# Patient Record
Sex: FEMALE | Race: OTHER | HISPANIC OR LATINO | Employment: OTHER | URBAN - METROPOLITAN AREA
[De-identification: names, ages, dates, MRNs, and addresses within clinical notes are randomized per-mention and may not be internally consistent; named-entity substitution may affect disease eponyms.]

---

## 2022-05-06 ENCOUNTER — HOSPITAL ENCOUNTER (INPATIENT)
Facility: HOSPITAL | Age: 57
LOS: 6 days | Discharge: HOME | DRG: 394 | End: 2022-05-13
Attending: EMERGENCY MEDICINE | Admitting: SURGERY
Payer: MEDICARE

## 2022-05-06 ENCOUNTER — APPOINTMENT (EMERGENCY)
Dept: RADIOLOGY | Facility: HOSPITAL | Age: 57
DRG: 394 | End: 2022-05-06
Payer: MEDICARE

## 2022-05-06 DIAGNOSIS — D73.3 SPLENIC ABSCESS: Primary | ICD-10-CM

## 2022-05-06 DIAGNOSIS — R10.12 LEFT UPPER QUADRANT ABDOMINAL PAIN: ICD-10-CM

## 2022-05-06 DIAGNOSIS — L03.311 CELLULITIS OF ABDOMINAL WALL: ICD-10-CM

## 2022-05-06 LAB
ALBUMIN SERPL BCP-MCNC: 2.8 G/DL (ref 3.5–5)
ALP SERPL-CCNC: 85 U/L (ref 46–116)
ALT SERPL W P-5'-P-CCNC: 19 U/L (ref 12–78)
ANION GAP SERPL CALCULATED.3IONS-SCNC: 7 MMOL/L (ref 4–13)
AST SERPL W P-5'-P-CCNC: 20 U/L (ref 5–45)
BASOPHILS # BLD AUTO: 0.09 THOUSANDS/ΜL (ref 0–0.1)
BASOPHILS NFR BLD AUTO: 1 % (ref 0–1)
BILIRUB SERPL-MCNC: 0.18 MG/DL (ref 0.2–1)
BUN SERPL-MCNC: 16 MG/DL (ref 5–25)
CALCIUM ALBUM COR SERPL-MCNC: 9.8 MG/DL (ref 8.3–10.1)
CALCIUM SERPL-MCNC: 8.8 MG/DL (ref 8.3–10.1)
CHLORIDE SERPL-SCNC: 107 MMOL/L (ref 100–108)
CO2 SERPL-SCNC: 23 MMOL/L (ref 21–32)
CREAT SERPL-MCNC: 0.7 MG/DL (ref 0.6–1.3)
EOSINOPHIL # BLD AUTO: 0.23 THOUSAND/ΜL (ref 0–0.61)
EOSINOPHIL NFR BLD AUTO: 2 % (ref 0–6)
ERYTHROCYTE [DISTWIDTH] IN BLOOD BY AUTOMATED COUNT: 17.4 % (ref 11.6–15.1)
GFR SERPL CREATININE-BSD FRML MDRD: 95 ML/MIN/1.73SQ M
GLUCOSE SERPL-MCNC: 90 MG/DL (ref 65–140)
HCT VFR BLD AUTO: 34.1 % (ref 34.8–46.1)
HGB BLD-MCNC: 10.9 G/DL (ref 11.5–15.4)
IMM GRANULOCYTES # BLD AUTO: 0.03 THOUSAND/UL (ref 0–0.2)
IMM GRANULOCYTES NFR BLD AUTO: 0 % (ref 0–2)
LYMPHOCYTES # BLD AUTO: 2.86 THOUSANDS/ΜL (ref 0.6–4.47)
LYMPHOCYTES NFR BLD AUTO: 22 % (ref 14–44)
MCH RBC QN AUTO: 25 PG (ref 26.8–34.3)
MCHC RBC AUTO-ENTMCNC: 32 G/DL (ref 31.4–37.4)
MCV RBC AUTO: 78 FL (ref 82–98)
MONOCYTES # BLD AUTO: 1.11 THOUSAND/ΜL (ref 0.17–1.22)
MONOCYTES NFR BLD AUTO: 9 % (ref 4–12)
NEUTROPHILS # BLD AUTO: 8.58 THOUSANDS/ΜL (ref 1.85–7.62)
NEUTS SEG NFR BLD AUTO: 66 % (ref 43–75)
NRBC BLD AUTO-RTO: 0 /100 WBCS
PLATELET # BLD AUTO: 528 THOUSANDS/UL (ref 149–390)
PMV BLD AUTO: 8.9 FL (ref 8.9–12.7)
POTASSIUM SERPL-SCNC: 4.4 MMOL/L (ref 3.5–5.3)
PROT SERPL-MCNC: 7.2 G/DL (ref 6.4–8.2)
RBC # BLD AUTO: 4.36 MILLION/UL (ref 3.81–5.12)
SODIUM SERPL-SCNC: 137 MMOL/L (ref 136–145)
WBC # BLD AUTO: 12.9 THOUSAND/UL (ref 4.31–10.16)

## 2022-05-06 PROCEDURE — 96375 TX/PRO/DX INJ NEW DRUG ADDON: CPT

## 2022-05-06 PROCEDURE — 99285 EMERGENCY DEPT VISIT HI MDM: CPT | Performed by: EMERGENCY MEDICINE

## 2022-05-06 PROCEDURE — G1004 CDSM NDSC: HCPCS

## 2022-05-06 PROCEDURE — 80053 COMPREHEN METABOLIC PANEL: CPT | Performed by: EMERGENCY MEDICINE

## 2022-05-06 PROCEDURE — 36415 COLL VENOUS BLD VENIPUNCTURE: CPT | Performed by: EMERGENCY MEDICINE

## 2022-05-06 PROCEDURE — 99285 EMERGENCY DEPT VISIT HI MDM: CPT

## 2022-05-06 PROCEDURE — 96376 TX/PRO/DX INJ SAME DRUG ADON: CPT

## 2022-05-06 PROCEDURE — 85025 COMPLETE CBC W/AUTO DIFF WBC: CPT | Performed by: EMERGENCY MEDICINE

## 2022-05-06 PROCEDURE — 74176 CT ABD & PELVIS W/O CONTRAST: CPT

## 2022-05-06 PROCEDURE — 93005 ELECTROCARDIOGRAM TRACING: CPT

## 2022-05-06 PROCEDURE — 96365 THER/PROPH/DIAG IV INF INIT: CPT

## 2022-05-06 RX ORDER — HYDROMORPHONE HCL/PF 1 MG/ML
0.5 SYRINGE (ML) INJECTION ONCE
Status: COMPLETED | OUTPATIENT
Start: 2022-05-06 | End: 2022-05-06

## 2022-05-06 RX ORDER — ONDANSETRON 2 MG/ML
4 INJECTION INTRAMUSCULAR; INTRAVENOUS ONCE
Status: COMPLETED | OUTPATIENT
Start: 2022-05-06 | End: 2022-05-06

## 2022-05-06 RX ADMIN — ONDANSETRON 4 MG: 2 INJECTION INTRAMUSCULAR; INTRAVENOUS at 20:17

## 2022-05-06 RX ADMIN — PIPERACILLIN AND TAZOBACTAM 4.5 G: 36; 4.5 INJECTION, POWDER, FOR SOLUTION INTRAVENOUS at 23:07

## 2022-05-06 RX ADMIN — HYDROMORPHONE HYDROCHLORIDE 0.5 MG: 1 INJECTION, SOLUTION INTRAMUSCULAR; INTRAVENOUS; SUBCUTANEOUS at 20:17

## 2022-05-06 RX ADMIN — HYDROMORPHONE HYDROCHLORIDE 0.5 MG: 1 INJECTION, SOLUTION INTRAMUSCULAR; INTRAVENOUS; SUBCUTANEOUS at 22:22

## 2022-05-06 NOTE — ED ATTENDING ATTESTATION
5/6/2022  I, Ed Ambrose MD, saw and evaluated the patient  I have discussed the patient with the resident/non-physician practitioner and agree with the resident's/non-physician practitioner's findings, Plan of Care, and MDM as documented in the resident's/non-physician practitioner's note, except where noted  All available labs and Radiology studies were reviewed  I was present for key portions of any procedure(s) performed by the resident/non-physician practitioner and I was immediately available to provide assistance  At this point I agree with the current assessment done in the Emergency Department  I have conducted an independent evaluation of this patient a history and physical is as follows:  Pt had gastric sleeve surgery in august with splenic injury and abscess formation   Pt had drain pulled in february and apparently had improving collection Pt now has pain for 1 week radiating into l shoulder No fevers + chills PE: alert heart reg lungs clear abd soft tender luq there is area of redness at site of drain in past MDM: will do ct labs   ED Course         Critical Care Time  Procedures

## 2022-05-07 ENCOUNTER — APPOINTMENT (INPATIENT)
Dept: RADIOLOGY | Facility: HOSPITAL | Age: 57
DRG: 394 | End: 2022-05-07
Attending: RADIOLOGY
Payer: MEDICARE

## 2022-05-07 PROBLEM — D73.3 SPLENIC ABSCESS: Status: ACTIVE | Noted: 2022-05-07

## 2022-05-07 LAB
ABO GROUP BLD: NORMAL
ABO GROUP BLD: NORMAL
ANION GAP SERPL CALCULATED.3IONS-SCNC: 3 MMOL/L (ref 4–13)
ATRIAL RATE: 72 BPM
BASOPHILS # BLD AUTO: 0.09 THOUSANDS/ΜL (ref 0–0.1)
BASOPHILS NFR BLD AUTO: 1 % (ref 0–1)
BLD GP AB SCN SERPL QL: POSITIVE
BLOOD GROUP ANTIBODIES SERPL: NORMAL
BUN SERPL-MCNC: 11 MG/DL (ref 5–25)
CALCIUM SERPL-MCNC: 9.2 MG/DL (ref 8.3–10.1)
CHLORIDE SERPL-SCNC: 108 MMOL/L (ref 100–108)
CO2 SERPL-SCNC: 29 MMOL/L (ref 21–32)
CREAT SERPL-MCNC: 0.74 MG/DL (ref 0.6–1.3)
DAT POLY-SP REAG RBC QL: NEGATIVE
E AG RBC QL: NEGATIVE
EOSINOPHIL # BLD AUTO: 0.3 THOUSAND/ΜL (ref 0–0.61)
EOSINOPHIL NFR BLD AUTO: 3 % (ref 0–6)
ERYTHROCYTE [DISTWIDTH] IN BLOOD BY AUTOMATED COUNT: 17.3 % (ref 11.6–15.1)
GFR SERPL CREATININE-BSD FRML MDRD: 88 ML/MIN/1.73SQ M
GLUCOSE SERPL-MCNC: 80 MG/DL (ref 65–140)
HCT VFR BLD AUTO: 32.1 % (ref 34.8–46.1)
HGB BLD-MCNC: 10.4 G/DL (ref 11.5–15.4)
IMM GRANULOCYTES # BLD AUTO: 0.03 THOUSAND/UL (ref 0–0.2)
IMM GRANULOCYTES NFR BLD AUTO: 0 % (ref 0–2)
INR PPP: 1.06 (ref 0.84–1.19)
LYMPHOCYTES # BLD AUTO: 2.45 THOUSANDS/ΜL (ref 0.6–4.47)
LYMPHOCYTES NFR BLD AUTO: 22 % (ref 14–44)
MCH RBC QN AUTO: 25.6 PG (ref 26.8–34.3)
MCHC RBC AUTO-ENTMCNC: 32.4 G/DL (ref 31.4–37.4)
MCV RBC AUTO: 79 FL (ref 82–98)
MONOCYTES # BLD AUTO: 1.02 THOUSAND/ΜL (ref 0.17–1.22)
MONOCYTES NFR BLD AUTO: 9 % (ref 4–12)
NEUTROPHILS # BLD AUTO: 7.13 THOUSANDS/ΜL (ref 1.85–7.62)
NEUTS SEG NFR BLD AUTO: 65 % (ref 43–75)
NRBC BLD AUTO-RTO: 0 /100 WBCS
P AXIS: 63 DEGREES
PLATELET # BLD AUTO: 502 THOUSANDS/UL (ref 149–390)
PMV BLD AUTO: 9 FL (ref 8.9–12.7)
POTASSIUM SERPL-SCNC: 4.4 MMOL/L (ref 3.5–5.3)
PR INTERVAL: 160 MS
PROTHROMBIN TIME: 13.4 SECONDS (ref 11.6–14.5)
QRS AXIS: 24 DEGREES
QRSD INTERVAL: 86 MS
QT INTERVAL: 394 MS
QTC INTERVAL: 431 MS
RBC # BLD AUTO: 4.07 MILLION/UL (ref 3.81–5.12)
RH BLD: POSITIVE
RH BLD: POSITIVE
SODIUM SERPL-SCNC: 140 MMOL/L (ref 136–145)
SPECIMEN EXPIRATION DATE: NORMAL
T WAVE AXIS: 32 DEGREES
VENTRICULAR RATE: 72 BPM
WBC # BLD AUTO: 11.02 THOUSAND/UL (ref 4.31–10.16)

## 2022-05-07 PROCEDURE — 87075 CULTR BACTERIA EXCEPT BLOOD: CPT | Performed by: SURGERY

## 2022-05-07 PROCEDURE — 99152 MOD SED SAME PHYS/QHP 5/>YRS: CPT | Performed by: RADIOLOGY

## 2022-05-07 PROCEDURE — 86870 RBC ANTIBODY IDENTIFICATION: CPT | Performed by: SURGERY

## 2022-05-07 PROCEDURE — 99255 IP/OBS CONSLTJ NEW/EST HI 80: CPT | Performed by: INTERNAL MEDICINE

## 2022-05-07 PROCEDURE — C1769 GUIDE WIRE: HCPCS

## 2022-05-07 PROCEDURE — 10030 IMG GID FLU COLL DRG SFT TIS: CPT

## 2022-05-07 PROCEDURE — 10030 IMG GID FLU COLL DRG SFT TIS: CPT | Performed by: RADIOLOGY

## 2022-05-07 PROCEDURE — NC001 PR NO CHARGE: Performed by: RADIOLOGY

## 2022-05-07 PROCEDURE — 87077 CULTURE AEROBIC IDENTIFY: CPT | Performed by: SURGERY

## 2022-05-07 PROCEDURE — 87185 SC STD ENZYME DETCJ PER NZM: CPT | Performed by: SURGERY

## 2022-05-07 PROCEDURE — 99153 MOD SED SAME PHYS/QHP EA: CPT

## 2022-05-07 PROCEDURE — 86905 BLOOD TYPING RBC ANTIGENS: CPT

## 2022-05-07 PROCEDURE — 99152 MOD SED SAME PHYS/QHP 5/>YRS: CPT

## 2022-05-07 PROCEDURE — 87070 CULTURE OTHR SPECIMN AEROBIC: CPT | Performed by: SURGERY

## 2022-05-07 PROCEDURE — C1729 CATH, DRAINAGE: HCPCS

## 2022-05-07 PROCEDURE — 86850 RBC ANTIBODY SCREEN: CPT | Performed by: SURGERY

## 2022-05-07 PROCEDURE — 99222 1ST HOSP IP/OBS MODERATE 55: CPT | Performed by: SURGERY

## 2022-05-07 PROCEDURE — 99223 1ST HOSP IP/OBS HIGH 75: CPT | Performed by: INTERNAL MEDICINE

## 2022-05-07 PROCEDURE — 079P30Z DRAINAGE OF SPLEEN WITH DRAINAGE DEVICE, PERCUTANEOUS APPROACH: ICD-10-PCS | Performed by: SURGERY

## 2022-05-07 PROCEDURE — 87205 SMEAR GRAM STAIN: CPT | Performed by: SURGERY

## 2022-05-07 PROCEDURE — 87040 BLOOD CULTURE FOR BACTERIA: CPT | Performed by: STUDENT IN AN ORGANIZED HEALTH CARE EDUCATION/TRAINING PROGRAM

## 2022-05-07 PROCEDURE — 86901 BLOOD TYPING SEROLOGIC RH(D): CPT | Performed by: SURGERY

## 2022-05-07 PROCEDURE — 85610 PROTHROMBIN TIME: CPT | Performed by: SURGERY

## 2022-05-07 PROCEDURE — 87076 CULTURE ANAEROBE IDENT EACH: CPT | Performed by: SURGERY

## 2022-05-07 PROCEDURE — 86880 COOMBS TEST DIRECT: CPT | Performed by: SURGERY

## 2022-05-07 PROCEDURE — 93010 ELECTROCARDIOGRAM REPORT: CPT | Performed by: INTERNAL MEDICINE

## 2022-05-07 PROCEDURE — 36415 COLL VENOUS BLD VENIPUNCTURE: CPT | Performed by: SURGERY

## 2022-05-07 PROCEDURE — 85025 COMPLETE CBC W/AUTO DIFF WBC: CPT | Performed by: SURGERY

## 2022-05-07 PROCEDURE — 86900 BLOOD TYPING SEROLOGIC ABO: CPT | Performed by: SURGERY

## 2022-05-07 PROCEDURE — 80048 BASIC METABOLIC PNL TOTAL CA: CPT | Performed by: SURGERY

## 2022-05-07 RX ORDER — SENNOSIDES 8.6 MG
1 TABLET ORAL DAILY
Status: DISCONTINUED | OUTPATIENT
Start: 2022-05-07 | End: 2022-05-13 | Stop reason: HOSPADM

## 2022-05-07 RX ORDER — OXYCODONE HYDROCHLORIDE 5 MG/1
5 TABLET ORAL EVERY 4 HOURS PRN
Status: DISCONTINUED | OUTPATIENT
Start: 2022-05-07 | End: 2022-05-13 | Stop reason: HOSPADM

## 2022-05-07 RX ORDER — HYDROMORPHONE HCL/PF 1 MG/ML
0.5 SYRINGE (ML) INJECTION EVERY 4 HOURS PRN
Status: DISCONTINUED | OUTPATIENT
Start: 2022-05-07 | End: 2022-05-12

## 2022-05-07 RX ORDER — AMLODIPINE BESYLATE 10 MG/1
10 TABLET ORAL DAILY
Status: DISCONTINUED | OUTPATIENT
Start: 2022-05-07 | End: 2022-05-13 | Stop reason: HOSPADM

## 2022-05-07 RX ORDER — POLYETHYLENE GLYCOL 3350 17 G/17G
17 POWDER, FOR SOLUTION ORAL DAILY
Status: DISCONTINUED | OUTPATIENT
Start: 2022-05-07 | End: 2022-05-13 | Stop reason: HOSPADM

## 2022-05-07 RX ORDER — ACETAMINOPHEN 325 MG/1
975 TABLET ORAL EVERY 8 HOURS SCHEDULED
Status: DISCONTINUED | OUTPATIENT
Start: 2022-05-07 | End: 2022-05-13 | Stop reason: HOSPADM

## 2022-05-07 RX ORDER — CARVEDILOL 12.5 MG/1
12.5 TABLET ORAL 2 TIMES DAILY WITH MEALS
Status: CANCELLED | OUTPATIENT
Start: 2022-05-07

## 2022-05-07 RX ORDER — SODIUM CHLORIDE 9 MG/ML
10 INJECTION INTRAVENOUS DAILY
Qty: 300 ML | Refills: 3 | Status: SHIPPED | OUTPATIENT
Start: 2022-05-07 | End: 2022-05-13

## 2022-05-07 RX ORDER — CALCIUM CARBONATE 200(500)MG
1000 TABLET,CHEWABLE ORAL 3 TIMES DAILY PRN
Status: DISCONTINUED | OUTPATIENT
Start: 2022-05-07 | End: 2022-05-13 | Stop reason: HOSPADM

## 2022-05-07 RX ORDER — TRAZODONE HYDROCHLORIDE 50 MG/1
75 TABLET ORAL
Status: DISCONTINUED | OUTPATIENT
Start: 2022-05-07 | End: 2022-05-13 | Stop reason: HOSPADM

## 2022-05-07 RX ORDER — HEPARIN SODIUM 5000 [USP'U]/ML
5000 INJECTION, SOLUTION INTRAVENOUS; SUBCUTANEOUS EVERY 8 HOURS SCHEDULED
Status: DISCONTINUED | OUTPATIENT
Start: 2022-05-07 | End: 2022-05-13 | Stop reason: HOSPADM

## 2022-05-07 RX ORDER — MIDAZOLAM HYDROCHLORIDE 2 MG/2ML
INJECTION, SOLUTION INTRAMUSCULAR; INTRAVENOUS CODE/TRAUMA/SEDATION MEDICATION
Status: COMPLETED | OUTPATIENT
Start: 2022-05-07 | End: 2022-05-07

## 2022-05-07 RX ORDER — SODIUM CHLORIDE, SODIUM LACTATE, POTASSIUM CHLORIDE, CALCIUM CHLORIDE 600; 310; 30; 20 MG/100ML; MG/100ML; MG/100ML; MG/100ML
125 INJECTION, SOLUTION INTRAVENOUS CONTINUOUS
Status: DISCONTINUED | OUTPATIENT
Start: 2022-05-07 | End: 2022-05-08

## 2022-05-07 RX ORDER — LEVOTHYROXINE SODIUM 0.07 MG/1
150 TABLET ORAL
Status: DISCONTINUED | OUTPATIENT
Start: 2022-05-07 | End: 2022-05-13 | Stop reason: HOSPADM

## 2022-05-07 RX ORDER — ALBUTEROL SULFATE 90 UG/1
2 AEROSOL, METERED RESPIRATORY (INHALATION) EVERY 4 HOURS PRN
Status: CANCELLED | OUTPATIENT
Start: 2022-05-07

## 2022-05-07 RX ORDER — FENTANYL CITRATE 50 UG/ML
INJECTION, SOLUTION INTRAMUSCULAR; INTRAVENOUS CODE/TRAUMA/SEDATION MEDICATION
Status: COMPLETED | OUTPATIENT
Start: 2022-05-07 | End: 2022-05-07

## 2022-05-07 RX ORDER — CITALOPRAM 20 MG/1
40 TABLET ORAL DAILY
Status: DISCONTINUED | OUTPATIENT
Start: 2022-05-07 | End: 2022-05-13 | Stop reason: HOSPADM

## 2022-05-07 RX ORDER — AMLODIPINE BESYLATE 5 MG/1
10 TABLET ORAL DAILY
Status: CANCELLED | OUTPATIENT
Start: 2022-05-07

## 2022-05-07 RX ORDER — ONDANSETRON 2 MG/ML
4 INJECTION INTRAMUSCULAR; INTRAVENOUS EVERY 4 HOURS PRN
Status: DISCONTINUED | OUTPATIENT
Start: 2022-05-07 | End: 2022-05-13 | Stop reason: HOSPADM

## 2022-05-07 RX ORDER — TRAZODONE HYDROCHLORIDE 50 MG/1
50 TABLET ORAL
Status: CANCELLED | OUTPATIENT
Start: 2022-05-07

## 2022-05-07 RX ORDER — LEVOTHYROXINE SODIUM 0.07 MG/1
150 TABLET ORAL
Status: CANCELLED | OUTPATIENT
Start: 2022-05-07

## 2022-05-07 RX ORDER — OXYCODONE HYDROCHLORIDE 10 MG/1
10 TABLET ORAL EVERY 4 HOURS PRN
Status: DISCONTINUED | OUTPATIENT
Start: 2022-05-07 | End: 2022-05-13 | Stop reason: HOSPADM

## 2022-05-07 RX ADMIN — OXYCODONE HYDROCHLORIDE 10 MG: 10 TABLET ORAL at 21:42

## 2022-05-07 RX ADMIN — FENTANYL CITRATE 25 MCG: 50 INJECTION INTRAMUSCULAR; INTRAVENOUS at 14:20

## 2022-05-07 RX ADMIN — SODIUM CHLORIDE, SODIUM LACTATE, POTASSIUM CHLORIDE, AND CALCIUM CHLORIDE 125 ML/HR: .6; .31; .03; .02 INJECTION, SOLUTION INTRAVENOUS at 07:47

## 2022-05-07 RX ADMIN — ACETAMINOPHEN 975 MG: 325 TABLET, FILM COATED ORAL at 21:39

## 2022-05-07 RX ADMIN — FENTANYL CITRATE 50 MCG: 50 INJECTION INTRAMUSCULAR; INTRAVENOUS at 14:13

## 2022-05-07 RX ADMIN — FENTANYL CITRATE 25 MCG: 50 INJECTION INTRAMUSCULAR; INTRAVENOUS at 14:30

## 2022-05-07 RX ADMIN — MIDAZOLAM 1 MG: 1 INJECTION INTRAMUSCULAR; INTRAVENOUS at 14:14

## 2022-05-07 RX ADMIN — PIPERACILLIN AND TAZOBACTAM 4.5 G: 36; 4.5 INJECTION, POWDER, FOR SOLUTION INTRAVENOUS at 12:37

## 2022-05-07 RX ADMIN — SODIUM CHLORIDE, SODIUM LACTATE, POTASSIUM CHLORIDE, AND CALCIUM CHLORIDE 125 ML/HR: .6; .31; .03; .02 INJECTION, SOLUTION INTRAVENOUS at 17:13

## 2022-05-07 RX ADMIN — PIPERACILLIN AND TAZOBACTAM 4.5 G: 36; 4.5 INJECTION, POWDER, FOR SOLUTION INTRAVENOUS at 23:40

## 2022-05-07 RX ADMIN — PIPERACILLIN AND TAZOBACTAM 4.5 G: 36; 4.5 INJECTION, POWDER, FOR SOLUTION INTRAVENOUS at 05:48

## 2022-05-07 RX ADMIN — LEVOTHYROXINE SODIUM 150 MCG: 75 TABLET ORAL at 05:48

## 2022-05-07 RX ADMIN — OXYCODONE HYDROCHLORIDE 10 MG: 10 TABLET ORAL at 08:02

## 2022-05-07 RX ADMIN — HEPARIN SODIUM 5000 UNITS: 5000 INJECTION INTRAVENOUS; SUBCUTANEOUS at 02:57

## 2022-05-07 RX ADMIN — TRAZODONE HYDROCHLORIDE 75 MG: 50 TABLET ORAL at 23:40

## 2022-05-07 RX ADMIN — HYDROMORPHONE HYDROCHLORIDE 0.5 MG: 1 INJECTION, SOLUTION INTRAMUSCULAR; INTRAVENOUS; SUBCUTANEOUS at 12:35

## 2022-05-07 RX ADMIN — TRAZODONE HYDROCHLORIDE 75 MG: 50 TABLET ORAL at 02:58

## 2022-05-07 RX ADMIN — ACETAMINOPHEN 975 MG: 325 TABLET, FILM COATED ORAL at 02:57

## 2022-05-07 RX ADMIN — CALCIUM CARBONATE (ANTACID) CHEW TAB 500 MG 1000 MG: 500 CHEW TAB at 21:49

## 2022-05-07 RX ADMIN — OXYCODONE HYDROCHLORIDE 10 MG: 10 TABLET ORAL at 16:26

## 2022-05-07 RX ADMIN — ACETAMINOPHEN 975 MG: 325 TABLET, FILM COATED ORAL at 12:35

## 2022-05-07 RX ADMIN — HYDROMORPHONE HYDROCHLORIDE 0.5 MG: 1 INJECTION, SOLUTION INTRAMUSCULAR; INTRAVENOUS; SUBCUTANEOUS at 19:34

## 2022-05-07 RX ADMIN — MIDAZOLAM 0.5 MG: 1 INJECTION INTRAMUSCULAR; INTRAVENOUS at 14:30

## 2022-05-07 RX ADMIN — MIDAZOLAM 0.5 MG: 1 INJECTION INTRAMUSCULAR; INTRAVENOUS at 14:20

## 2022-05-07 RX ADMIN — PIPERACILLIN AND TAZOBACTAM 4.5 G: 36; 4.5 INJECTION, POWDER, FOR SOLUTION INTRAVENOUS at 17:14

## 2022-05-07 RX ADMIN — SODIUM CHLORIDE, SODIUM LACTATE, POTASSIUM CHLORIDE, AND CALCIUM CHLORIDE 125 ML/HR: .6; .31; .03; .02 INJECTION, SOLUTION INTRAVENOUS at 02:56

## 2022-05-07 RX ADMIN — CITALOPRAM HYDROBROMIDE 40 MG: 20 TABLET, FILM COATED ORAL at 08:02

## 2022-05-07 RX ADMIN — HEPARIN SODIUM 5000 UNITS: 5000 INJECTION INTRAVENOUS; SUBCUTANEOUS at 21:39

## 2022-05-07 NOTE — PROGRESS NOTES
Progress Note - General Surgery  : [facility] Resident on [facility] [address] 59 y o  female MRN: [id number]  Unit/Bed#: [facility] 605-01 Encounter: [id number]      Assessment:  59 y o  female with splenic abscess likely causing subcutaneous abscess, s/p IR drain placement       Plan:  Zosyn  F/u blood/drain cultures  Monitor LUIS ENRIQUE drain output/character  Regular diet  PRN analgesia  Appreciate GI consultation   Hopefully EGD Monday to address axios stent blockage  Hold Eliquis  DVT ppx        Subjective: Feels well      Objective:     Physical Exam:  GEN: NAD   Ab: Soft, NT/ND, drain SS in tubing with some tan output in the tubing  Lung: Normal effort on RA  CV: RRR   Extrem: No CCE   Neuro: A+Ox3       I/O     None          Lab, Imaging and other studies: I have personally reviewed pertinent reports    , CBC with diff:   Lab Results   Component Value Date    WBC 11 02 (H) 05/07/2022    HGB 10 4 (L) 05/07/2022    HCT 32 1 (L) 05/07/2022    MCV 79 (L) 05/07/2022     (H) 05/07/2022    MCH 25 6 (L) 05/07/2022    MCHC 32 4 05/07/2022    RDW 17 3 (H) 05/07/2022    MPV 9 0 05/07/2022    NRBC 0 05/07/2022   , BMP/CMP:   Lab Results   Component Value Date    SODIUM 140 05/07/2022    K 4 4 05/07/2022     05/07/2022    CO2 29 05/07/2022    BUN 11 05/07/2022    CREATININE 0 74 05/07/2022    CALCIUM 9 2 05/07/2022    AST 20 05/06/2022    ALT 19 05/06/2022    ALKPHOS 85 05/06/2022    EGFR 88 05/07/2022         VTE Pharmacologic Prophylaxis: Heparin        Ksenia Felix MD  5/7/2022 10:36 AM

## 2022-05-07 NOTE — DISCHARGE INSTRUCTIONS
Acute Care Surgery Discharge Instructions    Please follow-up as instructed  Activity:  - You may resume activity as tolerated  Diet:    - You may resume your normal diet  Medications:  - Please complete your entire course of antibiotics, Augmentin, through 6/3/2022 at least   - You should continue your current medication regimenafter discharge unless otherwise instructed  Please refer to your discharge medication list for further details  - Please take the pain medications as directed  - You are encouraged to use non-narcotic pain medications first and whenever possible  Reserve the use of narcotic pain medication for moderate to severe pain not controlled by non-narcotic medications   - No driving while taking narcotic pain medications  - You may become constipated, especially if taking pain medications  You may take any over the counter stool softeners or laxatives as needed  Examples: Milk of Magnesia, Colace, Senna  Additional Instructions:  - May shower daily and/or bathe normally  - If you have any questions or concerns after discharge please call the office   - Call office or return to ER if fever greater than 101, chills, persistent nausea/vomiting, and/or worsening/uncontrollable pain

## 2022-05-07 NOTE — H&P (VIEW-ONLY)
[facility] - Inpatient Consultation  Gely Shook 59 y o  female MRN: [id number]  Unit/Bed#: [facility] 605-01 Encounter: [id number]    Reason for Consult / Principal Problem:     Perisplenic abscess    ASSESSMENT & PLAN:      64-year-old female with history of hypertension, hypothyroidism, coronary artery disease status post stent placement x4, prior DVT on Eliquis, history of laparoscopic sleeve gastrectomy complicated by splenic injury and subsequent perisplenic abscess at outside hospital, who presented with worsening left upper quadrant abdominal pain and was found to have abscess and possible cellulitis  GI consultation requested for perisplenic abscess and concern for possible occlusion of Axios stent  1  Status post LAMS (Axios) placement, perisplenic abscess, cellulitis - unclear if stent is occluded although this may be the case  Inflammation in the abdominal wall may be secondary to body attempt to formed fistula  Presented with leukocytosis but otherwise hemodynamically stable and afebrile  Certainly reasonable to evaluate the stent endoscopically and perform any interventions if needed depending on findings  · Will tentatively plan for endoscopy for evaluation of Axios stent early next week depending on GI lab and advanced endoscopist availability  · Depending on endoscopic findings, may consider removal versus exchange of the stent if needed; however, will discuss further with advanced endoscopist on Monday  · NPO after midnight on Sunday for possible procedure on Monday  · Discussed with patient and she is agreeable with plan as above  · Hold anticoagulation; okay for DVT prophylaxis  · Appreciate IR consultation with plan for abdominal wall aspiration versus drainage and potential intraperitoneal access if collection is seen  · Check blood cultures  · Agree with IV antibiotics and appreciate ID recommendations  · Continue supportive care per primary team    2   History of DVT on anticoagulation - on Eliquis as outpatient  Last dose in AM of 5/6  · Hold anticoagulation  · Okay to continue DVT prophylaxis  · GI recommendations otherwise as noted above  ______________________________________________________________________    HISTORY OF PRESENT ILLNESS:  61-year-old female with history of hypertension, hypothyroidism, CAD status post stent placement x4, prior DVT on Eliquis, history of laparoscopic sleeve gastrectomy in 46/6268 complicated by splenic injury requiring IR embolization, further complicated by perisplenic abscess status post IR drainage and placement of IR drain as well as endoscopic lumen-apposing stent placement in 12/2021 at [facility] in [place]  Following her surgery, patient had prolonged hospitalization requiring long-term antibiotics as well as the interventions by IR in GI as noted above  Most recently, patient was discharged to rehab at the end of December on IV Unasyn for 6 weeks and then change to oral Augmentin  Patient had repeat hospitalization in 02/2022 following IR drainage removal when she had worsening left upper quadrant pain as well as left shoulder pain  Patient underwent imaging with ultrasound and CT scan which demonstrated residual left upper quadrant abscess but no further intervention was performed at that time  She has since been at rehab but was visiting the [facility] to see her daughter for Mother's Day  Due to worsening left upper quadrant abdominal pain with radiation to the left flank as well as left shoulder pain over the last 2 months, patient presented to [facility] for further evaluation  Patient was admitted under the surgical service  Non-contrast CT on 05/06 on admission revealed probable collection in the left upper quadrant although not well-differentiated from the spleen in the absence of contrast enhancement with adjacent postsurgical change in the gastric fundus    There was presumed collection measuring approximately 4 5 cm containing few foci of gas  There was soft tissue inflammation in the left lateral chest wall overlying the diaphragm and spleen which could reflect cellulitis  Inflammation extending to the chest wall and intercostal space not well-differentiated from the superior aspect of the spleen and adjacent fluid collection  Possible developing fistula not excluded  GI consultation requested due to concern for possible occlusion of the existing Axios stent  This was placed in 12/2021 at [facility] in [place]  The stent appears to be in appropriate position although may be occluded  Patient reports that she has not had any repeat endoscopy since [facility] placement in December        REVIEW OF SYSTEMS:    CONSTITUTIONAL: Denies any fever, rigors, and weight loss  HEENT: No earache or tinnitus, denies hearing loss or visual disturbances  CARDIOVASCULAR: No chest pain or palpitations   RESPIRATORY: Denies any cough, hemoptysis, shortness of breath or dyspnea on exertion  GASTROINTESTINAL: As noted in the History of Present Illness   GENITOURINARY: No problems with urination, denies any hematuria or dysuria  NEUROLOGIC: No dizziness or vertigo, denies headaches   MUSCULOSKELETAL: Denies any muscle or joint pain   SKIN: Denies skin rashes or itching   ENDOCRINE: Denies excessive thirst, denies intolerance to heat or cold  PSYCHOSOCIAL: Denies depression or anxiety, denies any recent memory loss     Historical Information   Past Medical History:   Diagnosis Date    Disease of thyroid gland     Hernia     Hypertension      Past Surgical History:   Procedure Laterality Date    CHOLECYSTECTOMY      CORONARY ANGIOPLASTY WITH STENT PLACEMENT       Social History   Social History     Substance and Sexual Activity   Alcohol Use No     Social History     Substance and Sexual Activity   Drug Use No     Social History     Tobacco Use   Smoking Status Former Smoker   Smokeless Tobacco Never Used     History reviewed  No pertinent family history  Meds/Allergies   Medications Prior to Admission   Medication    naproxen (NAPROSYN) 500 mg tablet     Current Facility-Administered Medications   Medication Dose Route Frequency    acetaminophen (TYLENOL) tablet 975 mg  975 mg Oral Q8H [address]    amLODIPine (NORVASC) tablet 10 mg  10 mg Oral Daily    citalopram (CeleXA) tablet 40 mg  40 mg Oral Daily    heparin (porcine) subcutaneous injection 5,000 Units  5,000 Units Subcutaneous Q8H [address]    HYDROmorphone (DILAUDID) injection 0 5 mg  0 5 mg Intravenous Q4H PRN    lactated ringers infusion  125 mL/hr Intravenous Continuous    levothyroxine tablet 150 mcg  150 mcg Oral Early Morning    ondansetron (ZOFRAN) injection 4 mg  4 mg Intravenous Q4H PRN    oxyCODONE (ROXICODONE) immediate release tablet 10 mg  10 mg Oral Q4H PRN    oxyCODONE (ROXICODONE) IR tablet 5 mg  5 mg Oral Q4H PRN    piperacillin-tazobactam (ZOSYN) 4 5 g in sodium chloride 0 9 % 100 mL IVPB  4 5 g Intravenous Q6H    polyethylene glycol (MIRALAX) packet 17 g  17 g Oral Daily    senna (SENOKOT) tablet 8 6 mg  1 tablet Oral Daily    traZODone (DESYREL) tablet 75 mg  75 mg Oral HS     No Known Allergies    PHYSICAL EXAM:      Objective   Blood pressure 97/60, pulse 68, temperature 97 7 °F (36 5 °C), resp  rate 18, height 5' 3" (1 6 m), weight 88 5 kg (195 lb), last menstrual period 08/03/2016, SpO2 94 %, not currently breastfeeding  Body mass index is 34 54 kg/m²    No intake or output data in the 24 hours ending 05/07/22 1121    General Appearance:   Alert, cooperative, no distress   HEENT:   Normocephalic, atraumatic, anicteric     Neck:   Supple, symmetrical, trachea midline   Lungs:   Equal chest rise, respirations unlabored    Heart:   Regular rate and rhythm   Abdomen:   Soft, TTP in LUQ/left lateral flank in area of previous drain site, non-distended; normal bowel sounds; no masses, no organomegaly    Rectal:   Deferred Extremities:   No cyanosis, clubbing or edema    Neuro:    Moves all 4 extremities    Skin:   No jaundice, rashes, or lesions; healed scar in area of previous drain in LUQ/left flank, mild erythema and warmth to touch      LAB RESULTS:     Admission on 05/06/2022   Component Date Value    WBC 05/06/2022 12 90*    RBC 05/06/2022 4 36     Hemoglobin 05/06/2022 10 9*    Hematocrit 05/06/2022 34 1*    MCV 05/06/2022 78*    MCH 05/06/2022 25 0*    MCHC 05/06/2022 32 0     RDW 05/06/2022 17 4*    MPV 05/06/2022 8 9     Platelets 36/34/1809 528*    nRBC 05/06/2022 0     Neutrophils Relative 05/06/2022 66     Immat GRANS % 05/06/2022 0     Lymphocytes Relative 05/06/2022 22     Monocytes Relative 05/06/2022 9     Eosinophils Relative 05/06/2022 2     Basophils Relative 05/06/2022 1     Neutrophils Absolute 05/06/2022 8 58*    Immature Grans Absolute 05/06/2022 0 03     Lymphocytes Absolute 05/06/2022 2 86     Monocytes Absolute 05/06/2022 1 11     Eosinophils Absolute 05/06/2022 0 23     Basophils Absolute 05/06/2022 0 09     Sodium 05/06/2022 137     Potassium 05/06/2022 4 4     Chloride 05/06/2022 107     CO2 05/06/2022 23     ANION GAP 05/06/2022 7     BUN 05/06/2022 16     Creatinine 05/06/2022 0 70     Glucose 05/06/2022 90     Calcium 05/06/2022 8 8     Corrected Calcium 05/06/2022 9 8     AST 05/06/2022 20     ALT 05/06/2022 19     Alkaline Phosphatase 05/06/2022 85     Total Protein 05/06/2022 7 2     Albumin 05/06/2022 2 8*    Total Bilirubin 05/06/2022 0 18*    eGFR 05/06/2022 95     WBC 05/07/2022 11 02*    RBC 05/07/2022 4 07     Hemoglobin 05/07/2022 10 4*    Hematocrit 05/07/2022 32 1*    MCV 05/07/2022 79*    MCH 05/07/2022 25 6*    MCHC 05/07/2022 32 4     RDW 05/07/2022 17 3*    MPV 05/07/2022 9 0     Platelets 54/84/4442 502*    nRBC 05/07/2022 0     Neutrophils Relative 05/07/2022 65     Immat GRANS % 05/07/2022 0     Lymphocytes Relative 05/07/2022 22     Monocytes Relative 05/07/2022 9     Eosinophils Relative 05/07/2022 3     Basophils Relative 05/07/2022 1     Neutrophils Absolute 05/07/2022 7 13     Immature Grans Absolute 05/07/2022 0 03     Lymphocytes Absolute 05/07/2022 2 45     Monocytes Absolute 05/07/2022 1 02     Eosinophils Absolute 05/07/2022 0 30     Basophils Absolute 05/07/2022 0 09     Sodium 05/07/2022 140     Potassium 05/07/2022 4 4     Chloride 05/07/2022 108     CO2 05/07/2022 29     ANION GAP 05/07/2022 3*    BUN 05/07/2022 11     Creatinine 05/07/2022 0 74     Glucose 05/07/2022 80     Calcium 05/07/2022 9 2     eGFR 05/07/2022 88     ABO Grouping 05/07/2022 A     Rh Factor 05/07/2022 Positive     Antibody Screen 05/07/2022 Positive     Specimen Expiration Date 05/07/2022 [id number]     ABO Grouping 05/07/2022 A     Rh Factor 05/07/2022 Positive     Protime 05/07/2022 13 4     INR 05/07/2022 1 06        RADIOLOGY RESULTS: I have personally reviewed pertinent imaging studies  VINICIO Aviles  Chief Gastroenterology Fellow  [facility]  Available on [email or web address]

## 2022-05-07 NOTE — ED PROVIDER NOTES
History  Chief Complaint   Patient presents with    Abdominal Pain     Pt had a gastric sleeve done in August, during the surgery, they cut into her spleen leading to complications  pt started with LUQ abdominal pain, right at the site where she had a drain placed after the surgery  Pt denies fevers but c/o chills  18-year-old female with history of gastric sleeve surgery complicated by splenic injury with subsequent development of a splenic abscess who presents for evaluation of left upper quadrant abdominal pain  Patient underwent gastric sleeve surgery in August of 2021  Her spleen was inadvertently injured during the procedure and she subsequently underwent coil embolization with development of a perisplenic abscess  She had drains placed that were removed in February of 2022  One week ago, she developed increasing left upper quadrant abdominal pain that radiates to the left shoulder  She has had some intermittent nausea but no vomiting  She has had subjective chills but no fevers  She had 2 episodes of loose stools yesterday but her bowel movements have otherwise been normal for her  Her pain is pleuritic in nature but does not radiate elsewhere  She has been using Tylenol with codeine at home for her pain with little improvement  She also notes that there is an area of erythema over the left upper quadrant and an area of swelling that developed approximately 2 days ago  Prior to Admission Medications   Prescriptions Last Dose Informant Patient Reported? Taking?   naproxen (NAPROSYN) 500 mg tablet   No No   Sig: Take 1 tablet by mouth 2 (two) times a day with meals      Facility-Administered Medications: None       Past Medical History:   Diagnosis Date    Disease of thyroid gland     Hernia     Hypertension        Past Surgical History:   Procedure Laterality Date    CHOLECYSTECTOMY      CORONARY ANGIOPLASTY WITH STENT PLACEMENT         History reviewed   No pertinent family history  I have reviewed and agree with the history as documented  E-Cigarette/Vaping     E-Cigarette/Vaping Substances     Social History     Tobacco Use    Smoking status: Former Smoker    Smokeless tobacco: Never Used   Substance Use Topics    Alcohol use: No    Drug use: No        Review of Systems   Constitutional: Positive for chills  Negative for fever  Eyes: Negative for visual disturbance  Respiratory: Negative for chest tightness and shortness of breath  Cardiovascular: Negative for chest pain and leg swelling  Gastrointestinal: Positive for abdominal pain, diarrhea and nausea  Negative for abdominal distention, constipation and vomiting  Genitourinary: Negative for dysuria, flank pain, frequency and urgency  Musculoskeletal: Negative for back pain and gait problem  Skin: Positive for color change  Negative for pallor and rash  Neurological: Negative for syncope, weakness, light-headedness and headaches  All other systems reviewed and are negative  Physical Exam  ED Triage Vitals   Temperature Pulse Respirations Blood Pressure SpO2   05/06/22 1958 05/06/22 1844 05/06/22 1844 05/06/22 1844 05/06/22 1844   97 5 °F (36 4 °C) 88 16 138/75 97 %      Temp Source Heart Rate Source Patient Position - Orthostatic VS BP Location FiO2 (%)   05/06/22 1958 05/06/22 1844 05/06/22 1844 05/06/22 1844 --   Oral Monitor Sitting Right arm       Pain Score       05/06/22 1844       8             Orthostatic Vital Signs  Vitals:    05/06/22 1844 05/06/22 2107 05/06/22 2226   BP: 138/75 132/79 112/64   Pulse: 88 74 68   Patient Position - Orthostatic VS: Sitting Sitting Sitting       Physical Exam  Vitals and nursing note reviewed  Constitutional:       General: She is not in acute distress  Appearance: She is well-developed  HENT:      Head: Normocephalic and atraumatic        Nose: Nose normal       Mouth/Throat:      Mouth: Mucous membranes are moist    Eyes:      Extraocular Movements: Extraocular movements intact  Cardiovascular:      Rate and Rhythm: Normal rate and regular rhythm  Heart sounds: No murmur heard  No friction rub  No gallop  Pulmonary:      Effort: Pulmonary effort is normal       Breath sounds: Normal breath sounds  No wheezing, rhonchi or rales  Abdominal:      General: There is no distension  Palpations: Abdomen is soft  Tenderness: There is abdominal tenderness in the left upper quadrant  There is guarding  There is no rebound  Comments: Area of erythema surrounding an area of palpable fluctuance in the left upper quadrant of the abdomen  See media for images  Musculoskeletal:         General: No swelling or tenderness  Normal range of motion  Cervical back: Normal range of motion and neck supple  Skin:     General: Skin is warm and dry  Coloration: Skin is not pale  Findings: No rash  Neurological:      General: No focal deficit present  Mental Status: She is alert and oriented to person, place, and time     Psychiatric:         Behavior: Behavior normal          ED Medications  Medications   lactated ringers infusion (has no administration in time range)   ondansetron (ZOFRAN) injection 4 mg (has no administration in time range)   polyethylene glycol (MIRALAX) packet 17 g (has no administration in time range)   senna (SENOKOT) tablet 8 6 mg (has no administration in time range)   heparin (porcine) subcutaneous injection 5,000 Units (has no administration in time range)   acetaminophen (TYLENOL) tablet 975 mg (has no administration in time range)   oxyCODONE (ROXICODONE) IR tablet 5 mg (has no administration in time range)   oxyCODONE (ROXICODONE) immediate release tablet 10 mg (has no administration in time range)   HYDROmorphone (DILAUDID) injection 0 5 mg (has no administration in time range)   piperacillin-tazobactam (ZOSYN) 4 5 g in sodium chloride 0 9 % 100 mL IVPB (has no administration in time range)   amLODIPine (NORVASC) tablet 10 mg (has no administration in time range)   traZODone (DESYREL) tablet 75 mg (has no administration in time range)   levothyroxine tablet 150 mcg (has no administration in time range)   citalopram (CeleXA) tablet 40 mg (has no administration in time range)   ondansetron (ZOFRAN) injection 4 mg (4 mg Intravenous Given 5/6/22 2017)   HYDROmorphone (DILAUDID) injection 0 5 mg (0 5 mg Intravenous Given 5/6/22 2017)   barium (READI-CAT 2) suspension 500 mL (500 mL Oral Given 5/6/22 2156)   HYDROmorphone (DILAUDID) injection 0 5 mg (0 5 mg Intravenous Given 5/6/22 2222)   piperacillin-tazobactam (ZOSYN) 4 5 g in sodium chloride 0 9 % 100 mL IVPB (4 5 g Intravenous New Bag 5/6/22 2307)       Diagnostic Studies  Results Reviewed     Procedure Component Value Units Date/Time    Platelet count [[id number]]     Lab Status: No result Specimen: Blood     Comprehensive metabolic panel [[id number]]  (Abnormal) Collected: 05/06/22 2007    Lab Status: Final result Specimen: Blood from Arm, Right Updated: 05/06/22 2055     Sodium 137 mmol/L      Potassium 4 4 mmol/L      Chloride 107 mmol/L      CO2 23 mmol/L      ANION GAP 7 mmol/L      BUN 16 mg/dL      Creatinine 0 70 mg/dL      Glucose 90 mg/dL      Calcium 8 8 mg/dL      Corrected Calcium 9 8 mg/dL      AST 20 U/L      ALT 19 U/L      Alkaline Phosphatase 85 U/L      Total Protein 7 2 g/dL      Albumin 2 8 g/dL      Total Bilirubin 0 18 mg/dL      eGFR 95 ml/min/1 73sq m     Narrative:      [place] guidelines for Chronic Kidney Disease (CKD):     Stage 1 with normal or high GFR (GFR > 90 mL/min/1 73 square meters)    Stage 2 Mild CKD (GFR = 60-89 mL/min/1 73 square meters)    Stage 3A Moderate CKD (GFR = 45-59 mL/min/1 73 square meters)    Stage 3B Moderate CKD (GFR = 30-44 mL/min/1 73 square meters)    Stage 4 Severe CKD (GFR = 15-29 mL/min/1 73 square meters)    Stage 5 End Stage CKD (GFR <15 mL/min/1 73 square meters)  Note: GFR calculation is accurate only with a steady state creatinine    CBC and differential [[id number]]  (Abnormal) Collected: 05/06/22 2007    Lab Status: Final result Specimen: Blood from Arm, Right Updated: 05/06/22 2025     WBC 12 90 Thousand/uL      RBC 4 36 Million/uL      Hemoglobin 10 9 g/dL      Hematocrit 34 1 %      MCV 78 fL      MCH 25 0 pg      MCHC 32 0 g/dL      RDW 17 4 %      MPV 8 9 fL      Platelets 955 Thousands/uL      nRBC 0 /100 WBCs      Neutrophils Relative 66 %      Immat GRANS % 0 %      Lymphocytes Relative 22 %      Monocytes Relative 9 %      Eosinophils Relative 2 %      Basophils Relative 1 %      Neutrophils Absolute 8 58 Thousands/µL      Immature Grans Absolute 0 03 Thousand/uL      Lymphocytes Absolute 2 86 Thousands/µL      Monocytes Absolute 1 11 Thousand/µL      Eosinophils Absolute 0 23 Thousand/µL      Basophils Absolute 0 09 Thousands/µL                  CT abdomen pelvis wo contrast   Final Result by Derick Moy MD (05/06 2250)   Addendum 1 of 1 by Derick Moy MD (05/06 2250)   ADDENDUM:      Soft tissue inflammation in the left lateral chest wall overlying the    diaphragm and spleen could reflect cellulitis  Additionally inflammation    extends to the chest wall and intercostal space and is not well    differentiated from the superior aspect of the    spleen and adjacent fluid collection  Developing cutaneous fistula    cannot be excluded; however evaluation is limited in the absence of    contrast       Final         1  Probable collection in the left upper quadrant, not well differentiated from the spleen and the absence of intravenous contrast, and adjacent to postsurgical change in the gastric fundus  Presumed collection measures 4 5 cm containing few foci of    gas  Comparison with prior outside imaging, if available would be helpful  2   Postsurgical change from gastric sleeve    No evidence of bowel obstruction, colitis, diverticulitis or appendicitis  Workstation performed: [id number]               Procedures  Procedures      ED Course  ED Course as of 05/07/22 0115   Fri May 06, 2022   2025 Procedure Note: EKG  Date/Time: 05/06/22 8:23 PM   Interpreted by: Tiffany Mendez   Indications / Diagnosis: Upper abdominal pain  ECG reviewed by me, the ED Provider: yes   The EKG demonstrates:  Rhythm: rate 72, normal sinus  Intervals: normal intervals  Axis: normal axis  QRS/Blocks: normal QRS  ST Changes: No acute ST Changes, no STD/BURT       2055 Comprehensive metabolic panel(!)                             SBIRT 22yo+      Most Recent Value   SBIRT (23 yo +)    In order to provide better care to our patients, we are screening all of our patients for alcohol and drug use  Would it be okay to ask you these screening questions? No Filed at: 05/06/2022 2015                [facility]  Number of Diagnoses or Management Options  Cellulitis of abdominal wall: new and requires workup  Left upper quadrant abdominal pain: new and requires workup  Splenic abscess: new and requires workup  Diagnosis management comments: 38-year-old female who presents for evaluation of left upper quadrant abdominal pain with known splenic abscess  Concern for fistulization of abscess based on exam  Will obtain labs and CTAP to evaluate  Labs with mild leukocytosis  CT concerning for fistula formation with extension of abscess into the soft tissue  Patient initiated on zosyn  Discussed with red surgery and admitted to their service          Amount and/or Complexity of Data Reviewed  Clinical lab tests: reviewed and ordered  Tests in the radiology section of CPT®: ordered and reviewed  Review and summarize past medical records: yes  Discuss the patient with other providers: yes    Risk of Complications, Morbidity, and/or Mortality  Presenting problems: high  Diagnostic procedures: low  Management options: moderate    Patient Progress  Patient progress: stable      Disposition  Final diagnoses:   Splenic abscess   Left upper quadrant abdominal pain   Cellulitis of abdominal wall     Time reflects when diagnosis was documented in both MDM as applicable and the Disposition within this note     Time User Action Codes Description Comment    5/7/2022 12:31 AM Wales Heman Add [D73 3] Splenic abscess     5/7/2022 12:31 AM Levander Sleek Modify [D73 3] Splenic abscess     5/7/2022 12:32 AM Levander Sleek Add [R10 12] Left upper quadrant abdominal pain     5/7/2022 12:32 AM Levander Sleek Add [X53 356] Cellulitis of abdominal wall       ED Disposition     ED Disposition Condition Date/Time Comment    Admit Stable Sat May 7, 2022 12:31 AM Case was discussed with general surgery and the patient's admission status was agreed to be Admission Status: inpatient status to the service of Dr  To          Follow-up Information    None         Patient's Medications   Discharge Prescriptions    No medications on file     No discharge procedures on file  PDMP Review     None           ED Provider  Attending physically available and evaluated Samuel Joy I managed the patient along with the ED Attending      Electronically Signed by         Bolivar Elias MD  05/07/22 6563

## 2022-05-07 NOTE — H&P
H&P Exam - General Surgery   Joanne Mcintosh 59 y o  female MRN: [id number]  Unit/Bed#: ED 15 Encounter: [id number]    Assessment/Plan     Assessment:  58 yo female s/p lap sleeve gastrectomy complicated by splenic injury requiring splenic embolization  Further complicated by multiple splenic abscesses requiring IR drainage and cystgastrostomy, now presents with residual LUQ fluid collection and concern for developing cutaneous fistula    AVSS  WBC-12  Cr-0 70    Plan:  -patient will require IR drainage of left upper quadrant collection  -will start IV Zosyn, consult infectious disease secondary to prolonged antibiotics have previous institution  -[email or web address]  -will hold Eliquis, last dose yesterday morning  -admission to general surgery service, Med surg  -Pain/nasea control prn    History of Present Illness     HPI:  Joanne Mcintosh is a 59 y o  female p medical history of of hypertension, hypothyroidism, coronary artery disease status post stent placement x4, prior DVT on Eliquis who presents with left upper quadrant, left flank, and left shoulder pain  Patient has a previous surgical history of a laparoscopic sleeve gastrectomy, this was complicated by splenic injury requiring IR embolization at [facility] in August of 2021  Patient re-presented to the hospital in November with perisplenic abscesses, this required IR drainage and ultimately cyst gastrostomy of in December of 2021  Patient had a prolonged hospitalization requiring long-term antibiotics  She was most recently discharged to rehab at the end of December on IV Unasyn for 6 weeks  She was ultimately switched to Augmentin p o  For 10 days  The patient's original cultures is in November of 2021  End of February patient had her IR drainage removed  He has approximately 1 month later the patient began having worsening left upper quadrant pain onset and left shoulder pain    At the time an ultrasound and CT scan of the abdomen demonstrated a residual left upper quadrant of abscess  However no further treatment was recommended at that time  There was no further drainage or antibiotics  Over the past 2 months the patient has been complaining of worsening left upper quadrant pain, left shoulder pain, fatigue, and chills  Patient is visiting the [facility] was developed to see her daughter for mother's Day  Patient wishes to have her care at [facility]  Review of Systems   Constitutional: Positive for activity change, appetite change and chills  Negative for fever  HENT: Negative  Eyes: Negative  Respiratory: Negative for shortness of breath  Cardiovascular: Negative for chest pain  Gastrointestinal: Positive for abdominal pain  Negative for nausea and vomiting  Endocrine: Negative  Genitourinary: Negative  Musculoskeletal: Negative  Skin: Positive for wound  Allergic/Immunologic: Positive for immunocompromised state  Neurological: Negative  Hematological: Negative  Psychiatric/Behavioral: Negative  Historical Information   Past Medical History:   Diagnosis Date    Disease of thyroid gland     Hernia     Hypertension      Past Surgical History:   Procedure Laterality Date    CHOLECYSTECTOMY      CORONARY ANGIOPLASTY WITH STENT PLACEMENT       Social History   Social History     Substance and Sexual Activity   Alcohol Use No     Social History     Substance and Sexual Activity   Drug Use No     Social History     Tobacco Use   Smoking Status Former Smoker   Smokeless Tobacco Never Used     E-Cigarette/Vaping     E-Cigarette/Vaping Substances     Family History: History reviewed  No pertinent family history      Meds/Allergies   all medications and allergies reviewed  No Known Allergies    Objective   First Vitals:   Blood Pressure: 138/75 (05/06/22 1844)  Pulse: 88 (05/06/22 1844)  Temperature: 97 5 °F (36 4 °C) (05/06/22 1958)  Temp Source: Oral (05/06/22 1958)  Respirations: 16 (05/06/22 1844)  Height: 5' 3" (160 cm) (05/06/22 1844)  Weight - Scale: 88 5 kg (195 lb) (05/06/22 1844)  SpO2: 97 % (05/06/22 1844)    Current Vitals:   Blood Pressure: 112/64 (05/06/22 2226)  Pulse: 68 (05/06/22 2226)  Temperature: 97 5 °F (36 4 °C) (05/06/22 1958)  Temp Source: Oral (05/06/22 1958)  Respirations: 18 (05/06/22 2226)  Height: 5' 3" (160 cm) (05/06/22 1844)  Weight - Scale: 88 5 kg (195 lb) (05/06/22 1844)  SpO2: 97 % (05/06/22 2226)    No intake or output data in the 24 hours ending 05/07/22 0104    Invasive Devices  Report    Peripheral Intravenous Line            Peripheral IV 05/06/22 Right;Dorsal (posterior) Forearm <1 day                Physical Exam  General: NAD  HENT: NCAT MMM  Neck: supple, no JVD  CV: nl rate  Lungs: nl wob  No resp distress  ABD: Soft, left flank tenderness  Previous IR drainage area with erythema and slight induration  No palpable fluctuance noted, nondistended  Extrem: No CCE  Neuro: AAOx3    Lab Results:   I have personally reviewed pertinent lab results    , CBC:   Lab Results   Component Value Date    WBC 12 90 (H) 05/06/2022    HGB 10 9 (L) 05/06/2022    HCT 34 1 (L) 05/06/2022    MCV 78 (L) 05/06/2022     (H) 05/06/2022    MCH 25 0 (L) 05/06/2022    MCHC 32 0 05/06/2022    RDW 17 4 (H) 05/06/2022    MPV 8 9 05/06/2022    NRBC 0 05/06/2022   , CMP:   Lab Results   Component Value Date    SODIUM 137 05/06/2022    K 4 4 05/06/2022     05/06/2022    CO2 23 05/06/2022    BUN 16 05/06/2022    CREATININE 0 70 05/06/2022    CALCIUM 8 8 05/06/2022    AST 20 05/06/2022    ALT 19 05/06/2022    ALKPHOS 85 05/06/2022    EGFR 95 05/06/2022   , Coagulation: No results found for: PT, INR, APTT  Imaging: I have personally reviewed pertinent films in PACS  EKG, Pathology, and Other Studies: I have personally reviewed pertinent films in PACS    Code Status: Level 1 - Full Code  Advance Directive and Living Will:      Power of :    POLST:

## 2022-05-07 NOTE — INTERVAL H&P NOTE
Update: (This section must be completed if the H&P was completed greater than 24 hrs to procedure or admission)    H&P reviewed  After examining the patient, I find no changed to the H&P since it had been written  Patient arrives for aspiration pursestring placement of abdominal wall collection  Suspect connection a deep structures    On further discussion patient reveals that she had a four-month hospital stay with extended period of time in the intensive care unit related to her prior bariatric surgery and its complications, namely a left upper quadrant collection and bleeding as well as respiratory failure    On Eliquis currently suggest old    Risks benefits alternatives discussed Imaging reviewed with patient, we will sedate she has had difficult experiences with image guided procedures in the past    Mp2 ASA3      Patient re-evaluated   Accept as history and physical     Oscar Huggins MD/May 7, 2022/2:11 PM

## 2022-05-07 NOTE — PROGRESS NOTES
Progress Note - ***Surgery   Richard Sosa 59 y o  female MRN: [id number]  Unit/Bed#: ED 15 Encounter: [id number]    Assessment:  Patient is a 59 y o  female who presented with residual LUQ fluid collection and concern for developing cutaneous fistula  AVSS    Plan:  ***  - Plan for IR drainage of LUQ collection  - IV Zosyn, appreciate ID recommendations  - Hold Eliquis, last dose 5/6 am  - Pain/Nausea control prn    Subjective/Objective     Subjective:   No acute events overnight  Patient complains of LUQ pain, worse on palpation  No rebound  No other complaints  Denies nausea, vomiting, diarrhea, dysuria, chest pain, shortness of breath  Last BM yesterday, passing gas  Objective:    Blood pressure 124/61, pulse 77, temperature 97 5 °F (36 4 °C), temperature source Oral, resp  rate 18, height 5' 3" (1 6 m), weight 88 5 kg (195 lb), last menstrual period 08/03/2016, SpO2 98 %, not currently breastfeeding  ,Body mass index is 34 54 kg/m²  No intake or output data in the 24 hours ending 05/07/22 0616    Invasive Devices  Report    Peripheral Intravenous Line            Peripheral IV 05/06/22 Right;Dorsal (posterior) Forearm <1 day                Physical Exam  Constitutional:       General: She is not in acute distress  Appearance: She is not ill-appearing  HENT:      Head: Normocephalic and atraumatic  Eyes:      Extraocular Movements: Extraocular movements intact  Cardiovascular:      Rate and Rhythm: Normal rate and regular rhythm  Pulses: Normal pulses  Pulmonary:      Effort: Pulmonary effort is normal       Breath sounds: Normal breath sounds  Abdominal:      General: Abdomen is flat  There is no distension  Palpations: Abdomen is soft  Tenderness: There is abdominal tenderness (LUQ on palpation, no rebound)  Musculoskeletal:      Right lower leg: No edema  Left lower leg: No edema  Skin:     General: Skin is warm and dry        Capillary Refill: Capillary refill takes less than 2 seconds  Neurological:      Mental Status: She is alert and oriented to person, place, and time           Results from last 7 days   Lab Units 05/07/22  0553 05/06/22 2007   WBC Thousand/uL 11 02* 12 90*   HEMOGLOBIN g/dL 10 4* 10 9*   HEMATOCRIT % 32 1* 34 1*   PLATELETS Thousands/uL 502* 528*     Results from last 7 days   Lab Units 05/06/22 2007   POTASSIUM mmol/L 4 4   CHLORIDE mmol/L 107   CO2 mmol/L 23   BUN mg/dL 16   CREATININE mg/dL 0 70   CALCIUM mg/dL 8 8

## 2022-05-07 NOTE — TELEMEDICINE
INTERPROFESSIONAL (ELECTRONIC OR PHONE) CONSULTATION - Interventional Radiology  Loraine Flaming 59 y o  female MRN: [id number]  Unit/Bed#: [facility] 605-01 Encounter: [id number]    IR has been consulted regarding the care of Loraine Flaming  We were consulted by general surgery concerning this patient with leukocytosis and questionable collection    IP Consult to IR  Consult performed by: Dede Knox MD  Consult ordered by: Makeda Kidd DO        05/07/22      Assessment/Recommendation:     57F w/ bariatric surgery, reported splenic complications  Had a drain for period of time  Here with abd pain, WBC 12    noncon CT with questionable collection  In the absence of contrast cannot differential spleen from collection although I do not see a fluid density collection  I see an [place] stent in place from stomach remnant  Per report the drain was in for a period of time and then removed    Given inflammation in the abdominal wall I believe this represents a drain tract and likely the bodies attempt to form a fistula  We can access the subcutaneous tissue with ultrasound an aspirate or place a drain    I suspect the axios stent may be clogged  This can be evaluated endoscopically as it was placed endoscopically    Would suggest obtaining records, possibly imaging and reports from prior admission at outside institution    I discussed the above with the surgical service    - add on today for ABDOMINAL WALL aspiration vs drainage  - will not attempt intraperitoneal access unless I see a collection  - recommend advanced GI consult to evaluate axios stent endoscopically    Axios stent from stomach to questionable prior splenic collection:            Subcutaneous collection:        Total time spent in review of data, discussion with requesting provider and rendering advice was 25 minutes     Thank you for allowing Interventional Radiology to participate in the care of Loraine Flaming   Please don't hesitate to call or TigerText us with any questions  Agnieszka Almeida MD      Past Medical History:  Past Medical History:   Diagnosis Date    Disease of thyroid gland     Hernia     Hypertension        Past Surgical History:  Past Surgical History:   Procedure Laterality Date    CHOLECYSTECTOMY      CORONARY ANGIOPLASTY WITH STENT PLACEMENT         Social History:  Social History     Substance and Sexual Activity   Alcohol Use No     Social History     Substance and Sexual Activity   Drug Use No     Social History     Tobacco Use   Smoking Status Former Smoker   Smokeless Tobacco Never Used       Family History:  History reviewed  No pertinent family history      Allergies:  No Known Allergies    Medications:  Medications Prior to Admission   Medication    naproxen (NAPROSYN) 500 mg tablet     Current Facility-Administered Medications   Medication Dose Route Frequency    acetaminophen (TYLENOL) tablet 975 mg  975 mg Oral Q8H [address]    amLODIPine (NORVASC) tablet 10 mg  10 mg Oral Daily    citalopram (CeleXA) tablet 40 mg  40 mg Oral Daily    heparin (porcine) subcutaneous injection 5,000 Units  5,000 Units Subcutaneous Q8H [address]    HYDROmorphone (DILAUDID) injection 0 5 mg  0 5 mg Intravenous Q4H PRN    lactated ringers infusion  125 mL/hr Intravenous Continuous    levothyroxine tablet 150 mcg  150 mcg Oral Early Morning    ondansetron (ZOFRAN) injection 4 mg  4 mg Intravenous Q4H PRN    oxyCODONE (ROXICODONE) immediate release tablet 10 mg  10 mg Oral Q4H PRN    oxyCODONE (ROXICODONE) IR tablet 5 mg  5 mg Oral Q4H PRN    piperacillin-tazobactam (ZOSYN) 4 5 g in sodium chloride 0 9 % 100 mL IVPB  4 5 g Intravenous Q6H    polyethylene glycol (MIRALAX) packet 17 g  17 g Oral Daily    senna (SENOKOT) tablet 8 6 mg  1 tablet Oral Daily    traZODone (DESYREL) tablet 75 mg  75 mg Oral HS       Vitals:  BP 97/60 (BP Location: Right leg)   Pulse 68   Temp 97 7 °F (36 5 °C)   Resp 18   Ht 5' 3" (1 6 m)   Wt 88 5 kg (195 lb) LMP 08/03/2016   SpO2 94%   BMI 34 54 kg/m²   Body mass index is 34 54 kg/m²    Weight (last 2 days)     Date/Time Weight    05/06/22 1844 88 5 (195)          I/Os:  No intake or output data in the 24 hours ending 05/07/22 0912    Lab Results and Cultures:   CBC with diff:   Lab Results   Component Value Date    WBC 11 02 (H) 05/07/2022    HGB 10 4 (L) 05/07/2022    HCT 32 1 (L) 05/07/2022    MCV 79 (L) 05/07/2022     (H) 05/07/2022    MCH 25 6 (L) 05/07/2022    MCHC 32 4 05/07/2022    RDW 17 3 (H) 05/07/2022    MPV 9 0 05/07/2022    NRBC 0 05/07/2022      BMP/CMP:  Lab Results   Component Value Date    K 4 4 05/07/2022     05/07/2022    CO2 29 05/07/2022    BUN 11 05/07/2022    CREATININE 0 74 05/07/2022    CALCIUM 9 2 05/07/2022    AST 20 05/06/2022    ALT 19 05/06/2022    ALKPHOS 85 05/06/2022    EGFR 88 05/07/2022   ,     Coags: No results found for: PT, PTT, INR,          HgbA1c: No results found for: HGBA1C    Blood Culture: No results found for: BLOODCX,   Urinalysis:   Lab Results   Component Value Date    COLORU Yellow 10/03/2016    CLARITYU Clear 10/03/2016    SPECGRAV 1 020 10/03/2016    PHUR 6 5 10/03/2016    LEUKOCYTESUR Negative 10/03/2016    NITRITE Negative 10/03/2016    GLUCOSEU Negative 10/03/2016    KETONESU Negative 10/03/2016    BILIRUBINUR Negative 10/03/2016    BLOODU Moderate (A) 10/03/2016   ,   Urine Culture:   Lab Results   Component Value Date    URINECX >100,000 cfu/ml Escherichia coli 10/03/2016   ,   Wound Culure:  No results found for: WOUNDCULT    Imaging Studies: I have personally reviewed pertinent films in PACS

## 2022-05-07 NOTE — SEDATION DOCUMENTATION
6F left abdominal abscess drainage tube placement completed by Dr Esthela Cerna  Patient tolerated well, denies pain and discomfort post procedure  Specimens to lab  Pt transported back to room and bedside report given to [facility], [address] [facility]

## 2022-05-07 NOTE — BRIEF OP NOTE (RAD/CATH)
INTERVENTIONAL RADIOLOGY PROCEDURE NOTE    Date: 5/7/2022    Procedure:   IR DRAINAGE    Preoperative diagnosis:   1  Splenic abscess    2  Left upper quadrant abdominal pain    3  Cellulitis of abdominal wall         Postoperative diagnosis: Same  Surgeon: Carlos Miller MD     Assistant: None  No qualified resident was available  Blood loss: 0    Specimens:  Pus for culture, aerobic, anaerobic     Findings:     Small linear collection presumably along the prior drain tract in the left abdominal wall  This was seen to extend down to the surface of the abdomen    [address] drain placed    7 cc pus obtained    Complications: None immediate      Anesthesia: conscious sedation     Plan:  Await endoscopic evaluation  Suggest drain check while patient is inpatient under fluoroscopy to evaluate the deeper tract

## 2022-05-07 NOTE — CONSULTS
[facility] - Inpatient Consultation  Joaquin Leblanc 59 y o  female MRN: [id number]  Unit/Bed#: [facility] 605-01 Encounter: [id number]    Reason for Consult / Principal Problem:     Perisplenic abscess    ASSESSMENT & PLAN:      57-year-old female with history of hypertension, hypothyroidism, coronary artery disease status post stent placement x4, prior DVT on Eliquis, history of laparoscopic sleeve gastrectomy complicated by splenic injury and subsequent perisplenic abscess at outside hospital, who presented with worsening left upper quadrant abdominal pain and was found to have abscess and possible cellulitis  GI consultation requested for perisplenic abscess and concern for possible occlusion of Axios stent  1  Status post LAMS (Axios) placement, perisplenic abscess, cellulitis - unclear if stent is occluded although this may be the case  Inflammation in the abdominal wall may be secondary to body attempt to formed fistula  Presented with leukocytosis but otherwise hemodynamically stable and afebrile  Certainly reasonable to evaluate the stent endoscopically and perform any interventions if needed depending on findings  · Will tentatively plan for endoscopy for evaluation of Axios stent early next week depending on GI lab and advanced endoscopist availability  · Depending on endoscopic findings, may consider removal versus exchange of the stent if needed; however, will discuss further with advanced endoscopist on Monday  · NPO after midnight on Sunday for possible procedure on Monday  · Discussed with patient and she is agreeable with plan as above  · Hold anticoagulation; okay for DVT prophylaxis  · Appreciate IR consultation with plan for abdominal wall aspiration versus drainage and potential intraperitoneal access if collection is seen  · Check blood cultures  · Agree with IV antibiotics and appreciate ID recommendations  · Continue supportive care per primary team    2   History of DVT on anticoagulation - on Eliquis as outpatient  Last dose in AM of 5/6  · Hold anticoagulation  · Okay to continue DVT prophylaxis  · GI recommendations otherwise as noted above  ______________________________________________________________________    HISTORY OF PRESENT ILLNESS:  49-year-old female with history of hypertension, hypothyroidism, CAD status post stent placement x4, prior DVT on Eliquis, history of laparoscopic sleeve gastrectomy in 98/5318 complicated by splenic injury requiring IR embolization, further complicated by perisplenic abscess status post IR drainage and placement of IR drain as well as endoscopic lumen-apposing stent placement in 12/2021 at [facility] in [place]  Following her surgery, patient had prolonged hospitalization requiring long-term antibiotics as well as the interventions by IR in GI as noted above  Most recently, patient was discharged to rehab at the end of December on IV Unasyn for 6 weeks and then change to oral Augmentin  Patient had repeat hospitalization in 02/2022 following IR drainage removal when she had worsening left upper quadrant pain as well as left shoulder pain  Patient underwent imaging with ultrasound and CT scan which demonstrated residual left upper quadrant abscess but no further intervention was performed at that time  She has since been at rehab but was visiting the [facility] to see her daughter for Mother's Day  Due to worsening left upper quadrant abdominal pain with radiation to the left flank as well as left shoulder pain over the last 2 months, patient presented to [facility] for further evaluation  Patient was admitted under the surgical service  Non-contrast CT on 05/06 on admission revealed probable collection in the left upper quadrant although not well-differentiated from the spleen in the absence of contrast enhancement with adjacent postsurgical change in the gastric fundus    There was presumed collection measuring approximately 4 5 cm containing few foci of gas  There was soft tissue inflammation in the left lateral chest wall overlying the diaphragm and spleen which could reflect cellulitis  Inflammation extending to the chest wall and intercostal space not well-differentiated from the superior aspect of the spleen and adjacent fluid collection  Possible developing fistula not excluded  GI consultation requested due to concern for possible occlusion of the existing Axios stent  This was placed in 12/2021 at [facility] in [place]  The stent appears to be in appropriate position although may be occluded  Patient reports that she has not had any repeat endoscopy since [facility] placement in December        REVIEW OF SYSTEMS:    CONSTITUTIONAL: Denies any fever, rigors, and weight loss  HEENT: No earache or tinnitus, denies hearing loss or visual disturbances  CARDIOVASCULAR: No chest pain or palpitations   RESPIRATORY: Denies any cough, hemoptysis, shortness of breath or dyspnea on exertion  GASTROINTESTINAL: As noted in the History of Present Illness   GENITOURINARY: No problems with urination, denies any hematuria or dysuria  NEUROLOGIC: No dizziness or vertigo, denies headaches   MUSCULOSKELETAL: Denies any muscle or joint pain   SKIN: Denies skin rashes or itching   ENDOCRINE: Denies excessive thirst, denies intolerance to heat or cold  PSYCHOSOCIAL: Denies depression or anxiety, denies any recent memory loss     Historical Information   Past Medical History:   Diagnosis Date    Disease of thyroid gland     Hernia     Hypertension      Past Surgical History:   Procedure Laterality Date    CHOLECYSTECTOMY      CORONARY ANGIOPLASTY WITH STENT PLACEMENT       Social History   Social History     Substance and Sexual Activity   Alcohol Use No     Social History     Substance and Sexual Activity   Drug Use No     Social History     Tobacco Use   Smoking Status Former Smoker   Smokeless Tobacco Never Used     History reviewed  No pertinent family history  Meds/Allergies   Medications Prior to Admission   Medication    naproxen (NAPROSYN) 500 mg tablet     Current Facility-Administered Medications   Medication Dose Route Frequency    acetaminophen (TYLENOL) tablet 975 mg  975 mg Oral Q8H [facility]    amLODIPine (NORVASC) tablet 10 mg  10 mg Oral Daily    citalopram (CeleXA) tablet 40 mg  40 mg Oral Daily    heparin (porcine) subcutaneous injection 5,000 Units  5,000 Units Subcutaneous Q8H [facility]    HYDROmorphone (DILAUDID) injection 0 5 mg  0 5 mg Intravenous Q4H PRN    lactated ringers infusion  125 mL/hr Intravenous Continuous    levothyroxine tablet 150 mcg  150 mcg Oral Early Morning    ondansetron (ZOFRAN) injection 4 mg  4 mg Intravenous Q4H PRN    oxyCODONE (ROXICODONE) immediate release tablet 10 mg  10 mg Oral Q4H PRN    oxyCODONE (ROXICODONE) IR tablet 5 mg  5 mg Oral Q4H PRN    piperacillin-tazobactam (ZOSYN) 4 5 g in sodium chloride 0 9 % 100 mL IVPB  4 5 g Intravenous Q6H    polyethylene glycol (MIRALAX) packet 17 g  17 g Oral Daily    senna (SENOKOT) tablet 8 6 mg  1 tablet Oral Daily    traZODone (DESYREL) tablet 75 mg  75 mg Oral HS     No Known Allergies    PHYSICAL EXAM:      Objective   Blood pressure 97/60, pulse 68, temperature 97 7 °F (36 5 °C), resp  rate 18, height 5' 3" (1 6 m), weight 88 5 kg (195 lb), last menstrual period 08/03/2016, SpO2 94 %, not currently breastfeeding  Body mass index is 34 54 kg/m²    No intake or output data in the 24 hours ending 05/07/22 1121    General Appearance:   Alert, cooperative, no distress   HEENT:   Normocephalic, atraumatic, anicteric     Neck:   Supple, symmetrical, trachea midline   Lungs:   Equal chest rise, respirations unlabored    Heart:   Regular rate and rhythm   Abdomen:   Soft, TTP in LUQ/left lateral flank in area of previous drain site, non-distended; normal bowel sounds; no masses, no organomegaly    Rectal:   Deferred Extremities:   No cyanosis, clubbing or edema    Neuro:    Moves all 4 extremities    Skin:   No jaundice, rashes, or lesions; healed scar in area of previous drain in LUQ/left flank, mild erythema and warmth to touch      LAB RESULTS:     Admission on 05/06/2022   Component Date Value    WBC 05/06/2022 12 90*    RBC 05/06/2022 4 36     Hemoglobin 05/06/2022 10 9*    Hematocrit 05/06/2022 34 1*    MCV 05/06/2022 78*    MCH 05/06/2022 25 0*    MCHC 05/06/2022 32 0     RDW 05/06/2022 17 4*    MPV 05/06/2022 8 9     Platelets 93/26/3164 528*    nRBC 05/06/2022 0     Neutrophils Relative 05/06/2022 66     Immat GRANS % 05/06/2022 0     Lymphocytes Relative 05/06/2022 22     Monocytes Relative 05/06/2022 9     Eosinophils Relative 05/06/2022 2     Basophils Relative 05/06/2022 1     Neutrophils Absolute 05/06/2022 8 58*    Immature Grans Absolute 05/06/2022 0 03     Lymphocytes Absolute 05/06/2022 2 86     Monocytes Absolute 05/06/2022 1 11     Eosinophils Absolute 05/06/2022 0 23     Basophils Absolute 05/06/2022 0 09     Sodium 05/06/2022 137     Potassium 05/06/2022 4 4     Chloride 05/06/2022 107     CO2 05/06/2022 23     ANION GAP 05/06/2022 7     BUN 05/06/2022 16     Creatinine 05/06/2022 0 70     Glucose 05/06/2022 90     Calcium 05/06/2022 8 8     Corrected Calcium 05/06/2022 9 8     AST 05/06/2022 20     ALT 05/06/2022 19     Alkaline Phosphatase 05/06/2022 85     Total Protein 05/06/2022 7 2     Albumin 05/06/2022 2 8*    Total Bilirubin 05/06/2022 0 18*    eGFR 05/06/2022 95     WBC 05/07/2022 11 02*    RBC 05/07/2022 4 07     Hemoglobin 05/07/2022 10 4*    Hematocrit 05/07/2022 32 1*    MCV 05/07/2022 79*    MCH 05/07/2022 25 6*    MCHC 05/07/2022 32 4     RDW 05/07/2022 17 3*    MPV 05/07/2022 9 0     Platelets 84/68/1966 502*    nRBC 05/07/2022 0     Neutrophils Relative 05/07/2022 65     Immat GRANS % 05/07/2022 0     Lymphocytes Relative 05/07/2022 22     Monocytes Relative 05/07/2022 9     Eosinophils Relative 05/07/2022 3     Basophils Relative 05/07/2022 1     Neutrophils Absolute 05/07/2022 7 13     Immature Grans Absolute 05/07/2022 0 03     Lymphocytes Absolute 05/07/2022 2 45     Monocytes Absolute 05/07/2022 1 02     Eosinophils Absolute 05/07/2022 0 30     Basophils Absolute 05/07/2022 0 09     Sodium 05/07/2022 140     Potassium 05/07/2022 4 4     Chloride 05/07/2022 108     CO2 05/07/2022 29     ANION GAP 05/07/2022 3*    BUN 05/07/2022 11     Creatinine 05/07/2022 0 74     Glucose 05/07/2022 80     Calcium 05/07/2022 9 2     eGFR 05/07/2022 88     ABO Grouping 05/07/2022 A     Rh Factor 05/07/2022 Positive     Antibody Screen 05/07/2022 Positive     Specimen Expiration Date 05/07/2022 [id number]     ABO Grouping 05/07/2022 A     Rh Factor 05/07/2022 Positive     Protime 05/07/2022 13 4     INR 05/07/2022 1 06        RADIOLOGY RESULTS: I have personally reviewed pertinent imaging studies  VINICIO Ireland  Chief Gastroenterology Fellow  [facility]  Available on [email or web address]

## 2022-05-08 LAB
ALBUMIN SERPL BCP-MCNC: 2.3 G/DL (ref 3.5–5)
ALP SERPL-CCNC: 72 U/L (ref 46–116)
ALT SERPL W P-5'-P-CCNC: 15 U/L (ref 12–78)
ANION GAP SERPL CALCULATED.3IONS-SCNC: 2 MMOL/L (ref 4–13)
AST SERPL W P-5'-P-CCNC: 10 U/L (ref 5–45)
BASOPHILS # BLD AUTO: 0.08 THOUSANDS/ΜL (ref 0–0.1)
BASOPHILS NFR BLD AUTO: 1 % (ref 0–1)
BILIRUB SERPL-MCNC: 0.17 MG/DL (ref 0.2–1)
BUN SERPL-MCNC: 9 MG/DL (ref 5–25)
CALCIUM ALBUM COR SERPL-MCNC: 9.8 MG/DL (ref 8.3–10.1)
CALCIUM SERPL-MCNC: 8.4 MG/DL (ref 8.3–10.1)
CHLORIDE SERPL-SCNC: 107 MMOL/L (ref 100–108)
CO2 SERPL-SCNC: 30 MMOL/L (ref 21–32)
CREAT SERPL-MCNC: 0.81 MG/DL (ref 0.6–1.3)
EOSINOPHIL # BLD AUTO: 0.23 THOUSAND/ΜL (ref 0–0.61)
EOSINOPHIL NFR BLD AUTO: 3 % (ref 0–6)
ERYTHROCYTE [DISTWIDTH] IN BLOOD BY AUTOMATED COUNT: 17.4 % (ref 11.6–15.1)
GFR SERPL CREATININE-BSD FRML MDRD: 79 ML/MIN/1.73SQ M
GLUCOSE SERPL-MCNC: 81 MG/DL (ref 65–140)
HCT VFR BLD AUTO: 31.3 % (ref 34.8–46.1)
HGB BLD-MCNC: 9.9 G/DL (ref 11.5–15.4)
IMM GRANULOCYTES # BLD AUTO: 0.04 THOUSAND/UL (ref 0–0.2)
IMM GRANULOCYTES NFR BLD AUTO: 1 % (ref 0–2)
LYMPHOCYTES # BLD AUTO: 2.22 THOUSANDS/ΜL (ref 0.6–4.47)
LYMPHOCYTES NFR BLD AUTO: 26 % (ref 14–44)
MCH RBC QN AUTO: 25.3 PG (ref 26.8–34.3)
MCHC RBC AUTO-ENTMCNC: 31.6 G/DL (ref 31.4–37.4)
MCV RBC AUTO: 80 FL (ref 82–98)
MONOCYTES # BLD AUTO: 0.86 THOUSAND/ΜL (ref 0.17–1.22)
MONOCYTES NFR BLD AUTO: 10 % (ref 4–12)
NEUTROPHILS # BLD AUTO: 5.03 THOUSANDS/ΜL (ref 1.85–7.62)
NEUTS SEG NFR BLD AUTO: 59 % (ref 43–75)
NRBC BLD AUTO-RTO: 0 /100 WBCS
PLATELET # BLD AUTO: 463 THOUSANDS/UL (ref 149–390)
PMV BLD AUTO: 8.9 FL (ref 8.9–12.7)
POTASSIUM SERPL-SCNC: 4.1 MMOL/L (ref 3.5–5.3)
PROT SERPL-MCNC: 6.3 G/DL (ref 6.4–8.2)
RBC # BLD AUTO: 3.91 MILLION/UL (ref 3.81–5.12)
SODIUM SERPL-SCNC: 139 MMOL/L (ref 136–145)
WBC # BLD AUTO: 8.46 THOUSAND/UL (ref 4.31–10.16)

## 2022-05-08 PROCEDURE — 80053 COMPREHEN METABOLIC PANEL: CPT | Performed by: SURGERY

## 2022-05-08 PROCEDURE — 85025 COMPLETE CBC W/AUTO DIFF WBC: CPT | Performed by: SURGERY

## 2022-05-08 PROCEDURE — 99232 SBSQ HOSP IP/OBS MODERATE 35: CPT | Performed by: INTERNAL MEDICINE

## 2022-05-08 PROCEDURE — 99232 SBSQ HOSP IP/OBS MODERATE 35: CPT | Performed by: SURGERY

## 2022-05-08 RX ORDER — DIPHENHYDRAMINE HCL 25 MG
25 TABLET ORAL EVERY 6 HOURS PRN
Status: DISCONTINUED | OUTPATIENT
Start: 2022-05-08 | End: 2022-05-13 | Stop reason: HOSPADM

## 2022-05-08 RX ADMIN — HYDROMORPHONE HYDROCHLORIDE 0.5 MG: 1 INJECTION, SOLUTION INTRAMUSCULAR; INTRAVENOUS; SUBCUTANEOUS at 05:31

## 2022-05-08 RX ADMIN — OXYCODONE HYDROCHLORIDE 10 MG: 10 TABLET ORAL at 08:03

## 2022-05-08 RX ADMIN — PIPERACILLIN AND TAZOBACTAM 4.5 G: 36; 4.5 INJECTION, POWDER, FOR SOLUTION INTRAVENOUS at 11:22

## 2022-05-08 RX ADMIN — PIPERACILLIN AND TAZOBACTAM 4.5 G: 36; 4.5 INJECTION, POWDER, FOR SOLUTION INTRAVENOUS at 05:23

## 2022-05-08 RX ADMIN — OXYCODONE HYDROCHLORIDE 10 MG: 10 TABLET ORAL at 02:50

## 2022-05-08 RX ADMIN — DIPHENHYDRAMINE HCL 25 MG: 25 TABLET ORAL at 19:59

## 2022-05-08 RX ADMIN — TRAZODONE HYDROCHLORIDE 75 MG: 50 TABLET ORAL at 21:29

## 2022-05-08 RX ADMIN — HEPARIN SODIUM 5000 UNITS: 5000 INJECTION INTRAVENOUS; SUBCUTANEOUS at 11:19

## 2022-05-08 RX ADMIN — HEPARIN SODIUM 5000 UNITS: 5000 INJECTION INTRAVENOUS; SUBCUTANEOUS at 21:29

## 2022-05-08 RX ADMIN — HYDROMORPHONE HYDROCHLORIDE 0.5 MG: 1 INJECTION, SOLUTION INTRAMUSCULAR; INTRAVENOUS; SUBCUTANEOUS at 09:33

## 2022-05-08 RX ADMIN — PIPERACILLIN AND TAZOBACTAM 4.5 G: 36; 4.5 INJECTION, POWDER, FOR SOLUTION INTRAVENOUS at 17:21

## 2022-05-08 RX ADMIN — LEVOTHYROXINE SODIUM 150 MCG: 75 TABLET ORAL at 05:23

## 2022-05-08 RX ADMIN — OXYCODONE HYDROCHLORIDE 10 MG: 10 TABLET ORAL at 17:28

## 2022-05-08 RX ADMIN — CALCIUM CARBONATE (ANTACID) CHEW TAB 500 MG 1000 MG: 500 CHEW TAB at 12:30

## 2022-05-08 RX ADMIN — OXYCODONE HYDROCHLORIDE 10 MG: 10 TABLET ORAL at 21:32

## 2022-05-08 RX ADMIN — POLYETHYLENE GLYCOL 3350 17 G: 17 POWDER, FOR SOLUTION ORAL at 08:02

## 2022-05-08 RX ADMIN — CITALOPRAM HYDROBROMIDE 40 MG: 20 TABLET, FILM COATED ORAL at 08:02

## 2022-05-08 RX ADMIN — ACETAMINOPHEN 975 MG: 325 TABLET, FILM COATED ORAL at 21:29

## 2022-05-08 RX ADMIN — ACETAMINOPHEN 975 MG: 325 TABLET, FILM COATED ORAL at 11:19

## 2022-05-08 RX ADMIN — SODIUM CHLORIDE, SODIUM LACTATE, POTASSIUM CHLORIDE, AND CALCIUM CHLORIDE 125 ML/HR: .6; .31; .03; .02 INJECTION, SOLUTION INTRAVENOUS at 00:32

## 2022-05-08 RX ADMIN — DIPHENHYDRAMINE HCL 25 MG: 25 TABLET ORAL at 12:28

## 2022-05-08 NOTE — PROGRESS NOTES
Progress Note - Infectious Disease   Deena Palmer 59 y o  female MRN: [id number]  Unit/Bed#: [facility] 605-01 Encounter: [id number]      Impression:  1  Recurrent/persistent splenic abscesses  2  S/P Laparoscopic sleeve gastrectomy complicated by splenic injury requiring IR embolization with subsequent splenic abscesses, IR drainage, Axios stent placement and prolonged IV antibiotic course all at [facility]  3  History of DVT on Eliquis  4  CAD S/P stent placement   5  History of hypothyroidism     Recommendations:  Patient is afebrile with normal WBC count  Patient now having pruritus but no overt rash  1  Check final results of abscess fluid and blood cultures which are negative so far  2  Pending above continue piperacillin/tazobactam 4 5 g q 6 hours IV  If rash develops will need to discontinue and switch Rx  3  GI planning endoscopy  to evaluate Axios stent   4  Eventually we will need to decide whether it is more appropriate to give prolonged antibiotic course and/or splenectomy  5  Discussed at length with the patient and her relatives were present at bedside    Antibiotics:  1  Piperacillin/tazobactam 4 5 g q 6 hours IV, day 3 Rx     Subjective:  Has pruritus without rash  No pain  Denies fevers, chills, or sweats  Denies nausea, vomiting, or diarrhea  Objective:  Vitals:  Temp:  [97 5 °F (36 4 °C)-98 5 °F (36 9 °C)] 97 7 °F (36 5 °C)  HR:  [57-68] 68  Resp:  [16-18] 16  BP: ()/(58-75) 127/75  SpO2:  [91 %-94 %] 94 %  Temp (24hrs), Av 9 °F (36 6 °C), Min:97 5 °F (36 4 °C), Max:98 5 °F (36 9 °C)  Current: Temperature: 97 7 °F (36 5 °C)    Physical Exam:     General Appearance:  Eyes:   Alert, nontoxic, no acute distress  Conjunctiva pale   Throat: Oropharynx moist without lesions    Lips, mucosa, and tongue normal   Neck: Supple, symmetrical, trachea midline, no adenopathy,  no tenderness/mass/nodules   Lungs:   Clear to auscultation bilaterally, no audible wheezes, rhonchi or rales; respirations unlabored   Heart:  Regular rate and rhythm, S1, S2 normal, no murmur, rub or gallop   Abdomen:   Soft, protuberant, striae LUQ LUIS ENRIQUE drain in place with modest sanguinopurulent drainage, non-tender, non-distended, positive bowel sounds  No masses, no organomegaly    No CVA tenderness   Extremities: Extremities normal, atraumatic, no clubbing, cyanosis or edema   Skin: As above, no rash         Invasive Devices  Report    Peripheral Intravenous Line            Peripheral IV 05/06/22 Right;Dorsal (posterior) Forearm 1 day          Drain            Abscess Drain Abdomen 1 day                Labs, Imaging, & Other studies:   All pertinent labs were personally reviewed  Results from last 7 days   Lab Units 05/08/22 0437 05/07/22 0553 05/06/22 2007   WBC Thousand/uL 8 46 11 02* 12 90*   HEMOGLOBIN g/dL 9 9* 10 4* 10 9*   PLATELETS Thousands/uL 463* 502* 528*     Results from last 7 days   Lab Units 05/08/22 0437 05/07/22 0553 05/07/22 0553 05/06/22 2007 05/06/22 2007   SODIUM mmol/L 139  --  140  --  137   POTASSIUM mmol/L 4 1   < > 4 4   < > 4 4   CHLORIDE mmol/L 107   < > 108   < > 107   CO2 mmol/L 30   < > 29   < > 23   BUN mg/dL 9   < > 11   < > 16   CREATININE mg/dL 0 81   < > 0 74   < > 0 70   EGFR ml/min/1 73sq m 79   < > 88   < > 95   CALCIUM mg/dL 8 4   < > 9 2   < > 8 8   AST U/L 10  --   --   --  20   ALT U/L 15  --   --    < > 19   ALK PHOS U/L 72  --   --    < > 85    < > = values in this interval not displayed  Results from last 7 days   Lab Units 05/08/22  1209 05/07/22  1224   BLOOD CULTURE   --  No Growth at 24 hrs  No Growth at 24 hrs     GRAM STAIN RESULT  1+ Polys  --    BODY FLUID CULTURE, STERILE  No growth  --

## 2022-05-08 NOTE — CONSULTS
Consultation - Infectious Disease   Richard Sosa 59 y o  female MRN: [id number]  Unit/Bed#: [facility] 605-01 Encounter: [id number]      Inpatient consult to Infectious Diseases  Consult performed by: Gibran Johnson MD  Consult ordered by: Sherryle Faster, DO          IMPRESSION & RECOMMENDATIONS:   Impression:  1  Recurrent/persistent splenic abscesses  2  S/P Laparoscopic sleeve gastrectomy complicated by splenic injury requiring IR embolization with subsequent splenic abscesses, IR drainage, Axios stent placement and prolonged IV antibiotic course all at [facility]  3  History of DVT on Eliquis  4  CAD S/P stent placement   5  History of hypothyroidism     Recommendations:    Discuss with the primary service  1  Await culture results of the splenic abscess aspirate  2  Pending above agree with piperacillin/tazobactam 4 5 g q 6 hours IV  Would anticipate that she may need another prolonged antibiotic course along with definitive drainage  3  GI planning endoscopy for evaluation of Axios stent      HISTORY OF PRESENT ILLNESS:    Reason for Consult:  Recurrent/persistent splenic abscess  HPI:  The history was taken from the patient, prior consult notes, and from some abbreviated records that were available to me from [place] arian [place] Sol is a 59y o  year old female who was admitted from the emergency room yesterday where she presented with LUQ abdominal pain at prior drain site as well as chills  Patient has a history of a laparoscopic sleeve gast[place] in [address] [place] [address] in 8/21  This was complicated by a splenic injury requiring IR embolization  In November she was readmitted with perisplenic abscesses requiring IR drainage, cyst gastrostomy in 12/21  She had a prolonged hospitalization requiring long-term antibiotics and was discharged to rehab at the end of December on IV Unasyn for 6 weeks    She also received a course of p o  Augmentin  At the end of her therapy an ultrasound CT scan of the abdomen demonstrated residual left upper quadrant abscess but no further treatment was recommended at that time  She was last seen at [facility] clinic on 03/29 and no further antibiotics were recommended  For the prior 2 months she has had LUQ pain, left shoulder pain fatigue  For the prior 2 weeks she has noticed a increase in her pain, and now began to note tender reddened area over the prior drainage site along with chills  Visiting her daughter here she was encouraged to seek further evaluation   She also has a prior history of hypertension, hypothyroidism, CAD s/p stent placement x4, prior DVT on Eliquis  GI also evaluated the patient  here with a concern for her perisplenic abscesses and possible occlusion of Axios stent  They have tentatively planned endoscopy  CT scan here of abdomen and pelvis without contrast revealed a probable collection in the LUQ containing few foci of gas as well as postsurgical changes from prior gastric sleeve  Today IR placed drainage tube in the LUQ collection and 7 cc of pus was obtained  Culture and Gram stain pending  Yesterday the patient was placed on piperacillin/tazobactam 4 5 g q 6 hours IV which is continuing  Patient has remained afebrile  Blood cultures are negative so far  WBC count is 11,020 without left shift, creatinine is WNL  Patient is currently planning on continuing her care in the [facility] area      Review of Systems positive findings include LUQ pain, tenderness, redness, chills, left shoulder discomfort  A [id number] point system-based review of systems is otherwise negative      PAST MEDICAL HISTORY:  Past Medical History:   Diagnosis Date    Disease of thyroid gland     Hernia     Hypertension      Past Surgical History:   Procedure Laterality Date    CHOLECYSTECTOMY      CORONARY ANGIOPLASTY WITH STENT PLACEMENT      IR DRAINAGE TUBE PLACEMENT  2022       FAMILY HISTORY:  Non-contributory    SOCIAL HISTORY:  Social History   , retired  at [address] [place] [address] Drive History     Substance and Sexual Activity   Alcohol Use No     Social History     Substance and Sexual Activity   Drug Use No     Social History     Tobacco Use   Smoking Status Former Smoker   Smokeless Tobacco Never Used       ALLERGIES:  No Known Allergies    MEDICATIONS:  All current active medications have been reviewed        PHYSICAL EXAM:  Temp:  [97 7 °F (36 5 °C)-98 2 °F (36 8 °C)] 98 2 °F (36 8 °C)  HR:  [54-77] 57  Resp:  [16-20] 16  BP: ()/(44-76) 99/58  SpO2:  [90 %-99 %] 91 %  Temp (24hrs), Av 9 °F (36 6 °C), Min:97 7 °F (36 5 °C), Max:98 2 °F (36 8 °C)  Current: Temperature: 98 2 °F (36 8 °C)    Intake/Output Summary (Last 24 hours) at 2022  Last data filed at 2022 1744  Gross per 24 hour   Intake 580 ml   Output 7 ml   Net 573 ml       General Appearance:  Appearing well, nontoxic, and in no distress, appears stated age   Head:  Normocephalic, without obvious abnormality, atraumatic   Eyes:  PERRL, conjunctiva pale and sclera anicteric, both eyes   Nose: Nares normal, mucosa normal, no drainage   Throat: Oropharynx moist without lesions; lips, mucosa, and tongue normal; missing some upper teeth   Neck: Supple, symmetrical, trachea midline, no adenopathy, no tenderness/mass/nodules   Back:   Symmetric, no curvature, ROM normal, no CVA tenderness   Lungs:   Clear to auscultation bilaterally, no audible wheezes, rhonchi and rales, respirations unlabored   Chest Wall:  No tenderness or deformity   Heart:  Regular rate and rhythm, S1, S2 normal, no murmur, rub or gallop   Abdomen:   Soft, protuberant, striae, LUQ LUIS ENRIQUE drain in place with sanguinopurulent drainage, non-tender, non-distended, positive bowel sounds, no masses, no organomegaly    No CVA tenderness   Extremities: Extremities normal, atraumatic, no cyanosis, clubbing or edema   Skin: Skin color, texture, turgor normal, no rashes or lesions  No draining wounds noted  Lymph nodes: Cervical, supraclavicular, and axillary nodes normal   Neurologic: Alert and oriented times 3, extremity strength 5/5 and symmetric           Invasive Devices:   Peripheral IV 05/06/22 Right;Dorsal (posterior) Forearm (Active)   Site Assessment WDL 05/07/22 1946   Dressing Type Transparent 05/07/22 1946   Line Status Flushed; Infusing 05/07/22 1946   Dressing Status Clean;Dry; Intact 05/07/22 1946   Dressing Change Due 05/10/22 05/07/22 1946   Reason Not Rotated Not due 05/07/22 1946       Abscess Drain Abdomen (Active)   Site Description Unable to view 05/07/22 1942   Dressing Status Clean;Dry; Intact 05/07/22 1942   Drainage Appearance Bloody; Serosanguineous 05/07/22 1942   Status Unclamped 05/07/22 1942   Output (mL) 7 mL 05/07/22 1438       LABS, IMAGING, & OTHER STUDIES:  Lab Results:      I have personally reviewed pertinent labs  Results from last 7 days   Lab Units 05/07/22 0553 05/06/22 2007   WBC Thousand/uL 11 02* 12 90*   HEMOGLOBIN g/dL 10 4* 10 9*   PLATELETS Thousands/uL 502* 528*     Results from last 7 days   Lab Units 05/07/22 0553 05/06/22 2007 05/06/22 2007   SODIUM mmol/L 140  --  137   POTASSIUM mmol/L 4 4   < > 4 4   CHLORIDE mmol/L 108   < > 107   CO2 mmol/L 29   < > 23   BUN mg/dL 11   < > 16   CREATININE mg/dL 0 74   < > 0 70   EGFR ml/min/1 73sq m 88   < > 95   CALCIUM mg/dL 9 2   < > 8 8   AST U/L  --   --  20   ALT U/L  --   --  19   ALK PHOS U/L  --   --  85    < > = values in this interval not displayed  Results from last 7 days   Lab Units 05/07/22  1224   BLOOD CULTURE  Received in Microbiology Lab  Culture in Progress  Received in Microbiology Lab  Culture in Progress  Imaging Studies:   I have personally reviewed pertinent imaging study reports and images in PACS          EKG, Pathology, and Other Studies:   I have personally reviewed pertinent reports

## 2022-05-08 NOTE — UTILIZATION REVIEW
Initial Clinical Review    Admission: Date/Time/Statement:   Admission Orders (From admission, onward)     Ordered        05/07/22 0029  Inpatient Admission  Once                      Orders Placed This Encounter   Procedures    Inpatient Admission     Standing Status:   Standing     Number of Occurrences:   1     Order Specific Question:   Level of Care     Answer:   Med Surg [16]     Order Specific Question:   Estimated length of stay     Answer:   More than 2 Midnights     Order Specific Question:   Certification     Answer:   I certify that inpatient services are medically necessary for this patient for a duration of greater than two midnights  See H&P and MD Progress Notes for additional information about the patient's course of treatment  ED Arrival Information     Expected Arrival Acuity    - 5/6/2022 18:25 Urgent         Means of arrival Escorted by Service Admission type    Walk-In Family Member Surgery-General Urgent         Arrival complaint    left flank pain         Chief Complaint   Patient presents with    Abdominal Pain     Pt had a gastric sleeve done in August, during the surgery, they cut into her spleen leading to complications  pt started with LUQ abdominal pain, right at the site where she had a drain placed after the surgery  Pt denies fevers but c/o chills  Initial Presentation: 59 y o  female with PMhx of HTN, hypothyroidism, CAD s/p stent x4, prior DVT on Eliquis presents to ED with c/o LUQ, L flank and L shoulder pain x2 days  Pt s/p lap sleeve gastrectomy in August 2021 c/b splenic bleed requiring IR coil embolization, cystgastrostomy, and IR drainage of splenic abscess  IR drain removed Feb 2022 and abx stopped by ID March 2022  In ED today WBC 12 90, Cr  0 7  CT A/P now with residual LUQ fluid collection and concern for developing cutaneous fistula  Admit inpatient to M/S unit under gen surgery service -- pain control  IV abx  IVFs  Hold pta Eliquis   Consult ID, IR nd GI consulted  IR tele-consult 5/7 -- Given inflammation in the abdominal wall I believe this represents a drain tract and likely the bodies attempt to form a fistula  We can access the subcutaneous tissue with ultrasound an aspirate or place a drain I suspect the axios stent may be clogged  This can be evaluated endoscopically as it was placed endoscopically     Would suggest obtaining records, possibly imaging and reports from prior admission at outside institution  Plan: add on today for ABDOMINAL WALL aspiration vs drainage  Will not attempt intraperitoneal access unless I see a collection  Recommend advanced GI consult to evaluate axios stent endoscopically  GI consult 5/7 -- IR to plan drainage today, pt states she had traumatic experience with drains in the past   Check blood cultures and agree with antibiotics for splenic abscess  Ct imaging reviewed and stent seen  Tentative NPO after midnight Sunday but timing of GI procedure would be based on GI lab and advanced endoscopist availability  Tentatively plan for endoscopy for evaluation of Axios stent early next week depending on GI lab and advanced endoscopist availability  Depending on endoscopic findings, may consider removal versus exchange of the stent if needed however, will discuss further with advanced endoscopist on Monday     IR op note 5/7: IR drainage of splenic abscess --   Findings: Small linear collection presumably along the prior drain tract in the left abdominal wall  This was seen to extend down to the surface of the abdomen     ID consult 5/7 --  A: Recurrent/persistent splenic abscesses, S/P Laparoscopic sleeve gastrectomy complicated by splenic injury requiring IR embolization with subsequent splenic abscesses, IR drainage, Axios stent placement and prolonged IV antibiotic course all at [address]  Plan to await cx results of splenic abscess aspirate   Pending above agree with piperacillin/tazobactam 4 5 g q 6 hours IV  Would anticipate that she may need another prolonged antibiotic course along with definitive drainage      Date: 5/8 Day 2:  Pt with no c/o on exam this AM -- continue IV Zosyn, ID following  F/u blood/drain cxs  Monitor BP drain output/character  Regular diet  Analgesics prn  Hopefully EGD Monday to address axios stent blockage  Continue to hold Eliquis   SCD's    ED Triage Vitals   Temperature Pulse Respirations Blood Pressure SpO2   05/06/22 1958 05/06/22 1844 05/06/22 1844 05/06/22 1844 05/06/22 1844   97 5 °F (36 4 °C) 88 16 138/75 97 %      Temp Source Heart Rate Source Patient Position - Orthostatic VS BP Location FiO2 (%)   05/06/22 1958 05/06/22 1844 05/06/22 1844 05/06/22 1844 --   Oral Monitor Sitting Right arm       Pain Score       05/06/22 1844       8          Wt Readings from Last 1 Encounters:   05/06/22 88 5 kg (195 lb)     Additional Vital Signs:   Date/Time Temp Pulse Resp BP MAP (mmHg) SpO2 Calculated FIO2 (%) - Nasal Cannula Nasal Cannula O2 Flow Rate (L/min) O2 Device   05/08/22 10:47:10 97 5 °F (36 4 °C) -- 18 102/58 73 -- -- -- --   05/07/22 15:43:20 97 9 °F (36 6 °C) 54 Abnormal  16 100/59 73 94 % -- -- --   05/07/22 14:59:26 -- 70 -- 118/68 85 95 % -- -- --   05/07/22 1445 -- 66 18 88/59 Abnormal  -- 96 % -- -- None (Room air)   05/07/22 1430 -- 63 18 99/54 -- 99 % 28 2 L/min Nasal cannula   05/07/22 1424 -- 63 16 83/45 Abnormal  -- 96 % 28 2 L/min Nasal cannula   05/07/22 07:58:08 97 7 °F (36 5 °C) 68 18 97/60 -- 94 % -- -- --   05/07/22 0615 -- 62 16 111/76 88 98 % -- -- --   05/07/22 0300 -- 77 18 124/61 -- 98 % -- -- None (Room air)   05/06/22 1958 97 5 °F (36 4 °C) -- -- -- -- -- -- -- --   05/06/22 1844 -- 88 16 138/75 -- 97 % -- -- --     Pertinent Labs/Diagnostic Test Results:   IR drainage tube placement   Final Result by Tavo Leslie MD (05/07 7296)   Impression:       Ultrasound-guided 8 Persian drain placement into a left abdominal wall superficial purulent collection      Plan:       Recommend gastroenterology consult to interrogate the transgastric stent as it may be occluded      Monitor drain output of the percutaneous drain  We can bring the patient back during this inpatient hospitalization to inject the tract and assess for communication with deep intraperitoneal structures  Workstation performed: [id number]         CT abdomen pelvis wo contrast   Final Result by Patricia Camejo MD (05/06 2250)   Addendum 1 of 1 by Patricia Camejo MD (05/06 2250)   ADDENDUM:      Soft tissue inflammation in the left lateral chest wall overlying the    diaphragm and spleen could reflect cellulitis  Additionally inflammation    extends to the chest wall and intercostal space and is not well    differentiated from the superior aspect of the    spleen and adjacent fluid collection  Developing cutaneous fistula    cannot be excluded; however evaluation is limited in the absence of    contrast       Final         1  Probable collection in the left upper quadrant, not well differentiated from the spleen and the absence of intravenous contrast, and adjacent to postsurgical change in the gastric fundus  Presumed collection measures 4 5 cm containing few foci of    gas  Comparison with prior outside imaging, if available would be helpful  2   Postsurgical change from gastric sleeve  No evidence of bowel obstruction, colitis, diverticulitis or appendicitis              Workstation performed: [id number]         IR drainage tube check/change/reposition/reinsertion/upsize    (Results Pending)   IR drainage tube check/change/reposition/reinsertion/upsize    (Results Pending)         Results from last 7 days   Lab Units 05/08/22  0437 05/07/22  0553 05/06/22 2007   WBC Thousand/uL 8 46 11 02* 12 90*   HEMOGLOBIN g/dL 9 9* 10 4* 10 9*   HEMATOCRIT % 31 3* 32 1* 34 1*   PLATELETS Thousands/uL 463* 502* 528*   NEUTROS ABS Thousands/µL 5 03 7 13 8 58*     Results from last 7 days   Lab Units 05/08/22  0437 05/07/22  0553 05/06/22 2007   SODIUM mmol/L 139 140 137   POTASSIUM mmol/L 4 1 4 4 4 4   CHLORIDE mmol/L 107 108 107   CO2 mmol/L 30 29 23   ANION GAP mmol/L 2* 3* 7   BUN mg/dL 9 11 16   CREATININE mg/dL 0 81 0 74 0 70   EGFR ml/min/1 73sq m 79 88 95   CALCIUM mg/dL 8 4 9 2 8 8     Results from last 7 days   Lab Units 05/08/22 0437 05/06/22 2007   AST U/L 10 20   ALT U/L 15 19   ALK PHOS U/L 72 85   TOTAL PROTEIN g/dL 6 3* 7 2   ALBUMIN g/dL 2 3* 2 8*   TOTAL BILIRUBIN mg/dL 0 17* 0 18*     Results from last 7 days   Lab Units 05/08/22 0437 05/07/22  0553 05/06/22 2007   GLUCOSE RANDOM mg/dL 81 80 90     Results from last 7 days   Lab Units 05/07/22  0930   PROTIME seconds 13 4   INR  1 06         Results from last 7 days   Lab Units 05/08/22  0946 05/07/22  1224   BLOOD CULTURE   --  Received in Microbiology Lab  Culture in Progress  Received in Microbiology Lab  Culture in Progress     GRAM STAIN RESULT  1+ Polys  --          ED Treatment:   Medication Administration from 05/06/2022 1824 to 05/07/2022 0736       Date/Time Order Dose Route Action     05/06/2022 2017 ondansetron (ZOFRAN) injection 4 mg 4 mg Intravenous Given     05/06/2022 2017 HYDROmorphone (DILAUDID) injection 0 5 mg 0 5 mg Intravenous Given     05/06/2022 2156 barium (READI-CAT 2) suspension 500 mL 500 mL Oral Given     05/06/2022 2222 HYDROmorphone (DILAUDID) injection 0 5 mg 0 5 mg Intravenous Given     05/06/2022 2307 piperacillin-tazobactam (ZOSYN) 4 5 g in sodium chloride 0 9 % 100 mL IVPB 4 5 g Intravenous New Bag     05/07/2022 0256 lactated ringers infusion 125 mL/hr Intravenous New Bag     05/07/2022 0257 heparin (porcine) subcutaneous injection 5,000 Units 5,000 Units Subcutaneous Given     05/07/2022 0257 acetaminophen (TYLENOL) tablet 975 mg 975 mg Oral Given     05/07/2022 0543 piperacillin-tazobactam (ZOSYN) 4 5 g in sodium chloride 0 9 % 100 mL IVPB 4 5 g Intravenous New Bag 05/07/2022 0258 traZODone (DESYREL) tablet 75 mg 75 mg Oral Given     05/07/2022 0548 levothyroxine tablet 150 mcg 150 mcg Oral Given     Past Medical History:   Diagnosis Date    Disease of thyroid gland     Hernia     Hypertension      Present on Admission:   Splenic abscess      Admitting Diagnosis: Cellulitis of abdominal wall [L03 311]  Splenic abscess [D73 3]  Flank pain [R10 9]  Left upper quadrant abdominal pain [R10 12]  Age/Sex: 59 y o  female  Admission Orders:  Scheduled Medications:  acetaminophen, 975 mg, Oral, Q8H [facility]  amLODIPine, 10 mg, Oral, Daily  citalopram, 40 mg, Oral, Daily  heparin (porcine), 5,000 Units, Subcutaneous, Q8H [facility]  levothyroxine, 150 mcg, Oral, Early Morning  piperacillin-tazobactam, 4 5 g, Intravenous, Q6H  polyethylene glycol, 17 g, Oral, Daily  senna, 1 tablet, Oral, Daily  traZODone, 75 mg, Oral, HS    Continuous IV Infusions:  lactated ringers, 125 mL/hr, Intravenous, Continuous    PRN Meds:  calcium carbonate, 1,000 mg, Oral, TID PRN 5/7 x1  HYDROmorphone, 0 5 mg, Intravenous, Q4H PRN 5/7 x2, 5/8 x2  ondansetron, 4 mg, Intravenous, Q4H PRN  oxyCODONE, 10 mg, Oral, Q4H PRN 5/7 x3, 5/8 x2  oxyCODONE, 5 mg, Oral, Q4H PRN          Network Utilization Review Department  ATTENTION: Please call with any questions or concerns to [phone] and carefully listen to the prompts so that you are directed to the right person  All voicemails are confidential   India Stone all requests for admission clinical reviews, approved or denied determinations and any other requests to dedicated fax number below belonging to the campus where the patient is receiving treatment   List of dedicated fax numbers for the Facilities:  [address] DENIALS (Administrative/Medical Necessity) [phone]   [address] (Maternity/NICU/Pediatrics) [address] [place] [address],[address] [place] [address] [phone]   [address]co [address] [facility] [address] [place] [address] [address] [address] [place] [address] Evelyn Tolent[address] [address] [place] [address] [phone]

## 2022-05-09 ENCOUNTER — ANESTHESIA EVENT (INPATIENT)
Dept: GASTROENTEROLOGY | Facility: HOSPITAL | Age: 57
DRG: 394 | End: 2022-05-09
Payer: MEDICARE

## 2022-05-09 ENCOUNTER — APPOINTMENT (INPATIENT)
Dept: GASTROENTEROLOGY | Facility: HOSPITAL | Age: 57
DRG: 394 | End: 2022-05-09
Payer: MEDICARE

## 2022-05-09 ENCOUNTER — ANESTHESIA (INPATIENT)
Dept: GASTROENTEROLOGY | Facility: HOSPITAL | Age: 57
DRG: 394 | End: 2022-05-09
Payer: MEDICARE

## 2022-05-09 LAB
ANION GAP SERPL CALCULATED.3IONS-SCNC: -2 MMOL/L (ref 4–13)
BASOPHILS # BLD AUTO: 0.06 THOUSANDS/ΜL (ref 0–0.1)
BASOPHILS NFR BLD AUTO: 1 % (ref 0–1)
BUN SERPL-MCNC: 10 MG/DL (ref 5–25)
CALCIUM SERPL-MCNC: 8.9 MG/DL (ref 8.3–10.1)
CHLORIDE SERPL-SCNC: 108 MMOL/L (ref 100–108)
CO2 SERPL-SCNC: 35 MMOL/L (ref 21–32)
CREAT SERPL-MCNC: 0.86 MG/DL (ref 0.6–1.3)
EOSINOPHIL # BLD AUTO: 0.28 THOUSAND/ΜL (ref 0–0.61)
EOSINOPHIL NFR BLD AUTO: 4 % (ref 0–6)
ERYTHROCYTE [DISTWIDTH] IN BLOOD BY AUTOMATED COUNT: 17.3 % (ref 11.6–15.1)
GFR SERPL CREATININE-BSD FRML MDRD: 74 ML/MIN/1.73SQ M
GLUCOSE SERPL-MCNC: 78 MG/DL (ref 65–140)
HCT VFR BLD AUTO: 32.6 % (ref 34.8–46.1)
HGB BLD-MCNC: 10.5 G/DL (ref 11.5–15.4)
IMM GRANULOCYTES # BLD AUTO: 0.01 THOUSAND/UL (ref 0–0.2)
IMM GRANULOCYTES NFR BLD AUTO: 0 % (ref 0–2)
LYMPHOCYTES # BLD AUTO: 2.56 THOUSANDS/ΜL (ref 0.6–4.47)
LYMPHOCYTES NFR BLD AUTO: 34 % (ref 14–44)
MCH RBC QN AUTO: 25.4 PG (ref 26.8–34.3)
MCHC RBC AUTO-ENTMCNC: 32.2 G/DL (ref 31.4–37.4)
MCV RBC AUTO: 79 FL (ref 82–98)
MONOCYTES # BLD AUTO: 0.78 THOUSAND/ΜL (ref 0.17–1.22)
MONOCYTES NFR BLD AUTO: 10 % (ref 4–12)
NEUTROPHILS # BLD AUTO: 3.87 THOUSANDS/ΜL (ref 1.85–7.62)
NEUTS SEG NFR BLD AUTO: 51 % (ref 43–75)
NRBC BLD AUTO-RTO: 0 /100 WBCS
PLATELET # BLD AUTO: 497 THOUSANDS/UL (ref 149–390)
PMV BLD AUTO: 9 FL (ref 8.9–12.7)
POTASSIUM SERPL-SCNC: 3.9 MMOL/L (ref 3.5–5.3)
RBC # BLD AUTO: 4.14 MILLION/UL (ref 3.81–5.12)
SODIUM SERPL-SCNC: 141 MMOL/L (ref 136–145)
WBC # BLD AUTO: 7.56 THOUSAND/UL (ref 4.31–10.16)

## 2022-05-09 PROCEDURE — 43247 EGD REMOVE FOREIGN BODY: CPT | Performed by: INTERNAL MEDICINE

## 2022-05-09 PROCEDURE — 80048 BASIC METABOLIC PNL TOTAL CA: CPT | Performed by: SURGERY

## 2022-05-09 PROCEDURE — 85025 COMPLETE CBC W/AUTO DIFF WBC: CPT | Performed by: SURGERY

## 2022-05-09 PROCEDURE — 99232 SBSQ HOSP IP/OBS MODERATE 35: CPT | Performed by: SURGERY

## 2022-05-09 PROCEDURE — 99231 SBSQ HOSP IP/OBS SF/LOW 25: CPT | Performed by: INTERNAL MEDICINE

## 2022-05-09 PROCEDURE — 0DP68DZ REMOVAL OF INTRALUMINAL DEVICE FROM STOMACH, VIA NATURAL OR ARTIFICIAL OPENING ENDOSCOPIC: ICD-10-PCS | Performed by: SURGERY

## 2022-05-09 RX ORDER — PROPOFOL 10 MG/ML
INJECTION, EMULSION INTRAVENOUS AS NEEDED
Status: DISCONTINUED | OUTPATIENT
Start: 2022-05-09 | End: 2022-05-09

## 2022-05-09 RX ORDER — SODIUM CHLORIDE 9 MG/ML
INJECTION, SOLUTION INTRAVENOUS CONTINUOUS PRN
Status: DISCONTINUED | OUTPATIENT
Start: 2022-05-09 | End: 2022-05-09

## 2022-05-09 RX ORDER — EPHEDRINE SULFATE 50 MG/ML
INJECTION INTRAVENOUS AS NEEDED
Status: DISCONTINUED | OUTPATIENT
Start: 2022-05-09 | End: 2022-05-09

## 2022-05-09 RX ORDER — SODIUM CHLORIDE, SODIUM LACTATE, POTASSIUM CHLORIDE, CALCIUM CHLORIDE 600; 310; 30; 20 MG/100ML; MG/100ML; MG/100ML; MG/100ML
75 INJECTION, SOLUTION INTRAVENOUS CONTINUOUS
Status: DISCONTINUED | OUTPATIENT
Start: 2022-05-10 | End: 2022-05-09

## 2022-05-09 RX ORDER — LIDOCAINE HYDROCHLORIDE 10 MG/ML
INJECTION, SOLUTION EPIDURAL; INFILTRATION; INTRACAUDAL; PERINEURAL AS NEEDED
Status: DISCONTINUED | OUTPATIENT
Start: 2022-05-09 | End: 2022-05-09

## 2022-05-09 RX ORDER — PROPOFOL 10 MG/ML
INJECTION, EMULSION INTRAVENOUS CONTINUOUS PRN
Status: DISCONTINUED | OUTPATIENT
Start: 2022-05-09 | End: 2022-05-09

## 2022-05-09 RX ORDER — SODIUM CHLORIDE 9 MG/ML
75 INJECTION, SOLUTION INTRAVENOUS CONTINUOUS
Status: DISCONTINUED | OUTPATIENT
Start: 2022-05-10 | End: 2022-05-11

## 2022-05-09 RX ORDER — FENTANYL CITRATE 50 UG/ML
INJECTION, SOLUTION INTRAMUSCULAR; INTRAVENOUS AS NEEDED
Status: DISCONTINUED | OUTPATIENT
Start: 2022-05-09 | End: 2022-05-09

## 2022-05-09 RX ADMIN — SODIUM CHLORIDE: 0.9 INJECTION, SOLUTION INTRAVENOUS at 16:03

## 2022-05-09 RX ADMIN — FENTANYL CITRATE 25 MCG: 50 INJECTION INTRAMUSCULAR; INTRAVENOUS at 16:12

## 2022-05-09 RX ADMIN — SODIUM CHLORIDE 75 ML/HR: 0.9 INJECTION, SOLUTION INTRAVENOUS at 23:21

## 2022-05-09 RX ADMIN — PIPERACILLIN AND TAZOBACTAM 4.5 G: 36; 4.5 INJECTION, POWDER, FOR SOLUTION INTRAVENOUS at 11:15

## 2022-05-09 RX ADMIN — PROPOFOL 30 MG: 10 INJECTION, EMULSION INTRAVENOUS at 16:29

## 2022-05-09 RX ADMIN — LEVOTHYROXINE SODIUM 150 MCG: 75 TABLET ORAL at 05:12

## 2022-05-09 RX ADMIN — LIDOCAINE HYDROCHLORIDE 100 MG: 10 INJECTION, SOLUTION EPIDURAL; INFILTRATION; INTRACAUDAL; PERINEURAL at 16:12

## 2022-05-09 RX ADMIN — SODIUM CHLORIDE: 0.9 INJECTION, SOLUTION INTRAVENOUS at 16:25

## 2022-05-09 RX ADMIN — OXYCODONE HYDROCHLORIDE 10 MG: 10 TABLET ORAL at 17:17

## 2022-05-09 RX ADMIN — PROPOFOL 120 MCG/KG/MIN: 10 INJECTION, EMULSION INTRAVENOUS at 16:12

## 2022-05-09 RX ADMIN — AMLODIPINE BESYLATE 10 MG: 10 TABLET ORAL at 07:40

## 2022-05-09 RX ADMIN — PROPOFOL 80 MG: 10 INJECTION, EMULSION INTRAVENOUS at 16:12

## 2022-05-09 RX ADMIN — DIPHENHYDRAMINE HCL 25 MG: 25 TABLET ORAL at 11:11

## 2022-05-09 RX ADMIN — OXYCODONE HYDROCHLORIDE 10 MG: 10 TABLET ORAL at 07:39

## 2022-05-09 RX ADMIN — TRAZODONE HYDROCHLORIDE 75 MG: 50 TABLET ORAL at 21:14

## 2022-05-09 RX ADMIN — DIPHENHYDRAMINE HCL 25 MG: 25 TABLET ORAL at 23:19

## 2022-05-09 RX ADMIN — OXYCODONE HYDROCHLORIDE 10 MG: 10 TABLET ORAL at 21:22

## 2022-05-09 RX ADMIN — EPHEDRINE SULFATE 5 MG: 50 INJECTION INTRAVENOUS at 16:25

## 2022-05-09 RX ADMIN — PIPERACILLIN AND TAZOBACTAM 4.5 G: 36; 4.5 INJECTION, POWDER, FOR SOLUTION INTRAVENOUS at 23:21

## 2022-05-09 RX ADMIN — CITALOPRAM HYDROBROMIDE 40 MG: 20 TABLET, FILM COATED ORAL at 07:39

## 2022-05-09 RX ADMIN — HEPARIN SODIUM 5000 UNITS: 5000 INJECTION INTRAVENOUS; SUBCUTANEOUS at 21:14

## 2022-05-09 RX ADMIN — HEPARIN SODIUM 5000 UNITS: 5000 INJECTION INTRAVENOUS; SUBCUTANEOUS at 11:11

## 2022-05-09 RX ADMIN — PIPERACILLIN AND TAZOBACTAM 4.5 G: 36; 4.5 INJECTION, POWDER, FOR SOLUTION INTRAVENOUS at 17:18

## 2022-05-09 RX ADMIN — PIPERACILLIN AND TAZOBACTAM 4.5 G: 36; 4.5 INJECTION, POWDER, FOR SOLUTION INTRAVENOUS at 05:12

## 2022-05-09 RX ADMIN — PIPERACILLIN AND TAZOBACTAM 4.5 G: 36; 4.5 INJECTION, POWDER, FOR SOLUTION INTRAVENOUS at 00:05

## 2022-05-09 RX ADMIN — ACETAMINOPHEN 975 MG: 325 TABLET, FILM COATED ORAL at 21:14

## 2022-05-09 RX ADMIN — ACETAMINOPHEN 975 MG: 325 TABLET, FILM COATED ORAL at 11:11

## 2022-05-09 NOTE — QUICK NOTE
Patient having axios stent evaluated today by EGD  Depending on results, IR drain may need evaluation/repositioning  Will make patient NPO after midnight for tentative drain repositioning tomorrow  Patient will need sedation for tube repositioning      [place] PA-C

## 2022-05-09 NOTE — PROGRESS NOTES
Progress Note - Infectious Disease   Gely Shook 59 y o  female MRN: [id number]  Unit/Bed#: [facility] 605-01 Encounter: [id number]      Impression:  1  Recurrent/persistent splenic abscesses  2  S/P Laparoscopic sleeve gastrectomy complicated by splenic injury requiring IR embolization with subsequent splenic abscesses, IR drainage, Axios stent placement and prolonged IV antibiotic course all at [facility]  3  History of DVT on Eliquis  4  CAD S/P stent placement   5  History of hypothyroidism     Recommendations:  Patient is afebrile with normal WBC count  1  Check final results of abscess fluid and blood cultures which are negative so far  2  Pending above continue piperacillin/tazobactam 4 5 g q 6 hours IV  If rash develops will need to discontinue and switch Rx  3  GI completed endoscopy  to evaluate Axios stent  No blockage encountered and patient was told that the stent was removed  Await description and further GI plans  4  Eventually we will need to decide whether it is more appropriate to give prolonged antibiotic course and/or splenectomy      Antibiotics:  1  Piperacillin/tazobactam 4 5 g q 6 hours IV, day 4 Rx     Subjective:  Less pruritus, no rash  No pain  Denies fevers, chills, or sweats  Denies nausea, vomiting, or diarrhea  Objective:  Vitals:  Temp:  [96 7 °F (35 9 °C)-98 1 °F (36 7 °C)] 96 7 °F (35 9 °C)  HR:  [63] 63  Resp:  [16-18] 18  BP: (124-143)/(68-76) 143/68  SpO2:  [97 %] 97 %  Temp (24hrs), Av 6 °F (36 4 °C), Min:96 7 °F (35 9 °C), Max:98 1 °F (36 7 °C)  Current: Temperature: (!) 96 7 °F (35 9 °C)    Physical Exam:     General Appearance:  Eyes:   Alert, nontoxic, no acute distress  Conjunctiva pale   Throat: Oropharynx moist without lesions    Lips, mucosa, and tongue normal   Neck: Supple, symmetrical, trachea midline, no adenopathy,  no tenderness/mass/nodules   Lungs:   Clear to auscultation bilaterally, no audible wheezes, rhonchi or rales; respirations unlabored   Heart:  Regular rate and rhythm, S1, S2 normal, no murmur, rub or gallop   Abdomen:   Soft, protuberant, striae LUQ LUIS ENRIQUE drain in place with modest sanguinopurulent drainage, non-tender, non-distended, positive bowel sounds  No masses, no organomegaly    No CVA tenderness   Extremities: Extremities normal, atraumatic, no clubbing, cyanosis or edema   Skin: As above, no rash         Invasive Devices  Report    Peripheral Intravenous Line            Peripheral IV 05/08/22 Left;Proximal;Ventral (anterior) Forearm <1 day          Drain            Abscess Drain Abdomen 2 days                Labs, Imaging, & Other studies:   All pertinent labs were personally reviewed  Results from last 7 days   Lab Units 05/09/22 0511 05/08/22 0437 05/07/22 0553   WBC Thousand/uL 7 56 8 46 11 02*   HEMOGLOBIN g/dL 10 5* 9 9* 10 4*   PLATELETS Thousands/uL 497* 463* 502*     Results from last 7 days   Lab Units 05/09/22 0511 05/08/22 0437 05/08/22 0437 05/07/22 0553 05/07/22  0553 05/06/22 2007 05/06/22 2007   SODIUM mmol/L 141  --  139  --  140   < > 137   POTASSIUM mmol/L 3 9   < > 4 1   < > 4 4   < > 4 4   CHLORIDE mmol/L 108   < > 107   < > 108   < > 107   CO2 mmol/L 35*   < > 30   < > 29   < > 23   BUN mg/dL 10   < > 9   < > 11   < > 16   CREATININE mg/dL 0 86   < > 0 81   < > 0 74   < > 0 70   EGFR ml/min/1 73sq m 74   < > 79   < > 88   < > 95   CALCIUM mg/dL 8 9   < > 8 4   < > 9 2   < > 8 8   AST U/L  --   --  10  --   --   --  20   ALT U/L  --   --  15  --   --    < > 19   ALK PHOS U/L  --   --  72  --   --    < > 85    < > = values in this interval not displayed  Results from last 7 days   Lab Units 05/07/22  1436 05/07/22  1224   BLOOD CULTURE   --  No Growth at 24 hrs  No Growth at 24 hrs  GRAM STAIN RESULT  1+ Polys  --    BODY FLUID CULTURE, STERILE  Culture results to follow    --

## 2022-05-09 NOTE — PROGRESS NOTES
Progress Note - Joanne Mcintosh 59 y o  female MRN: [id number]    Unit/Bed#: [facility] [place] 605-01 Encounter: [id number]      Assessment:  60 yo female s/p lap sleeve gastrectomy c/b splenic injury requiring IR embolization, cystgastrostomy, and IR drainage of splenic abscess  Pt now presents with residual LUQ abscess collection s/p IR drainage on 5/7    IR drain- 5 cc SS  WBC 7 56 (8 5)    Plan:  -Appreciate ID recommendations, continue zosyn  -F/u cultures  -Plan for GI EGD to evaluate axios stent today w/ GI, appreciate recs  -NPO for potential intervention  -Monitor LUIS ENRIQUE output and character  -Continue to hold eliquis  -DVT ppx  -Pain control prn    Subjective:   No acute events overnight  Reports some LUQ pain, but improving  No nausea or vomiting  Toleratin diet  Denies fevers/chills  Objective:     Vitals: Blood pressure 127/76, pulse 68, temperature 98 1 °F (36 7 °C), resp  rate 18, height 5' 3" (1 6 m), weight 88 5 kg (195 lb), last menstrual period 08/03/2016, SpO2 94 %, not currently breastfeeding  ,Body mass index is 34 54 kg/m²  Intake/Output Summary (Last 24 hours) at 5/8/2022 2243  Last data filed at 5/8/2022 1812  Gross per 24 hour   Intake 2971 25 ml   Output 720 ml   Net 2251 25 ml       Physical Exam:   General: NAD  HENT: NCAT MMM  Neck: supple, no JVD  CV: nl rate  Lungs: nl wob  No resp distress  ABD: Soft, TTP in the LUQ, nondistended   IR drain with SS output  Extrem: No CCE  Neuro: AAOx3       Invasive Devices  Report    Peripheral Intravenous Line            Peripheral IV 05/08/22 Left;Proximal;Ventral (anterior) Forearm <1 day          Drain            Abscess Drain Abdomen 1 day                Lab, Imaging and other studies: I have personally reviewed pertinent films in PACS

## 2022-05-09 NOTE — CASE MANAGEMENT
Case Management Assessment & Discharge Planning Note    Patient name Damaris Stroud  [facility] [address] [facility] [id number]  : 1965 Date 2022       Current Admission Date: 2022  Current Admission Diagnosis:Splenic abscess   Patient Active Problem List    Diagnosis Date Noted    Splenic abscess 2022      LOS (days): 2  Geometric Mean LOS (GMLOS) (days):   Days to GMLOS:     OBJECTIVE:    Risk of Unplanned Readmission Score: 7         Current admission status: Inpatient       Preferred Pharmacy:   [facility]/pharmacy #[id number], [address]  [place] [address] [address]  Phone: [phone] Fax: [phone]    Primary Care Provider: Taylor Palma MD    Primary Insurance: [facility]  Secondary Insurance:     ASSESSMENT:  [address] [place] Hilda Representative - Daughter   Primary Phone: [phone] (Home)                         Readmission Root Cause  30 Day Readmission: No    Patient Information  Admitted from[de-identified] Home  Mental Status: Alert  During Assessment patient was accompanied by: Not accompanied during assessment  Assessment information provided by[de-identified] Patient  Primary Caregiver: Self  Support Systems: Self,Parent,Family members  Home entry access options   Select all that apply : Stairs  Number of steps to enter home : One Flight  Do the steps have railings?: Yes  Type of Current Residence: Apartment  Floor Level: 2  Upon entering residence, is there a bedroom on the main floor (no further steps)?: Yes  Upon entering residence, is there a bathroom on the main floor (no further steps)?: Yes  In the last 12 months, was there a time when you were not able to pay the mortgage or rent on time?: No  In the last 12 months, how many places have you lived?: 1  In the last 12 months, was there a time when you did not have a steady place to sleep or slept in a shelter (including now)?: No  Living Arrangements: Lives w/ Parent(s)  Is patient a ?: No    Activities of Daily Living Prior to Admission  Functional Status: Independent  Completes ADLs independently?: Yes  Ambulates independently?: Yes  Does patient use assisted devices?: No  Does patient currently own DME?: Yes  What DME does the patient currently own?: Bedside Commode,Walker,Straight Cane  Does patient have a history of Outpatient Therapy (PT/OT)?: No  Does the patient have a history of Short-Term Rehab?: Yes ([facility]  Does patient have a history of HHC?: Yes (Patient does not know what agency was previously used)  Does patient currently have [facility]?: No         Patient Information Continued  Income Source: SSI/SSD  Does patient have prescription coverage?: Yes  Within the past 12 months, you worried that your food would run out before you got the money to buy more : Never true  Within the past 12 months, the food you bought just didnt last and you didnt have money to get more : Sometimes true  Food insecurity resource given?:  (Patient stated she has to reapply for food stamps and has the information for it)  Does patient receive dialysis treatments?: No  Does patient have a history of substance abuse?: No  Does patient have a history of Mental Health Diagnosis?: Yes  Is patient receiving treatment for mental health?: Yes  Has patient received inpatient treatment related to mental health in the last 2 years?: No         Means of Transportation  Means of Transport to Appts[de-identified] Friends (or public transport)  In the past 12 months, has lack of transportation kept you from medical appointments or from getting medications?: No  In the past 12 months, has lack of transportation kept you from meetings, work, or from getting things needed for daily living?: No        DISCHARGE DETAILS:    Discharge planning discussed with[de-identified] Patient  Freedom of Choice: Yes  Comments - Freedom of Choice: Patient stated she will be discharged to her daughter's home in [facility]  Patient stated she is agreeable to [facility] to come to her daughter's home if recommended  Patient stated she would be agreeable to rehab if recommended, does not know at this time if she would like to stay local or go to one near her home       Were Treatment Team discharge recommendations reviewed with patient/caregiver?: Yes  Did patient/caregiver verbalize understanding of patient care needs?: Yes  Were patient/caregiver advised of the risks associated with not following Treatment Team discharge recommendations?: Yes    Contacts  Reason/Outcome: Discharge Planning                   Patient is vaccinated for Covid-19 with Moderna vaccine, no booster

## 2022-05-09 NOTE — ANESTHESIA POSTPROCEDURE EVALUATION
Post-Op Assessment Note    CV Status:  Stable    Pain management: adequate     Mental Status:  Alert and awake   Hydration Status:  Euvolemic   PONV Controlled:  Controlled   Airway Patency:  Patent      Post Op Vitals Reviewed: Yes      Staff: CRNA         No complications documented      /53 (05/09/22 1638)    Temp 97 7 °F (36 5 °C) (05/09/22 1638)    Pulse 75 (05/09/22 1638)   Resp 18 (05/09/22 1638)    SpO2 96 % (05/09/22 1638)

## 2022-05-09 NOTE — ANESTHESIA PREPROCEDURE EVALUATION
Procedure:  EGD    Relevant Problems   Other   (+) Splenic abscess      59year old female s/p lap sleeve gastrectomy complicated by splenic injury requiring IR embolization, drainage of spenic abscess  Now presenting with residual LUQ abscess collection s/p IR drainage on 5/7/22  Plan for axios stent evaluation by EGD today  CAD, hx of angioplasty with stent placement   HTN  Hx of cholecystectomy     Body mass index is 34 54 kg/m²  Physical Exam    Airway    Mallampati score: II  TM Distance: >3 FB  Neck ROM: full     Dental       Cardiovascular      Pulmonary      Other Findings        Anesthesia Plan  ASA Score- 2     Anesthesia Type- general with ASA Monitors  Additional Monitors:   Airway Plan: ETT  Plan Factors-    Chart reviewed  Existing labs reviewed  Patient summary reviewed  Patient is not a current smoker  Obstructive sleep apnea risk education given perioperatively  Induction- intravenous and rapid sequence induction  Postoperative Plan- Plan for postoperative opioid use  Planned trial extubation    Informed Consent- Anesthetic plan and risks discussed with patient  I personally reviewed this patient with the CRNA  Discussed and agreed on the Anesthesia Plan with the CRNA  Melquiades Calles

## 2022-05-10 ENCOUNTER — APPOINTMENT (INPATIENT)
Dept: RADIOLOGY | Facility: HOSPITAL | Age: 57
DRG: 394 | End: 2022-05-10
Attending: RADIOLOGY
Payer: MEDICARE

## 2022-05-10 LAB
ANION GAP SERPL CALCULATED.3IONS-SCNC: 3 MMOL/L (ref 4–13)
BACTERIA SPEC ANAEROBE CULT: ABNORMAL
BACTERIA SPEC ANAEROBE CULT: ABNORMAL
BACTERIA SPEC BFLD CULT: ABNORMAL
BASOPHILS # BLD AUTO: 0.08 THOUSANDS/ΜL (ref 0–0.1)
BASOPHILS NFR BLD AUTO: 1 % (ref 0–1)
BUN SERPL-MCNC: 8 MG/DL (ref 5–25)
CALCIUM SERPL-MCNC: 8.6 MG/DL (ref 8.3–10.1)
CHLORIDE SERPL-SCNC: 105 MMOL/L (ref 100–108)
CO2 SERPL-SCNC: 29 MMOL/L (ref 21–32)
CREAT SERPL-MCNC: 0.81 MG/DL (ref 0.6–1.3)
EOSINOPHIL # BLD AUTO: 0.25 THOUSAND/ΜL (ref 0–0.61)
EOSINOPHIL NFR BLD AUTO: 4 % (ref 0–6)
ERYTHROCYTE [DISTWIDTH] IN BLOOD BY AUTOMATED COUNT: 17.2 % (ref 11.6–15.1)
GFR SERPL CREATININE-BSD FRML MDRD: 79 ML/MIN/1.73SQ M
GLUCOSE SERPL-MCNC: 80 MG/DL (ref 65–140)
GRAM STN SPEC: ABNORMAL
GRAM STN SPEC: ABNORMAL
HCT VFR BLD AUTO: 33.4 % (ref 34.8–46.1)
HGB BLD-MCNC: 10.6 G/DL (ref 11.5–15.4)
IMM GRANULOCYTES # BLD AUTO: 0.01 THOUSAND/UL (ref 0–0.2)
IMM GRANULOCYTES NFR BLD AUTO: 0 % (ref 0–2)
LYMPHOCYTES # BLD AUTO: 2.57 THOUSANDS/ΜL (ref 0.6–4.47)
LYMPHOCYTES NFR BLD AUTO: 40 % (ref 14–44)
MCH RBC QN AUTO: 25 PG (ref 26.8–34.3)
MCHC RBC AUTO-ENTMCNC: 31.7 G/DL (ref 31.4–37.4)
MCV RBC AUTO: 79 FL (ref 82–98)
MONOCYTES # BLD AUTO: 0.63 THOUSAND/ΜL (ref 0.17–1.22)
MONOCYTES NFR BLD AUTO: 10 % (ref 4–12)
NEUTROPHILS # BLD AUTO: 2.88 THOUSANDS/ΜL (ref 1.85–7.62)
NEUTS SEG NFR BLD AUTO: 45 % (ref 43–75)
NRBC BLD AUTO-RTO: 0 /100 WBCS
PLATELET # BLD AUTO: 513 THOUSANDS/UL (ref 149–390)
PMV BLD AUTO: 8.7 FL (ref 8.9–12.7)
POTASSIUM SERPL-SCNC: 3.6 MMOL/L (ref 3.5–5.3)
RBC # BLD AUTO: 4.24 MILLION/UL (ref 3.81–5.12)
SODIUM SERPL-SCNC: 137 MMOL/L (ref 136–145)
WBC # BLD AUTO: 6.42 THOUSAND/UL (ref 4.31–10.16)

## 2022-05-10 PROCEDURE — 99232 SBSQ HOSP IP/OBS MODERATE 35: CPT | Performed by: INTERNAL MEDICINE

## 2022-05-10 PROCEDURE — 99232 SBSQ HOSP IP/OBS MODERATE 35: CPT | Performed by: SURGERY

## 2022-05-10 PROCEDURE — 49424 ASSESS CYST CONTRAST INJECT: CPT | Performed by: STUDENT IN AN ORGANIZED HEALTH CARE EDUCATION/TRAINING PROGRAM

## 2022-05-10 PROCEDURE — 85025 COMPLETE CBC W/AUTO DIFF WBC: CPT | Performed by: SURGERY

## 2022-05-10 PROCEDURE — 76080 X-RAY EXAM OF FISTULA: CPT

## 2022-05-10 PROCEDURE — 49424 ASSESS CYST CONTRAST INJECT: CPT

## 2022-05-10 PROCEDURE — 76080 X-RAY EXAM OF FISTULA: CPT | Performed by: STUDENT IN AN ORGANIZED HEALTH CARE EDUCATION/TRAINING PROGRAM

## 2022-05-10 PROCEDURE — 80048 BASIC METABOLIC PNL TOTAL CA: CPT | Performed by: SURGERY

## 2022-05-10 RX ORDER — POTASSIUM CHLORIDE 20 MEQ/1
40 TABLET, EXTENDED RELEASE ORAL ONCE
Status: DISCONTINUED | OUTPATIENT
Start: 2022-05-10 | End: 2022-05-13 | Stop reason: HOSPADM

## 2022-05-10 RX ORDER — AMOXICILLIN AND CLAVULANATE POTASSIUM 875; 125 MG/1; MG/1
1 TABLET, FILM COATED ORAL EVERY 12 HOURS SCHEDULED
Status: DISCONTINUED | OUTPATIENT
Start: 2022-05-11 | End: 2022-05-13 | Stop reason: HOSPADM

## 2022-05-10 RX ADMIN — DIPHENHYDRAMINE HCL 25 MG: 25 TABLET ORAL at 21:00

## 2022-05-10 RX ADMIN — AMLODIPINE BESYLATE 10 MG: 10 TABLET ORAL at 08:07

## 2022-05-10 RX ADMIN — OXYCODONE HYDROCHLORIDE 10 MG: 10 TABLET ORAL at 12:27

## 2022-05-10 RX ADMIN — PIPERACILLIN AND TAZOBACTAM 4.5 G: 36; 4.5 INJECTION, POWDER, FOR SOLUTION INTRAVENOUS at 05:44

## 2022-05-10 RX ADMIN — PIPERACILLIN AND TAZOBACTAM 4.5 G: 36; 4.5 INJECTION, POWDER, FOR SOLUTION INTRAVENOUS at 17:42

## 2022-05-10 RX ADMIN — HEPARIN SODIUM 5000 UNITS: 5000 INJECTION INTRAVENOUS; SUBCUTANEOUS at 21:00

## 2022-05-10 RX ADMIN — OXYCODONE HYDROCHLORIDE 10 MG: 10 TABLET ORAL at 08:09

## 2022-05-10 RX ADMIN — ACETAMINOPHEN 975 MG: 325 TABLET, FILM COATED ORAL at 21:00

## 2022-05-10 RX ADMIN — TRAZODONE HYDROCHLORIDE 75 MG: 50 TABLET ORAL at 21:00

## 2022-05-10 RX ADMIN — OXYCODONE HYDROCHLORIDE 10 MG: 10 TABLET ORAL at 21:29

## 2022-05-10 RX ADMIN — CITALOPRAM HYDROBROMIDE 40 MG: 20 TABLET, FILM COATED ORAL at 08:07

## 2022-05-10 RX ADMIN — IOHEXOL 2 ML: 350 INJECTION, SOLUTION INTRAVENOUS at 15:13

## 2022-05-10 RX ADMIN — CALCIUM CARBONATE (ANTACID) CHEW TAB 500 MG 1000 MG: 500 CHEW TAB at 21:00

## 2022-05-10 RX ADMIN — LEVOTHYROXINE SODIUM 150 MCG: 75 TABLET ORAL at 05:44

## 2022-05-10 RX ADMIN — PIPERACILLIN AND TAZOBACTAM 4.5 G: 36; 4.5 INJECTION, POWDER, FOR SOLUTION INTRAVENOUS at 12:26

## 2022-05-10 RX ADMIN — OXYCODONE HYDROCHLORIDE 10 MG: 10 TABLET ORAL at 17:18

## 2022-05-10 NOTE — BRIEF OP NOTE (RAD/CATH)
INTERVENTIONAL RADIOLOGY PROCEDURE NOTE    Date: 5/10/2022    Procedure: tube check and removal    Preoperative diagnosis:   1  Splenic abscess    2  Left upper quadrant abdominal pain    3  Cellulitis of abdominal wall         Postoperative diagnosis: Same  Surgeon: Angélica Cobos MD     Assistant: None  No qualified resident was available  Blood loss: 0 ml    Specimens: none  Findings:   Tube check showed no fistula, minimal residual cavity  Catheter removed  Complications: None immediate      Anesthesia: none

## 2022-05-10 NOTE — PROGRESS NOTES
Progress Note - Infectious Disease   Andrea Foley 59 y o  female MRN: [id number]  Unit/Bed#: [facility] 605-01 Encounter: [id number]      Impression:  1  Recurrent/persistent splenic abscesses  2  S/P Laparoscopic sleeve gastrectomy complicated by splenic injury requiring IR embolization with subsequent splenic abscesses, IR drainage, Axios stent placement and prolonged IV antibiotic course all at [facility]  3  History of DVT on Eliquis  4  CAD S/P stent placement   5  History of hypothyroidism     Recommendations:  Patient is afebrile with normal WBC count  1  Final results of abscess fluid are showing Prevotella denticola, fusobacterium nucleatum, and non beta lactamase producing Haemophilus parainfluenza  Blood cultures are negative  2  In a m  will change piperacillin/tazobactam to Augmentin 875 mg q 12 hours p o   3  IR removed drainage tube today  4  Discussed with GI  Current plan is to give several weeks of p o  Augmentin  If this does not resolve splenic abscess it is likely she will need a splenectomy  Have asked GI to involve surgery as well as themselves an ID in laying out a plan of action for patient  In discussion with patient she intends to have her care completed here rather than [place]      Antibiotics:  1  Piperacillin/tazobactam 4 5 g q 6 hours IV, day 5 Rx     Subjective:  No rash, No pain  Denies fevers, chills, or sweats  Denies nausea, vomiting, or diarrhea  Objective:  Vitals:  Temp:  [97 3 °F (36 3 °C)-98 °F (36 7 °C)] 97 3 °F (36 3 °C)  HR:  [63] 63  Resp:  [17-18] 17  BP: ()/(61-69) 121/68  SpO2:  [92 %-96 %] 92 %  Temp (24hrs), Av 7 °F (36 5 °C), Min:97 3 °F (36 3 °C), Max:98 °F (36 7 °C)  Current: Temperature: (!) 97 3 °F (36 3 °C)    Physical Exam:     General Appearance:  Eyes:   Alert, nontoxic, no acute distress  Conjunctiva pale   Throat: Oropharynx moist without lesions    Lips, mucosa, and tongue normal   Neck: Supple, symmetrical, trachea midline, no adenopathy,  no tenderness/mass/nodules   Lungs:   Clear to auscultation bilaterally, no audible wheezes, rhonchi or rales; respirations unlabored   Heart:  Regular rate and rhythm, S1, S2 normal, no murmur, rub or gallop   Abdomen:   Soft, protuberant, striae LUQ drain out non-tender, non-distended, positive bowel sounds  No masses, no organomegaly    No CVA tenderness   Extremities: Extremities normal, atraumatic, no clubbing, cyanosis or edema   Skin: As above, no rash         Invasive Devices  Report    Peripheral Intravenous Line            Peripheral IV 05/08/22 Left;Proximal;Ventral (anterior) Forearm 1 day    Peripheral IV 05/09/22 Right Hand 1 day                Labs, Imaging, & Other studies:   All pertinent labs were personally reviewed  Results from last 7 days   Lab Units 05/10/22  0444 05/09/22  0511 05/08/22  0437   WBC Thousand/uL 6 42 7 56 8 46   HEMOGLOBIN g/dL 10 6* 10 5* 9 9*   PLATELETS Thousands/uL 513* 497* 463*     Results from last 7 days   Lab Units 05/10/22  0444 05/09/22  0511 05/09/22  0511 05/08/22  0437 05/08/22  0437 05/07/22  0553 05/06/22 2007   SODIUM mmol/L 137  --  141  --  139   < > 137   POTASSIUM mmol/L 3 6   < > 3 9   < > 4 1   < > 4 4   CHLORIDE mmol/L 105   < > 108   < > 107   < > 107   CO2 mmol/L 29   < > 35*   < > 30   < > 23   BUN mg/dL 8   < > 10   < > 9   < > 16   CREATININE mg/dL 0 81   < > 0 86   < > 0 81   < > 0 70   EGFR ml/min/1 73sq m 79   < > 74   < > 79   < > 95   CALCIUM mg/dL 8 6   < > 8 9   < > 8 4   < > 8 8   AST U/L  --   --   --   --  10  --  20   ALT U/L  --   --   --   --  15  --  19   ALK PHOS U/L  --   --   --   --  72  --  85    < > = values in this interval not displayed  Results from last 7 days   Lab Units 05/07/22  1436 05/07/22  1224   BLOOD CULTURE   --  No Growth at 72 hrs  No Growth at 72 hrs     GRAM STAIN RESULT  1+ Polys*  Rare Gram negative rods*  --    BODY FLUID CULTURE, STERILE  1+ Growth of Haemophilus parainfluenzae*  --

## 2022-05-10 NOTE — QUICK NOTE
Post Op Check  Andrea Foley 59 y o  female MRN: [id number]  Unit/Bed#: [facility] 605-01 Encounter: [id number]        Assessment/Plan     Assessment:  60 yo female s/p lap sleeve gastrectomy c/b splenic injury requiring IR embolization, cystgastrostomy, and IR drainage of splenic abscess  Pt now presents with residual LUQ abscess collection s/p IR drainage on 5/7  On EGD today, cystogastrostomy stent was removed  Afebrile, VSS  LUQ LUIS ENRIQUE: 10 mL serosanguinous     Plan:  -F/u abdominal exam  -F/u LUIS ENRIQUE output   -Follow IR plan    Subjective:  Pt is doing well  She reports her pain is well controlled  She denies nausea and vomiting  She is tolerating her liquid diet  She reports flatus but no BM  Today she has been up and ambulating  Objective     Current Vitals:   Blood Pressure: 119/50 (05/09/22 1653)  Pulse: 73 (05/09/22 1653)  Temperature: 97 7 °F (36 5 °C) (05/09/22 1638)  Temp Source: Tympanic (05/09/22 1638)  Respirations: 18 (05/09/22 1653)  Height: 5' 3" (160 cm) (05/06/22 1844)  Weight - Scale: 88 5 kg (195 lb) (05/06/22 1844)  SpO2: 97 % (05/09/22 1653)      Intake/Output Summary (Last 24 hours) at 5/9/2022 2207  Last data filed at 5/9/2022 1640  Gross per 24 hour   Intake 900 ml   Output 0 ml   Net 900 ml       Invasive Devices  Report    Peripheral Intravenous Line            Peripheral IV 05/08/22 Left;Proximal;Ventral (anterior) Forearm 1 day    Peripheral IV 05/09/22 Right Hand <1 day          Drain            Abscess Drain Abdomen 2 days                Physical Exam  Vitals and nursing note reviewed  Constitutional:       Appearance: Normal appearance  HENT:      Head: Normocephalic and atraumatic  Right Ear: External ear normal       Left Ear: External ear normal       Nose: No congestion or rhinorrhea  Eyes:      General: No scleral icterus  Extraocular Movements: Extraocular movements intact  Cardiovascular:      Rate and Rhythm: Normal rate and regular rhythm     Pulmonary: Effort: Pulmonary effort is normal    Abdominal:      General: Bowel sounds are normal  There is no distension  Palpations: Abdomen is soft  Tenderness: There is abdominal tenderness  There is no guarding or rebound  Comments: LUIS ENRIQUE Drain LUQ   Musculoskeletal:      Right lower leg: No edema  Left lower leg: No edema  Skin:     General: Skin is warm and dry  Capillary Refill: Capillary refill takes less than 2 seconds  Neurological:      General: No focal deficit present  Mental Status: She is alert and oriented to person, place, and time  Psychiatric:         Mood and Affect: Mood normal          Behavior: Behavior normal          Thought Content:  Thought content normal          Judgment: Judgment normal

## 2022-05-10 NOTE — PROGRESS NOTES
Progress Note - General Surgery   Maya Organ 59 y o  female MRN: [id number]  Unit/Bed#: [facility] 605-01 Encounter: [id number]    Assessment:  58 yo female s/p lap sleeve gastrectomy c/b splenic injury requiring IR embolization, cystgastrostomy, and IR drainage of splenic abscess  Pt now presents with residual LUQ abscess collection s/p IR drainage on 5/7  S/p axios stent removal by GI 5/9     IR drain- 0 from 5 cc SS  Cultures prelim results      Plan:  -Appreciate ID recommendations, continue zosyn  -F/u cultures  -NPO for potential intervention with IR  -Monitor LUIS ENRIQUE output and character  -Continue to hold eliquis  -DVT ppx  -Pain control prn      Subjective/Objective     Subjective:   No acute events overnight  Pain controlle  d -N  -V  +F  +BM  Objective:     Blood pressure 119/50, pulse 73, temperature 97 7 °F (36 5 °C), temperature source Tympanic, resp  rate 18, height 5' 3" (1 6 m), weight 88 5 kg (195 lb), last menstrual period 08/03/2016, SpO2 97 %, not currently breastfeeding  ,Body mass index is 34 54 kg/m²        Intake/Output Summary (Last 24 hours) at 5/9/2022 2121  Last data filed at 5/9/2022 1640  Gross per 24 hour   Intake 900 ml   Output 0 ml   Net 900 ml       Invasive Devices  Report    Peripheral Intravenous Line            Peripheral IV 05/08/22 Left;Proximal;Ventral (anterior) Forearm 1 day    Peripheral IV 05/09/22 Right Hand <1 day          Drain            Abscess Drain Abdomen 2 days                Physical Exam:   Gen: NAD, Comfortable  Neuro: A&O, No focal deficits  Head: Normal Cephalic, Atraumatic  Eye: EOMI, PERRLA, No scleral icterus  Neck: Supple, No JVD, Midline trachea  CV: RRR, Cap refill <2 sec  Pulm: Normal work of breathing, no respiratory distress  Abd: drain ss, pain by drain, otherwise soft  Ext: No edema, Non-tender  Skin: warm, dry, intact

## 2022-05-10 NOTE — BRIEF OP NOTE (RAD/CATH)
IR DRAINAGE TUBE CHECK/CHANGE/REPOSITION/REINSERTION/UPSIZE Procedure Note    PATIENT NAME: Jt Salgado  : 1965  MRN: [id number]    Pre-op Diagnosis:   1  Splenic abscess    2  Left upper quadrant abdominal pain    3  Cellulitis of abdominal wall      Post-op Diagnosis:   1  Splenic abscess    2  Left upper quadrant abdominal pain    3  Cellulitis of abdominal wall        Provider:   SHANTI Jensen  Assistants: Dr Angélica Cobos    No qualified resident was available, Resident is only observing    Estimated Blood Loss: none  Findings: Injected 2 ml of contrast, fluid collection resolved, drain removed      Specimens: none    Complications:  None immediate    Anesthesia: none    [address]     Date: 5/10/2022  Time: 3:32 PM

## 2022-05-10 NOTE — PLAN OF CARE
Problem: Potential for Falls  Goal: Patient will remain free of falls  Description: INTERVENTIONS:  - Educate patient/family on patient safety including physical limitations  - Instruct patient to call for assistance with activity   - Consult OT/PT to assist with strengthening/mobility   - Keep Call bell within reach  - Keep bed low and locked with side rails adjusted as appropriate  - Keep care items and personal belongings within reach  - Initiate and maintain comfort rounds  - Make Fall Risk Sign visible to staff  - Offer Toileting every  Hours, in advance of need  - Initiate/Maintain alarm  - Obtain necessary fall risk management equipment:   - Apply yellow socks and bracelet for high fall risk patients  - Consider moving patient to room near nurses station  Outcome: Progressing     Problem: PAIN - ADULT  Goal: Verbalizes/displays adequate comfort level or baseline comfort level  Description: Interventions:  - Encourage patient to monitor pain and request assistance  - Assess pain using appropriate pain scale  - Administer analgesics based on type and severity of pain and evaluate response  - Implement non-pharmacological measures as appropriate and evaluate response  - Consider cultural and social influences on pain and pain management  - Notify physician/advanced practitioner if interventions unsuccessful or patient reports new pain  Outcome: Progressing     Problem: INFECTION - ADULT  Goal: Absence or prevention of progression during hospitalization  Description: INTERVENTIONS:  - Assess and monitor for signs and symptoms of infection  - Monitor lab/diagnostic results  - Monitor all insertion sites, i e  indwelling lines, tubes, and drains  - Monitor endotracheal if appropriate and nasal secretions for changes in amount and color  - Tama appropriate cooling/warming therapies per order  - Administer medications as ordered  - Instruct and encourage patient and family to use good hand hygiene technique  - Identify and instruct in appropriate isolation precautions for identified infection/condition  Outcome: Progressing  Goal: Absence of fever/infection during neutropenic period  Description: INTERVENTIONS:  - Monitor WBC    Outcome: Progressing     Problem: DISCHARGE PLANNING  Goal: Discharge to home or other facility with appropriate resources  Description: INTERVENTIONS:  - Identify barriers to discharge w/patient and caregiver  - Arrange for needed discharge resources and transportation as appropriate  - Identify discharge learning needs (meds, wound care, etc )  - Arrange for interpretive services to assist at discharge as needed  - Refer to Case Management Department for coordinating discharge planning if the patient needs post-hospital services based on physician/advanced practitioner order or complex needs related to functional status, cognitive ability, or social support system  Outcome: Progressing

## 2022-05-10 NOTE — PROGRESS NOTES
Gastroenterology Progress Note      PATIENT INFORMATION      Patient: Chance Abbott 59 y o  female   MRN: [id number]  PCP: Grey Wong MD  Unit/Bed#: [facility] 605-01 Encounter: [id number]  Date Of Visit: 05/10/22  Current Length of Stay: 3 day(s)     ASSESSMENTS & PLAN    61-year-old female with history of hypertension, hypothyroidism, coronary artery disease status post stent placement x4, prior DVT on Eliquis, history of laparoscopic sleeve gastrectomy complicated by splenic injury and subsequent perisplenic abscess at outside hospital, who presented with worsening left upper quadrant abdominal pain and was found to have abscess and possible cellulitis  GI consultation requested for perisplenic abscess and concern for possible occlusion of Axios stent      Status post LAMS (Axios) placement, LUQ/subcutaneous collection, cellulitis -status post EGD with axial stent removal   There is no drainage of any pus/material from axios stent so the decision was made to remove axial stent  · Plan for IR repositioning of drain today  · Consider repeat contrast enhanced imaging later this week  · No plan for further endoscopic procedures at this time  Patient can follow-up with outpatient advanced endoscopist at [place]   · Follow-up blood cultures  · Agree with IV antibiotics and appreciate ID recommendations  · Continue supportive care per primary team     History of DVT on anticoagulation - on Eliquis as outpatient  Last dose in AM of 6  · Hold anticoagulation  · Okay to continue DVT prophylaxis  · GI recommendations otherwise as noted above      SUBJECTIVE     Minimal drain is of serosanguineous overnight  Patient reports mild left upper quadrant abdominal wall pain  Otherwise no acute complaints        OBJECTIVE     Vitals:   Temp (24hrs), Av 6 °F (36 4 °C), Min:96 7 °F (35 9 °C), Max:98 °F (36 7 °C)    Temp:  [96 7 °F (35 9 °C)-98 °F (36 7 °C)] 98 °F (36 7 °C)  HR:  [63-75] 63  Resp:  [17-18] 18  BP: ()/(50-69) 125/69  SpO2:  [92 %-97 %] 92 %  Body mass index is 34 54 kg/m²  Input and Output Summary (last 24 hours): Intake/Output Summary (Last 24 hours) at 5/10/2022 0802  Last data filed at 5/10/2022 6545  Gross per 24 hour   Intake 1893 75 ml   Output 208 ml   Net 1685 75 ml       Physical Exam:   GENERAL: NAD  HEENT:  NC/AT, no scleral icterus  CARDIAC:  RRR, +S1/S2  PULMONARY:  non-labored breathing  ABDOMEN:  Soft, NT/ND, +BS, no rebound/guarding/rigidity  Extremities:  No edema, cyanosis, or clubbing  NEUROLOGIC:  Alert  SKIN:  No rashes or erythema      ADDITIONAL DATA     Labs & Recent Cultures:     Results from last 7 days   Lab Units 05/10/22  0444   WBC Thousand/uL 6 42   HEMOGLOBIN g/dL 10 6*   HEMATOCRIT % 33 4*   PLATELETS Thousands/uL 513*   NEUTROS PCT % 45   LYMPHS PCT % 40   MONOS PCT % 10   EOS PCT % 4     Results from last 7 days   Lab Units 05/10/22  0444 05/09/22  0511 05/08/22  0437   POTASSIUM mmol/L 3 6   < > 4 1   CHLORIDE mmol/L 105   < > 107   CO2 mmol/L 29   < > 30   BUN mg/dL 8   < > 9   CREATININE mg/dL 0 81   < > 0 81   CALCIUM mg/dL 8 6   < > 8 4   ALK PHOS U/L  --   --  72   ALT U/L  --   --  15   AST U/L  --   --  10    < > = values in this interval not displayed  Results from last 7 days   Lab Units 05/07/22  0930   INR  1 06         Results from last 7 days   Lab Units 05/07/22  1436 05/07/22  1224   BLOOD CULTURE   --  No Growth at 48 hrs  No Growth at 48 hrs     GRAM STAIN RESULT  1+ Polys*  Rare Gram negative rods*  --    BODY FLUID CULTURE, STERILE  1+ Growth of Haemophilus parainfluenzae*  --          Nutrition:  Diet NPO; Sips with meds  Radiology Results:   IR drainage tube placement   Final Result by Eduardo Cranker, MD (05/07 5866)   Impression:       Ultrasound-guided 8 Filipino drain placement into a left abdominal wall superficial purulent collection      Plan:       Recommend gastroenterology consult to interrogate the transgastric stent as it may be occluded      Monitor drain output of the percutaneous drain  We can bring the patient back during this inpatient hospitalization to inject the tract and assess for communication with deep intraperitoneal structures  Workstation performed: [id number]         CT abdomen pelvis wo contrast   Final Result by Sharron Shah MD (05/06 2250)   Addendum 1 of 1 by Sharron Shah MD (05/06 2250)   ADDENDUM:      Soft tissue inflammation in the left lateral chest wall overlying the    diaphragm and spleen could reflect cellulitis  Additionally inflammation    extends to the chest wall and intercostal space and is not well    differentiated from the superior aspect of the    spleen and adjacent fluid collection  Developing cutaneous fistula    cannot be excluded; however evaluation is limited in the absence of    contrast       Final         1  Probable collection in the left upper quadrant, not well differentiated from the spleen and the absence of intravenous contrast, and adjacent to postsurgical change in the gastric fundus  Presumed collection measures 4 5 cm containing few foci of    gas  Comparison with prior outside imaging, if available would be helpful  2   Postsurgical change from gastric sleeve  No evidence of bowel obstruction, colitis, diverticulitis or appendicitis              Workstation performed: [id number]         IR drainage tube check/change/reposition/reinsertion/upsize    (Results Pending)   IR drainage tube check/change/reposition/reinsertion/upsize    (Results Pending)     Scheduled Medications:  acetaminophen, 975 mg, Q8H [facility] long-term  amLODIPine, 10 mg, Daily  citalopram, 40 mg, Daily  heparin (porcine), 5,000 Units, Q8H [facility] & long-term  levothyroxine, 150 mcg, Early Morning  piperacillin-tazobactam, 4 5 g, Q6H  polyethylene glycol, 17 g, Daily  senna, 1 tablet, Daily  traZODone, 75 mg, HS        PRN MEDS:  calcium carbonate, 1,000 mg, TID PRN  diphenhydrAMINE, 25 mg, Q6H PRN  HYDROmorphone, 0 5 mg, Q4H PRN  ondansetron, 4 mg, Q4H PRN  oxyCODONE, 10 mg, Q4H PRN  oxyCODONE, 5 mg, Q4H PRN        Last 24 Hours Medication List:   Current Facility-Administered Medications   Medication Dose Route Frequency Provider Last Rate    acetaminophen  975 mg Oral Q8H [facility] Ramon Jacobo, DO      amLODIPine  10 mg Oral Daily Subhash Sas, DO      calcium carbonate  1,000 mg Oral TID PRN Migdalia Jacob MD      citalopram  40 mg Oral Daily [facility], DO      diphenhydrAMINE  25 mg Oral Q6H PRN Subhash Sas, DO      heparin (porcine)  5,000 Units Subcutaneous Q8H [facility], DO      HYDROmorphone  0 5 mg Intravenous Q4H PRN Subhash Sas, DO      levothyroxine  150 mcg Oral Early Morning [facility], DO      ondansetron  4 mg Intravenous Q4H PRN Subhash Sas, DO      oxyCODONE  10 mg Oral Q4H PRN Subhash Sas, DO      oxyCODONE  5 mg Oral Q4H PRN [facility], DO      piperacillin-tazobactam  4 5 g Intravenous Q6H [facility], DO Stopped (05/10/22 0293)    polyethylene glycol  17 g Oral Daily Subhash Sas, DO      senna  1 tablet Oral Daily [facility], DO      sodium chloride  75 mL/hr Intravenous Continuous Shirin Rockwell MD 75 mL/hr (05/09/22 1103)    traZODone  75 mg Oral HS Subhash Sas, DO            Time Spent for Care: 30 mins spent in total   More than 50% of total time spent on counseling and coordination of care as described above  Current Length of Stay: 3 day(s)      Code Status: Level 1 - Full Code          ** Please Note: This note is constructed using a voice recognition dictation system   **

## 2022-05-11 ENCOUNTER — APPOINTMENT (INPATIENT)
Dept: RADIOLOGY | Facility: HOSPITAL | Age: 57
DRG: 394 | End: 2022-05-11
Payer: MEDICARE

## 2022-05-11 LAB
ANION GAP SERPL CALCULATED.3IONS-SCNC: 2 MMOL/L (ref 4–13)
BASOPHILS # BLD AUTO: 0.09 THOUSANDS/ΜL (ref 0–0.1)
BASOPHILS NFR BLD AUTO: 1 % (ref 0–1)
BUN SERPL-MCNC: 12 MG/DL (ref 5–25)
CALCIUM SERPL-MCNC: 9.1 MG/DL (ref 8.3–10.1)
CHLORIDE SERPL-SCNC: 107 MMOL/L (ref 100–108)
CO2 SERPL-SCNC: 30 MMOL/L (ref 21–32)
CREAT SERPL-MCNC: 0.82 MG/DL (ref 0.6–1.3)
EOSINOPHIL # BLD AUTO: 0.23 THOUSAND/ΜL (ref 0–0.61)
EOSINOPHIL NFR BLD AUTO: 4 % (ref 0–6)
ERYTHROCYTE [DISTWIDTH] IN BLOOD BY AUTOMATED COUNT: 17.4 % (ref 11.6–15.1)
GFR SERPL CREATININE-BSD FRML MDRD: 78 ML/MIN/1.73SQ M
GLUCOSE SERPL-MCNC: 81 MG/DL (ref 65–140)
HCT VFR BLD AUTO: 32.9 % (ref 34.8–46.1)
HGB BLD-MCNC: 10.3 G/DL (ref 11.5–15.4)
IMM GRANULOCYTES # BLD AUTO: 0.01 THOUSAND/UL (ref 0–0.2)
IMM GRANULOCYTES NFR BLD AUTO: 0 % (ref 0–2)
LYMPHOCYTES # BLD AUTO: 1.99 THOUSANDS/ΜL (ref 0.6–4.47)
LYMPHOCYTES NFR BLD AUTO: 30 % (ref 14–44)
MCH RBC QN AUTO: 24.6 PG (ref 26.8–34.3)
MCHC RBC AUTO-ENTMCNC: 31.3 G/DL (ref 31.4–37.4)
MCV RBC AUTO: 79 FL (ref 82–98)
MONOCYTES # BLD AUTO: 0.63 THOUSAND/ΜL (ref 0.17–1.22)
MONOCYTES NFR BLD AUTO: 10 % (ref 4–12)
NEUTROPHILS # BLD AUTO: 3.68 THOUSANDS/ΜL (ref 1.85–7.62)
NEUTS SEG NFR BLD AUTO: 55 % (ref 43–75)
NRBC BLD AUTO-RTO: 0 /100 WBCS
PLATELET # BLD AUTO: 510 THOUSANDS/UL (ref 149–390)
PMV BLD AUTO: 8.8 FL (ref 8.9–12.7)
POTASSIUM SERPL-SCNC: 3.7 MMOL/L (ref 3.5–5.3)
RBC # BLD AUTO: 4.19 MILLION/UL (ref 3.81–5.12)
SODIUM SERPL-SCNC: 139 MMOL/L (ref 136–145)
WBC # BLD AUTO: 6.63 THOUSAND/UL (ref 4.31–10.16)

## 2022-05-11 PROCEDURE — 99232 SBSQ HOSP IP/OBS MODERATE 35: CPT | Performed by: SURGERY

## 2022-05-11 PROCEDURE — G1004 CDSM NDSC: HCPCS

## 2022-05-11 PROCEDURE — 85025 COMPLETE CBC W/AUTO DIFF WBC: CPT | Performed by: SURGERY

## 2022-05-11 PROCEDURE — 99232 SBSQ HOSP IP/OBS MODERATE 35: CPT | Performed by: INTERNAL MEDICINE

## 2022-05-11 PROCEDURE — 74176 CT ABD & PELVIS W/O CONTRAST: CPT

## 2022-05-11 PROCEDURE — 80048 BASIC METABOLIC PNL TOTAL CA: CPT | Performed by: SURGERY

## 2022-05-11 RX ADMIN — OXYCODONE HYDROCHLORIDE 10 MG: 10 TABLET ORAL at 09:40

## 2022-05-11 RX ADMIN — AMOXICILLIN AND CLAVULANATE POTASSIUM 1 TABLET: 875; 125 TABLET, FILM COATED ORAL at 21:05

## 2022-05-11 RX ADMIN — TRAZODONE HYDROCHLORIDE 75 MG: 50 TABLET ORAL at 21:05

## 2022-05-11 RX ADMIN — HEPARIN SODIUM 5000 UNITS: 5000 INJECTION INTRAVENOUS; SUBCUTANEOUS at 21:05

## 2022-05-11 RX ADMIN — HEPARIN SODIUM 5000 UNITS: 5000 INJECTION INTRAVENOUS; SUBCUTANEOUS at 14:00

## 2022-05-11 RX ADMIN — ACETAMINOPHEN 975 MG: 325 TABLET, FILM COATED ORAL at 21:05

## 2022-05-11 RX ADMIN — AMLODIPINE BESYLATE 10 MG: 10 TABLET ORAL at 09:35

## 2022-05-11 RX ADMIN — CITALOPRAM HYDROBROMIDE 40 MG: 20 TABLET, FILM COATED ORAL at 09:35

## 2022-05-11 RX ADMIN — OXYCODONE HYDROCHLORIDE 10 MG: 10 TABLET ORAL at 17:45

## 2022-05-11 RX ADMIN — AMOXICILLIN AND CLAVULANATE POTASSIUM 1 TABLET: 875; 125 TABLET, FILM COATED ORAL at 09:35

## 2022-05-11 RX ADMIN — LEVOTHYROXINE SODIUM 150 MCG: 75 TABLET ORAL at 05:33

## 2022-05-11 NOTE — PROGRESS NOTES
Progress Note - Infectious Disease   Ayla Jerez 59 y o  female MRN: [id number]  Unit/Bed#: [facility] 605-01 Encounter: [id number]      Impression:  1  Recurrent/persistent splenic abscesses  2  S/P Laparoscopic sleeve gastrectomy complicated by splenic injury requiring IR embolization with subsequent splenic abscesses, IR drainage, Axios stent placement and prolonged IV antibiotic course all at [facility]  3  History of DVT on Eliquis  4  CAD S/P stent placement   5  History of hypothyroidism     Recommendations:  Patient is afebrile with normal WBC count  1  Final results of abscess fluid are showing Prevotella denticola, fusobacterium nucleatum, and non beta lactamase producing Haemophilus parainfluenza  Blood cultures are negative  2  Continue Augmentin 875 mg q 12 hours p o   3  IR removed drainage tube 5/10  4  Discussed with GI 5/10  Current plan is to give several weeks of p o  Augmentin  If this does not resolve splenic abscess it is likely she will need a splenectomy  Have asked GI to involve surgery as well as themselves an ID in laying out a plan of action for patient  In discussion with patient she intends to have her care completed here rather than [address] [place] [address]  5  Repeat CT  scan does not indicate that this spleen has a definite abscess  There is still a small fluid collection in the left upper lateral abdominal wall adjacent to the level of the spleen  6  Patient's left flank discomfort needs to be continually monitored  to see if it worsens      Antibiotics:  1  Augmentin 875 mg q 12 hours p o , day 6 total Rx of possible 28 day course     Subjective:  Now has some left flank discomfort  Denies fevers, chills, or sweats  Denies nausea, vomiting, or diarrhea        Objective:  Vitals:  Temp:  [97 7 °F (36 5 °C)-98 1 °F (36 7 °C)] 97 7 °F (36 5 °C)  HR:  [69-73] 73  Resp:  [16-18] 18  BP: (100-133)/(60-75) 132/75  SpO2:  [96 %-97 %] 97 %  Temp (24hrs), Av 8 °F (36 6 °C), Min:97 7 °F (36 5 °C), Max:98 1 °F (36 7 °C)  Current: Temperature: 97 7 °F (36 5 °C)    Physical Exam:     General Appearance:  Eyes:   Alert, nontoxic, no acute distress  Conjunctiva pale   Throat: Oropharynx moist without lesions  Lips, mucosa, and tongue normal   Neck: Supple, symmetrical, trachea midline, no adenopathy,  no tenderness/mass/nodules   Lungs:   Clear to auscultation bilaterally, no audible wheezes, rhonchi or rales; respirations unlabored   Heart:  Regular rate and rhythm, S1, S2 normal, no murmur, rub or gallop   Abdomen:   Soft, protuberant, striae, former LUQ drain site is clean and dry,  non-distended, positive bowel sounds  No masses, no organomegaly    No CVA tenderness   Extremities: Extremities normal, atraumatic, no clubbing, cyanosis or edema   Skin: As above, no rash         Invasive Devices  Report    Peripheral Intravenous Line  Duration           Peripheral IV 05/08/22 Left;Proximal;Ventral (anterior) Forearm 2 days    Peripheral IV 05/09/22 Right Hand 1 day                Labs, Imaging, & Other studies:   All pertinent labs were personally reviewed  Results from last 7 days   Lab Units 05/11/22  0443 05/10/22  0444 05/09/22  0511   WBC Thousand/uL 6 63 6 42 7 56   HEMOGLOBIN g/dL 10 3* 10 6* 10 5*   PLATELETS Thousands/uL 510* 513* 497*     Results from last 7 days   Lab Units 05/11/22  0443 05/10/22  0444 05/09/22  0511 05/08/22  0437 05/07/22  0553 05/06/22 2007   SODIUM mmol/L 139 137 141 139   < > 137   POTASSIUM mmol/L 3 7 3 6 3 9 4 1   < > 4 4   CHLORIDE mmol/L 107 105 108 107   < > 107   CO2 mmol/L 30 29 35* 30   < > 23   BUN mg/dL 12 8 10 9   < > 16   CREATININE mg/dL 0 82 0 81 0 86 0 81   < > 0 70   EGFR ml/min/1 73sq m 78 79 74 79   < > 95   CALCIUM mg/dL 9 1 8 6 8 9 8 4   < > 8 8   AST U/L  --   --   --  10  --  20   ALT U/L  --   --   --  15  --  19   ALK PHOS U/L  --   --   --  72  --  85    < > = values in this interval not displayed       Results from last 7 days   Lab Units 05/07/22  1436 05/07/22  1224   BLOOD CULTURE   --  No Growth at 72 hrs  No Growth at 72 hrs     GRAM STAIN RESULT  1+ Polys*  Rare Gram negative rods*  --    BODY FLUID CULTURE, STERILE  1+ Growth of Haemophilus parainfluenzae*  --

## 2022-05-11 NOTE — UTILIZATION REVIEW
Continued Stay Review    Date: 5/11                         Current Patient Class: Inpatient  Current Level of Care: Med Surg    HPI:57 y o  female initially admitted on 5/7    Assessment/Plan: LUQ abscess collection s/p IR drainage on 5/7  Progress notes; S/p axios stent removal by GI 5/9  IR drain removed on 5/10  Transition from Iv antibiotics to Augmentin  Plan CT today to r/o any further collections  NPO until after scan  Continue to hold Eliquis for now  Pain/nausea prn  GI cons; S/p IR drain removal, S/p Axiios stent removal  Plan for extemded course of antibiotics  If persistent collection, can consider IR versus endoscopic drainage  Can also consider splenectomy at that time    5/10  IR;  tube check and removed  Vital Signs:   05/11/22 06:19:51 98 1 °F (36 7 °C) 69 18 133/75 94 96 % --   05/10/22 22:29:04 97 7 °F (36 5 °C) -- 16 100/60 73 -- --   05/10/22 1500 -- -- -- -- -- -- None (Room air)       Pertinent Labs/Diagnostic Results:   5/11  CT abd/pelvis - Decreased size and less well-defined left upper lateral abdominal wall fluid collection when compared with the prior study    Trace partially loculated left pleural effusion posteriorly with patchy atelectasis in the lingula and left lower lobes          Results from last 7 days   Lab Units 05/11/22  0443 05/10/22  0444 05/09/22  0511 05/08/22  0437 05/07/22  0553   WBC Thousand/uL 6 63 6 42 7 56 8 46 11 02*   HEMOGLOBIN g/dL 10 3* 10 6* 10 5* 9 9* 10 4*   HEMATOCRIT % 32 9* 33 4* 32 6* 31 3* 32 1*   PLATELETS Thousands/uL 510* 513* 497* 463* 502*   NEUTROS ABS Thousands/µL 3 68 2 88 3 87 5 03 7 13         Results from last 7 days   Lab Units 05/11/22  0443 05/10/22  0444 05/09/22  0511 05/08/22  0437 05/07/22  0553   SODIUM mmol/L 139 137 141 139 140   POTASSIUM mmol/L 3 7 3 6 3 9 4 1 4 4   CHLORIDE mmol/L 107 105 108 107 108   CO2 mmol/L 30 29 35* 30 29   ANION GAP mmol/L 2* 3* -2* 2* 3*   BUN mg/dL 12 8 10 9 11   CREATININE mg/dL 0 82 0  81 0 86 0 81 0 74   EGFR ml/min/1 73sq m 78 79 74 79 88   CALCIUM mg/dL 9 1 8 6 8 9 8 4 9 2     Results from last 7 days   Lab Units 05/08/22  0437 05/06/22 2007   AST U/L 10 20   ALT U/L 15 19   ALK PHOS U/L 72 85   TOTAL PROTEIN g/dL 6 3* 7 2   ALBUMIN g/dL 2 3* 2 8*   TOTAL BILIRUBIN mg/dL 0 17* 0 18*         Results from last 7 days   Lab Units 05/11/22  0443 05/10/22  0444 05/09/22  0511 05/08/22  0437 05/07/22  0553 05/06/22 2007   GLUCOSE RANDOM mg/dL 81 80 78 81 80 90         Results from last 7 days   Lab Units 05/07/22  0930   PROTIME seconds 13 4   INR  1 06         Results from last 7 days   Lab Units 05/07/22  1436 05/07/22  1224   BLOOD CULTURE   --  No Growth at 72 hrs  No Growth at 72 hrs     GRAM STAIN RESULT  1+ Polys*  Rare Gram negative rods*  --    BODY FLUID CULTURE, STERILE  1+ Growth of Haemophilus parainfluenzae*  --      Medications:   Scheduled Medications:  acetaminophen, 975 mg, Oral, Q8H [facility]  amLODIPine, 10 mg, Oral, Daily  amoxicillin-clavulanate, 1 tablet, Oral, Q12H [facility]  citalopram, 40 mg, Oral, Daily  heparin (porcine), 5,000 Units, Subcutaneous, Q8H [facility]  levothyroxine, 150 mcg, Oral, Early Morning  polyethylene glycol, 17 g, Oral, Daily  potassium chloride, 40 mEq, Oral, Once  senna, 1 tablet, Oral, Daily  traZODone, 75 mg, Oral, HS    piperacillin-tazobactam (ZOSYN) 4 5 g in sodium chloride 0 9 % 100 mL IVPB  Dose: 4 5 g  Freq: Every 6 hours Route: IV  Last Dose: 4 5 g (05/10/22 1742)  Start: 05/07/22 0500 End: 05/10/22 1812    Continuous IV Infusions:  sodium chloride, 75 mL/hr, Intravenous, Continuous      PRN Meds:  calcium carbonate, 1,000 mg, Oral, TID PRN  diphenhydrAMINE, 25 mg, Oral, Q6H PRN  HYDROmorphone, 0 5 mg, Intravenous, Q4H PRN  ondansetron, 4 mg, Intravenous, Q4H PRN  oxyCODONE, 10 mg, Oral, Q4H PRN 5/11 x1  oxyCODONE, 5 mg, Oral, Q4H PRN      Discharge Plan: D    Network Utilization Review Department  ATTENTION: Please call with any questions or concerns to [phone] and carefully listen to the prompts so that you are directed to the right person  All voicemails are confidential   Essence Daugherty all requests for admission clinical reviews, approved or denied determinations and any other requests to dedicated fax number below belonging to the campus where the patient is receiving treatment   List of dedicated fax numbers for the Facilities:  [address] DENIALS (Administrative/Medical Necessity) [phone]   [address] (Maternity/NICU/Pediatrics) [phone]   [address] [place] [address]  [address] [facility] [place] [address] [address] [place] [address] [address] [facility] [place] [address] [phone]

## 2022-05-11 NOTE — PROGRESS NOTES
Progress Note - General Surgery   Debo Ohara 59 y o  female MRN: [id number]  Unit/Bed#: [facility] 605-01 Encounter: [id number]    Assessment:  58 yo female s/p lap sleeve gastrectomy c/b splenic injury requiring IR embolization, cystgastrostomy, and IR drainage of splenic abscess  Pt now presents with residual LUQ abscess collection s/p IR drainage on 5/7  S/p axios stent removal by GI 5/9  IR drain removed on 5/10     AVSS  WBC 6 6(6 4)    Plan:  -Appreciate ID recommendations, transition to augmentin  -Will plan on CT scan today to r/o any further collections  -NPO until after scan   -Continue to hold eliquis for now  -Pain/nausea control PRN  -Dispo planning per CT results    Subjective/Objective     Subjective:   No acute events overnight  No fevers/chills  No nausea or vomiting  No fevers/chills  Tolerating diet  Has mild LUQ abd pain  Objective:     Blood pressure 133/75, pulse 69, temperature 98 1 °F (36 7 °C), resp  rate 18, height 5' 3" (1 6 m), weight 88 5 kg (195 lb), last menstrual period 08/03/2016, SpO2 96 %, not currently breastfeeding  ,Body mass index is 34 54 kg/m²  No intake or output data in the 24 hours ending 05/11/22 0623    Invasive Devices  Report    Peripheral Intravenous Line  Duration           Peripheral IV 05/08/22 Left;Proximal;Ventral (anterior) Forearm 2 days    Peripheral IV 05/09/22 Right Hand 1 day                Physical Exam:   General: NAD  HENT: NCAT MMM  Neck: supple, no JVD  CV: nl rate  Lungs: nl wob   No resp distress  ABD: Soft, Mild LUQ tenderness, nondistended  Extrem: No CCE  Neuro: AAOx3

## 2022-05-11 NOTE — PROGRESS NOTES
Gastroenterology Progress Note      PATIENT INFORMATION      Patient: Gillian Shook 59 y o  female   MRN: [id number]  PCP: Bossman Royal MD  Unit/Bed#: [facility] 605-01 Encounter: [id number]  Date Of Visit: 22  Current Length of Stay: 4 day(s)     ASSESSMENTS & PLAN    57-year-old female with history of hypertension, hypothyroidism, coronary artery disease status post stent placement x4, prior DVT on Eliquis, history of laparoscopic sleeve gastrectomy complicated by splenic injury and subsequent perisplenic abscess at outside hospital, who presented with worsening left upper quadrant abdominal pain and was found to have abscess and possible cellulitis  GI consultation requested for perisplenic abscess and concern for possible occlusion of Axios stent      Status post LAMS (Axios) placement, LUQ/subcutaneous collection, cellulitis -status post EGD with axial stent removal   There is no drainage of any pus/material from axios stent so the decision was made to remove axial stent  § S/p IR drain removal  § S/p Axiios stent removal  § Plan for extemded course of antibiotics  Repeat after antibiotics are completed  If persistent collection, can consider IR versus endoscopic drainage  Can also consider splenectomy at that time  § Continue supportive care per primary team     History of DVT on anticoagulation - on Eliquis as outpatient  Last dose in AM of 5/6   § Okay to restart from gastroenterology standpoint  § Okay to continue DVT prophylaxis  § GI recommendations otherwise as noted above      SUBJECTIVE     Patient denies abdominal pain, nausea, vomiting, constipation, diarrhea, fevers/chills  OBJECTIVE     Vitals:   Temp (24hrs), Av 7 °F (36 5 °C), Min:97 3 °F (36 3 °C), Max:98 1 °F (36 7 °C)    Temp:  [97 3 °F (36 3 °C)-98 1 °F (36 7 °C)] 98 1 °F (36 7 °C)  HR:  [69] 69  Resp:  [16-18] 18  BP: (100-133)/(60-75) 133/75  SpO2:  [96 %] 96 %  Body mass index is 34 54 kg/m²       Input and Output Summary (last 24 hours):     No intake or output data in the 24 hours ending 05/11/22 0754    Physical Exam:   GENERAL: NAD  HEENT:  NC/AT, no scleral icterus  CARDIAC:  RRR, +S1/S2  PULMONARY:  non-labored breathing  ABDOMEN:  Mild tenderness palpation in the left flank Soft, ND, +BS, no rebound/guarding/rigidity  Extremities:  No edema, cyanosis, or clubbing  NEUROLOGIC:  Alert  SKIN:  No rashes or erythema      ADDITIONAL DATA     Labs & Recent Cultures:     Results from last 7 days   Lab Units 05/11/22  0443   WBC Thousand/uL 6 63   HEMOGLOBIN g/dL 10 3*   HEMATOCRIT % 32 9*   PLATELETS Thousands/uL 510*   NEUTROS PCT % 55   LYMPHS PCT % 30   MONOS PCT % 10   EOS PCT % 4     Results from last 7 days   Lab Units 05/11/22  0443 05/09/22  0511 05/08/22  0437   POTASSIUM mmol/L 3 7   < > 4 1   CHLORIDE mmol/L 107   < > 107   CO2 mmol/L 30   < > 30   BUN mg/dL 12   < > 9   CREATININE mg/dL 0 82   < > 0 81   CALCIUM mg/dL 9 1   < > 8 4   ALK PHOS U/L  --   --  72   ALT U/L  --   --  15   AST U/L  --   --  10    < > = values in this interval not displayed  Results from last 7 days   Lab Units 05/07/22  0930   INR  1 06         Results from last 7 days   Lab Units 05/07/22  1436 05/07/22  1224   BLOOD CULTURE   --  No Growth at 72 hrs  No Growth at 72 hrs     GRAM STAIN RESULT  1+ Polys*  Rare Gram negative rods*  --    BODY FLUID CULTURE, STERILE  1+ Growth of Haemophilus parainfluenzae*  --          Nutrition:  Diet NPO; Sips with meds  Radiology Results:   IR drainage tube check/change/reposition/reinsertion/upsize   Final Result by Katy Burrell MD (05/10 1709)   Abscessogram with catheter removal             Workstation performed: [id number]         IR drainage tube placement   Final Result by Syble Sever, MD (05/07 2966)   Impression:       Ultrasound-guided 8 [place] drain placement into a left abdominal wall superficial purulent collection      Plan:       Recommend gastroenterology consult to interrogate the transgastric stent as it may be occluded      Monitor drain output of the percutaneous drain  We can bring the patient back during this inpatient hospitalization to inject the tract and assess for communication with deep intraperitoneal structures  Workstation performed: [id number]         CT abdomen pelvis wo contrast   Final Result by Belgica Garsia MD (05/06 2250)   Addendum 1 of 1 by Belgica Garsia MD (05/06 2250)   ADDENDUM:      Soft tissue inflammation in the left lateral chest wall overlying the    diaphragm and spleen could reflect cellulitis  Additionally inflammation    extends to the chest wall and intercostal space and is not well    differentiated from the superior aspect of the    spleen and adjacent fluid collection  Developing cutaneous fistula    cannot be excluded; however evaluation is limited in the absence of    contrast       Final         1  Probable collection in the left upper quadrant, not well differentiated from the spleen and the absence of intravenous contrast, and adjacent to postsurgical change in the gastric fundus  Presumed collection measures 4 5 cm containing few foci of    gas  Comparison with prior outside imaging, if available would be helpful  2   Postsurgical change from gastric sleeve  No evidence of bowel obstruction, colitis, diverticulitis or appendicitis              Workstation performed: [id number]         IR drainage tube check/change/reposition/reinsertion/upsize    (Results Pending)   CT abdomen pelvis wo contrast    (Results Pending)     Scheduled Medications:  acetaminophen, 975 mg, Q8H JANETT  amLODIPine, 10 mg, Daily  amoxicillin-clavulanate, 1 tablet, Q12H JANETT  citalopram, 40 mg, Daily  heparin (porcine), 5,000 Units, Q8H [facility]  levothyroxine, 150 mcg, Early Morning  polyethylene glycol, 17 g, Daily  potassium chloride, 40 mEq, Once  senna, 1 tablet, Daily  traZODone, 75 mg, HS        PRN MEDS:  barium, 900 mL, Once in imaging  calcium carbonate, 1,000 mg, TID PRN  diphenhydrAMINE, 25 mg, Q6H PRN  HYDROmorphone, 0 5 mg, Q4H PRN  ondansetron, 4 mg, Q4H PRN  oxyCODONE, 10 mg, Q4H PRN  oxyCODONE, 5 mg, Q4H PRN        Last 24 Hours Medication List:   Current Facility-Administered Medications   Medication Dose Route Frequency Provider Last Rate    acetaminophen  975 mg Oral Q8H [address] Ramon UMM Jacobo, DO      amLODIPine  10 mg Oral Daily Edita Opitz, DO      amoxicillin-clavulanate  1 tablet Oral Q12H [address] Davie Homans, MD      barium  900 mL Oral Once in imaging Zoey Farley MD      calcium carbonate  1,000 mg Oral TID PRN Zoey Farley MD      citalopram  40 mg Oral Daily Edita Opitz, DO      diphenhydrAMINE  25 mg Oral Q6H PRN Edita Opitz, DO      heparin (porcine)  5,000 Units Subcutaneous Q8H [address] Lacinda Flattery Donnell, DO      HYDROmorphone  0 5 mg Intravenous Q4H PRN Edita Opitz, DO      levothyroxine  150 mcg Oral Early Morning Lacinda Flattery Donnell, DO      ondansetron  4 mg Intravenous Q4H PRN Edita Opitz, DO      oxyCODONE  10 mg Oral Q4H PRN Edita Opitz, DO      oxyCODONE  5 mg Oral Q4H PRN Edita Opitz, DO      polyethylene glycol  17 g Oral Daily Edita Opitz, DO      potassium chloride  40 mEq Oral Once Zoey Farley MD      senna  1 tablet Oral Daily Edita Opitz, DO      sodium chloride  75 mL/hr Intravenous Continuous Kit Osuna MD 75 mL/hr (05/09/22 7454)    traZODone  75 mg Oral HS Lupe Opitz, DO            Time Spent for Care: 30 mins spent in total   More than 50% of total time spent on counseling and coordination of care as described above  Current Length of Stay: 4 day(s)      Code Status: Level 1 - Full Code          ** Please Note: This note is constructed using a voice recognition dictation system   **

## 2022-05-11 NOTE — PLAN OF CARE
Problem: Potential for Falls  Goal: Patient will remain free of falls  Description: INTERVENTIONS:  - Educate patient/family on patient safety including physical limitations  - Instruct patient to call for assistance with activity   - Consult OT/PT to assist with strengthening/mobility   - Keep Call bell within reach  - Keep bed low and locked with side rails adjusted as appropriate  - Keep care items and personal belongings within reach  - Initiate and maintain comfort rounds  - Make Fall Risk Sign visible to staff  - Offer Toileting every  Hours, in advance of need  - Initiate/Maintain alarm  - Obtain necessary fall risk management equipme  - Apply yellow socks and bracelet for high fall risk patients  - Consider moving patient to room near nurses station  Outcome: Progressing     Problem: PAIN - ADULT  Goal: Verbalizes/displays adequate comfort level or baseline comfort level  Description: Interventions:  - Encourage patient to monitor pain and request assistance  - Assess pain using appropriate pain scale  - Administer analgesics based on type and severity of pain and evaluate response  - Implement non-pharmacological measures as appropriate and evaluate response  - Consider cultural and social influences on pain and pain management  - Notify physician/advanced practitioner if interventions unsuccessful or patient reports new pain  Outcome: Progressing     Problem: INFECTION - ADULT  Goal: Absence or prevention of progression during hospitalization  Description: INTERVENTIONS:  - Assess and monitor for signs and symptoms of infection  - Monitor lab/diagnostic results  - Monitor all insertion sites, i e  indwelling lines, tubes, and drains  - Monitor endotracheal if appropriate and nasal secretions for changes in amount and color  - [place] appropriate cooling/warming therapies per order  - Administer medications as ordered  - Instruct and encourage patient and family to use good hand hygiene technique  - Identify and instruct in appropriate isolation precautions for identified infection/condition  Outcome: Progressing  Goal: Absence of fever/infection during neutropenic period  Description: INTERVENTIONS:  - Monitor WBC    Outcome: Progressing     Problem: DISCHARGE PLANNING  Goal: Discharge to home or other facility with appropriate resources  Description: INTERVENTIONS:  - Identify barriers to discharge w/patient and caregiver  - Arrange for needed discharge resources and transportation as appropriate  - Identify discharge learning needs (meds, wound care, etc )  - Arrange for interpretive services to assist at discharge as needed  - Refer to Case Management Department for coordinating discharge planning if the patient needs post-hospital services based on physician/advanced practitioner order or complex needs related to functional status, cognitive ability, or social support system  Outcome: Progressing

## 2022-05-12 LAB
BACTERIA BLD CULT: NORMAL
BACTERIA BLD CULT: NORMAL
BASOPHILS # BLD AUTO: 0.05 THOUSANDS/ΜL (ref 0–0.1)
BASOPHILS NFR BLD AUTO: 1 % (ref 0–1)
EOSINOPHIL # BLD AUTO: 0.14 THOUSAND/ΜL (ref 0–0.61)
EOSINOPHIL NFR BLD AUTO: 2 % (ref 0–6)
ERYTHROCYTE [DISTWIDTH] IN BLOOD BY AUTOMATED COUNT: 17.3 % (ref 11.6–15.1)
HCT VFR BLD AUTO: 36.9 % (ref 34.8–46.1)
HGB BLD-MCNC: 11.5 G/DL (ref 11.5–15.4)
IMM GRANULOCYTES # BLD AUTO: 0.02 THOUSAND/UL (ref 0–0.2)
IMM GRANULOCYTES NFR BLD AUTO: 0 % (ref 0–2)
LYMPHOCYTES # BLD AUTO: 2.14 THOUSANDS/ΜL (ref 0.6–4.47)
LYMPHOCYTES NFR BLD AUTO: 33 % (ref 14–44)
MCH RBC QN AUTO: 24.8 PG (ref 26.8–34.3)
MCHC RBC AUTO-ENTMCNC: 31.2 G/DL (ref 31.4–37.4)
MCV RBC AUTO: 80 FL (ref 82–98)
MONOCYTES # BLD AUTO: 0.63 THOUSAND/ΜL (ref 0.17–1.22)
MONOCYTES NFR BLD AUTO: 10 % (ref 4–12)
NEUTROPHILS # BLD AUTO: 3.46 THOUSANDS/ΜL (ref 1.85–7.62)
NEUTS SEG NFR BLD AUTO: 54 % (ref 43–75)
NRBC BLD AUTO-RTO: 0 /100 WBCS
PLATELET # BLD AUTO: 568 THOUSANDS/UL (ref 149–390)
PMV BLD AUTO: 9.2 FL (ref 8.9–12.7)
RBC # BLD AUTO: 4.63 MILLION/UL (ref 3.81–5.12)
WBC # BLD AUTO: 6.44 THOUSAND/UL (ref 4.31–10.16)

## 2022-05-12 PROCEDURE — 99232 SBSQ HOSP IP/OBS MODERATE 35: CPT | Performed by: SURGERY

## 2022-05-12 PROCEDURE — 99232 SBSQ HOSP IP/OBS MODERATE 35: CPT | Performed by: INTERNAL MEDICINE

## 2022-05-12 PROCEDURE — C9113 INJ PANTOPRAZOLE SODIUM, VIA: HCPCS | Performed by: SURGERY

## 2022-05-12 PROCEDURE — 85025 COMPLETE CBC W/AUTO DIFF WBC: CPT | Performed by: SURGERY

## 2022-05-12 RX ORDER — OXYCODONE HYDROCHLORIDE 5 MG/1
5 TABLET ORAL EVERY 6 HOURS PRN
Qty: 10 TABLET | Refills: 0 | Status: SHIPPED | OUTPATIENT
Start: 2022-05-12 | End: 2022-05-13

## 2022-05-12 RX ORDER — METHOCARBAMOL 500 MG/1
500 TABLET, FILM COATED ORAL EVERY 6 HOURS SCHEDULED
Status: DISCONTINUED | OUTPATIENT
Start: 2022-05-12 | End: 2022-05-13 | Stop reason: HOSPADM

## 2022-05-12 RX ORDER — LIDOCAINE 50 MG/G
2 PATCH TOPICAL DAILY
Status: DISCONTINUED | OUTPATIENT
Start: 2022-05-12 | End: 2022-05-13 | Stop reason: HOSPADM

## 2022-05-12 RX ORDER — PANTOPRAZOLE SODIUM 40 MG/1
40 INJECTION, POWDER, FOR SOLUTION INTRAVENOUS
Status: DISCONTINUED | OUTPATIENT
Start: 2022-05-12 | End: 2022-05-13 | Stop reason: SDUPTHER

## 2022-05-12 RX ORDER — KETOROLAC TROMETHAMINE 30 MG/ML
15 INJECTION, SOLUTION INTRAMUSCULAR; INTRAVENOUS EVERY 6 HOURS PRN
Status: DISCONTINUED | OUTPATIENT
Start: 2022-05-12 | End: 2022-05-13 | Stop reason: HOSPADM

## 2022-05-12 RX ORDER — GABAPENTIN 100 MG/1
100 CAPSULE ORAL 3 TIMES DAILY
Status: DISCONTINUED | OUTPATIENT
Start: 2022-05-12 | End: 2022-05-13 | Stop reason: HOSPADM

## 2022-05-12 RX ADMIN — LEVOTHYROXINE SODIUM 150 MCG: 75 TABLET ORAL at 05:22

## 2022-05-12 RX ADMIN — AMOXICILLIN AND CLAVULANATE POTASSIUM 1 TABLET: 875; 125 TABLET, FILM COATED ORAL at 21:28

## 2022-05-12 RX ADMIN — LIDOCAINE 5% 2 PATCH: 700 PATCH TOPICAL at 15:52

## 2022-05-12 RX ADMIN — HEPARIN SODIUM 5000 UNITS: 5000 INJECTION INTRAVENOUS; SUBCUTANEOUS at 21:25

## 2022-05-12 RX ADMIN — CITALOPRAM HYDROBROMIDE 40 MG: 20 TABLET, FILM COATED ORAL at 09:32

## 2022-05-12 RX ADMIN — PANTOPRAZOLE SODIUM 40 MG: 40 INJECTION, POWDER, FOR SOLUTION INTRAVENOUS at 15:52

## 2022-05-12 RX ADMIN — ACETAMINOPHEN 975 MG: 325 TABLET, FILM COATED ORAL at 13:03

## 2022-05-12 RX ADMIN — OXYCODONE HYDROCHLORIDE 10 MG: 10 TABLET ORAL at 07:51

## 2022-05-12 RX ADMIN — HEPARIN SODIUM 5000 UNITS: 5000 INJECTION INTRAVENOUS; SUBCUTANEOUS at 13:04

## 2022-05-12 RX ADMIN — GABAPENTIN 100 MG: 100 CAPSULE ORAL at 21:28

## 2022-05-12 RX ADMIN — GABAPENTIN 100 MG: 100 CAPSULE ORAL at 15:52

## 2022-05-12 RX ADMIN — OXYCODONE HYDROCHLORIDE 10 MG: 10 TABLET ORAL at 12:47

## 2022-05-12 RX ADMIN — ACETAMINOPHEN 975 MG: 325 TABLET, FILM COATED ORAL at 21:28

## 2022-05-12 RX ADMIN — OXYCODONE HYDROCHLORIDE 10 MG: 10 TABLET ORAL at 20:33

## 2022-05-12 RX ADMIN — HYDROMORPHONE HYDROCHLORIDE 0.5 MG: 1 INJECTION, SOLUTION INTRAMUSCULAR; INTRAVENOUS; SUBCUTANEOUS at 09:35

## 2022-05-12 RX ADMIN — METHOCARBAMOL 500 MG: 500 TABLET, FILM COATED ORAL at 17:57

## 2022-05-12 RX ADMIN — TRAZODONE HYDROCHLORIDE 75 MG: 50 TABLET ORAL at 21:29

## 2022-05-12 RX ADMIN — AMOXICILLIN AND CLAVULANATE POTASSIUM 1 TABLET: 875; 125 TABLET, FILM COATED ORAL at 09:32

## 2022-05-12 NOTE — CASE MANAGEMENT
Case Management Discharge Planning Note    Patient name Samuel Joy  Location [address] [place] [address] [address] [id number]  : 1965 Date 2022       Current Admission Date: 2022  Current Admission Diagnosis:Splenic abscess   Patient Active Problem List    Diagnosis Date Noted    Splenic abscess 2022      LOS (days): 5  Geometric Mean LOS (GMLOS) (days): 3 00  Days to GMLOS:-2 6     OBJECTIVE:  Risk of Unplanned Readmission Score: 6 37         Current admission status: Inpatient   Preferred Pharmacy:   [id number] [address]  Phone: [phone] Fax: [phone]    Primary Care Provider: Adam Sahni MD    Primary Insurance: MEDICARE MISC REPLACEMENT  Secondary Insurance:     DISCHARGE DETAILS:                                          Other Referral/Resources/Interventions Provided:  Referral Comments: CM spoke to ID, patient not stable for dc at this time, will continue to monitor

## 2022-05-12 NOTE — PROGRESS NOTES
Progress Note - General Surgery   Deena Palmer 59 y o  female MRN: [id number]  Unit/Bed#: [facility] 605-01 Encounter: [id number]    Assessment:  58 yo female s/p lap sleeve gastrectomy c/b splenic injury requiring IR embolization, cystgastrostomy, and IR drainage of splenic abscess  Pt now presents with residual LUQ abscess collection s/p IR drainage on 5/7  S/p axios stent removal by GI 5/9  IR drain removed on 5/10     AVSS  WBC 6 4(6 6)    CT-Decreased size and less well defined left upper lateral abd wall fluid collection    Plan:  -Continue augmentin per ID  -Will discuss with IR today if able to aspirate drain residual collection, patient still complaining of LUQ pain, night sweats, and chills  -NPO for poss intervention, can have diet after  -Continue to hold eliquis for now-Can restart on discharge  -Pain/nausea control PRN      Subjective/Objective     Subjective:   No acute events overnight  Reports night sweats/chills  Still complaining of LUQ pain/fullness  Tolerated diet last night  No nausea or vomiting  Objective:     Blood pressure 105/61, pulse 72, temperature 97 8 °F (36 6 °C), resp  rate 16, height 5' 3" (1 6 m), weight 88 5 kg (195 lb), last menstrual period 08/03/2016, SpO2 93 %, not currently breastfeeding  ,Body mass index is 34 54 kg/m²  Intake/Output Summary (Last 24 hours) at 5/12/2022 0547  Last data filed at 5/11/2022 1817  Gross per 24 hour   Intake 480 ml   Output --   Net 480 ml       Invasive Devices  Report    Peripheral Intravenous Line  Duration           Peripheral IV 05/09/22 Right Hand 2 days                Physical Exam:   General: NAD  HENT: NCAT MMM  Neck: supple, no JVD  CV: nl rate  Lungs: nl wob   No resp distress  ABD: Soft, +LUQ abd pain, nondistended  Extrem: No CCE  Neuro: AAOx3

## 2022-05-12 NOTE — PROGRESS NOTES
Progress Note - Infectious Disease   [facility] 59 y o  female MRN: [id number]  Unit/Bed#: [facility] 605-01 Encounter: [id number]      Impression:  1  Recurrent/persistent splenic abscesses  2  S/P Laparoscopic sleeve gastrectomy complicated by splenic injury requiring IR embolization with subsequent splenic abscesses, IR drainage, Axios stent placement and prolonged IV antibiotic course all at [facility]  3  History of DVT on Eliquis  4  CAD S/P stent placement   5  History of hypothyroidism     Recommendations:  Patient is afebrile with normal WBC count  However, she is experiencing severe pain particularly in the evening that requires narcotics for analgesia  1  Final results of abscess fluid are showing Prevotella denticola, fusobacterium nucleatum, and non beta lactamase producing Haemophilus parainfluenza  Blood cultures are negative  2  Continue Augmentin 875 mg q 12 hours p o   3  IR removed drainage tube 5/10  4  Discussed with GI 5/10  Current plan is to give several weeks of p o  Augmentin  If this does not resolve splenic abscess it is likely she will need a splenectomy  Have asked GI to involve surgery as well as themselves an ID in laying out a plan of action for patient  In discussion with patient she intends to have her care completed here rather than [place]  Patient will need weekly CBC, diff, ESR, CRP, and BMP  5  Repeat CT 5/11 scan does not indicate that this spleen has a definite abscess  There is still a small fluid collection in the left upper lateral abdominal wall adjacent to the level of the spleen  6  I am concerned that discharging the patient with severe left flank pain requiring narcotics in the absence of a major drainable abscess is a potential issue  I have discussed this with the surgical resident and recommend that she not be discharged until this can be addressed appropriately  Antibiotics:  1   Augmentin 875 mg q 12 hours p o , day 7 total Rx of possible 28 day course     Subjective:  Has left flank discomfort that requires narcotics for pain control  Denies fevers, chills, or sweats  Denies nausea, vomiting, or diarrhea  Objective:  Vitals:  Temp:  [97 5 °F (36 4 °C)-97 8 °F (36 6 °C)] 97 5 °F (36 4 °C)  HR:  [71-73] 71  Resp:  [16-18] 18  BP: (105-132)/(61-75) 109/70  SpO2:  [93 %-97 %] 95 %  Temp (24hrs), Av 7 °F (36 5 °C), Min:97 5 °F (36 4 °C), Max:97 8 °F (36 6 °C)  Current: Temperature: 97 5 °F (36 4 °C)    Physical Exam:     General Appearance:  Eyes:   Alert, nontoxic, no acute distress  Conjunctiva pale   Throat: Oropharynx moist without lesions  Lips, mucosa, and tongue normal   Neck: Supple, symmetrical, trachea midline, no adenopathy,  no tenderness/mass/nodules   Lungs:   Clear to auscultation bilaterally, no audible wheezes, rhonchi or rales; respirations unlabored   Heart:  Regular rate and rhythm, S1, S2 normal, no murmur, rub or gallop   Abdomen:   Soft, protuberant, striae, former LUQ drain site is clean with small amount serous drainage  non-distended, positive bowel sounds    No masses, no organomegaly    No CVA tenderness   Extremities: Extremities normal, atraumatic, no clubbing, cyanosis or edema   Skin: As above, no rash         Invasive Devices  Report    Peripheral Intravenous Line  Duration           Peripheral IV 22 Right Hand 2 days                Labs, Imaging, & Other studies:   All pertinent labs were personally reviewed  Results from last 7 days   Lab Units 226 05/11/22  0443 05/10/22  0444   WBC Thousand/uL 6 44 6 63 6 42   HEMOGLOBIN g/dL 11 5 10 3* 10 6*   PLATELETS Thousands/uL 568* 510* 513*     Results from last 7 days   Lab Units 22  0443 05/10/22  0444 22  0511 22  0437 22  0553 22   SODIUM mmol/L 139 137 141 139   < > 137   POTASSIUM mmol/L 3 7 3 6 3 9 4 1   < > 4 4   CHLORIDE mmol/L 107 105 108 107   < > 107   CO2 mmol/L 30 29 35* 30 < > 23   BUN mg/dL 12 8 10 9   < > 16   CREATININE mg/dL 0 82 0 81 0 86 0 81   < > 0 70   EGFR ml/min/1 73sq m 78 79 74 79   < > 95   CALCIUM mg/dL 9 1 8 6 8 9 8 4   < > 8 8   AST U/L  --   --   --  10  --  20   ALT U/L  --   --   --  15  --  19   ALK PHOS U/L  --   --   --  72  --  85    < > = values in this interval not displayed  Results from last 7 days   Lab Units 05/07/22  1436 05/07/22  1224   BLOOD CULTURE   --  No Growth After 4 Days  No Growth After 4 Days     GRAM STAIN RESULT  1+ Polys*  Rare Gram negative rods*  --    BODY FLUID CULTURE, STERILE  1+ Growth of Haemophilus parainfluenzae*  --

## 2022-05-12 NOTE — PLAN OF CARE
Problem: Potential for Falls  Goal: Patient will remain free of falls  Description: INTERVENTIONS:  - Educate patient/family on patient safety including physical limitations  - Instruct patient to call for assistance with activity   - Consult OT/PT to assist with strengthening/mobility   - Keep Call bell within reach  - Keep bed low and locked with side rails adjusted as appropriate  - Keep care items and personal belongings within reach  - Initiate and maintain comfort rounds  - Make Fall Risk Sign visible to staff  - Offer Toileting every 1 Hours, in advance of need  - Initiate/Maintain alarm  - Obtain necessary fall risk management equipment  - Apply yellow socks and bracelet for high fall risk patients  - Consider moving patient to room near nurses station  Outcome: Progressing     Problem: PAIN - ADULT  Goal: Verbalizes/displays adequate comfort level or baseline comfort level  Description: Interventions:  - Encourage patient to monitor pain and request assistance  - Assess pain using appropriate pain scale  - Administer analgesics based on type and severity of pain and evaluate response  - Implement non-pharmacological measures as appropriate and evaluate response  - Consider cultural and social influences on pain and pain management  - Notify physician/advanced practitioner if interventions unsuccessful or patient reports new pain  Outcome: Progressing     Problem: INFECTION - ADULT  Goal: Absence or prevention of progression during hospitalization  Description: INTERVENTIONS:  - Assess and monitor for signs and symptoms of infection  - Monitor lab/diagnostic results  - Monitor all insertion sites, i e  indwelling lines, tubes, and drains  - Monitor endotracheal if appropriate and nasal secretions for changes in amount and color  - [place] appropriate cooling/warming therapies per order  - Administer medications as ordered  - Instruct and encourage patient and family to use good hand hygiene technique  - Identify and instruct in appropriate isolation precautions for identified infection/condition  Outcome: Progressing  Goal: Absence of fever/infection during neutropenic period  Description: INTERVENTIONS:  - Monitor WBC    Outcome: Progressing     Problem: DISCHARGE PLANNING  Goal: Discharge to home or other facility with appropriate resources  Description: INTERVENTIONS:  - Identify barriers to discharge w/patient and caregiver  - Arrange for needed discharge resources and transportation as appropriate  - Identify discharge learning needs (meds, wound care, etc )  - Arrange for interpretive services to assist at discharge as needed  - Refer to Case Management Department for coordinating discharge planning if the patient needs post-hospital services based on physician/advanced practitioner order or complex needs related to functional status, cognitive ability, or social support system  Outcome: Progressing

## 2022-05-12 NOTE — QUICK NOTE
Discussed with Dr Jamie Leyva from ID that given lack of pain control, would be better to defer discharge for today  Patient amenable to this  Will initiate multimodal analgesia and discontinue IV dilaudid  Discussed with Sunita from pharmacy that IV toradol is acceptable with her history of sleeve gastrectomy

## 2022-05-13 ENCOUNTER — TELEPHONE (OUTPATIENT)
Dept: GASTROENTEROLOGY | Facility: CLINIC | Age: 57
End: 2022-05-13

## 2022-05-13 VITALS
HEIGHT: 63 IN | DIASTOLIC BLOOD PRESSURE: 62 MMHG | BODY MASS INDEX: 34.55 KG/M2 | WEIGHT: 195 LBS | TEMPERATURE: 97.9 F | HEART RATE: 72 BPM | SYSTOLIC BLOOD PRESSURE: 106 MMHG | OXYGEN SATURATION: 95 % | RESPIRATION RATE: 18 BRPM

## 2022-05-13 LAB
BASOPHILS # BLD AUTO: 0.08 THOUSANDS/ΜL (ref 0–0.1)
BASOPHILS NFR BLD AUTO: 1 % (ref 0–1)
EOSINOPHIL # BLD AUTO: 0.07 THOUSAND/ΜL (ref 0–0.61)
EOSINOPHIL NFR BLD AUTO: 1 % (ref 0–6)
ERYTHROCYTE [DISTWIDTH] IN BLOOD BY AUTOMATED COUNT: 17.8 % (ref 11.6–15.1)
HCT VFR BLD AUTO: 38 % (ref 34.8–46.1)
HGB BLD-MCNC: 11.8 G/DL (ref 11.5–15.4)
IMM GRANULOCYTES # BLD AUTO: 0.02 THOUSAND/UL (ref 0–0.2)
IMM GRANULOCYTES NFR BLD AUTO: 0 % (ref 0–2)
LYMPHOCYTES # BLD AUTO: 2.5 THOUSANDS/ΜL (ref 0.6–4.47)
LYMPHOCYTES NFR BLD AUTO: 31 % (ref 14–44)
MCH RBC QN AUTO: 25.1 PG (ref 26.8–34.3)
MCHC RBC AUTO-ENTMCNC: 31.1 G/DL (ref 31.4–37.4)
MCV RBC AUTO: 81 FL (ref 82–98)
MONOCYTES # BLD AUTO: 0.71 THOUSAND/ΜL (ref 0.17–1.22)
MONOCYTES NFR BLD AUTO: 9 % (ref 4–12)
NEUTROPHILS # BLD AUTO: 4.63 THOUSANDS/ΜL (ref 1.85–7.62)
NEUTS SEG NFR BLD AUTO: 58 % (ref 43–75)
NRBC BLD AUTO-RTO: 0 /100 WBCS
PLATELET # BLD AUTO: 563 THOUSANDS/UL (ref 149–390)
PMV BLD AUTO: 8.7 FL (ref 8.9–12.7)
RBC # BLD AUTO: 4.71 MILLION/UL (ref 3.81–5.12)
WBC # BLD AUTO: 8.01 THOUSAND/UL (ref 4.31–10.16)

## 2022-05-13 PROCEDURE — 99238 HOSP IP/OBS DSCHRG MGMT 30/<: CPT | Performed by: PHYSICIAN ASSISTANT

## 2022-05-13 PROCEDURE — NC001 PR NO CHARGE: Performed by: SURGERY

## 2022-05-13 PROCEDURE — 85025 COMPLETE CBC W/AUTO DIFF WBC: CPT | Performed by: SURGERY

## 2022-05-13 RX ORDER — PANTOPRAZOLE SODIUM 40 MG/1
40 TABLET, DELAYED RELEASE ORAL
Status: DISCONTINUED | OUTPATIENT
Start: 2022-05-13 | End: 2022-05-13 | Stop reason: HOSPADM

## 2022-05-13 RX ORDER — LIDOCAINE 4 G/G
1 PATCH TOPICAL DAILY
Refills: 0
Start: 2022-05-13

## 2022-05-13 RX ORDER — LEVOTHYROXINE SODIUM 0.15 MG/1
150 TABLET ORAL
Refills: 0
Start: 2022-05-14

## 2022-05-13 RX ORDER — OXYCODONE HYDROCHLORIDE 5 MG/1
5-10 TABLET ORAL EVERY 6 HOURS PRN
Qty: 24 TABLET | Refills: 0 | Status: SHIPPED | OUTPATIENT
Start: 2022-05-13 | End: 2022-05-26

## 2022-05-13 RX ORDER — AMOXICILLIN AND CLAVULANATE POTASSIUM 875; 125 MG/1; MG/1
1 TABLET, FILM COATED ORAL EVERY 12 HOURS SCHEDULED
Qty: 42 TABLET | Refills: 0 | Status: SHIPPED | OUTPATIENT
Start: 2022-05-13 | End: 2022-06-03

## 2022-05-13 RX ORDER — CALCIUM CARBONATE 200(500)MG
1000 TABLET,CHEWABLE ORAL 3 TIMES DAILY PRN
Refills: 0
Start: 2022-05-13

## 2022-05-13 RX ORDER — PANTOPRAZOLE SODIUM 40 MG/1
40 TABLET, DELAYED RELEASE ORAL
Qty: 30 TABLET | Refills: 0 | Status: SHIPPED | OUTPATIENT
Start: 2022-05-13 | End: 2022-06-12

## 2022-05-13 RX ORDER — TRAZODONE HYDROCHLORIDE 150 MG/1
75 TABLET ORAL
Refills: 0
Start: 2022-05-13

## 2022-05-13 RX ORDER — CITALOPRAM 40 MG/1
40 TABLET ORAL DAILY
Refills: 0
Start: 2022-05-13

## 2022-05-13 RX ORDER — ACETAMINOPHEN 325 MG/1
650 TABLET ORAL EVERY 4 HOURS PRN
Refills: 0
Start: 2022-05-13

## 2022-05-13 RX ORDER — METHOCARBAMOL 500 MG/1
500 TABLET, FILM COATED ORAL EVERY 6 HOURS SCHEDULED
Qty: 40 TABLET | Refills: 0 | Status: ON HOLD | OUTPATIENT
Start: 2022-05-13 | End: 2022-05-25 | Stop reason: SDUPTHER

## 2022-05-13 RX ORDER — AMLODIPINE BESYLATE 10 MG/1
10 TABLET ORAL DAILY
Refills: 0
Start: 2022-05-14

## 2022-05-13 RX ORDER — GABAPENTIN 100 MG/1
100 CAPSULE ORAL 3 TIMES DAILY
Qty: 30 CAPSULE | Refills: 0 | Status: ON HOLD | OUTPATIENT
Start: 2022-05-13 | End: 2022-05-25

## 2022-05-13 RX ORDER — SENNOSIDES 8.6 MG
8.6 TABLET ORAL DAILY
Qty: 5 TABLET | Refills: 0 | Status: SHIPPED | OUTPATIENT
Start: 2022-05-14 | End: 2022-05-19

## 2022-05-13 RX ADMIN — OXYCODONE HYDROCHLORIDE 10 MG: 10 TABLET ORAL at 05:51

## 2022-05-13 RX ADMIN — GABAPENTIN 100 MG: 100 CAPSULE ORAL at 09:25

## 2022-05-13 RX ADMIN — OXYCODONE HYDROCHLORIDE 10 MG: 10 TABLET ORAL at 09:52

## 2022-05-13 RX ADMIN — LIDOCAINE 5% 2 PATCH: 700 PATCH TOPICAL at 09:25

## 2022-05-13 RX ADMIN — LEVOTHYROXINE SODIUM 150 MCG: 75 TABLET ORAL at 05:49

## 2022-05-13 RX ADMIN — POLYETHYLENE GLYCOL 3350 17 G: 17 POWDER, FOR SOLUTION ORAL at 08:46

## 2022-05-13 RX ADMIN — METHOCARBAMOL 500 MG: 500 TABLET, FILM COATED ORAL at 12:08

## 2022-05-13 RX ADMIN — METHOCARBAMOL 500 MG: 500 TABLET, FILM COATED ORAL at 05:49

## 2022-05-13 RX ADMIN — CITALOPRAM HYDROBROMIDE 40 MG: 20 TABLET, FILM COATED ORAL at 09:25

## 2022-05-13 RX ADMIN — PANTOPRAZOLE SODIUM 40 MG: 40 TABLET, DELAYED RELEASE ORAL at 09:25

## 2022-05-13 RX ADMIN — ACETAMINOPHEN 975 MG: 325 TABLET, FILM COATED ORAL at 12:08

## 2022-05-13 RX ADMIN — AMOXICILLIN AND CLAVULANATE POTASSIUM 1 TABLET: 875; 125 TABLET, FILM COATED ORAL at 09:25

## 2022-05-13 RX ADMIN — METHOCARBAMOL 500 MG: 500 TABLET, FILM COATED ORAL at 00:20

## 2022-05-13 NOTE — DISCHARGE SUMMARY
Discharge Summary - General Surgery   Joaquin Leblanc 59 y o  female MRN: [id number]  Unit/Bed#: [facility] 605-01 Encounter: [id number]    Admission Date: 5/6/2022     Discharge Date: 5/13/2022    Admitting Diagnosis: Cellulitis of abdominal wall [L03 311]  Splenic abscess [D73 3]  Flank pain [R10 9]  Left upper quadrant abdominal pain [R10 12]    Discharge Diagnosis: See above  Attending and Service: Dr Meet Putnam, Acute Care Surgical Services  Consulting Physician(s):     1  Infectious disease  2  Gastroenterology  Imaging and Procedures Performed:     EGD    Result Date: 5/9/2022  Impression: Removal of lumen apposing stent from the proximal body of the stomach  RECOMMENDATION: Return to [facility] for ongoing care Follow clinically Antibiotics per primary team Repeat contrast enhanced imaging later this week   Ramon Calles MD     CT abdomen pelvis wo contrast    Result Date: 5/11/2022  Impression: Decreased size and less well-defined left upper lateral abdominal wall fluid collection when compared with the prior study as above  Trace partially loculated left pleural effusion posteriorly with patchy atelectasis in the lingula and left lower lobes  Additional findings as above  Workstation performed: [id number]     CT abdomen pelvis wo contrast    Addendum Date: 5/6/2022    ADDENDUM: Soft tissue inflammation in the left lateral chest wall overlying the diaphragm and spleen could reflect cellulitis  Additionally inflammation extends to the chest wall and intercostal space and is not well differentiated from the superior aspect of the  spleen and adjacent fluid collection  Developing cutaneous fistula cannot be excluded; however evaluation is limited in the absence of contrast     Result Date: 5/6/2022  Impression: 1    Probable collection in the left upper quadrant, not well differentiated from the spleen and the absence of intravenous contrast, and adjacent to postsurgical change in the gastric fundus  Presumed collection measures 4 5 cm containing few foci of gas  Comparison with prior outside imaging, if available would be helpful  2   Postsurgical change from gastric sleeve  No evidence of bowel obstruction, colitis, diverticulitis or appendicitis  Workstation performed: [id number]     IR drainage tube placement    Result Date: 5/7/2022  Impression: Impression: Ultrasound-guided 8 [place] drain placement into a left abdominal wall superficial purulent collection Plan: Recommend gastroenterology consult to interrogate the transgastric stent as it may be occluded Monitor drain output of the percutaneous drain  We can bring the patient back during this inpatient hospitalization to inject the tract and assess for communication with deep intraperitoneal structures  Workstation performed: [id number]     IR drainage tube check/change/reposition/reinsertion/upsize    Result Date: 5/10/2022  Impression: Abscessogram with catheter removal  Workstation performed: [id number] [facility] [facility] Course: Damaris Stroud is a 38-year-old female who presented with worsening left upper abdominal pain associated with left shoulder pain, fatigue and chills  She has a complex history over the last year following surgical intervention with a laparoscopic sleeve gastrectomy complicated by splenic injury requiring IR embolization in August of 2021  She had further hospital encounters for perisplenic abscesses requiring IR drainage and ultimately a cyst gastrostomy in December 2021  She had a prolonged hospital course requiring long-term IV antibiotics with Unasyn for 6 weeks before being transition to Augmentin for 10 days  Her IR drain had been removed in February of 2022  She developed worsening left upper quadrant abdominal pain and associated left shoulder pain approximately 1 month later    At that time, an ultrasound and CT scan demonstrated residual left quadrant abscess, but no further treatment was recommended at that time and she did not undergo any further drainage or treatment with antibiotics  She was now visiting the [facility] to visit family and presented to [facility] for further evaluation and recommendations due to her worsening pain  On her initial evaluation by [facility]'s surgical service this presentation, she was afebrile with mild hypertension, but normal vital signs otherwise  Her abdomen was soft and nondistended with tenderness of the left flank and area of erythema with slight induration without palpable fluctuance at the site of her prior IR drainage; the remainder of her exam was unremarkable  Her initial workup included labs in the above-noted imaging studies  She was admitted to the acute care surgery service with residual left upper quadrant fluid collection concerning for developing abscess and possible fistula tract to the skin  She was initially kept NPO, started on IV fluids for IV hydration and resuscitation, placed on an IV antibiotic regimen with consultation to Infectious Disease, her anticoagulation was held and Interventional Radiology was consulted to assess for possible percutaneous drainage  Additionally, the GI service was also consulted to assist with her evaluation management during hospital encounter  During her hospital stay, the multi disciplinary team worked together to continue treatment for her recurrent perisplenic abscesses  The patient did undergo percutaneous drainage with the catheter left in place by Interventional Radiology that was able to be successfully removed prior to her discharge  Her diet was advanced as tolerated once no further interventions were deemed necessary  Once tolerating oral diet, her IV fluids were discontinued and she was transitioned to an oral analgesic regimen    She was continued on IV antibiotic course throughout most of her hospital encounter before being transition to an oral antibiotic course prior to discharge for monitoring tolerance  She was ultimately deemed stable for discharge on 05/13/2022  For further details of her hospital encounter, please see her complete medical records  On discharge, the patient is instructed to follow-up with the patient's primary care provider within the next 2 weeks to review the events of the recent hospitalization  The patient is instructed to follow-up with the Acute Care Surgical Services as scheduled on 05/16/2022 at 9:45 AM  The patient is instructed to follow the provided discharge instructions  Condition at Discharge: good     Discharge instructions/Information to patient and family:   See after visit summary for information provided to patient and family  Provisions for Follow-Up Care:  See after visit summary for information related to follow-up care and any pertinent home health orders  Disposition: See After Visit Summary for discharge disposition information  Planned Readmission: No    Discharge Statement   I spent 30 minutes discharging the patient  This time was spent on the day of discharge  I had direct contact with the patient on the day of discharge  Additional documentation is required if more than 30 minutes were spent on discharge  Discharge Medications:  See after visit summary for reconciled discharge medications provided to patient and family      Richelle Jamison PA-C  5/13/2022  09:27 AM

## 2022-05-13 NOTE — TELEPHONE ENCOUNTER
----- Message from Myles Smith MA sent at 5/11/2022  3:44 PM EDT -----    ----- Message -----  From: Cesar Trejo MD  Sent: 5/11/2022   3:42 PM EDT  To: , #    Please schedule pt for outpt f/u with Dr Ashutosh Pastor within 1-2 months   Thanks     -Venus

## 2022-05-13 NOTE — PLAN OF CARE
Problem: Potential for Falls  Goal: Patient will remain free of falls  Description: INTERVENTIONS:  - Educate patient/family on patient safety including physical limitations  - Instruct patient to call for assistance with activity   - Consult OT/PT to assist with strengthening/mobility   - Keep Call bell within reach  - Keep bed low and locked with side rails adjusted as appropriate  - Keep care items and personal belongings within reach  - Initiate and maintain comfort rounds  - Make Fall Risk Sign visible to staff  - Apply yellow socks and bracelet for high fall risk patients  - Consider moving patient to room near nurses station  5/13/2022 1102 by Owen Gunderson RN  Outcome: Adequate for Discharge  5/13/2022 0800 by Owen Gunderson RN  Outcome: Progressing     Problem: PAIN - ADULT  Goal: Verbalizes/displays adequate comfort level or baseline comfort level  Description: Interventions:  - Encourage patient to monitor pain and request assistance  - Assess pain using appropriate pain scale  - Administer analgesics based on type and severity of pain and evaluate response  - Implement non-pharmacological measures as appropriate and evaluate response  - Consider cultural and social influences on pain and pain management  - Notify physician/advanced practitioner if interventions unsuccessful or patient reports new pain  5/13/2022 1102 by Owen Gunderson RN  Outcome: Adequate for Discharge  5/13/2022 0800 by Owen Gunderson RN  Outcome: Progressing     Problem: INFECTION - ADULT  Goal: Absence or prevention of progression during hospitalization  Description: INTERVENTIONS:  - Assess and monitor for signs and symptoms of infection  - Monitor lab/diagnostic results  - Monitor all insertion sites, i e  indwelling lines, tubes, and drains  - Monitor endotracheal if appropriate and nasal secretions for changes in amount and color  - [place] appropriate cooling/warming therapies per order  - Administer medications as ordered  - Instruct and encourage patient and family to use good hand hygiene technique  - Identify and instruct in appropriate isolation precautions for identified infection/condition  5/13/2022 1102 by Steven Nieves RN  Outcome: Adequate for Discharge  5/13/2022 0800 by Steven Nieves RN  Outcome: Progressing     Problem: DISCHARGE PLANNING  Goal: Discharge to home or other facility with appropriate resources  Description: INTERVENTIONS:  - Identify barriers to discharge w/patient and caregiver  - Arrange for needed discharge resources and transportation as appropriate  - Identify discharge learning needs (meds, wound care, etc )  - Arrange for interpretive services to assist at discharge as needed  - Refer to Case Management Department for coordinating discharge planning if the patient needs post-hospital services based on physician/advanced practitioner order or complex needs related to functional status, cognitive ability, or social support system  5/13/2022 1102 by Steven Nieves RN  Outcome: Adequate for Discharge  5/13/2022 0800 by Steven Nieves RN  Outcome: Progressing

## 2022-05-13 NOTE — CASE MANAGEMENT
Case Management Discharge Planning Note    Patient name Andrea Foley  Location [address] [place] [address] 605/PPHP 320-32 MRN [id number]  : 1965 Date 2022       Current Admission Date: 2022  Current Admission Diagnosis:Splenic abscess   Patient Active Problem List    Diagnosis Date Noted    Splenic abscess 2022      LOS (days): 6  Geometric Mean LOS (GMLOS) (days): 3 00  Days to GMLOS:-3 3     OBJECTIVE:  Risk of Unplanned Readmission Score: 6 95         Current admission status: Inpatient   Preferred Pharmacy:   [id number] [address]  Phone: [phone] Fax: [phone]    Primary Care Provider: Jacquelin Gutierrez MD    Primary Insurance: MEDICARE MISC REPLACEMENT  Secondary Insurance:     DISCHARGE DETAILS:                                [facility]         Is the patient interested in [address] at discharge?: No    DME Referral Provided  Referral made for DME?: No    Other Referral/Resources/Interventions Provided:  Referral Comments: Patient stated her pain is better controlled           Treatment Team Recommendation: Home  Discharge Destination Plan[de-identified] Home  Transport at Discharge : Family                             IMM Given (Date):: 22  IMM Given to[de-identified] Patient

## 2022-05-13 NOTE — PLAN OF CARE
Problem: Potential for Falls  Goal: Patient will remain free of falls  Description: INTERVENTIONS:  - Educate patient/family on patient safety including physical limitations  - Instruct patient to call for assistance with activity   - Consult OT/PT to assist with strengthening/mobility   - Keep Call bell within reach  - Keep bed low and locked with side rails adjusted as appropriate  - Keep care items and personal belongings within reach  - Initiate and maintain comfort rounds  - Make Fall Risk Sign visible to staff  - Apply yellow socks and bracelet for high fall risk patients  - Consider moving patient to room near nurses station  Outcome: Progressing     Problem: PAIN - ADULT  Goal: Verbalizes/displays adequate comfort level or baseline comfort level  Description: Interventions:  - Encourage patient to monitor pain and request assistance  - Assess pain using appropriate pain scale  - Administer analgesics based on type and severity of pain and evaluate response  - Implement non-pharmacological measures as appropriate and evaluate response  - Consider cultural and social influences on pain and pain management  - Notify physician/advanced practitioner if interventions unsuccessful or patient reports new pain  Outcome: Progressing     Problem: INFECTION - ADULT  Goal: Absence or prevention of progression during hospitalization  Description: INTERVENTIONS:  - Assess and monitor for signs and symptoms of infection  - Monitor lab/diagnostic results  - Monitor all insertion sites, i e  indwelling lines, tubes, and drains  - Monitor endotracheal if appropriate and nasal secretions for changes in amount and color  - [place] appropriate cooling/warming therapies per order  - Administer medications as ordered  - Instruct and encourage patient and family to use good hand hygiene technique  - Identify and instruct in appropriate isolation precautions for identified infection/condition  Outcome: Progressing     Problem: DISCHARGE PLANNING  Goal: Discharge to home or other facility with appropriate resources  Description: INTERVENTIONS:  - Identify barriers to discharge w/patient and caregiver  - Arrange for needed discharge resources and transportation as appropriate  - Identify discharge learning needs (meds, wound care, etc )  - Arrange for interpretive services to assist at discharge as needed  - Refer to Case Management Department for coordinating discharge planning if the patient needs post-hospital services based on physician/advanced practitioner order or complex needs related to functional status, cognitive ability, or social support system  Outcome: Progressing

## 2022-05-13 NOTE — PROGRESS NOTES
Progress Note - General Surgery   Migdalia Barnhart 59 y o  female MRN: [id number]  Unit/Bed#: [facility] 605-01 Encounter: [id number]    Assessment:  58 yo female s/p lap sleeve gastrectomy c/b splenic injury requiring IR embolization, cystgastrostomy, and IR drainage of splenic abscess  Pt now presents with residual LUQ abscess collection s/p IR drainage on 5/7  S/p axios stent removal by GI 5/9  IR drain removed on 5/10     AVSS  WBC labs pending     CTAP on 5/11 showed- Decreased size and less well defined left upper lateral abd wall fluid collection    Plan:  -Continue augmentin per ID  -IV Toradol for pain control  -Continue to hold eliquis for now-Can restart on discharge      Subjective/Objective     Subjective:   No acute events overnight  Pt is feeling better  Pt reports mild LUQ discomfort, but reports that it is mostly controlled  Tolerated diet last night  No nausea or vomiting  No fever or chills  Pt repots some constipation  Objective:     Blood pressure 100/60, pulse 75, temperature 98 2 °F (36 8 °C), resp  rate 16, height 5' 3" (1 6 m), weight 88 5 kg (195 lb), last menstrual period 08/03/2016, SpO2 96 %, not currently breastfeeding  ,Body mass index is 34 54 kg/m²  Intake/Output Summary (Last 24 hours) at 5/13/2022 0535  Last data filed at 5/12/2022 1900  Gross per 24 hour   Intake 540 ml   Output --   Net 540 ml       Invasive Devices  Report    Peripheral Intravenous Line  Duration           Peripheral IV 05/13/22 Dorsal (posterior); Right Forearm <1 day                Physical Exam:   General: NAD  HENT: NCAT MMM  Neck: supple, no JVD  CV: nl rate  Lungs: nl wob   No resp distress  ABD: Soft, +LUQ abd pain, nondistended  Extrem: No CCE  Neuro: AAOx3

## 2022-05-13 NOTE — INCIDENTAL FINDINGS
The following findings require follow up:  Radiographic finding     Findings: Several small air cysts are seen within the right lower lobe of the right lung  A 3 mm pulmonary nodule right lower lobe of the right lung  Follow up required: A malignancy (cancer) cannot be excluded based on this imaging study  Recommend follow up with your primary care provider to review the findings and for further work up as indicated  I discussed the incidental findings as well as the recommended follow up with the patient  She acknowledged the findings and demonstrated understanding of the findings and recommendations       Follow up should be done within 2 week(s)    Please notify the following clinician to assist with the follow up:   Dr Norlin Duverney

## 2022-05-13 NOTE — PROGRESS NOTES
The pantoprazole has / have been converted to Oral per [facility] IV-to-PO Auto-Conversion Protocol for Adults as approved by the Pharmacy and Therapeutics Committee  The patient met all eligible criteria:  3 Age = 25years old   2) Received at least one dose of the IV form   3) Receiving at least one other scheduled oral/enteral medication   4) Tolerating an oral/enteral diet   and did not have any exclusions:   1) Critical care patient   2) Active GI bleed (IF assessing H2RAs or PPIs)   3) Continuous tube feeding (IF assessing cipro, doxycycline, levofloxacin, minocycline, rifampin, or voriconazole)   4) Receiving PO vancomycin (IF assessing metronidazole)   5) Persistent nausea and/or vomiting   6) Ileus or gastrointestinal obstruction   7) Stacia/nasogastric tube set for continuous suction   8) Specific order not to automatically convert to PO (in the order's comments or if discussed in the most recent Infectious Disease or primary team's progress notes)     Thanks  Jean Reid, Pharmacist

## 2022-05-16 ENCOUNTER — OFFICE VISIT (OUTPATIENT)
Dept: SURGERY | Facility: CLINIC | Age: 57
End: 2022-05-16

## 2022-05-16 VITALS — WEIGHT: 200.1 LBS | TEMPERATURE: 97.1 F | BODY MASS INDEX: 35.45 KG/M2 | HEIGHT: 63 IN

## 2022-05-16 DIAGNOSIS — E66.01 MORBID OBESITY DUE TO EXCESS CALORIES (HCC): ICD-10-CM

## 2022-05-16 DIAGNOSIS — L30.4 INTERTRIGO: ICD-10-CM

## 2022-05-16 DIAGNOSIS — D73.3 SPLENIC ABSCESS: Primary | ICD-10-CM

## 2022-05-16 PROBLEM — R91.1 PULMONARY NODULE: Status: ACTIVE | Noted: 2022-05-16

## 2022-05-16 PROCEDURE — 99024 POSTOP FOLLOW-UP VISIT: CPT | Performed by: SURGERY

## 2022-05-16 RX ORDER — CLOTRIMAZOLE 1 %
CREAM (GRAM) TOPICAL 2 TIMES DAILY
Qty: 30 G | Refills: 0 | Status: SHIPPED | OUTPATIENT
Start: 2022-05-16 | End: 2022-06-17

## 2022-05-16 RX ORDER — AMOXICILLIN AND CLAVULANATE POTASSIUM 875; 125 MG/1; MG/1
1 TABLET, FILM COATED ORAL EVERY 12 HOURS SCHEDULED
Qty: 42 TABLET | Refills: 0 | Status: CANCELLED | OUTPATIENT
Start: 2022-05-16 | End: 2022-06-06

## 2022-05-16 NOTE — PROGRESS NOTES
Assessment/Plan:    Intertrigo  Will prescribe a topical antifungal clotrimazole  Pt encouraged to maintain good hygiene (keep the area dry, cleanse regularly)  Pt encouraged to f/u with PCP  Splenic abscess  Hospital admission 5/6-5/13 follow up  Pt is doing well  Her drain site is healed  She remains mildly tender in the LUQ  Pt was encouraged to take scheduled tylenol and decrease her oxycodone usage  Pt is on Augmentin until 6/3  Will f/u 1 week after completion of antibiotics for potential repeat CT scan  Pt encouraged to f/u with ID for continued management  Pulmonary nodule  Incidental finding on CT on 5/11 showed 3 mm pulmonary nodule right lower lobe  No surgical interventions indicated at this time  Pt recommended to f/u with primary care physician  Diagnoses and all orders for this visit:    Splenic abscess  -     Ambulatory Referral to Infectious Disease; Future    Intertrigo  -     clotrimazole (LOTRIMIN) 1 % cream; Apply topically 2 (two) times a day    Morbid obesity due to excess calories (HCC)          Subjective: Pt reports she is doing much better since hospital admission 5/6 - 5/13  She denies any fever, chills, nausea, and vomiting  She reports regular BM and urination  Pt reports residual tenderness in the LUQ that is slowly improving  She reports pain in that area when she takes a deep breath  Pt reports taking her antibiotics as prescribed  Additionally pt reports she has a rash in her groin area that itches since she started the antibiotics  Patient ID: Cristóbal Marin is a 59 y o  female  HPI    The following portions of the patient's history were reviewed and updated as appropriate: allergies, current medications, past family history, past medical history, past social history, past surgical history and problem list     Review of Systems   Constitutional: Negative for chills and fever  HENT: Negative for ear pain and sore throat      Eyes: Negative for pain and visual disturbance  Respiratory: Negative for cough and shortness of breath  Cardiovascular: Negative for chest pain and palpitations  Gastrointestinal: Negative for abdominal pain and vomiting  Tender LUQ   Genitourinary: Negative for dysuria and hematuria  Musculoskeletal: Negative for arthralgias and back pain  Skin: Positive for rash  Negative for color change  Itching in bilateral groin and under pannus   Neurological: Negative for seizures and syncope  All other systems reviewed and are negative  Objective:  Temp (!) 97 1 °F (36 2 °C) (Temporal)   Ht 5' 3" (1 6 m)   Wt 90 8 kg (200 lb 1 6 oz)   LMP 08/03/2016   BMI 35 45 kg/m²          Physical Exam  Vitals and nursing note reviewed  Constitutional:       Appearance: Normal appearance  She is obese  HENT:      Head: Normocephalic and atraumatic  Mouth/Throat:      Mouth: Mucous membranes are moist    Eyes:      General: No scleral icterus  Pulmonary:      Effort: Pulmonary effort is normal    Abdominal:      General: Bowel sounds are normal  There is no distension  Palpations: Abdomen is soft  Tenderness: There is abdominal tenderness  There is no guarding  Comments: Tender in LUQ with palpation  Well healed drain site   Genitourinary:     Comments: Intertrigo in the groin  Skin:     General: Skin is warm and dry  Capillary Refill: Capillary refill takes less than 2 seconds  Neurological:      General: No focal deficit present  Mental Status: She is alert and oriented to person, place, and time  Psychiatric:         Mood and Affect: Mood normal          Behavior: Behavior normal          Thought Content:  Thought content normal          Judgment: Judgment normal

## 2022-05-16 NOTE — ASSESSMENT & PLAN NOTE
Will prescribe a topical antifungal clotrimazole  Pt encouraged to maintain good hygiene (keep the area dry, cleanse regularly)  Pt encouraged to f/u with PCP

## 2022-05-16 NOTE — ASSESSMENT & PLAN NOTE
Incidental finding on CT on 5/11 showed 3 mm pulmonary nodule right lower lobe  No surgical interventions indicated at this time  Pt recommended to f/u with primary care physician

## 2022-05-16 NOTE — UTILIZATION REVIEW
Notification of Discharge   This is a Notification of Discharge from our facility [address]  Please be advised that this patient has been discharge from our facility  Below you will find the admission and discharge date and time including the patients disposition  UTILIZATION REVIEW CONTACT:  Adriana Keenan  Utilization   Network Utilization Review Department  Phone: [phone] x carefully listen to the prompts  All voicemails are confidential   Email: [email or web address]     PHYSICIAN ADVISORY SERVICES:  FOR XGIO-TM-YUMI REVIEW - MEDICAL NECESSITY DENIAL  Phone: [phone]  Fax: [phone]  Email: [email or web address]     PRESENTATION DATE: 5/6/2022  6:36 PM  OBERVATION ADMISSION DATE:   INPATIENT ADMISSION DATE: 5/7/22 12:29 AM   DISCHARGE DATE: 5/13/2022  2:26 PM  DISPOSITION: Home/Self Care Home/Self Care      IMPORTANT INFORMATION:  Send all requests for admission clinical reviews, approved or denied determinations and any other requests to dedicated fax number below belonging to the campus where the patient is receiving treatment   List of dedicated fax numbers:  [address] DENIALS (Administrative/Medical Necessity) [phone]   [address] (Maternity/NICU/Pediatrics) [phone]   Mg [facility] [phone]   130 [facility] [phone]   [address] [place] [address] [phone]   [address] [phone]   [facility]  [phone]   [address] [place] [address], [address]  2401 [facility] 1000 [facility] [phone]

## 2022-05-16 NOTE — ASSESSMENT & PLAN NOTE
Hospital admission 5/6-5/13 follow up  Pt is doing well  Her drain site is healed  She remains mildly tender in the LUQ  Pt was encouraged to take scheduled tylenol and decrease her oxycodone usage  Pt is on Augmentin until 6/3  Will f/u 1 week after completion of antibiotics for potential repeat CT scan  Pt encouraged to f/u with ID for continued management

## 2022-05-16 NOTE — PROGRESS NOTES
Office Visit - General Surgery  Yolanda Velasquez MRN: [id number]  Encounter: [id number]    Assessment and Plan  Problem List Items Addressed This Visit    None         Chief Complaint:  Yolanda Velasquez is a 59 y o  female who presents for Follow-Up Abscess (F/u abscess after surgery at other hospital )    Subjective      Past Medical History:   Diagnosis Date    Disease of thyroid gland     Hernia     Hypertension        Past Surgical History:   Procedure Laterality Date    CHOLECYSTECTOMY      CORONARY ANGIOPLASTY WITH STENT PLACEMENT      IR DRAINAGE TUBE CHECK/CHANGE/REPOSITION/REINSERTION/UPSIZE  5/10/2022    IR DRAINAGE TUBE PLACEMENT  5/7/2022       History reviewed  No pertinent family history  Social History     Tobacco Use    Smoking status: Former Smoker    Smokeless tobacco: Never Used   Substance Use Topics    Alcohol use: No    Drug use: No        Medications  Current Outpatient Medications on File Prior to Visit   Medication Sig Dispense Refill    acetaminophen (TYLENOL) 325 mg tablet Take 2 tablets (650 mg total) by mouth every 4 (four) hours as needed for mild pain  0    amLODIPine (NORVASC) 10 mg tablet Take 1 tablet (10 mg total) by mouth in the morning  0    amoxicillin-clavulanate (AUGMENTIN) 875-125 mg per tablet Take 1 tablet by mouth every 12 (twelve) hours for 21 days 42 tablet 0    calcium carbonate (TUMS) 500 mg chewable tablet Chew 2 tablets (1,000 mg total)  as needed in the morning and 2 tablets (1,000 mg total) as needed at noon and 2 tablets (1,000 mg total) as needed in the evening (indigestion,heartburn)  0    citalopram (CeleXA) 40 mg tablet Take 1 tablet (40 mg total) by mouth in the morning  0    gabapentin (NEURONTIN) 100 mg capsule Take 1 capsule (100 mg total) by mouth in the morning and 1 capsule (100 mg total) in the evening and 1 capsule (100 mg total) before bedtime  Do all this for 10 days   30 capsule 0    levothyroxine 150 mcg tablet Take 1 tablet (150 mcg total) by mouth daily in the early morning  0    Lidocaine 4 % PTCH Apply 1 patch topically daily  0    methocarbamol (ROBAXIN) 500 mg tablet Take 1 tablet (500 mg total) by mouth every 6 (six) hours 40 tablet 0    oxyCODONE (ROXICODONE) 5 immediate release tablet Take 1-2 tablets (5-10 mg total) by mouth every 6 (six) hours as needed for moderate pain or severe pain Max Daily Amount: 40 mg 24 tablet 0    pantoprazole (PROTONIX) 40 mg tablet Take 1 tablet (40 mg total) by mouth daily in the early morning 30 tablet 0    senna (SENOKOT) 8 6 mg Take 1 tablet (8 6 mg total) by mouth in the morning for 5 days  5 tablet 0    traZODone (DESYREL) 150 mg tablet Take 0 5 tablets (75 mg total) by mouth daily at bedtime  0    [DISCONTINUED] sodium chloride, PF, 0 9 % 10 mL by Intracatheter route daily for 120 doses Intracatheter flushing daily 300 mL 3     No current facility-administered medications on file prior to visit         Allergies  No Known Allergies    Review of Systems    Objective  Vitals:    05/16/22 0958   Temp: (!) 97 1 °F (36 2 °C)       Physical Exam

## 2022-05-19 DIAGNOSIS — D73.3 SPLENIC ABSCESS: Primary | ICD-10-CM

## 2022-05-19 DIAGNOSIS — Z79.2 LONG TERM (CURRENT) USE OF ANTIBIOTICS: ICD-10-CM

## 2022-05-20 ENCOUNTER — APPOINTMENT (EMERGENCY)
Dept: RADIOLOGY | Facility: HOSPITAL | Age: 57
DRG: 252 | End: 2022-05-20
Payer: COMMERCIAL

## 2022-05-20 ENCOUNTER — HOSPITAL ENCOUNTER (INPATIENT)
Facility: HOSPITAL | Age: 57
LOS: 5 days | Discharge: HOME | DRG: 252 | End: 2022-05-26
Attending: EMERGENCY MEDICINE | Admitting: SURGERY
Payer: COMMERCIAL

## 2022-05-20 DIAGNOSIS — D73.3 SPLENIC ABSCESS: Primary | ICD-10-CM

## 2022-05-20 LAB
ALBUMIN SERPL BCP-MCNC: 3 G/DL (ref 3.5–5)
ALP SERPL-CCNC: 84 U/L (ref 46–116)
ALT SERPL W P-5'-P-CCNC: 25 U/L (ref 12–78)
ANION GAP SERPL CALCULATED.3IONS-SCNC: 4 MMOL/L (ref 4–13)
AST SERPL W P-5'-P-CCNC: 18 U/L (ref 5–45)
BASOPHILS # BLD AUTO: 0.06 THOUSANDS/ΜL (ref 0–0.1)
BASOPHILS NFR BLD AUTO: 1 % (ref 0–1)
BILIRUB SERPL-MCNC: 0.15 MG/DL (ref 0.2–1)
BUN SERPL-MCNC: 15 MG/DL (ref 5–25)
CALCIUM ALBUM COR SERPL-MCNC: 10 MG/DL (ref 8.3–10.1)
CALCIUM SERPL-MCNC: 9.2 MG/DL (ref 8.3–10.1)
CHLORIDE SERPL-SCNC: 109 MMOL/L (ref 100–108)
CO2 SERPL-SCNC: 28 MMOL/L (ref 21–32)
CREAT SERPL-MCNC: 0.76 MG/DL (ref 0.6–1.3)
EOSINOPHIL # BLD AUTO: 0.15 THOUSAND/ΜL (ref 0–0.61)
EOSINOPHIL NFR BLD AUTO: 2 % (ref 0–6)
ERYTHROCYTE [DISTWIDTH] IN BLOOD BY AUTOMATED COUNT: 18.5 % (ref 11.6–15.1)
GFR SERPL CREATININE-BSD FRML MDRD: 86 ML/MIN/1.73SQ M
GLUCOSE SERPL-MCNC: 98 MG/DL (ref 65–140)
HCT VFR BLD AUTO: 35.2 % (ref 34.8–46.1)
HGB BLD-MCNC: 11.2 G/DL (ref 11.5–15.4)
IMM GRANULOCYTES # BLD AUTO: 0.03 THOUSAND/UL (ref 0–0.2)
IMM GRANULOCYTES NFR BLD AUTO: 0 % (ref 0–2)
LIPASE SERPL-CCNC: 208 U/L (ref 73–393)
LYMPHOCYTES # BLD AUTO: 3.05 THOUSANDS/ΜL (ref 0.6–4.47)
LYMPHOCYTES NFR BLD AUTO: 36 % (ref 14–44)
MCH RBC QN AUTO: 25.2 PG (ref 26.8–34.3)
MCHC RBC AUTO-ENTMCNC: 31.8 G/DL (ref 31.4–37.4)
MCV RBC AUTO: 79 FL (ref 82–98)
MONOCYTES # BLD AUTO: 0.72 THOUSAND/ΜL (ref 0.17–1.22)
MONOCYTES NFR BLD AUTO: 9 % (ref 4–12)
NEUTROPHILS # BLD AUTO: 4.37 THOUSANDS/ΜL (ref 1.85–7.62)
NEUTS SEG NFR BLD AUTO: 52 % (ref 43–75)
NRBC BLD AUTO-RTO: 0 /100 WBCS
NT-PROBNP SERPL-MCNC: 168 PG/ML
PLATELET # BLD AUTO: 485 THOUSANDS/UL (ref 149–390)
PMV BLD AUTO: 9.2 FL (ref 8.9–12.7)
POTASSIUM SERPL-SCNC: 4 MMOL/L (ref 3.5–5.3)
PROT SERPL-MCNC: 7.3 G/DL (ref 6.4–8.2)
RBC # BLD AUTO: 4.44 MILLION/UL (ref 3.81–5.12)
SODIUM SERPL-SCNC: 141 MMOL/L (ref 136–145)
WBC # BLD AUTO: 8.38 THOUSAND/UL (ref 4.31–10.16)

## 2022-05-20 PROCEDURE — 96374 THER/PROPH/DIAG INJ IV PUSH: CPT

## 2022-05-20 PROCEDURE — 80053 COMPREHEN METABOLIC PANEL: CPT

## 2022-05-20 PROCEDURE — 99285 EMERGENCY DEPT VISIT HI MDM: CPT

## 2022-05-20 PROCEDURE — 36415 COLL VENOUS BLD VENIPUNCTURE: CPT

## 2022-05-20 PROCEDURE — 93005 ELECTROCARDIOGRAM TRACING: CPT

## 2022-05-20 PROCEDURE — 85025 COMPLETE CBC W/AUTO DIFF WBC: CPT

## 2022-05-20 PROCEDURE — 83690 ASSAY OF LIPASE: CPT

## 2022-05-20 PROCEDURE — 99285 EMERGENCY DEPT VISIT HI MDM: CPT | Performed by: EMERGENCY MEDICINE

## 2022-05-20 PROCEDURE — 83880 ASSAY OF NATRIURETIC PEPTIDE: CPT

## 2022-05-20 RX ORDER — HYDROMORPHONE HCL/PF 1 MG/ML
0.5 SYRINGE (ML) INJECTION ONCE
Status: COMPLETED | OUTPATIENT
Start: 2022-05-20 | End: 2022-05-20

## 2022-05-20 RX ADMIN — HYDROMORPHONE HYDROCHLORIDE 0.5 MG: 1 INJECTION, SOLUTION INTRAMUSCULAR; INTRAVENOUS; SUBCUTANEOUS at 21:38

## 2022-05-21 ENCOUNTER — APPOINTMENT (EMERGENCY)
Dept: RADIOLOGY | Facility: HOSPITAL | Age: 57
DRG: 252 | End: 2022-05-21
Payer: COMMERCIAL

## 2022-05-21 LAB
ANION GAP SERPL CALCULATED.3IONS-SCNC: 5 MMOL/L (ref 4–13)
APTT PPP: 161 SECONDS (ref 23–37)
APTT PPP: 32 SECONDS (ref 23–37)
BACTERIA UR QL AUTO: NORMAL /HPF
BASOPHILS # BLD AUTO: 0.08 THOUSANDS/ΜL (ref 0–0.1)
BASOPHILS NFR BLD AUTO: 1 % (ref 0–1)
BILIRUB UR QL STRIP: NEGATIVE
BUN SERPL-MCNC: 12 MG/DL (ref 5–25)
CALCIUM SERPL-MCNC: 9 MG/DL (ref 8.3–10.1)
CHLORIDE SERPL-SCNC: 107 MMOL/L (ref 100–108)
CLARITY UR: CLEAR
CO2 SERPL-SCNC: 28 MMOL/L (ref 21–32)
COLOR UR: COLORLESS
CREAT SERPL-MCNC: 0.65 MG/DL (ref 0.6–1.3)
EOSINOPHIL # BLD AUTO: 0.19 THOUSAND/ΜL (ref 0–0.61)
EOSINOPHIL NFR BLD AUTO: 3 % (ref 0–6)
ERYTHROCYTE [DISTWIDTH] IN BLOOD BY AUTOMATED COUNT: 18.6 % (ref 11.6–15.1)
GFR SERPL CREATININE-BSD FRML MDRD: 97 ML/MIN/1.73SQ M
GLUCOSE SERPL-MCNC: 72 MG/DL (ref 65–140)
GLUCOSE UR STRIP-MCNC: NEGATIVE MG/DL
HCT VFR BLD AUTO: 34.2 % (ref 34.8–46.1)
HGB BLD-MCNC: 11.2 G/DL (ref 11.5–15.4)
HGB UR QL STRIP.AUTO: NEGATIVE
IMM GRANULOCYTES # BLD AUTO: 0.02 THOUSAND/UL (ref 0–0.2)
IMM GRANULOCYTES NFR BLD AUTO: 0 % (ref 0–2)
INR PPP: 1.04 (ref 0.84–1.19)
INR PPP: 1.09 (ref 0.84–1.19)
KETONES UR STRIP-MCNC: NEGATIVE MG/DL
LEUKOCYTE ESTERASE UR QL STRIP: NEGATIVE
LYMPHOCYTES # BLD AUTO: 3.09 THOUSANDS/ΜL (ref 0.6–4.47)
LYMPHOCYTES NFR BLD AUTO: 40 % (ref 14–44)
MCH RBC QN AUTO: 26.3 PG (ref 26.8–34.3)
MCHC RBC AUTO-ENTMCNC: 32.7 G/DL (ref 31.4–37.4)
MCV RBC AUTO: 80 FL (ref 82–98)
MONOCYTES # BLD AUTO: 0.73 THOUSAND/ΜL (ref 0.17–1.22)
MONOCYTES NFR BLD AUTO: 10 % (ref 4–12)
NEUTROPHILS # BLD AUTO: 3.59 THOUSANDS/ΜL (ref 1.85–7.62)
NEUTS SEG NFR BLD AUTO: 46 % (ref 43–75)
NITRITE UR QL STRIP: NEGATIVE
NON-SQ EPI CELLS URNS QL MICRO: NORMAL /HPF
NRBC BLD AUTO-RTO: 0 /100 WBCS
PH UR STRIP.AUTO: 6.5 [PH]
PLATELET # BLD AUTO: 528 THOUSANDS/UL (ref 149–390)
PMV BLD AUTO: 9.4 FL (ref 8.9–12.7)
POTASSIUM SERPL-SCNC: 3.9 MMOL/L (ref 3.5–5.3)
PREALB SERPL-MCNC: 19.5 MG/DL (ref 18–40)
PROT UR STRIP-MCNC: NEGATIVE MG/DL
PROTHROMBIN TIME: 13.2 SECONDS (ref 11.6–14.5)
PROTHROMBIN TIME: 13.6 SECONDS (ref 11.6–14.5)
RBC # BLD AUTO: 4.26 MILLION/UL (ref 3.81–5.12)
RBC #/AREA URNS AUTO: NORMAL /HPF
SODIUM SERPL-SCNC: 140 MMOL/L (ref 136–145)
SP GR UR STRIP.AUTO: 1.01 (ref 1–1.03)
UROBILINOGEN UR STRIP-ACNC: <2 MG/DL
WBC # BLD AUTO: 7.7 THOUSAND/UL (ref 4.31–10.16)
WBC #/AREA URNS AUTO: NORMAL /HPF

## 2022-05-21 PROCEDURE — 74177 CT ABD & PELVIS W/CONTRAST: CPT

## 2022-05-21 PROCEDURE — 99222 1ST HOSP IP/OBS MODERATE 55: CPT | Performed by: SURGERY

## 2022-05-21 PROCEDURE — 84134 ASSAY OF PREALBUMIN: CPT | Performed by: SURGERY

## 2022-05-21 PROCEDURE — 71275 CT ANGIOGRAPHY CHEST: CPT

## 2022-05-21 PROCEDURE — NC001 PR NO CHARGE: Performed by: RADIOLOGY

## 2022-05-21 PROCEDURE — 85025 COMPLETE CBC W/AUTO DIFF WBC: CPT | Performed by: STUDENT IN AN ORGANIZED HEALTH CARE EDUCATION/TRAINING PROGRAM

## 2022-05-21 PROCEDURE — 81001 URINALYSIS AUTO W/SCOPE: CPT | Performed by: SURGERY

## 2022-05-21 PROCEDURE — 85730 THROMBOPLASTIN TIME PARTIAL: CPT | Performed by: SURGERY

## 2022-05-21 PROCEDURE — 80048 BASIC METABOLIC PNL TOTAL CA: CPT | Performed by: STUDENT IN AN ORGANIZED HEALTH CARE EDUCATION/TRAINING PROGRAM

## 2022-05-21 PROCEDURE — G1004 CDSM NDSC: HCPCS

## 2022-05-21 PROCEDURE — 96376 TX/PRO/DX INJ SAME DRUG ADON: CPT

## 2022-05-21 PROCEDURE — 85610 PROTHROMBIN TIME: CPT | Performed by: STUDENT IN AN ORGANIZED HEALTH CARE EDUCATION/TRAINING PROGRAM

## 2022-05-21 PROCEDURE — 85610 PROTHROMBIN TIME: CPT | Performed by: SURGERY

## 2022-05-21 PROCEDURE — 99223 1ST HOSP IP/OBS HIGH 75: CPT | Performed by: INTERNAL MEDICINE

## 2022-05-21 RX ORDER — GABAPENTIN 100 MG/1
100 CAPSULE ORAL 3 TIMES DAILY
Status: DISCONTINUED | OUTPATIENT
Start: 2022-05-21 | End: 2022-05-26 | Stop reason: HOSPADM

## 2022-05-21 RX ORDER — HYDROMORPHONE HCL/PF 1 MG/ML
0.5 SYRINGE (ML) INJECTION
Status: DISCONTINUED | OUTPATIENT
Start: 2022-05-21 | End: 2022-05-25

## 2022-05-21 RX ORDER — METHOCARBAMOL 500 MG/1
500 TABLET, FILM COATED ORAL EVERY 6 HOURS SCHEDULED
Status: DISCONTINUED | OUTPATIENT
Start: 2022-05-21 | End: 2022-05-26 | Stop reason: HOSPADM

## 2022-05-21 RX ORDER — SENNOSIDES 8.6 MG
1 TABLET ORAL DAILY
Status: DISCONTINUED | OUTPATIENT
Start: 2022-05-21 | End: 2022-05-22

## 2022-05-21 RX ORDER — HEPARIN SODIUM 5000 [USP'U]/ML
5000 INJECTION, SOLUTION INTRAVENOUS; SUBCUTANEOUS EVERY 8 HOURS SCHEDULED
Status: DISCONTINUED | OUTPATIENT
Start: 2022-05-21 | End: 2022-05-21

## 2022-05-21 RX ORDER — OXYCODONE HYDROCHLORIDE 5 MG/1
5 TABLET ORAL EVERY 4 HOURS PRN
Status: DISCONTINUED | OUTPATIENT
Start: 2022-05-21 | End: 2022-05-26 | Stop reason: HOSPADM

## 2022-05-21 RX ORDER — ONDANSETRON 2 MG/ML
4 INJECTION INTRAMUSCULAR; INTRAVENOUS EVERY 6 HOURS PRN
Status: DISCONTINUED | OUTPATIENT
Start: 2022-05-21 | End: 2022-05-26 | Stop reason: HOSPADM

## 2022-05-21 RX ORDER — HYDROMORPHONE HCL/PF 1 MG/ML
0.5 SYRINGE (ML) INJECTION ONCE
Status: COMPLETED | OUTPATIENT
Start: 2022-05-21 | End: 2022-05-21

## 2022-05-21 RX ORDER — CLOTRIMAZOLE 1 %
CREAM (GRAM) TOPICAL 2 TIMES DAILY
Status: DISCONTINUED | OUTPATIENT
Start: 2022-05-21 | End: 2022-05-26 | Stop reason: HOSPADM

## 2022-05-21 RX ORDER — NYSTATIN 100000 [USP'U]/G
POWDER TOPICAL 3 TIMES DAILY
Status: DISCONTINUED | OUTPATIENT
Start: 2022-05-21 | End: 2022-05-26 | Stop reason: HOSPADM

## 2022-05-21 RX ORDER — OXYCODONE HYDROCHLORIDE 10 MG/1
10 TABLET ORAL EVERY 4 HOURS PRN
Status: DISCONTINUED | OUTPATIENT
Start: 2022-05-21 | End: 2022-05-26 | Stop reason: HOSPADM

## 2022-05-21 RX ORDER — TRAZODONE HYDROCHLORIDE 50 MG/1
75 TABLET ORAL
Status: DISCONTINUED | OUTPATIENT
Start: 2022-05-21 | End: 2022-05-26 | Stop reason: HOSPADM

## 2022-05-21 RX ORDER — HEPARIN SODIUM 10000 [USP'U]/100ML
3-30 INJECTION, SOLUTION INTRAVENOUS
Status: DISCONTINUED | OUTPATIENT
Start: 2022-05-21 | End: 2022-05-25

## 2022-05-21 RX ORDER — PANTOPRAZOLE SODIUM 40 MG/1
40 TABLET, DELAYED RELEASE ORAL
Status: DISCONTINUED | OUTPATIENT
Start: 2022-05-21 | End: 2022-05-26 | Stop reason: HOSPADM

## 2022-05-21 RX ORDER — ACETAMINOPHEN 325 MG/1
975 TABLET ORAL EVERY 6 HOURS SCHEDULED
Status: DISCONTINUED | OUTPATIENT
Start: 2022-05-21 | End: 2022-05-26 | Stop reason: HOSPADM

## 2022-05-21 RX ORDER — LIDOCAINE 50 MG/G
1 PATCH TOPICAL DAILY
Status: DISCONTINUED | OUTPATIENT
Start: 2022-05-21 | End: 2022-05-26 | Stop reason: HOSPADM

## 2022-05-21 RX ORDER — LEVOTHYROXINE SODIUM 0.07 MG/1
150 TABLET ORAL
Status: DISCONTINUED | OUTPATIENT
Start: 2022-05-21 | End: 2022-05-26 | Stop reason: HOSPADM

## 2022-05-21 RX ORDER — SODIUM CHLORIDE, SODIUM LACTATE, POTASSIUM CHLORIDE, CALCIUM CHLORIDE 600; 310; 30; 20 MG/100ML; MG/100ML; MG/100ML; MG/100ML
125 INJECTION, SOLUTION INTRAVENOUS CONTINUOUS
Status: DISCONTINUED | OUTPATIENT
Start: 2022-05-21 | End: 2022-05-22

## 2022-05-21 RX ORDER — CITALOPRAM 20 MG/1
40 TABLET ORAL DAILY
Status: DISCONTINUED | OUTPATIENT
Start: 2022-05-21 | End: 2022-05-26 | Stop reason: HOSPADM

## 2022-05-21 RX ADMIN — METHOCARBAMOL 500 MG: 500 TABLET, FILM COATED ORAL at 17:13

## 2022-05-21 RX ADMIN — OXYCODONE HYDROCHLORIDE 10 MG: 10 TABLET ORAL at 20:15

## 2022-05-21 RX ADMIN — HEPARIN SODIUM 5000 UNITS: 5000 INJECTION INTRAVENOUS; SUBCUTANEOUS at 05:03

## 2022-05-21 RX ADMIN — SODIUM CHLORIDE, SODIUM LACTATE, POTASSIUM CHLORIDE, AND CALCIUM CHLORIDE 125 ML/HR: .6; .31; .03; .02 INJECTION, SOLUTION INTRAVENOUS at 04:37

## 2022-05-21 RX ADMIN — SODIUM CHLORIDE 500 ML: 9 INJECTION, SOLUTION INTRAVENOUS at 02:38

## 2022-05-21 RX ADMIN — LIDOCAINE 5% 1 PATCH: 700 PATCH TOPICAL at 04:48

## 2022-05-21 RX ADMIN — ACETAMINOPHEN 975 MG: 325 TABLET, FILM COATED ORAL at 11:38

## 2022-05-21 RX ADMIN — HYDROMORPHONE HYDROCHLORIDE 0.5 MG: 1 INJECTION, SOLUTION INTRAMUSCULAR; INTRAVENOUS; SUBCUTANEOUS at 16:15

## 2022-05-21 RX ADMIN — ACETAMINOPHEN 975 MG: 325 TABLET, FILM COATED ORAL at 23:16

## 2022-05-21 RX ADMIN — GABAPENTIN 100 MG: 100 CAPSULE ORAL at 08:11

## 2022-05-21 RX ADMIN — ACETAMINOPHEN 975 MG: 325 TABLET, FILM COATED ORAL at 17:13

## 2022-05-21 RX ADMIN — METHOCARBAMOL 500 MG: 500 TABLET, FILM COATED ORAL at 11:38

## 2022-05-21 RX ADMIN — HYDROMORPHONE HYDROCHLORIDE 0.5 MG: 1 INJECTION, SOLUTION INTRAMUSCULAR; INTRAVENOUS; SUBCUTANEOUS at 02:29

## 2022-05-21 RX ADMIN — GABAPENTIN 100 MG: 100 CAPSULE ORAL at 16:15

## 2022-05-21 RX ADMIN — PIPERACILLIN AND TAZOBACTAM 3.38 G: 36; 4.5 INJECTION, POWDER, FOR SOLUTION INTRAVENOUS at 15:19

## 2022-05-21 RX ADMIN — OXYCODONE HYDROCHLORIDE 10 MG: 10 TABLET ORAL at 04:49

## 2022-05-21 RX ADMIN — HYDROMORPHONE HYDROCHLORIDE 0.5 MG: 1 INJECTION, SOLUTION INTRAMUSCULAR; INTRAVENOUS; SUBCUTANEOUS at 00:52

## 2022-05-21 RX ADMIN — HYDROMORPHONE HYDROCHLORIDE 0.5 MG: 1 INJECTION, SOLUTION INTRAMUSCULAR; INTRAVENOUS; SUBCUTANEOUS at 23:16

## 2022-05-21 RX ADMIN — TRAZODONE HYDROCHLORIDE 75 MG: 50 TABLET ORAL at 23:37

## 2022-05-21 RX ADMIN — HEPARIN SODIUM 18 UNITS/KG/HR: 10000 INJECTION, SOLUTION INTRAVENOUS at 10:13

## 2022-05-21 RX ADMIN — PIPERACILLIN AND TAZOBACTAM 3.38 G: 36; 4.5 INJECTION, POWDER, FOR SOLUTION INTRAVENOUS at 20:44

## 2022-05-21 RX ADMIN — METHOCARBAMOL 500 MG: 500 TABLET, FILM COATED ORAL at 23:16

## 2022-05-21 RX ADMIN — PANTOPRAZOLE SODIUM 40 MG: 40 TABLET, DELAYED RELEASE ORAL at 05:03

## 2022-05-21 RX ADMIN — HYDROMORPHONE HYDROCHLORIDE 0.5 MG: 1 INJECTION, SOLUTION INTRAMUSCULAR; INTRAVENOUS; SUBCUTANEOUS at 08:11

## 2022-05-21 RX ADMIN — ACETAMINOPHEN 975 MG: 325 TABLET, FILM COATED ORAL at 05:03

## 2022-05-21 RX ADMIN — OXYCODONE HYDROCHLORIDE 10 MG: 10 TABLET ORAL at 13:45

## 2022-05-21 RX ADMIN — PIPERACILLIN AND TAZOBACTAM 3.38 G: 36; 4.5 INJECTION, POWDER, FOR SOLUTION INTRAVENOUS at 09:43

## 2022-05-21 RX ADMIN — CITALOPRAM HYDROBROMIDE 40 MG: 20 TABLET ORAL at 08:11

## 2022-05-21 RX ADMIN — LEVOTHYROXINE SODIUM 150 MCG: 75 TABLET ORAL at 05:03

## 2022-05-21 RX ADMIN — METHOCARBAMOL 500 MG: 500 TABLET, FILM COATED ORAL at 05:03

## 2022-05-21 RX ADMIN — CLOTRIMAZOLE: 0.01 CREAM TOPICAL at 08:14

## 2022-05-21 RX ADMIN — IODIXANOL 70 ML: 320 INJECTION, SOLUTION INTRAVASCULAR at 00:21

## 2022-05-21 RX ADMIN — GABAPENTIN 100 MG: 100 CAPSULE ORAL at 20:15

## 2022-05-21 RX ADMIN — PIPERACILLIN AND TAZOBACTAM 3.38 G: 36; 4.5 INJECTION, POWDER, FOR SOLUTION INTRAVENOUS at 05:51

## 2022-05-21 RX ADMIN — NYSTATIN: 100000 POWDER TOPICAL at 20:15

## 2022-05-21 NOTE — PLAN OF CARE
Problem: PAIN - ADULT  Goal: Verbalizes/displays adequate comfort level or baseline comfort level  Description: Interventions:  - Encourage patient to monitor pain and request assistance  - Assess pain using appropriate pain scale  - Administer analgesics based on type and severity of pain and evaluate response  - Implement non-pharmacological measures as appropriate and evaluate response  - Consider cultural and social influences on pain and pain management  - Notify physician/advanced practitioner if interventions unsuccessful or patient reports new pain  Outcome: Progressing     Problem: INFECTION - ADULT  Goal: Absence or prevention of progression during hospitalization  Description: INTERVENTIONS:  - Assess and monitor for signs and symptoms of infection  - Monitor lab/diagnostic results  - Monitor all insertion sites, i e  indwelling lines, tubes, and drains  - Monitor endotracheal if appropriate and nasal secretions for changes in amount and color  - [place] appropriate cooling/warming therapies per order  - Administer medications as ordered  - Instruct and encourage patient and family to use good hand hygiene technique  - Identify and instruct in appropriate isolation precautions for identified infection/condition  Outcome: Progressing  Goal: Absence of fever/infection during neutropenic period  Description: INTERVENTIONS:  - Monitor WBC    Outcome: Progressing     Problem: DISCHARGE PLANNING  Goal: Discharge to home or other facility with appropriate resources  Description: INTERVENTIONS:  - Identify barriers to discharge w/patient and caregiver  - Arrange for needed discharge resources and transportation as appropriate  - Identify discharge learning needs (meds, wound care, etc )  - Arrange for interpretive services to assist at discharge as needed  - Refer to Case Management Department for coordinating discharge planning if the patient needs post-hospital services based on physician/advanced practitioner order or complex needs related to functional status, cognitive ability, or social support system  Outcome: Progressing     Problem: GASTROINTESTINAL - ADULT  Goal: Minimal or absence of nausea and/or vomiting  Description: INTERVENTIONS:  - Administer IV fluids if ordered to ensure adequate hydration  - Maintain NPO status until nausea and vomiting are resolved  - Nasogastric tube if ordered  - Administer ordered antiemetic medications as needed  - Provide nonpharmacologic comfort measures as appropriate  - Advance diet as tolerated, if ordered  - Consider nutrition services referral to assist patient with adequate nutrition and appropriate food choices  Outcome: Progressing  Goal: Maintains or returns to baseline bowel function  Description: INTERVENTIONS:  - Assess bowel function  - Encourage oral fluids to ensure adequate hydration  - Administer IV fluids if ordered to ensure adequate hydration  - Administer ordered medications as needed  - Encourage mobilization and activity  - Consider nutritional services referral to assist patient with adequate nutrition and appropriate food choices  Outcome: Progressing  Goal: Maintains adequate nutritional intake  Description: INTERVENTIONS:  - Monitor percentage of each meal consumed  - Identify factors contributing to decreased intake, treat as appropriate  - Assist with meals as needed  - Monitor I&O, weight, and lab values if indicated  - Obtain nutrition services referral as needed  Outcome: Progressing  Goal: Establish and maintain optimal ostomy function  Description: INTERVENTIONS:  - Assess bowel function  - Encourage oral fluids to ensure adequate hydration  - Administer IV fluids if ordered to ensure adequate hydration   - Administer ordered medications as needed  - Encourage mobilization and activity  - Nutrition services referral to assist patient with appropriate food choices  - Assess stoma site  - Consider wound care consult   Outcome: Progressing  Goal: Oral mucous membranes remain intact  Description: INTERVENTIONS  - Assess oral mucosa and hygiene practices  - Implement preventative oral hygiene regimen  - Implement oral medicated treatments as ordered  - Initiate Nutrition services referral as needed  Outcome: Progressing

## 2022-05-21 NOTE — ED PROVIDER NOTES
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  Since February, the patient has been hospitalized a few times for splenic abscess requiring antibiotics and drainage  Patient was recently discharged on 05/14/2022 due to splenic abscess  At this time, patient states that her pain was relatively well controlled, she was sent home with oxycodone and instructions to take Tylenol as needed for pain  Patient has been taking the medicine as prescribed, however notes that over the past few days her pain has been worsening to the point where the oxycodone only lasts an hour or so before she develops severe left upper quadrant pain  Patient is accompanied by her daughter who states that the patient has been sleeping a lot more than normal and patient also endorses chills  Patient has been taking her temperature at home, has been afebrile  She states that she has been having normal bowel movements and normal urinary habits, she is denying any chest pain, but does endorse some shortness of breath and pleuritic type pain when she takes a deep breath and that she localizes to the left chest           Prior to Admission Medications   Prescriptions Last Dose Informant Patient Reported? Taking? Lidocaine 4 % PTCH   No No   Sig: Apply 1 patch topically daily   acetaminophen (TYLENOL) 325 mg tablet   No No   Sig: Take 2 tablets (650 mg total) by mouth every 4 (four) hours as needed for mild pain   amLODIPine (NORVASC) 10 mg tablet   No No   Sig: Take 1 tablet (10 mg total) by mouth in the morning     amoxicillin-clavulanate (AUGMENTIN) 875-125 mg per tablet   No No   Sig: Take 1 tablet by mouth every 12 (twelve) hours for 21 days   calcium carbonate (TUMS) 500 mg chewable tablet   No No   Sig: Chew 2 tablets (1,000 mg total)  as needed in the morning and 2 tablets (1,000 mg total) as needed at noon and 2 tablets (1,000 mg total) as needed in the evening (indigestion,heartburn)  citalopram (CeleXA) 40 mg tablet   No No   Sig: Take 1 tablet (40 mg total) by mouth in the morning  clotrimazole (LOTRIMIN) 1 % cream   No No   Sig: Apply topically 2 (two) times a day   gabapentin (NEURONTIN) 100 mg capsule   No No   Sig: Take 1 capsule (100 mg total) by mouth in the morning and 1 capsule (100 mg total) in the evening and 1 capsule (100 mg total) before bedtime  Do all this for 10 days  levothyroxine 150 mcg tablet   No No   Sig: Take 1 tablet (150 mcg total) by mouth daily in the early morning   methocarbamol (ROBAXIN) 500 mg tablet   No No   Sig: Take 1 tablet (500 mg total) by mouth every 6 (six) hours   oxyCODONE (ROXICODONE) 5 immediate release tablet   No No   Sig: Take 1-2 tablets (5-10 mg total) by mouth every 6 (six) hours as needed for moderate pain or severe pain Max Daily Amount: 40 mg   pantoprazole (PROTONIX) 40 mg tablet   No No   Sig: Take 1 tablet (40 mg total) by mouth daily in the early morning   senna (SENOKOT) 8 6 mg   No No   Sig: Take 1 tablet (8 6 mg total) by mouth in the morning for 5 days  traZODone (DESYREL) 150 mg tablet   No No   Sig: Take 0 5 tablets (75 mg total) by mouth daily at bedtime      Facility-Administered Medications: None       Past Medical History:   Diagnosis Date    Disease of thyroid gland     Hernia     Hypertension        Past Surgical History:   Procedure Laterality Date    CHOLECYSTECTOMY      CORONARY ANGIOPLASTY WITH STENT PLACEMENT      IR DRAINAGE TUBE CHECK/CHANGE/REPOSITION/REINSERTION/UPSIZE  5/10/2022    IR DRAINAGE TUBE PLACEMENT  5/7/2022       History reviewed  No pertinent family history    I have reviewed and agree with the history as documented  E-Cigarette/Vaping     E-Cigarette/Vaping Substances     Social History     Tobacco Use    Smoking status: Former Smoker    Smokeless tobacco: Never Used   Substance Use Topics    Alcohol use: No    Drug use: No        Review of Systems   Constitutional: Positive for activity change, chills and fever  HENT: Negative for ear pain and sore throat  Eyes: Negative for pain and visual disturbance  Respiratory: Positive for shortness of breath  Negative for cough  Cardiovascular: Negative for chest pain and palpitations  Gastrointestinal: Positive for abdominal pain  Negative for vomiting  Genitourinary: Negative for dysuria and hematuria  Musculoskeletal: Negative for arthralgias and back pain  Skin: Negative for color change and rash  Neurological: Negative for seizures and syncope  All other systems reviewed and are negative  Physical Exam  ED Triage Vitals   Temperature Pulse Respirations Blood Pressure SpO2   05/20/22 2044 05/20/22 2044 05/20/22 2044 05/20/22 2044 05/20/22 2044   97 6 °F (36 4 °C) 90 18 124/87 97 %      Temp Source Heart Rate Source Patient Position - Orthostatic VS BP Location FiO2 (%)   05/20/22 2044 05/20/22 2044 05/20/22 2044 05/20/22 2044 --   Temporal Monitor Sitting Right arm       Pain Score       05/20/22 2138       9             Orthostatic Vital Signs  Vitals:    05/21/22 0407 05/21/22 0757 05/21/22 1415 05/21/22 2208   BP: 133/75 125/72 126/71 116/69   Pulse: 71 71 71 74   Patient Position - Orthostatic VS:           Physical Exam  Vitals and nursing note reviewed  Constitutional:       General: She is not in acute distress  Appearance: She is well-developed  HENT:      Head: Normocephalic and atraumatic  Right Ear: External ear normal       Left Ear: External ear normal       Nose: Nose normal  No congestion or rhinorrhea  Mouth/Throat:      Mouth: Mucous membranes are moist       Pharynx: Oropharynx is clear  No oropharyngeal exudate or posterior oropharyngeal erythema  Eyes:      General: No scleral icterus  Extraocular Movements: Extraocular movements intact  Conjunctiva/sclera: Conjunctivae normal       Pupils: Pupils are equal, round, and reactive to light  Cardiovascular:      Rate and Rhythm: Normal rate and regular rhythm  Pulses: Normal pulses  Heart sounds: Normal heart sounds  No murmur heard  Pulmonary:      Effort: Pulmonary effort is normal  No respiratory distress  Breath sounds: Normal breath sounds  No wheezing or rhonchi  Abdominal:      General: Abdomen is flat  There is no distension  Palpations: Abdomen is soft  Tenderness: There is abdominal tenderness in the left upper quadrant  There is guarding  There is no right CVA tenderness, left CVA tenderness or rebound  Negative signs include Rodriguez's sign and McBurney's sign  Musculoskeletal:         General: No swelling  Cervical back: Neck supple  No rigidity  Right lower leg: No edema  Left lower leg: No edema  Lymphadenopathy:      Cervical: No cervical adenopathy  Skin:     General: Skin is warm and dry  Capillary Refill: Capillary refill takes less than 2 seconds  Coloration: Skin is not jaundiced  Findings: No rash  Neurological:      General: No focal deficit present  Mental Status: She is alert and oriented to person, place, and time  Mental status is at baseline     Psychiatric:         Mood and Affect: Mood normal          Behavior: Behavior normal          ED Medications  Medications   lactated ringers infusion (0 mL/hr Intravenous Stopped 5/21/22 1028)   senna (SENOKOT) tablet 8 6 mg (8 6 mg Oral Refused 5/21/22 0813)   ondansetron (ZOFRAN) injection 4 mg (has no administration in time range)   acetaminophen (TYLENOL) tablet 975 mg (975 mg Oral Given 5/22/22 4353)   HYDROmorphone (DILAUDID) injection 0 5 mg (0 5 mg Intravenous Given 5/21/22 2316)   oxyCODONE (ROXICODONE) immediate release tablet 10 mg (10 mg Oral Given 5/21/22 2015)   oxyCODONE (ROXICODONE) IR tablet 5 mg (has no administration in time range)   lidocaine (LIDODERM) 5 % patch 1 patch (1 patch Topical Patch Removed 5/21/22 1619)   piperacillin-tazobactam (ZOSYN) 3 375 g in sodium chloride 0 9 % 100 mL IVPB (3 375 g Intravenous New Bag 5/22/22 0232)   citalopram (CeleXA) tablet 40 mg (40 mg Oral Given 5/21/22 0811)   clotrimazole (LOTRIMIN) 1 % cream ( Topical Refused 5/21/22 1714)   gabapentin (NEURONTIN) capsule 100 mg (100 mg Oral Given 5/21/22 2015)   levothyroxine tablet 150 mcg (150 mcg Oral Given 5/22/22 0503)   methocarbamol (ROBAXIN) tablet 500 mg (500 mg Oral Given 5/22/22 0503)   pantoprazole (PROTONIX) EC tablet 40 mg (40 mg Oral Given 5/22/22 0503)   heparin (porcine) 25,000 units in 0 45% NaCl 250 mL infusion (premix) (12 Units/kg/hr × 85 kg (Order-Specific) Intravenous New Bag 5/22/22 0613)   nystatin (MYCOSTATIN) powder ( Topical Given 5/21/22 2015)   traZODone (DESYREL) tablet 75 mg (75 mg Oral Given 5/21/22 2337)   HYDROmorphone (DILAUDID) injection 0 5 mg (0 5 mg Intravenous Given 5/20/22 2138)   barium (READI-CAT 2) suspension 900 mL (900 mL Oral Given 5/20/22 2205)   iodixanol (VISIPAQUE) 320 MG/ML injection 70 mL (70 mL Intravenous Given 5/21/22 0021)   HYDROmorphone (DILAUDID) injection 0 5 mg (0 5 mg Intravenous Given 5/21/22 0052)   HYDROmorphone (DILAUDID) injection 0 5 mg (0 5 mg Intravenous Given 5/21/22 0229)   sodium chloride 0 9 % bolus 500 mL (500 mL Intravenous New Bag 5/21/22 0238)       Diagnostic Studies  Results Reviewed     Procedure Component Value Units Date/Time    Basic metabolic panel [[id number]] Collected: 05/21/22 0450    Lab Status: Final result Specimen: Blood from Arm, Right Updated: 05/21/22 0648     Sodium 140 mmol/L      Potassium 3 9 mmol/L      Chloride 107 mmol/L      CO2 28 mmol/L      ANION GAP 5 mmol/L      BUN 12 mg/dL      Creatinine 0 65 mg/dL Glucose 72 mg/dL      Calcium 9 0 mg/dL      eGFR 97 ml/min/1 73sq m     Narrative:      National Kidney Disease Foundation guidelines for Chronic Kidney Disease (CKD):     Stage 1 with normal or high GFR (GFR > 90 mL/min/1 73 square meters)    Stage 2 Mild CKD (GFR = 60-89 mL/min/1 73 square meters)    Stage 3A Moderate CKD (GFR = 45-59 mL/min/1 73 square meters)    Stage 3B Moderate CKD (GFR = 30-44 mL/min/1 73 square meters)    Stage 4 Severe CKD (GFR = 15-29 mL/min/1 73 square meters)    Stage 5 End Stage CKD (GFR <15 mL/min/1 73 square meters)  Note: GFR calculation is accurate only with a steady state creatinine    Protime-INR [[id number]]  (Normal) Collected: 05/21/22 0438    Lab Status: Final result Specimen: Blood from Arm, Right Updated: 05/21/22 0639     Protime 13 2 seconds      INR 1 04    CBC and differential [[id number]]  (Abnormal) Collected: 05/21/22 0450    Lab Status: Final result Specimen: Blood from Arm, Right Updated: 05/21/22 0631     WBC 7 70 Thousand/uL      RBC 4 26 Million/uL      Hemoglobin 11 2 g/dL      Hematocrit 34 2 %      MCV 80 fL      MCH 26 3 pg      MCHC 32 7 g/dL      RDW 18 6 %      MPV 9 4 fL      Platelets 993 Thousands/uL      nRBC 0 /100 WBCs      Neutrophils Relative 46 %      Immat GRANS % 0 %      Lymphocytes Relative 40 %      Monocytes Relative 10 %      Eosinophils Relative 3 %      Basophils Relative 1 %      Neutrophils Absolute 3 59 Thousands/µL      Immature Grans Absolute 0 02 Thousand/uL      Lymphocytes Absolute 3 09 Thousands/µL      Monocytes Absolute 0 73 Thousand/µL      Eosinophils Absolute 0 19 Thousand/µL      Basophils Absolute 0 08 Thousands/µL     Lipase [[id number]]  (Normal) Collected: 05/20/22 2141    Lab Status: Final result Specimen: Blood from Arm, Left Updated: 05/20/22 2240     Lipase 208 u/L     NT-BNP PRO [[id number]]  (Abnormal) Collected: 05/20/22 2141    Lab Status: Final result Specimen: Blood from Arm, Left Updated: 05/20/22 2240 NT-proBNP 168 pg/mL     Comprehensive metabolic panel [[id number]]  (Abnormal) Collected: 05/20/22 2141    Lab Status: Final result Specimen: Blood from Arm, Left Updated: 05/20/22 2240     Sodium 141 mmol/L      Potassium 4 0 mmol/L      Chloride 109 mmol/L      CO2 28 mmol/L      ANION GAP 4 mmol/L      BUN 15 mg/dL      Creatinine 0 76 mg/dL      Glucose 98 mg/dL      Calcium 9 2 mg/dL      Corrected Calcium 10 0 mg/dL      AST 18 U/L      ALT 25 U/L      Alkaline Phosphatase 84 U/L      Total Protein 7 3 g/dL      Albumin 3 0 g/dL      Total Bilirubin 0 15 mg/dL      eGFR 86 ml/min/1 73sq m     Narrative:      National Kidney Disease Foundation guidelines for Chronic Kidney Disease (CKD):     Stage 1 with normal or high GFR (GFR > 90 mL/min/1 73 square meters)    Stage 2 Mild CKD (GFR = 60-89 mL/min/1 73 square meters)    Stage 3A Moderate CKD (GFR = 45-59 mL/min/1 73 square meters)    Stage 3B Moderate CKD (GFR = 30-44 mL/min/1 73 square meters)    Stage 4 Severe CKD (GFR = 15-29 mL/min/1 73 square meters)    Stage 5 End Stage CKD (GFR <15 mL/min/1 73 square meters)  Note: GFR calculation is accurate only with a steady state creatinine    CBC and differential [[id number]]  (Abnormal) Collected: 05/20/22 2141    Lab Status: Final result Specimen: Blood from Arm, Left Updated: 05/20/22 2152     WBC 8 38 Thousand/uL      RBC 4 44 Million/uL      Hemoglobin 11 2 g/dL      Hematocrit 35 2 %      MCV 79 fL      MCH 25 2 pg      MCHC 31 8 g/dL      RDW 18 5 %      MPV 9 2 fL      Platelets 531 Thousands/uL      nRBC 0 /100 WBCs      Neutrophils Relative 52 %      Immat GRANS % 0 %      Lymphocytes Relative 36 %      Monocytes Relative 9 %      Eosinophils Relative 2 %      Basophils Relative 1 %      Neutrophils Absolute 4 37 Thousands/µL      Immature Grans Absolute 0 03 Thousand/uL      Lymphocytes Absolute 3 05 Thousands/µL      Monocytes Absolute 0 72 Thousand/µL      Eosinophils Absolute 0 15 Thousand/µL Basophils Absolute 0 06 Thousands/µL                  PE Study with CT abdomen &pelvis with contrast   Final Result by Marnie Love MD (05/21 4882)      There is an approximately 2 7 x 1 6 x 1 5 cm fluid collection containing a tiny gas bubble immediately adjacent to the posterior superior aspect of the spleen  This is concerning for abscess  Surgical consultation and follow-up is recommended  Interventional Radiology consultation is recommended  There remains some infiltration of the fat of the left upper lateral abdominal wall overlying the level of the spleen in the area of prior catheter drainage, however, the gas bubbles seen in this area on the May 11, 2022 study are not clearly    demonstrable on the present examination  Clinical correlation is necessary  If further evaluation is indicated, ultrasound of this region could be performed  No evidence of pulmonary embolism is seen  Small pulmonary nodules  Reportedly, the patient does have history of tobacco use  Follow-up CT of the chest in 12 months is suggested  Other findings as described above, please see discussion  This examination demonstrates findings for which clinical and imaging follow-up is recommended and was logged as such in [address] [place] [address] Rd               I personally discussed this study with John Long on 5/21/2022 at 2:02 AM                               Workstation performed: [id number]               Procedures  ECG 12 Lead Documentation Only    Date/Time: 5/20/2022 9:46 PM  Performed by: Brenna Foster MD  Authorized by: Brenna Foster MD     Indications / Diagnosis:  Pleuritic Chest Pain  ECG reviewed by me, the ED Provider: yes    Patient location:  ED  Previous ECG:     Previous ECG:  Compared to current    Comparison ECG info:  5/6/22    Similarity:  No change    Comparison to cardiac monitor: Yes    Interpretation:     Interpretation: normal    Rate:     ECG rate:  74    ECG rate assessment: normal    Rhythm:     Rhythm: sinus rhythm    Ectopy:     Ectopy: none    QRS:     QRS axis:  Normal  Conduction:     Conduction: normal    ST segments:     ST segments:  Normal  T waves:     T waves: normal            ED Course  ED Course as of 05/22/22 0714   Fri May 20, 2022   2247 PE Study with CT abdomen &pelvis with contrast   2344 PE Study with CT abdomen &pelvis with contrast   Sat May 21, 2022   0154 Red Blood Cell Count: 4 44                                 Uli' Criteria for PE    Flowsheet Row Most Recent Value   Wells' Criteria for PE    Clinical signs and symptoms of DVT 0 Filed at: 05/20/2022 2126   PE is primary diagnosis or equally likely 3 Filed at: 05/20/2022 2126   HR >100 0 Filed at: 05/20/2022 2126   Immobilization at least 3 days or Surgery in the previous 4 weeks 1 5 Filed at: 05/20/2022 2126   Previous, objectively diagnosed PE or DVT 1 5 Filed at: 05/20/2022 2126   Hemoptysis 0 Filed at: 05/20/2022 2126   Malignancy with treatment within 6 months or palliative 0 Filed at: 05/20/2022 2126   Shi Ramos' Criteria Total 6 Filed at: 05/20/2022 2126            MDM  Number of Diagnoses or Management Options  Splenic abscess  Diagnosis management comments: 49-year-old female with known splenic abscesses presenting with worsening left upper quadrant abdominal pain  Patient also is endorsing some shortness of breath and pleuritic type chest pain  Differential diagnoses considered, recurrence of splenic abscess, pulmonary embolus, musculoskeletal rib pain, kidney stone, gastric perforation  I will evaluate further with CTA PE study with runoff to and abdomen and pelvis CT  Basic labs will also be obtained to evaluate for possible abscess formation and other sources of infection  Will treat patient's pain with IV narcotics  Reassessment/disposition:  CT of the abdomen and pelvis demonstrated possible worsening of her splenic abscess  This is the only source I can find for her acute pain    I reached out to surgery team who agreed that patient should be admitted for at least further observation if not additional procedures  Patient is agreeable to this plan, patient will be admitted under surgical service  Disposition  Final diagnoses:   Splenic abscess     Time reflects when diagnosis was documented in both MDM as applicable and the Disposition within this note     Time User Action Codes Description Comment    5/21/2022  3:04 AM Rylie Jose Alejandrocarson Add [D73 3] Splenic abscess     5/21/2022  3:06 AM Joyce Edina Modify [D73 3] Splenic abscess       ED Disposition     ED Disposition   Admit    Condition   Stable    Date/Time   Sat May 21, 2022  3:06 AM    Comment   Case was discussed with Dr Tyrone Champagne and the patient's admission status was agreed to be Admission Status: inpatient status to the service of Dr Ivett Rangel     Follow-up Information    None         Current Discharge Medication List      CONTINUE these medications which have NOT CHANGED    Details   acetaminophen (TYLENOL) 325 mg tablet Take 2 tablets (650 mg total) by mouth every 4 (four) hours as needed for mild pain  Refills: 0    Associated Diagnoses: Splenic abscess      amLODIPine (NORVASC) 10 mg tablet Take 1 tablet (10 mg total) by mouth in the morning  Refills: 0    Associated Diagnoses: Splenic abscess      amoxicillin-clavulanate (AUGMENTIN) 875-125 mg per tablet Take 1 tablet by mouth every 12 (twelve) hours for 21 days  Qty: 42 tablet, Refills: 0    Associated Diagnoses: Splenic abscess      calcium carbonate (TUMS) 500 mg chewable tablet Chew 2 tablets (1,000 mg total)  as needed in the morning and 2 tablets (1,000 mg total) as needed at noon and 2 tablets (1,000 mg total) as needed in the evening (indigestion,heartburn)  Refills: 0    Associated Diagnoses: Splenic abscess      citalopram (CeleXA) 40 mg tablet Take 1 tablet (40 mg total) by mouth in the morning    Refills: 0    Associated Diagnoses: Splenic abscess clotrimazole (LOTRIMIN) 1 % cream Apply topically 2 (two) times a day  Qty: 30 g, Refills: 0    Associated Diagnoses: Intertrigo      gabapentin (NEURONTIN) 100 mg capsule Take 1 capsule (100 mg total) by mouth in the morning and 1 capsule (100 mg total) in the evening and 1 capsule (100 mg total) before bedtime  Do all this for 10 days  Qty: 30 capsule, Refills: 0    Associated Diagnoses: Splenic abscess      levothyroxine 150 mcg tablet Take 1 tablet (150 mcg total) by mouth daily in the early morning  Refills: 0    Associated Diagnoses: Splenic abscess      Lidocaine 4 % PTCH Apply 1 patch topically daily  Refills: 0    Associated Diagnoses: Splenic abscess      methocarbamol (ROBAXIN) 500 mg tablet Take 1 tablet (500 mg total) by mouth every 6 (six) hours  Qty: 40 tablet, Refills: 0    Associated Diagnoses: Splenic abscess      oxyCODONE (ROXICODONE) 5 immediate release tablet Take 1-2 tablets (5-10 mg total) by mouth every 6 (six) hours as needed for moderate pain or severe pain Max Daily Amount: 40 mg  Qty: 24 tablet, Refills: 0    Comments: Continuing therapy  Associated Diagnoses: Splenic abscess      pantoprazole (PROTONIX) 40 mg tablet Take 1 tablet (40 mg total) by mouth daily in the early morning  Qty: 30 tablet, Refills: 0    Associated Diagnoses: Splenic abscess      senna (SENOKOT) 8 6 mg Take 1 tablet (8 6 mg total) by mouth in the morning for 5 days  Qty: 5 tablet, Refills: 0    Associated Diagnoses: Splenic abscess      traZODone (DESYREL) 150 mg tablet Take 0 5 tablets (75 mg total) by mouth daily at bedtime  Refills: 0    Associated Diagnoses: Splenic abscess           No discharge procedures on file  PDMP Review       Value Time User    PDMP Reviewed  Yes 5/13/2022  9:18 AM Keshia Amin PA-C           ED Provider  Attending physically available and evaluated Joanne Mcintosh  PATRICIO managed the patient along with the ED Attending      Electronically Signed by         William Hankins MD  05/22/22 9550

## 2022-05-21 NOTE — QUICK NOTE
Updated patient, daughter, and patient's partner regarding current plans  As of now, no plans for invasive intervention  We discussed as a group that this perisplenic collection is not amenable to IR intervention and that any surgery would be high risk both from a cardiovascular/pulmonary standpoint given her comorbidities, and from an anatomical/surgical standpoint given the high likelihood of dense adhesions in the LUQ  Patient/daughter/partner were in agreement that they did not want to rush into any high-risk surgeries without weighing all options  We discussed that extended course of antibiotics remains an option, but that we would need to wait for Infectious Disease consultation before making any firm plans

## 2022-05-21 NOTE — CONSULTS
e-Consult (IPC)  - Interventional Radiology  Joaquin Leblanc 59 y o  female MRN: [id number]  Unit/Bed#: [facility] 628-01 Encounter: [id number]    Interventional Radiology has been consulted to evaluate Joaquin Leblanc    We were consulted by Dr Elisabeth De Santiago concerning this patient with a small splenic abscess  IP Consult to IR  Consult performed by: David David MD  Consult ordered by: Kae Vasquez MD        05/21/22    Assessment/Recommendation:   60 yo female s/p lap sleeve gastrectomy c/b splenic injury requiring IR embolization, cystgastrostomy, and IR drainage of splenic abscess  The plan on discharge was to treat with 4 weeks of antibiotics and to consider possible splenectomy if the splenic abscess does not completely resolve  IR consulted today for possible drainage of a small perisplenic abscess  There is a very small abscess adjacent to the superior posterior spleen measuring 2 7 x 1 6 x 1 5 cm and previously measured 3 7 x 1 7 cm on the CT of May 11  On both CT scans there is a small air bubble in the collection  If anything, the collection has decreased in size and has not increased in size  However, the prior CT scan is limited due to no IV contrast so it is difficult to determine the actual size on the prior scan  Either way, the collection is very small and too small for a drainage tube placement and risky for needle aspiration  I also do not believe we will get much out with a needle aspiration and the risk of bleeding and pneumothorax as well as crossing the pleura is higher than the benefit  This was discussed with Peña Hernandez from the surgical service  Recommend ID consultation and continuing the 4 week plan of antibiotics since this appears to be slowly resolving  Also recommend surgery to re-consider their plan of splenectomy as stated on 5/12  Total time spent in review of data, discussion with requesting provider and rendering advice was 45 minutes       Thank you for allowing Interventional Radiology to participate in the care of [facility]  Please don't hesitate to call or TigerText us with any questions       Arabella Balderas MD

## 2022-05-21 NOTE — CONSULTS
Consultation - Infectious Disease   Migdalia Barnhart 59 y o  female MRN: [id number]  Unit/Bed#: [facility] 628-01 Encounter: [id number]      Inpatient consult to Infectious Diseases  Consult performed by: Alphonso Neely MD  Consult ordered by: Navjot Solano MD          IMPRESSION & RECOMMENDATIONS:   Impression:  1  Recurrent/persistent splenic abscesses  2  S/P Laparoscopic sleeve gastrectomy complicated by splenic injury requiring IR embolization with subsequent splenic abscesses, IR drainage, Axios stent placement and prolonged IV antibiotic course all at [facility]  3  History of DVT on Eliquis  4  CAD S/P stent placement   5  History of hypothyroidism   6  Intertriginous fungal dermatitis secondary to antibiotic therapy    Recommendations:    Discussed with the primary surgical service  1  Although the abscess appears to be responding to the antibiotics her pain which was a major issue at discharge during her last hospitalization has again increased over the prior 2 days localized to the left flank  There is a question of whether there is some additional pressure on an area such as sub costal nerve and whether further imaging would clarify  Surgical service will discuss  2  Pending above would continue piperacillin/tazobactam IV as part of her at least 4 week antibiotic trial before again switching to p o  Augmentin     She has completed total of 16 days of antibiotic Rx  3  Patient understands that if conservative measures fail to produce the desired outcome that further surgery may be necessary  4   Agree with clotrimazole cream for patient's fungal dermatitis         HISTORY OF PRESENT ILLNESS:    Reason for Consult:  Persistent splenic abscess  HPI: Migdalia Barnhart is a 59y o  year old female who is well known to me from her recent hospitalization here from 5/6/22 - 5/13/22 who was admitted from the emergency room 5/20 to the acute Surgical Service with 2 days of increasing abdominal pain  Patient has a history of a laparoscopic sleeve gastrectomy at [facility] in [place] in 8/21  This was complicated by a splenic injury requiring IR embolization  In November she was readmitted with perisplenic abscesses requiring IR drainage, cyst gastrostomy in 12/21  She had a prolonged hospitalization requiring long-term antibiotics and was discharged to rehab at the end of December on IV Unasyn for 6 weeks  She also received a course of p o  Augmentin  At the end of her therapy an ultrasound CT scan of the abdomen demonstrated residual left upper quadrant abscess but no further treatment was recommended at that time  She was last seen at [facility] on 03/29 and no further antibiotics were recommended  Over the next 2 months she continued to have LUQ pain, left shoulder pain fatigue that markedly increased in the 2 weeks prior to her [address] [place] [address] admission  Her surgeon in [place] advised her to seek further care in [place] where her daughter who lives and she was then admitted for further workup and evaluation with residual left upper quadrant fluid collection concerning for developing abscess and possible fistula tract to the skin  She was initially kept NPO, started on IV fluids for IV hydration and resuscitation, placed on an IV antibiotic regimen with consultation to Infectious Disease, her anticoagulation was held and Interventional Radiology was consulted to assess for possible percutaneous drainage  Additionally, the GI service was also consulted to assist with her evaluation management during hospital encounter  During her hospital stay, the multi disciplinary team worked together to continue treatment for her recurrent perisplenic abscesses  The patient did undergo percutaneous drainage with the catheter left in place by Interventional Radiology that was able to be successfully removed prior to her discharge  Her diet was advanced as tolerated once no further interventions were deemed necessary  Final results of abscess fluid showed Prevotella denticola, fusobacterium nucleatum, and non beta lactamase producing Haemophilus parainfluenza  Blood cultures are negative  for 5 days of her course the patient was treated with IV piperacillin/tazobactam that was eventually changed to p o  Augmentin 875 mg q 12 hours once her culture results were known  I was concerned that the patient still had marked left flank pain that required narcotics in the absence of a major drainable abscess  It was decided by her primary service that if the pain could be controlled with lesser medication that because of the high risk of further surgery including splenectomy that a conservative course of approximately 4 weeks of appropriate antibiotics should be undertaken to see if there was continued improvement  Over the past 2 days the patient has noted markedly increased left flank pain with shortness of breath secondary to inability to take a deep breath, feeling cold without fever or true chills  Here the patient was found to be afebrile with a normal WBC count and differential   A PE study including a CT of the abdomen and pelvis with with contrast showed a 2 7 x 1 6 x 1 5 cm fluid collection that contained tiny gas bubbles immediately adjacent to the posterior superior aspect of the spleen that was concerning for an abscess  IR was consulted and felt that the abscess that was described was actually smaller than what existed previously and is now too small to justify a drainage tube placement and risky needle aspiration  Yesterday the patient was restarted on piperacillin/tazobactam 3 375 g q 6 hours IV  Review of Systems   Constitutional: Positive for activity change, chills and fatigue  Respiratory: Positive for shortness of breath  Genitourinary: Positive for flank pain (Left flank pain)     Neurological: Positive for weakness  A [id number] point system-based review of systems is otherwise negative  PAST MEDICAL HISTORY:  Past Medical History:   Diagnosis Date    Disease of thyroid gland     Hernia     Hypertension      Past Surgical History:   Procedure Laterality Date    CHOLECYSTECTOMY      CORONARY ANGIOPLASTY WITH STENT PLACEMENT      IR DRAINAGE TUBE CHECK/CHANGE/REPOSITION/REINSERTION/UPSIZE  5/10/2022    IR DRAINAGE TUBE PLACEMENT  2022       FAMILY HISTORY:  Non-contributory    SOCIAL HISTORY:  Social History     Social History     Substance and Sexual Activity   Alcohol Use No     Social History     Substance and Sexual Activity   Drug Use No     Social History     Tobacco Use   Smoking Status Former Smoker   Smokeless Tobacco Never Used       ALLERGIES:  No Known Allergies    MEDICATIONS:  All current active medications have been reviewed        PHYSICAL EXAM:  Temp:  [97 2 °F (36 2 °C)-97 6 °F (36 4 °C)] 97 4 °F (36 3 °C)  HR:  [70-90] 71  Resp:  [16-20] 16  BP: (110-134)/(57-87) 126/71  SpO2:  [94 %-97 %] 96 %  Temp (24hrs), Av 4 °F (36 3 °C), Min:97 2 °F (36 2 °C), Max:97 6 °F (36 4 °C)  Current: Temperature: (!) 97 4 °F (36 3 °C)    Intake/Output Summary (Last 24 hours) at 2022 1927  Last data filed at 2022 1028  Gross per 24 hour   Intake 831 25 ml   Output 650 ml   Net 181 25 ml       General Appearance:  Appearing well, alert, obese, nontoxic, and in no distress, appears stated age   Head:  Normocephalic, without obvious abnormality, atraumatic   Eyes:  PERRL, conjunctiva pink and sclera anicteric, both eyes   Nose: Nares normal, mucosa normal, no drainage   Throat: Oropharynx moist without lesions; lips, mucosa, and tongue normal; teeth and gums normal   Neck: Supple, symmetrical, trachea midline, no adenopathy, no tenderness/mass/nodules   Back:   Symmetric, no curvature, ROM normal, no CVA tenderness   Lungs:   Clear to auscultation bilaterally, no audible wheezes, rhonchi and rales, respirations unlabored   Chest Wall:  No tenderness or deformity   Heart:  Regular rate and rhythm, S1, S2 normal, no murmur, rub or gallop   Abdomen:   Soft, protuberant, striae, surgical scars, non-tender, non-distended, positive bowel sounds, no masses, no organomegaly    Left flank subcostal tenderness   Extremities: Extremities normal, atraumatic, no cyanosis, clubbing or edema   Skin: Surgical scars, as above   Lymph nodes: Cervical, supraclavicular, and axillary nodes normal   Neurologic: Alert and oriented times 3, extremity strength 5/5 and symmetric           Invasive Devices:   Peripheral IV 05/21/22 Distal;Right;Ventral (anterior) Forearm (Active)   Site Assessment WDL 05/21/22 0811   Dressing Type Transparent 05/21/22 0811   Line Status Flushed; Infusing 05/21/22 0811   Dressing Status Clean;Dry; Intact 05/21/22 0811   Dressing Change Due 05/25/22 05/21/22 0600       LABS, IMAGING, & OTHER STUDIES:  Lab Results:      I have personally reviewed pertinent labs  Results from last 7 days   Lab Units 05/21/22  0450 05/20/22  2141   WBC Thousand/uL 7 70 8 38   HEMOGLOBIN g/dL 11 2* 11 2*   PLATELETS Thousands/uL 528* 485*     Results from last 7 days   Lab Units 05/21/22  0450 05/20/22  2141   SODIUM mmol/L 140 141   POTASSIUM mmol/L 3 9 4 0   CHLORIDE mmol/L 107 109*   CO2 mmol/L 28 28   BUN mg/dL 12 15   CREATININE mg/dL 0 65 0 76   EGFR ml/min/1 73sq m 97 86   CALCIUM mg/dL 9 0 9 2   AST U/L  --  18   ALT U/L  --  25   ALK PHOS U/L  --  84           Imaging Studies:   I have personally reviewed pertinent imaging study reports and images in PACS  EKG, Pathology, and Other Studies:   I have personally reviewed pertinent reports

## 2022-05-21 NOTE — H&P
Acute Care Surgery  History and Physical  Beltran Post 59 y o  female MRN: [id number]  Unit/Bed#: ED 09 Encounter: [id number]    Assessment and Plan:  Beltran Post is a 59 y o  female who presents with recurrent krystle-splenic abscess    Plan:  Admit to surgery   NPO    Zosyn  F/u am labs: INR, CBC, BMP  F/u IR consult  Hold home Eliquis  DVT ppx SQH  Pain and nausea control PRN      History of Present Illness     HPI:  Beltran Post is a 59 y o  female with a past medical history of sleeve gastrectomy (OSH 0480) complicated by splenic injury status post embolization and recurrent perisplenic abscesses requiring IR drainage, cyst gastrostomy, CAD, HTN, CHF, DVT on Eliquis, hypothyroidism who presents with 2 days of worsening abdominal pain  Patient is known to our service (hospitalized 5/6-5/13) for splenic abscess in the setting of sleeve gastrectomy from outside hospital, complicated by splenic injury requiring IR embolization, recurrent abscesses/drainage procedures and cyst gastrostomy  Ultimately required IR drainage during most recent admission and was discharged on Augmentin until 6 3  She was recently seen in the office on 05/16 and doing well at that time  Patient states over the last 2 days she has experienced worsening abdominal pain localized to the left upper quadrant and back  Patient states that it is difficult to take a deep breath in due to the pain  No changes to bowel habits or urination; no fevers or chills  Endorses increasing fatigue  Vital signs are stable, she is afebrile, abdomen is tender left upper quadrant without peritoneal signs, WBC 8  CT scan demonstrating 3 x 1 5 x 1 5 cm collection posterior and adjacent to the spleen  No PE  We will admit to Surgical Service, and administer IV antibiotics, consult IR for consideration of drainage  Review of Systems   Constitutional: Positive for fatigue  HENT: Negative  Eyes: Negative  Respiratory: Negative  Pleuritic chest pain    Cardiovascular: Negative  Gastrointestinal: Positive for abdominal pain  Negative for abdominal distention, blood in stool, constipation, diarrhea, nausea and vomiting  Endocrine: Negative  Genitourinary: Negative  Musculoskeletal: Negative  Skin: Negative  Allergic/Immunologic: Negative  Neurological: Negative  Hematological: Negative  Psychiatric/Behavioral: Negative  All other systems reviewed and are negative  Historical Information   Past Medical History:   Diagnosis Date    Disease of thyroid gland     Hernia     Hypertension      Past Surgical History:   Procedure Laterality Date    CHOLECYSTECTOMY      CORONARY ANGIOPLASTY WITH STENT PLACEMENT      IR DRAINAGE TUBE CHECK/CHANGE/REPOSITION/REINSERTION/UPSIZE  5/10/2022    IR DRAINAGE TUBE PLACEMENT  5/7/2022     Social History   Social History     Substance and Sexual Activity   Alcohol Use No     Social History     Substance and Sexual Activity   Drug Use No     Social History     Tobacco Use   Smoking Status Former Smoker   Smokeless Tobacco Never Used     Family History: History reviewed  No pertinent family history  Meds/Allergies   PTA meds:   Prior to Admission Medications   Prescriptions Last Dose Informant Patient Reported? Taking? Lidocaine 4 % PTCH   No No   Sig: Apply 1 patch topically daily   acetaminophen (TYLENOL) 325 mg tablet   No No   Sig: Take 2 tablets (650 mg total) by mouth every 4 (four) hours as needed for mild pain   amLODIPine (NORVASC) 10 mg tablet   No No   Sig: Take 1 tablet (10 mg total) by mouth in the morning     amoxicillin-clavulanate (AUGMENTIN) 875-125 mg per tablet   No No   Sig: Take 1 tablet by mouth every 12 (twelve) hours for 21 days   calcium carbonate (TUMS) 500 mg chewable tablet   No No   Sig: Chew 2 tablets (1,000 mg total)  as needed in the morning and 2 tablets (1,000 mg total) as needed at noon and 2 tablets (1,000 mg total) as needed in the evening (indigestion,heartburn)  citalopram (CeleXA) 40 mg tablet   No No   Sig: Take 1 tablet (40 mg total) by mouth in the morning  clotrimazole (LOTRIMIN) 1 % cream   No No   Sig: Apply topically 2 (two) times a day   gabapentin (NEURONTIN) 100 mg capsule   No No   Sig: Take 1 capsule (100 mg total) by mouth in the morning and 1 capsule (100 mg total) in the evening and 1 capsule (100 mg total) before bedtime  Do all this for 10 days  levothyroxine 150 mcg tablet   No No   Sig: Take 1 tablet (150 mcg total) by mouth daily in the early morning   methocarbamol (ROBAXIN) 500 mg tablet   No No   Sig: Take 1 tablet (500 mg total) by mouth every 6 (six) hours   oxyCODONE (ROXICODONE) 5 immediate release tablet   No No   Sig: Take 1-2 tablets (5-10 mg total) by mouth every 6 (six) hours as needed for moderate pain or severe pain Max Daily Amount: 40 mg   pantoprazole (PROTONIX) 40 mg tablet   No No   Sig: Take 1 tablet (40 mg total) by mouth daily in the early morning   senna (SENOKOT) 8 6 mg   No No   Sig: Take 1 tablet (8 6 mg total) by mouth in the morning for 5 days     traZODone (DESYREL) 150 mg tablet   No No   Sig: Take 0 5 tablets (75 mg total) by mouth daily at bedtime      Facility-Administered Medications: None     No Known Allergies    Objective   First Vitals:   Blood Pressure: 124/87 (05/20/22 2044)  Pulse: 90 (05/20/22 2044)  Temperature: 97 6 °F (36 4 °C) (05/20/22 2044)  Temp Source: Temporal (05/20/22 2044)  Respirations: 18 (05/20/22 2044)  Weight - Scale: 90 7 kg (200 lb) (05/20/22 2044)  SpO2: 97 % (05/20/22 2044)    Current Vitals:   Blood Pressure: 110/57 (05/21/22 0230)  Pulse: 70 (05/21/22 0230)  Temperature: 97 6 °F (36 4 °C) (05/20/22 2044)  Temp Source: Temporal (05/20/22 2044)  Respirations: 18 (05/21/22 0230)  Weight - Scale: 90 7 kg (200 lb) (05/20/22 2044)  SpO2: 95 % (05/21/22 0230)    No intake or output data in the 24 hours ending 05/21/22 0325    Invasive Devices  Report Peripheral Intravenous Line  Duration           Peripheral IV 05/20/22 Left Forearm <1 day                Physical Exam  Vitals reviewed  Constitutional:       General: She is not in acute distress  Appearance: She is obese  She is not ill-appearing or toxic-appearing  HENT:      Head: Normocephalic and atraumatic  Right Ear: External ear normal       Left Ear: External ear normal       Nose: Nose normal       Mouth/Throat:      Mouth: Mucous membranes are moist       Pharynx: Oropharynx is clear  Eyes:      Extraocular Movements: Extraocular movements intact  Conjunctiva/sclera: Conjunctivae normal    Cardiovascular:      Rate and Rhythm: Normal rate  Pulmonary:      Effort: Pulmonary effort is normal  No respiratory distress  Abdominal:      General: There is no distension  Palpations: Abdomen is soft  Tenderness: There is abdominal tenderness (Left upper quadrant and left flank)  There is no guarding or rebound  Genitourinary:     Comments: Deferred  Musculoskeletal:         General: Normal range of motion  Cervical back: Normal range of motion  Skin:     General: Skin is warm and dry  Findings: No erythema (No erythema, induration, fluctuance, or drainage 2 left-sided abdomen/flank)  Neurological:      General: No focal deficit present  Cranial Nerves: No cranial nerve deficit  Motor: No weakness  Psychiatric:         Mood and Affect: Mood normal          Behavior: Behavior normal          Thought Content:  Thought content normal          Judgment: Judgment normal          Lab Results:   CBC:   Lab Results   Component Value Date    WBC 8 38 05/20/2022    HGB 11 2 (L) 05/20/2022    HCT 35 2 05/20/2022    MCV 79 (L) 05/20/2022     (H) 05/20/2022    MCH 25 2 (L) 05/20/2022    MCHC 31 8 05/20/2022    RDW 18 5 (H) 05/20/2022    MPV 9 2 05/20/2022    NRBC 0 05/20/2022   , CMP:   Lab Results   Component Value Date    SODIUM 141 05/20/2022    K 4 0 05/20/2022     (H) 05/20/2022    CO2 28 05/20/2022    BUN 15 05/20/2022    CREATININE 0 76 05/20/2022    CALCIUM 9 2 05/20/2022    AST 18 05/20/2022    ALT 25 05/20/2022    ALKPHOS 84 05/20/2022    EGFR 86 05/20/2022   , Coagulation: No results found for: PT, INR, APTT, Urinalysis: No results found for: [place], SPECGRAV, PHUR, LEUKOCYTESUR, NITRITE, PROTEINUA, GLUCOSEU, KETONESU, BILIRUBINUR, BLOODU, Amylase: No results found for: AMYLASE, Lipase:   Lab Results   Component Value Date    LIPASE 208 05/20/2022     Imaging: I have personally reviewed pertinent reports  EKG, Pathology, and Other Studies: I have personally reviewed pertinent reports  Code Status: Level 1 - Full Code  Advance Directive and Living Will:      Power of :    POLST:      Counseling / Coordination of Care  Total floor / unit time spent today 30 minutes  This involved direct patient contact where I performed a full history and physical, reviewed previous records, and reviewed laboratory and other diagnostic studies  Greater than 50% of total time was spent with the patient and / or family counseling and / or coordination of care      Ravi Jorge MD  5/21/2022

## 2022-05-22 LAB
ANION GAP SERPL CALCULATED.3IONS-SCNC: 1 MMOL/L (ref 4–13)
APTT PPP: 171 SECONDS (ref 23–37)
APTT PPP: 69 SECONDS (ref 23–37)
APTT PPP: 93 SECONDS (ref 23–37)
BASOPHILS # BLD AUTO: 0.05 THOUSANDS/ΜL (ref 0–0.1)
BASOPHILS NFR BLD AUTO: 1 % (ref 0–1)
BUN SERPL-MCNC: 16 MG/DL (ref 5–25)
CALCIUM SERPL-MCNC: 8.7 MG/DL (ref 8.3–10.1)
CHLORIDE SERPL-SCNC: 108 MMOL/L (ref 100–108)
CO2 SERPL-SCNC: 32 MMOL/L (ref 21–32)
CREAT SERPL-MCNC: 0.88 MG/DL (ref 0.6–1.3)
EOSINOPHIL # BLD AUTO: 0.23 THOUSAND/ΜL (ref 0–0.61)
EOSINOPHIL NFR BLD AUTO: 4 % (ref 0–6)
ERYTHROCYTE [DISTWIDTH] IN BLOOD BY AUTOMATED COUNT: 18.6 % (ref 11.6–15.1)
GFR SERPL CREATININE-BSD FRML MDRD: 72 ML/MIN/1.73SQ M
GLUCOSE SERPL-MCNC: 79 MG/DL (ref 65–140)
HCT VFR BLD AUTO: 30 % (ref 34.8–46.1)
HCT VFR BLD AUTO: 34.3 % (ref 34.8–46.1)
HGB BLD-MCNC: 10.8 G/DL (ref 11.5–15.4)
HGB BLD-MCNC: 9.5 G/DL (ref 11.5–15.4)
IMM GRANULOCYTES # BLD AUTO: 0.01 THOUSAND/UL (ref 0–0.2)
IMM GRANULOCYTES NFR BLD AUTO: 0 % (ref 0–2)
LYMPHOCYTES # BLD AUTO: 2.22 THOUSANDS/ΜL (ref 0.6–4.47)
LYMPHOCYTES NFR BLD AUTO: 35 % (ref 14–44)
MCH RBC QN AUTO: 25.4 PG (ref 26.8–34.3)
MCHC RBC AUTO-ENTMCNC: 31.7 G/DL (ref 31.4–37.4)
MCV RBC AUTO: 80 FL (ref 82–98)
MONOCYTES # BLD AUTO: 0.68 THOUSAND/ΜL (ref 0.17–1.22)
MONOCYTES NFR BLD AUTO: 11 % (ref 4–12)
NEUTROPHILS # BLD AUTO: 3.16 THOUSANDS/ΜL (ref 1.85–7.62)
NEUTS SEG NFR BLD AUTO: 49 % (ref 43–75)
NRBC BLD AUTO-RTO: 0 /100 WBCS
PLATELET # BLD AUTO: 424 THOUSANDS/UL (ref 149–390)
PMV BLD AUTO: 9.6 FL (ref 8.9–12.7)
POTASSIUM SERPL-SCNC: 3.7 MMOL/L (ref 3.5–5.3)
RBC # BLD AUTO: 3.74 MILLION/UL (ref 3.81–5.12)
SODIUM SERPL-SCNC: 141 MMOL/L (ref 136–145)
WBC # BLD AUTO: 6.35 THOUSAND/UL (ref 4.31–10.16)

## 2022-05-22 PROCEDURE — 85025 COMPLETE CBC W/AUTO DIFF WBC: CPT | Performed by: SURGERY

## 2022-05-22 PROCEDURE — 85730 THROMBOPLASTIN TIME PARTIAL: CPT | Performed by: SURGERY

## 2022-05-22 PROCEDURE — 99232 SBSQ HOSP IP/OBS MODERATE 35: CPT | Performed by: INTERNAL MEDICINE

## 2022-05-22 PROCEDURE — 85014 HEMATOCRIT: CPT | Performed by: STUDENT IN AN ORGANIZED HEALTH CARE EDUCATION/TRAINING PROGRAM

## 2022-05-22 PROCEDURE — 85018 HEMOGLOBIN: CPT | Performed by: STUDENT IN AN ORGANIZED HEALTH CARE EDUCATION/TRAINING PROGRAM

## 2022-05-22 PROCEDURE — 80048 BASIC METABOLIC PNL TOTAL CA: CPT | Performed by: SURGERY

## 2022-05-22 PROCEDURE — 99232 SBSQ HOSP IP/OBS MODERATE 35: CPT | Performed by: SURGERY

## 2022-05-22 RX ORDER — POTASSIUM CHLORIDE 20 MEQ/1
20 TABLET, EXTENDED RELEASE ORAL ONCE
Status: COMPLETED | OUTPATIENT
Start: 2022-05-22 | End: 2022-05-22

## 2022-05-22 RX ORDER — SENNOSIDES 8.6 MG
1 TABLET ORAL 2 TIMES DAILY
Status: DISCONTINUED | OUTPATIENT
Start: 2022-05-22 | End: 2022-05-26 | Stop reason: HOSPADM

## 2022-05-22 RX ADMIN — GABAPENTIN 100 MG: 100 CAPSULE ORAL at 21:23

## 2022-05-22 RX ADMIN — HEPARIN SODIUM 12 UNITS/KG/HR: 10000 INJECTION, SOLUTION INTRAVENOUS at 06:13

## 2022-05-22 RX ADMIN — HYDROMORPHONE HYDROCHLORIDE 0.5 MG: 1 INJECTION, SOLUTION INTRAMUSCULAR; INTRAVENOUS; SUBCUTANEOUS at 20:52

## 2022-05-22 RX ADMIN — PIPERACILLIN AND TAZOBACTAM 3.38 G: 36; 4.5 INJECTION, POWDER, FOR SOLUTION INTRAVENOUS at 15:27

## 2022-05-22 RX ADMIN — ACETAMINOPHEN 975 MG: 325 TABLET, FILM COATED ORAL at 17:04

## 2022-05-22 RX ADMIN — PANTOPRAZOLE SODIUM 40 MG: 40 TABLET, DELAYED RELEASE ORAL at 05:03

## 2022-05-22 RX ADMIN — METHOCARBAMOL 500 MG: 500 TABLET, FILM COATED ORAL at 17:04

## 2022-05-22 RX ADMIN — ACETAMINOPHEN 975 MG: 325 TABLET, FILM COATED ORAL at 05:03

## 2022-05-22 RX ADMIN — CITALOPRAM HYDROBROMIDE 40 MG: 20 TABLET ORAL at 08:12

## 2022-05-22 RX ADMIN — METHOCARBAMOL 500 MG: 500 TABLET, FILM COATED ORAL at 05:03

## 2022-05-22 RX ADMIN — GABAPENTIN 100 MG: 100 CAPSULE ORAL at 17:04

## 2022-05-22 RX ADMIN — GABAPENTIN 100 MG: 100 CAPSULE ORAL at 08:12

## 2022-05-22 RX ADMIN — OXYCODONE HYDROCHLORIDE 10 MG: 10 TABLET ORAL at 18:37

## 2022-05-22 RX ADMIN — LIDOCAINE 5% 1 PATCH: 700 PATCH TOPICAL at 09:27

## 2022-05-22 RX ADMIN — PIPERACILLIN AND TAZOBACTAM 3.38 G: 36; 4.5 INJECTION, POWDER, FOR SOLUTION INTRAVENOUS at 21:23

## 2022-05-22 RX ADMIN — TRAZODONE HYDROCHLORIDE 75 MG: 50 TABLET ORAL at 21:23

## 2022-05-22 RX ADMIN — POTASSIUM CHLORIDE 20 MEQ: 1500 TABLET, EXTENDED RELEASE ORAL at 08:12

## 2022-05-22 RX ADMIN — ONDANSETRON 4 MG: 2 INJECTION INTRAMUSCULAR; INTRAVENOUS at 14:00

## 2022-05-22 RX ADMIN — NYSTATIN: 100000 POWDER TOPICAL at 17:06

## 2022-05-22 RX ADMIN — NYSTATIN: 100000 POWDER TOPICAL at 21:26

## 2022-05-22 RX ADMIN — NYSTATIN: 100000 POWDER TOPICAL at 08:16

## 2022-05-22 RX ADMIN — OXYCODONE HYDROCHLORIDE 10 MG: 10 TABLET ORAL at 08:12

## 2022-05-22 RX ADMIN — METHOCARBAMOL 500 MG: 500 TABLET, FILM COATED ORAL at 11:12

## 2022-05-22 RX ADMIN — PIPERACILLIN AND TAZOBACTAM 3.38 G: 36; 4.5 INJECTION, POWDER, FOR SOLUTION INTRAVENOUS at 09:25

## 2022-05-22 RX ADMIN — SENNOSIDES 8.6 MG: 8.6 TABLET, FILM COATED ORAL at 08:12

## 2022-05-22 RX ADMIN — LEVOTHYROXINE SODIUM 150 MCG: 75 TABLET ORAL at 05:03

## 2022-05-22 RX ADMIN — OXYCODONE HYDROCHLORIDE 10 MG: 10 TABLET ORAL at 12:59

## 2022-05-22 RX ADMIN — PIPERACILLIN AND TAZOBACTAM 3.38 G: 36; 4.5 INJECTION, POWDER, FOR SOLUTION INTRAVENOUS at 02:32

## 2022-05-22 NOTE — PROGRESS NOTES
Progress Note - General Surgery  : [facility] Resident on [address] 59 y o  female MRN: [id number]  Unit/Bed#: [facility] 628-01 Encounter: [id number]      Assessment:  59 y o  female with recurrent perisplenic abscess       Plan:  Regular diet  Off IVF  Zosyn, appreciate ID input  Hold home Eliquis, on hep gtt for hx dvt  PRN analgesics / anti-emetics  Will continue team discussion regarding conservative management vs possible surgical management      Subjective: No acute complaints, endorses stable ongoing abdominal pain      Objective:     Physical Exam:  GEN: NAD   Ab: Soft, mildly tender L flank/ND  Lung: Normal effort on RA  CV: RRR   Extrem: No CCE   Neuro: A+Ox3       I/O       05/20 0701 05/21 0700 05/21 0701 05/22 0700    P  O  0     I V  (mL/kg) 283 3 (3 2) 447 9 (5)    IV Piggyback 100     Total Intake(mL/kg) 383 3 (4 3) 447 9 (5)    Urine (mL/kg/hr)  650 (0 5)    Total Output  650    Net +383 3 -202 1          Unmeasured Urine Occurrence  1 x          Lab, Imaging and other studies: I have personally reviewed pertinent reports    , CBC with diff:   Lab Results   Component Value Date    WBC 7 70 05/21/2022    HGB 11 2 (L) 05/21/2022    HCT 34 2 (L) 05/21/2022    MCV 80 (L) 05/21/2022     (H) 05/21/2022    MCH 26 3 (L) 05/21/2022    MCHC 32 7 05/21/2022    RDW 18 6 (H) 05/21/2022    MPV 9 4 05/21/2022    NRBC 0 05/21/2022   , BMP/CMP:   Lab Results   Component Value Date    SODIUM 140 05/21/2022    K 3 9 05/21/2022     05/21/2022    CO2 28 05/21/2022    BUN 12 05/21/2022    CREATININE 0 65 05/21/2022    CALCIUM 9 0 05/21/2022    AST 18 05/20/2022    ALT 25 05/20/2022    ALKPHOS 84 05/20/2022    EGFR 97 05/21/2022         VTE Pharmacologic Prophylaxis: Heparin        Ray Fleischer, MD  5/21/2022 9:18 PM

## 2022-05-22 NOTE — PROGRESS NOTES
Progress Note - Infectious Disease   Rogers Ray 59 y o  female MRN: [id number]  Unit/Bed#: [facility] 628-01 Encounter: [id number]      Impression:  1  Recurrent/persistent splenic abscesses  2  S/P Laparoscopic sleeve gastrectomy complicated by splenic injury requiring IR embolization with subsequent splenic abscesses, IR drainage, Axios stent placement and prolonged IV antibiotic course all at [facility]  3  History of DVT on Eliquis  4  CAD S/P stent placement   5  History of hypothyroidism   6  Intertriginous fungal dermatitis secondary to antibiotic therapy    Recommendations:  Patient is afebrile with normal WBC count  1  Surgical comments appreciated  Surgical intervention not being considered at this time  However, patient states she is not much better in so far as her pain is concerned which is the reason why she is here  She is still requiring oxycodone  I do not believe that switching from oral to IV antibiotics is likely to make a significant difference in her pain and that this needs to be further investigated before discharge  Antibiotics:  1  Piperacillin/tazobactam, day 3 Rx     Subjective:  Continues to complain of left flank pain that requires narcotics for some relief  Denies fevers, chills, or sweats  Denies nausea, vomiting, or diarrhea  Objective:  Vitals:  Temp:  [97 3 °F (36 3 °C)-98 °F (36 7 °C)] 97 6 °F (36 4 °C)  HR:  [63-74] 73  Resp:  [16] 16  BP: (111-119)/(55-69) 119/65  SpO2:  [93 %-96 %] 93 %  Temp (24hrs), Av 6 °F (36 4 °C), Min:97 3 °F (36 3 °C), Max:98 °F (36 7 °C)  Current: Temperature: 97 6 °F (36 4 °C)    Physical Exam:     General Appearance:  Alert, obese, nontoxic, no acute distress  Throat: Oropharynx moist without lesions    Lips, mucosa, and tongue normal   Neck: Supple, symmetrical, trachea midline, no adenopathy,  no tenderness/mass/nodules   Lungs:   Clear to auscultation bilaterally, no audible wheezes, rhonchi or rales; respirations unlabored   Heart:  Regular rate and rhythm, S1, S2 normal, no murmur, rub or gallop   Abdomen:   Soft, protuberant, striae, surgical scars, non-tender, non-distended, positive bowel sounds  No masses, no organomegaly    Left flank subcostal CVA tenderness   Extremities: Extremities normal, atraumatic, no clubbing, cyanosis or edema   Skin: Skin color, texture, turgor normal, no rashes or lesions  No draining wounds noted           Invasive Devices  Report    Peripheral Intravenous Line  Duration           Peripheral IV 05/21/22 Distal;Right;Ventral (anterior) Forearm 1 day                Labs, Imaging, & Other studies:   All pertinent labs were personally reviewed  Results from last 7 days   Lab Units 05/22/22  1242 05/22/22  0501 05/21/22  0450 05/20/22  2141   WBC Thousand/uL  --  6 35 7 70 8 38   HEMOGLOBIN g/dL 10 8* 9 5* 11 2* 11 2*   PLATELETS Thousands/uL  --  424* 528* 485*     Results from last 7 days   Lab Units 05/22/22  0501 05/21/22  0450 05/20/22  2141   SODIUM mmol/L 141 140 141   POTASSIUM mmol/L 3 7 3 9 4 0   CHLORIDE mmol/L 108 107 109*   CO2 mmol/L 32 28 28   BUN mg/dL 16 12 15   CREATININE mg/dL 0 88 0 65 0 76   EGFR ml/min/1 73sq m 72 97 86   CALCIUM mg/dL 8 7 9 0 9 2   AST U/L  --   --  18   ALT U/L  --   --  25   ALK PHOS U/L  --   --  84

## 2022-05-23 LAB
ANION GAP SERPL CALCULATED.3IONS-SCNC: 3 MMOL/L (ref 4–13)
APTT PPP: 41 SECONDS (ref 23–37)
APTT PPP: 61 SECONDS (ref 23–37)
APTT PPP: 67 SECONDS (ref 23–37)
APTT PPP: 80 SECONDS (ref 23–37)
BASOPHILS # BLD AUTO: 0.06 THOUSANDS/ΜL (ref 0–0.1)
BASOPHILS NFR BLD AUTO: 1 % (ref 0–1)
BUN SERPL-MCNC: 14 MG/DL (ref 5–25)
CALCIUM SERPL-MCNC: 8.5 MG/DL (ref 8.3–10.1)
CHLORIDE SERPL-SCNC: 105 MMOL/L (ref 100–108)
CO2 SERPL-SCNC: 30 MMOL/L (ref 21–32)
CREAT SERPL-MCNC: 0.79 MG/DL (ref 0.6–1.3)
EOSINOPHIL # BLD AUTO: 0.24 THOUSAND/ΜL (ref 0–0.61)
EOSINOPHIL NFR BLD AUTO: 2 % (ref 0–6)
ERYTHROCYTE [DISTWIDTH] IN BLOOD BY AUTOMATED COUNT: 18.6 % (ref 11.6–15.1)
GFR SERPL CREATININE-BSD FRML MDRD: 82 ML/MIN/1.73SQ M
GLUCOSE SERPL-MCNC: 90 MG/DL (ref 65–140)
HCT VFR BLD AUTO: 31.4 % (ref 34.8–46.1)
HGB BLD-MCNC: 9.9 G/DL (ref 11.5–15.4)
IMM GRANULOCYTES # BLD AUTO: 0.05 THOUSAND/UL (ref 0–0.2)
IMM GRANULOCYTES NFR BLD AUTO: 0 % (ref 0–2)
LYMPHOCYTES # BLD AUTO: 1.96 THOUSANDS/ΜL (ref 0.6–4.47)
LYMPHOCYTES NFR BLD AUTO: 16 % (ref 14–44)
MAGNESIUM SERPL-MCNC: 1.8 MG/DL (ref 1.6–2.6)
MCH RBC QN AUTO: 25.6 PG (ref 26.8–34.3)
MCHC RBC AUTO-ENTMCNC: 31.5 G/DL (ref 31.4–37.4)
MCV RBC AUTO: 81 FL (ref 82–98)
MONOCYTES # BLD AUTO: 1.23 THOUSAND/ΜL (ref 0.17–1.22)
MONOCYTES NFR BLD AUTO: 10 % (ref 4–12)
NEUTROPHILS # BLD AUTO: 8.79 THOUSANDS/ΜL (ref 1.85–7.62)
NEUTS SEG NFR BLD AUTO: 71 % (ref 43–75)
NRBC BLD AUTO-RTO: 0 /100 WBCS
PHOSPHATE SERPL-MCNC: 3.3 MG/DL (ref 2.7–4.5)
PLATELET # BLD AUTO: 397 THOUSANDS/UL (ref 149–390)
PMV BLD AUTO: 9.3 FL (ref 8.9–12.7)
POTASSIUM SERPL-SCNC: 3.8 MMOL/L (ref 3.5–5.3)
RBC # BLD AUTO: 3.86 MILLION/UL (ref 3.81–5.12)
SODIUM SERPL-SCNC: 138 MMOL/L (ref 136–145)
WBC # BLD AUTO: 12.33 THOUSAND/UL (ref 4.31–10.16)

## 2022-05-23 PROCEDURE — 85730 THROMBOPLASTIN TIME PARTIAL: CPT | Performed by: SURGERY

## 2022-05-23 PROCEDURE — 84100 ASSAY OF PHOSPHORUS: CPT | Performed by: STUDENT IN AN ORGANIZED HEALTH CARE EDUCATION/TRAINING PROGRAM

## 2022-05-23 PROCEDURE — 85025 COMPLETE CBC W/AUTO DIFF WBC: CPT | Performed by: STUDENT IN AN ORGANIZED HEALTH CARE EDUCATION/TRAINING PROGRAM

## 2022-05-23 PROCEDURE — 99232 SBSQ HOSP IP/OBS MODERATE 35: CPT | Performed by: SURGERY

## 2022-05-23 PROCEDURE — 83735 ASSAY OF MAGNESIUM: CPT | Performed by: STUDENT IN AN ORGANIZED HEALTH CARE EDUCATION/TRAINING PROGRAM

## 2022-05-23 PROCEDURE — 80048 BASIC METABOLIC PNL TOTAL CA: CPT | Performed by: STUDENT IN AN ORGANIZED HEALTH CARE EDUCATION/TRAINING PROGRAM

## 2022-05-23 PROCEDURE — 99232 SBSQ HOSP IP/OBS MODERATE 35: CPT | Performed by: INTERNAL MEDICINE

## 2022-05-23 RX ORDER — POTASSIUM CHLORIDE 20 MEQ/1
20 TABLET, EXTENDED RELEASE ORAL ONCE
Status: COMPLETED | OUTPATIENT
Start: 2022-05-23 | End: 2022-05-23

## 2022-05-23 RX ORDER — MAGNESIUM SULFATE HEPTAHYDRATE 40 MG/ML
2 INJECTION, SOLUTION INTRAVENOUS ONCE
Status: COMPLETED | OUTPATIENT
Start: 2022-05-23 | End: 2022-05-23

## 2022-05-23 RX ADMIN — SENNOSIDES 8.6 MG: 8.6 TABLET, FILM COATED ORAL at 08:16

## 2022-05-23 RX ADMIN — LIDOCAINE 5% 1 PATCH: 700 PATCH TOPICAL at 08:16

## 2022-05-23 RX ADMIN — PIPERACILLIN AND TAZOBACTAM 3.38 G: 36; 4.5 INJECTION, POWDER, FOR SOLUTION INTRAVENOUS at 21:24

## 2022-05-23 RX ADMIN — MAGNESIUM SULFATE HEPTAHYDRATE 2 G: 40 INJECTION, SOLUTION INTRAVENOUS at 10:45

## 2022-05-23 RX ADMIN — NYSTATIN: 100000 POWDER TOPICAL at 08:17

## 2022-05-23 RX ADMIN — PIPERACILLIN AND TAZOBACTAM 3.38 G: 36; 4.5 INJECTION, POWDER, FOR SOLUTION INTRAVENOUS at 15:39

## 2022-05-23 RX ADMIN — OXYCODONE HYDROCHLORIDE 10 MG: 10 TABLET ORAL at 15:39

## 2022-05-23 RX ADMIN — METHOCARBAMOL 500 MG: 500 TABLET, FILM COATED ORAL at 00:23

## 2022-05-23 RX ADMIN — TRAZODONE HYDROCHLORIDE 75 MG: 50 TABLET ORAL at 21:23

## 2022-05-23 RX ADMIN — PANTOPRAZOLE SODIUM 40 MG: 40 TABLET, DELAYED RELEASE ORAL at 05:13

## 2022-05-23 RX ADMIN — NYSTATIN: 100000 POWDER TOPICAL at 15:46

## 2022-05-23 RX ADMIN — METHOCARBAMOL 500 MG: 500 TABLET, FILM COATED ORAL at 10:53

## 2022-05-23 RX ADMIN — METHOCARBAMOL 500 MG: 500 TABLET, FILM COATED ORAL at 05:13

## 2022-05-23 RX ADMIN — GABAPENTIN 100 MG: 100 CAPSULE ORAL at 21:23

## 2022-05-23 RX ADMIN — ACETAMINOPHEN 975 MG: 325 TABLET, FILM COATED ORAL at 05:13

## 2022-05-23 RX ADMIN — METHOCARBAMOL 500 MG: 500 TABLET, FILM COATED ORAL at 17:59

## 2022-05-23 RX ADMIN — PIPERACILLIN AND TAZOBACTAM 3.38 G: 36; 4.5 INJECTION, POWDER, FOR SOLUTION INTRAVENOUS at 10:04

## 2022-05-23 RX ADMIN — ACETAMINOPHEN 975 MG: 325 TABLET, FILM COATED ORAL at 10:53

## 2022-05-23 RX ADMIN — OXYCODONE HYDROCHLORIDE 10 MG: 10 TABLET ORAL at 08:23

## 2022-05-23 RX ADMIN — LEVOTHYROXINE SODIUM 150 MCG: 75 TABLET ORAL at 05:13

## 2022-05-23 RX ADMIN — GABAPENTIN 100 MG: 100 CAPSULE ORAL at 15:39

## 2022-05-23 RX ADMIN — OXYCODONE HYDROCHLORIDE 10 MG: 10 TABLET ORAL at 03:44

## 2022-05-23 RX ADMIN — NYSTATIN: 100000 POWDER TOPICAL at 21:25

## 2022-05-23 RX ADMIN — ACETAMINOPHEN 975 MG: 325 TABLET, FILM COATED ORAL at 17:59

## 2022-05-23 RX ADMIN — ACETAMINOPHEN 975 MG: 325 TABLET, FILM COATED ORAL at 00:23

## 2022-05-23 RX ADMIN — PIPERACILLIN AND TAZOBACTAM 3.38 G: 36; 4.5 INJECTION, POWDER, FOR SOLUTION INTRAVENOUS at 03:40

## 2022-05-23 RX ADMIN — HYDROMORPHONE HYDROCHLORIDE 0.5 MG: 1 INJECTION, SOLUTION INTRAMUSCULAR; INTRAVENOUS; SUBCUTANEOUS at 23:16

## 2022-05-23 RX ADMIN — GABAPENTIN 100 MG: 100 CAPSULE ORAL at 08:16

## 2022-05-23 RX ADMIN — HEPARIN SODIUM 14 UNITS/KG/HR: 10000 INJECTION, SOLUTION INTRAVENOUS at 10:09

## 2022-05-23 RX ADMIN — HYDROMORPHONE HYDROCHLORIDE 0.5 MG: 1 INJECTION, SOLUTION INTRAMUSCULAR; INTRAVENOUS; SUBCUTANEOUS at 10:09

## 2022-05-23 RX ADMIN — OXYCODONE HYDROCHLORIDE 10 MG: 10 TABLET ORAL at 22:10

## 2022-05-23 RX ADMIN — CITALOPRAM HYDROBROMIDE 40 MG: 20 TABLET ORAL at 08:16

## 2022-05-23 RX ADMIN — POTASSIUM CHLORIDE 20 MEQ: 1500 TABLET, EXTENDED RELEASE ORAL at 10:53

## 2022-05-23 NOTE — PROGRESS NOTES
Progress Note - General Surgery  : AG Red Surgery Resident on [address] 59 y o  female MRN: [id number]  Unit/Bed#: [facility] 628-01 Encounter: [id number]      Assessment:  59 y o  female with recurrent perisplenic abscess, not acutely amenable to IR drainage      Plan:  Regular diet  Continue antibiotics   Appreciate ID assistance and recommendations  Hold home Eliquis, on hep gtt for hx dvt  PRN analgesics / anti-emetics  F/u am labs      Subjective:   Patient seen and examined bedside  Endorses nausea and continued pain  States she has not taken in much PO  OOB and ambulating  Objective:     Physical Exam:  General - no acute distress, responsive and cooperative  CV - warm, regular rate  Pulm - normal work of breathing, no respiratory distress  Abd - soft, nondistended, tender LUQ  Neuro - m/s grossly intact, cn grossly intact  Ext - moving all extremities        I/O       05/20 0701  05/21 0700 05/21 0701  05/22 0700    P  O  0     I V  (mL/kg) 283 3 (3 2) 447 9 (5)    IV Piggyback 100     Total Intake(mL/kg) 383 3 (4 3) 447 9 (5)    Urine (mL/kg/hr)  650 (0 5)    Total Output  650    Net +383 3 -202 1          Unmeasured Urine Occurrence  1 x          Lab, Imaging and other studies: I have personally reviewed pertinent reports    , CBC with diff:   Lab Results   Component Value Date    WBC 12 33 (H) 05/23/2022    HGB 9 9 (L) 05/23/2022    HCT 31 4 (L) 05/23/2022    MCV 81 (L) 05/23/2022     (H) 05/23/2022    MCH 25 6 (L) 05/23/2022    MCHC 31 5 05/23/2022    RDW 18 6 (H) 05/23/2022    MPV 9 3 05/23/2022    NRBC 0 05/23/2022   , BMP/CMP:   Lab Results   Component Value Date    SODIUM 138 05/23/2022    K 3 8 05/23/2022     05/23/2022    CO2 30 05/23/2022    BUN 14 05/23/2022    CREATININE 0 79 05/23/2022    CALCIUM 8 5 05/23/2022    EGFR 82 05/23/2022         VTE Pharmacologic Prophylaxis: Heparin        Mona Naidu, MD  5/23/2022 8:27 AM

## 2022-05-23 NOTE — UTILIZATION REVIEW
Inpatient Admission Authorization Request   NOTIFICATION OF INPATIENT ADMISSION/INPATIENT AUTHORIZATION REQUEST   SERVICING FACILITY:   [facility]  Address: [address] [place] [address], [address] [place] [address]  Tax ID: [id number]  NPI: [id number]  Place of Service: Inpatient [address] [facility] [place] Code: 24     ATTENDING PROVIDER:  Attending Name and NPI#: Korin Bangura Md [[id number]]  Address: [address] [place] [address], [address] [place] [address]  Phone: [phone]     UTILIZATION REVIEW CONTACT:  Emiliana Drummond, Utilization   Network Utilization Review Department  Phone: [phone]  Fax: [phone]  Email: [email or web address]     PHYSICIAN ADVISORY SERVICES:  FOR AJZO-JZ-OLYD REVIEW - MEDICAL NECESSITY DENIAL  Phone: [phone]  Fax: [phone]  Email: [email or web address]     TYPE OF REQUEST:  Inpatient Status     ADMISSION INFORMATION:  ADMISSION DATE/TIME: 5/21/22  3:04 AM  PATIENT DIAGNOSIS CODE/DESCRIPTION:  Splenic abscess [D73 3]  Abdominal pain [R10 9]  DISCHARGE DATE/TIME: No discharge date for patient encounter  IMPORTANT INFORMATION:  Please contact the Emiliana Drummond directly with any questions or concerns regarding this request  Department voicemails are confidential     Send requests for admission clinical reviews, concurrent reviews, approvals, and administrative denials due to lack of clinical to fax [phone]

## 2022-05-23 NOTE — QUICK NOTE
Nurse-Patient-Provider rounds were completed with the patient's nurse today, Susanna Hutson  We discussed the plan is to continue post gastrectomy diet with ensures for nutritional supplementation  Continue IV antibiotic regimen per Infectious Disease, appreciate recommendations  Will need eventual transition back to home Eliquis and off of heparin drip  Replete electrolyte abnormalities today  Mild leukocytosis today, afebrile  Continue to monitor fever curve/WBC  We reviewed all of the invasive devices/lines/telemetry orders   - None  DVT Prophylaxis:  Hep gtt and SCDs    Pain Assessment / Plan:  - Continue current analgesic regimen   -Initiate multimodal pain control regimen    Mobility Assessment / Plan:  - Activity as tolerated  Goals / Barriers for discharge:  -Barriers to discharge include further monitoring of fever curve/WBC per ID, appreciate recommendations   -Difficulty with pain control, multimodal regimen   -If patient remains stable will plan for discharge within the next 24-48 hours with Infectious Disease recommendations  Case management following; case and discharge needs discussed  All questions and concerns were addressed  I spent greater than 18 minutes reviewing the plan with the patient and the nurse, and coordinating care for the day      Jelena Stark PA-C  5/23/2022

## 2022-05-23 NOTE — UTILIZATION REVIEW
Initial Clinical Review    Admission: Date/Time/Statement:   Admission Orders (From admission, onward)     Ordered        05/21/22 0304  Inpatient Admission  Once                      Orders Placed This Encounter   Procedures    Inpatient Admission     Standing Status:   Standing     Number of Occurrences:   1     Order Specific Question:   Level of Care     Answer:   Med Surg [16]     Order Specific Question:   Estimated length of stay     Answer:   More than 2 Midnights     Order Specific Question:   Certification     Answer:   I certify that inpatient services are medically necessary for this patient for a duration of greater than two midnights  See H&P and MD Progress Notes for additional information about the patient's course of treatment  ED Arrival Information     Expected   -    Arrival   5/20/2022 20:08    Acuity   Urgent            Means of arrival   Walk-In    Escorted by   Family Member    Service   Surgery-General    Admission type   Urgent            Arrival complaint   left side pain           Chief Complaint   Patient presents with    Abdominal Pain     Pt reports lt sided abd pain was seen here last week and admitted for same  Denies n/v/d       Initial Presentation:  59 y o  female who presented  to [address] [place] [address] ED  Inpatient admission for evaluation and treatment of a Splenic abscess  She has a past medical history of Disease of thyroid gland, Hernia, and Hypertension, s/p gastric sleeve  She presented w/ LUQ abdominal pain with guarding, she reports her symptoms began several months ago after developing a splenic abscess as a complication from her gastric sleeve surgery  She has been hospitalized a few times for splenic abscess requiring antibiotics and drainage  She was recently discharged on 5/14/2022  She reports she was complaint with her medications, however her pain has worsened over the past few days   Her oxycodone only lasts an hour or so before she develops severe LUQ pain  She is s/p splenis embolization with recurrent perisplenic abscess s/p IR drainage, cyst gastrostomy  WBC normal CT imaging with 2 7 x 1 6 x 1 5 cm abscess located on the posterior superior aspect of the spleen  She is started on Zosyn iv, NPO with IVF, to discuss with IR  For possibel drainage  Date: 5/21/2022  Day 2:  Admitted with splenic abscess, IR eval for possible intervention  ID for eval of abx course  Family discussion not amenable to IR intervention, and any surgery would be high risk, Plan to continue conservative treatment ABX's andmultimodal pain management  Interventional Radiology consult -5/21 - concern a splenic abscess,  For possible drainage  The collection has decreased in size from prior evaluation  The collection is currently too small for a drainage tube placement and risky for needle aspiration  Recommend ID consult and continue 4 week plan of antibiotics  Also recommend surgery to re-consider their plan of splenectomy  Infectious Disease Consult - 5/21 - Recurrent -persistent splenic abscesses  S/p lap sleeve gastrectomy complicated by splenic injury/IR embolization with subsequent splenic abscesses, IR drainage, Axios stent placement and prolonged IV antibiotic course at [facility]  The abscess appears to be responding to the antibiotics, her pain has been a major issue  Question if there is some additional pressure on an area such as subcostal nerve, to discuss with surgery if further imagting would clarify  Plan continue IV abx's Zosyn iv as part of her 4 week antibiotic trial before switching to Augmentin po  SO far  She has completer 16 days of therapy  Date 5/22/22 - no acute events overnight - continue Antibiotics per ID recommendations  Will need PICC line for prolonged abx trial Woudl not recommend splenectomy at this time, as the abscess is responding to antibiotics    Hold Eliquis, on Heparin gtt for DVT in th event intervention is requires  Diet as tolerated  Her pain continued, requiring oxycodone  ED Triage Vitals   Temperature Pulse Respirations Blood Pressure SpO2   05/20/22 2044 05/20/22 2044 05/20/22 2044 05/20/22 2044 05/20/22 2044   97 6 °F (36 4 °C) 90 18 124/87 97 %      Temp Source Heart Rate Source Patient Position - Orthostatic VS BP Location FiO2 (%)   05/20/22 2044 05/20/22 2044 05/20/22 2044 05/20/22 2044 --   Temporal Monitor Sitting Right arm       Pain Score       05/20/22 2138       9          Wt Readings from Last 1 Encounters:   05/21/22 89 8 kg (197 lb 14 4 oz)     Additional Vital Signs:   Date/Time Temp Pulse Resp BP MAP (mmHg) SpO2 O2 Device   05/23/22 08:23:16 -- 74 -- 111/63 79 94 % --   05/23/22 0716 -- -- -- -- -- 95 % None (Room air)   05/23/22 06:55:49 97 9 °F (36 6 °C) 68 17 99/56 70 93 % --   05/22/22 14:07:44 97 6 °F (36 4 °C) 73 -- 119/65 83 93 % --   05/22/22 07:41:28 98 °F (36 7 °C) 63 -- 111/55 74 96 % --   05/21/22 22:08:16 97 3 °F (36 3 °C) Abnormal  74 16 116/69 85 95 % --   05/21/22 2000 -- -- -- -- -- -- None (Room air)   05/21/22 14:15:25 97 4 °F (36 3 °C) Abnormal  71 -- 126/71 89 96 % --   05/21/22 07:57:37 97 3 °F (36 3 °C) Abnormal  71 -- 125/72 90 94 % --   05/21/22 0430 -- -- -- -- -- 95 % None (Room air)   05/21/22 04:07:46 97 2 °F (36 2 °C) Abnormal  71 16 133/75 94 97 % --   05/21/22 0230 -- 70 18 110/57 76 95 % None (Room air)   05/21/22 0145 -- 70 20 117/63 84 94 % None (Room air)         Pertinent Labs/Diagnostic Test Results:   PE Study with CT abdomen &pelvis with contrast   Final Result by Janet Flores MD (05/21 0130)      There is an approximately 2 7 x 1 6 x 1 5 cm fluid collection containing a tiny gas bubble immediately adjacent to the posterior superior aspect of the spleen  This is concerning for abscess  Surgical consultation and follow-up is recommended  Interventional Radiology consultation is recommended        There remains some infiltration of the fat of the left upper lateral abdominal wall overlying the level of the spleen in the area of prior catheter drainage, however, the gas bubbles seen in this area on the May 11, 2022 study are not clearly    demonstrable on the present examination  Clinical correlation is necessary  If further evaluation is indicated, ultrasound of this region could be performed  No evidence of pulmonary embolism is seen  Small pulmonary nodules  Reportedly, the patient does have history of tobacco use  Follow-up CT of the chest in 12 months is suggested  Other findings as described above, please see discussion  This examination demonstrates findings for which clinical and imaging follow-up is recommended and was logged as such in [address] [place] [address] Rd               I personally discussed this study with Ferny Echeverria on 5/21/2022 at 2:02 AM                               Workstation performed: [id number]               Results from last 7 days   Lab Units 05/23/22  0521 05/22/22  1242 05/22/22  0501 05/21/22  0450 05/20/22  2141   WBC Thousand/uL 12 33*  --  6 35 7 70 8 38   HEMOGLOBIN g/dL 9 9* 10 8* 9 5* 11 2* 11 2*   HEMATOCRIT % 31 4* 34 3* 30 0* 34 2* 35 2   PLATELETS Thousands/uL 397*  --  424* 528* 485*   NEUTROS ABS Thousands/µL 8 79*  --  3 16 3 59 4 37         Results from last 7 days   Lab Units 05/23/22  0521 05/22/22  0501 05/21/22  0450 05/20/22  2141   SODIUM mmol/L 138 141 140 141   POTASSIUM mmol/L 3 8 3 7 3 9 4 0   CHLORIDE mmol/L 105 108 107 109*   CO2 mmol/L 30 32 28 28   ANION GAP mmol/L 3* 1* 5 4   BUN mg/dL 14 16 12 15   CREATININE mg/dL 0 79 0 88 0 65 0 76   EGFR ml/min/1 73sq m 82 72 97 86   CALCIUM mg/dL 8 5 8 7 9 0 9 2   MAGNESIUM mg/dL 1 8  --   --   --    PHOSPHORUS mg/dL 3 3  --   --   --      Results from last 7 days   Lab Units 05/20/22  2141   AST U/L 18   ALT U/L 25   ALK PHOS U/L 84   TOTAL PROTEIN g/dL 7 3   ALBUMIN g/dL 3 0*   TOTAL BILIRUBIN mg/dL 0 15*         Results from last 7 days   Lab Units 05/23/22  0521 05/22/22  0501 05/21/22  0450 05/20/22  2141   GLUCOSE RANDOM mg/dL 90 79 72 98       Results from last 7 days   Lab Units 05/23/22  0521 05/22/22  2351 05/22/22  1613 05/21/22  1614 05/21/22  0937 05/21/22  0438   PROTIME seconds  --   --   --   --  13 6 13 2   INR   --   --   --   --  1 09 1 04   PTT seconds 41* 61* 69*   < > 32  --     < > = values in this interval not displayed  Results from last 7 days   Lab Units 05/20/22  2141   NT-PRO BNP pg/mL 168*       Results from last 7 days   Lab Units 05/20/22  2141   LIPASE u/L 208       Results from last 7 days   Lab Units 05/21/22  1026   CLARITY UA  Clear   COLOR UA  Colorless   SPEC GRAV UA  1 014   PH UA  6 5   GLUCOSE UA mg/dl Negative   KETONES UA mg/dl Negative   BLOOD UA  Negative   PROTEIN UA mg/dl Negative   NITRITE UA  Negative   BILIRUBIN UA  Negative   UROBILINOGEN UA (BE) mg/dl <2 0   LEUKOCYTES UA  Negative   WBC UA /hpf None Seen   RBC UA /hpf 1-2   BACTERIA UA /hpf None Seen   EPITHELIAL CELLS WET PREP /hpf Occasional       ED Treatment:   Medication Administration from 05/20/2022 2008 to 05/21/2022 0351       Date/Time Order Dose Route Action Comments     05/20/2022 2138 HYDROmorphone (DILAUDID) injection 0 5 mg 0 5 mg Intravenous Given      05/20/2022 2205 barium (READI-CAT 2) suspension 900 mL 900 mL Oral Given      05/21/2022 0021 iodixanol (VISIPAQUE) 320 MG/ML injection 70 mL 70 mL Intravenous Given      05/21/2022 0052 HYDROmorphone (DILAUDID) injection 0 5 mg 0 5 mg Intravenous Given      05/21/2022 0229 HYDROmorphone (DILAUDID) injection 0 5 mg 0 5 mg Intravenous Given      05/21/2022 0238 sodium chloride 0 9 % bolus 500 mL 500 mL Intravenous New Bag         Past Medical History:   Diagnosis Date    Disease of thyroid gland     Hernia     Hypertension      Present on Admission:   Splenic abscess      Admitting Diagnosis: Splenic abscess [D73 3]  Abdominal pain [R10 9]  Age/Sex: 59 y o  female  Admission Orders:  Scheduled Medications:  acetaminophen, 975 mg, Oral, Q6H [address]  citalopram, 40 mg, Oral, Daily  clotrimazole, , Topical, BID  gabapentin, 100 mg, Oral, TID  levothyroxine, 150 mcg, Oral, Early Morning  lidocaine, 1 patch, Topical, Daily  magnesium sulfate, 2 g, Intravenous, Once-5/23 x1   methocarbamol, 500 mg, Oral, Q6H JANETT  nystatin, , Topical, TID  pantoprazole, 40 mg, Oral, Early Morning  piperacillin-tazobactam, 3 375 g, Intravenous, Q6H  senna, 1 tablet, Oral, BID  traZODone, 75 mg, Oral, HS  Potassium chloride 20 meq po once  - 5/22x 1 - 5/23 x  1       Continuous IV Infusions:  heparin (porcine), 3-30 Units/kg/hr (Order-Specific), Intravenous, Titrated  lactated ringers infusion  Rate: 125 mL/hr Dose: 125 mL/hr  Freq: Continuous Route: IV  Last Dose: Stopped (05/21/22 1028)  Start: 05/21/22 0315 End: 05/22/22 1223    PRN Meds:  HYDROmorphone, 0 5 mg, Intravenous, Q3H PRN-5/21 x 3 - 5/22 x 1 - 5/23 x 1   ondansetron, 4 mg, Intravenous, Q6H PRN-5/22 x 1   oxyCODONE, 10 mg, Oral, Q4H PRN - 5/21 x 3 - 5/22 x 23 - 5/23 x 2   oxyCODONE, 5 mg, Oral, Q4H PRN      Nursing Orders - VS  -  I & O q shift - daily weights - incentive spirometry - up as tolerated - scd's to le's - diet post gastrectomy,no carbonation - dietary nutritional supplements    Network Utilization Review Department  ATTENTION: Please call with any questions or concerns to [phone] and carefully listen to the prompts so that you are directed to the right person  All voicemails are confidential   Jonathan Pineda all requests for admission clinical reviews, approved or denied determinations and any other requests to dedicated fax number below belonging to the campus where the patient is receiving treatment   List of dedicated fax numbers for the Facilities:  [address] DENIALS (Administrative/Medical Necessity) [phone]   [address]  (Maternity/NICU/Pediatrics) [phone]   Antony Amin [address] [facility] [address] [facility]  [phone]   [address] [facility] [place] [address] [address] [place] [address] Evelyn Tolentino [address] [facility] [phone]

## 2022-05-23 NOTE — PLAN OF CARE
Problem: PAIN - ADULT  Goal: Verbalizes/displays adequate comfort level or baseline comfort level  Description: Interventions:  - Encourage patient to monitor pain and request assistance  - Assess pain using appropriate pain scale  - Administer analgesics based on type and severity of pain and evaluate response  - Implement non-pharmacological measures as appropriate and evaluate response  - Consider cultural and social influences on pain and pain management  - Notify physician/advanced practitioner if interventions unsuccessful or patient reports new pain  Outcome: Progressing     Problem: INFECTION - ADULT  Goal: Absence or prevention of progression during hospitalization  Description: INTERVENTIONS:  - Assess and monitor for signs and symptoms of infection  - Monitor lab/diagnostic results  - Monitor all insertion sites, i e  indwelling lines, tubes, and drains  - Monitor endotracheal if appropriate and nasal secretions for changes in amount and color  - [place] appropriate cooling/warming therapies per order  - Administer medications as ordered  - Instruct and encourage patient and family to use good hand hygiene technique  - Identify and instruct in appropriate isolation precautions for identified infection/condition  Outcome: Progressing  Goal: Absence of fever/infection during neutropenic period  Description: INTERVENTIONS:  - Monitor WBC    Outcome: Progressing     Problem: DISCHARGE PLANNING  Goal: Discharge to home or other facility with appropriate resources  Description: INTERVENTIONS:  - Identify barriers to discharge w/patient and caregiver  - Arrange for needed discharge resources and transportation as appropriate  - Identify discharge learning needs (meds, wound care, etc )  - Arrange for interpretive services to assist at discharge as needed  - Refer to Case Management Department for coordinating discharge planning if the patient needs post-hospital services based on physician/advanced practitioner order or complex needs related to functional status, cognitive ability, or social support system  Outcome: Progressing     Problem: GASTROINTESTINAL - ADULT  Goal: Minimal or absence of nausea and/or vomiting  Description: INTERVENTIONS:  - Administer IV fluids if ordered to ensure adequate hydration  - Maintain NPO status until nausea and vomiting are resolved  - Nasogastric tube if ordered  - Administer ordered antiemetic medications as needed  - Provide nonpharmacologic comfort measures as appropriate  - Advance diet as tolerated, if ordered  - Consider nutrition services referral to assist patient with adequate nutrition and appropriate food choices  Outcome: Progressing  Goal: Maintains or returns to baseline bowel function  Description: INTERVENTIONS:  - Assess bowel function  - Encourage oral fluids to ensure adequate hydration  - Administer IV fluids if ordered to ensure adequate hydration  - Administer ordered medications as needed  - Encourage mobilization and activity  - Consider nutritional services referral to assist patient with adequate nutrition and appropriate food choices  Outcome: Progressing  Goal: Maintains adequate nutritional intake  Description: INTERVENTIONS:  - Monitor percentage of each meal consumed  - Identify factors contributing to decreased intake, treat as appropriate  - Assist with meals as needed  - Monitor I&O, weight, and lab values if indicated  - Obtain nutrition services referral as needed  Outcome: Progressing  Goal: Establish and maintain optimal ostomy function  Description: INTERVENTIONS:  - Assess bowel function  - Encourage oral fluids to ensure adequate hydration  - Administer IV fluids if ordered to ensure adequate hydration   - Administer ordered medications as needed  - Encourage mobilization and activity  - Nutrition services referral to assist patient with appropriate food choices  - Assess stoma site  - Consider wound care consult   Outcome: Progressing  Goal: Oral mucous membranes remain intact  Description: INTERVENTIONS  - Assess oral mucosa and hygiene practices  - Implement preventative oral hygiene regimen  - Implement oral medicated treatments as ordered  - Initiate Nutrition services referral as needed  Outcome: Progressing

## 2022-05-23 NOTE — PROGRESS NOTES
Progress Note - Infectious Disease   Mitzi Reilly 59 y o  female MRN: [id number]  Unit/Bed#: [facility] 628-01 Encounter: [id number]      Impression:  1  Recurrent/persistent splenic abscesses  2  S/P Laparoscopic sleeve gastrectomy complicated by splenic injury requiring IR embolization with subsequent splenic abscesses, IR drainage, Axios stent placement and prolonged IV antibiotic course all at [facility]  3  History of DVT on Eliquis  4  CAD S/P stent placement   5  History of hypothyroidism   6  Intertriginous fungal dermatitis secondary to antibiotic therapy    Recommendations:  Patient is afebrile with elevated WBC count  1  Patient has noted no improvement in her left flank pain and now has referred pain to her left shoulder which she said that she experienced with her initial surgery  In addition her WBC count is modestly elevated  Patient required tramadol and oxycodone for some pain relief  2  At this point I do not feel that antibiotics are the answer  In fact on discharge would switch her back to her p o  Augmentin which we know has reduced the size of her abscess  Antibiotics:  1  Piperacillin/tazobactam, day 4 Rx     Subjective:  Left flank and referred left shoulder pain  Denies fevers, chills, or sweats  Denies nausea, vomiting, or diarrhea  Objective:  Vitals:  Temp:  [97 9 °F (36 6 °C)-98 2 °F (36 8 °C)] 98 2 °F (36 8 °C)  HR:  [68-74] 70  Resp:  [17-20] 20  BP: ()/(56-63) 111/63  SpO2:  [93 %-97 %] 97 %  Temp (24hrs), Av 1 °F (36 7 °C), Min:97 9 °F (36 6 °C), Max:98 2 °F (36 8 °C)  Current: Temperature: 98 2 °F (36 8 °C)    Physical Exam:     General Appearance:  Alert, obese, nontoxic, no acute distress  Throat: Oropharynx moist without lesions    Lips, mucosa, and tongue normal   Neck: Supple, symmetrical, trachea midline, no adenopathy,  no tenderness/mass/nodules   Lungs:   Clear to auscultation bilaterally, no audible wheezes, rhonchi or rales; respirations unlabored   Heart:  Regular rate and rhythm, S1, S2 normal, no murmur, rub or gallop   Abdomen:   Soft, protuberant, striae, surgical scars, non-tender, non-distended, positive bowel sounds  No masses, no organomegaly    Left flank subcostal CVA tenderness   Extremities: Some pain with left shoulder ROM   Skin: Skin color, texture, turgor normal, no rashes or lesions  No draining wounds noted           Invasive Devices  Report    Peripheral Intravenous Line  Duration           Peripheral IV 05/21/22 Distal;Right;Ventral (anterior) Forearm 2 days                Labs, Imaging, & Other studies:   All pertinent labs were personally reviewed  Results from last 7 days   Lab Units 05/23/22  0521 05/22/22  1242 05/22/22  0501 05/21/22  0450   WBC Thousand/uL 12 33*  --  6 35 7 70   HEMOGLOBIN g/dL 9 9* 10 8* 9 5* 11 2*   PLATELETS Thousands/uL 397*  --  424* 528*     Results from last 7 days   Lab Units 05/23/22  0521 05/22/22  0501 05/21/22  0450 05/20/22  2141   SODIUM mmol/L 138 141 140 141   POTASSIUM mmol/L 3 8 3 7 3 9 4 0   CHLORIDE mmol/L 105 108 107 109*   CO2 mmol/L 30 32 28 28   BUN mg/dL 14 16 12 15   CREATININE mg/dL 0 79 0 88 0 65 0 76   EGFR ml/min/1 73sq m 82 72 97 86   CALCIUM mg/dL 8 5 8 7 9 0 9 2   AST U/L  --   --   --  18   ALT U/L  --   --   --  25   ALK PHOS U/L  --   --   --  84

## 2022-05-23 NOTE — CASE MANAGEMENT
Case Management Assessment & Discharge Planning Note    Patient name Florian Schaeffer  Location [facility] 628/PPHP 637-13 MRN [id number]  : 1965 Date 2022       Current Admission Date: 2022  Current Admission Diagnosis:Splenic abscess   Patient Active Problem List    Diagnosis Date Noted   Mica Coburn 2022    Pulmonary nodule 2022    Morbid obesity due to excess calories ([address] ) 2022    Splenic abscess 2022      LOS (days): 2  Geometric Mean LOS (GMLOS) (days):   Days to GMLOS:     OBJECTIVE:  PATIENT READMITTED [address]  of Unplanned Readmission Score: 13 58         Current admission status: Inpatient       Preferred Pharmacy:   [facility]/pharmacy #[id number]  [facility] [id number]  Phone: [phone] Fax: [phone]    Primary Care Provider: Hyacinth Perales MD    Primary Insurance: MEDICARE MISC REPLACEMENT  Secondary Insurance: [facility]    ASSESSMENT:  Active Health Care Proxies     Hermelindailene Jodi Angelica Stevens Representative - Daughter   Primary Phone: [phone] (Home)                         Readmission Root Cause  30 Day Readmission: Yes  Who directed you to return to the hospital?: Self  Did you understand whom to contact if you had questions or problems?: Yes  Did you get your prescriptions before you left the hospital?: Yes  Were you able to get your prescriptions filled when you left the hospital?: Yes  Did you take your medications as prescribed?: Yes  Patient was readmitted due to: pain    Patient Information  Admitted from[de-identified] Other (comment) (daughter's home)  Mental Status: Alert  During Assessment patient was accompanied by: Not accompanied during assessment  Assessment information provided by[de-identified] Patient  Primary Caregiver: Self  Support Systems: Self, Family members  Home entry access options   Select all that apply : Stairs  Number of steps to enter home : One Flight  Do the steps have railings?: Yes  Type of Current Residence: Apartment  Floor Level: 2  Upon entering residence, is there a bedroom on the main floor (no further steps)?: Yes  Upon entering residence, is there a bathroom on the main floor (no further steps)?: Yes  In the last 12 months, was there a time when you were not able to pay the mortgage or rent on time?: No  In the last 12 months, how many places have you lived?: 1  In the last 12 months, was there a time when you did not have a steady place to sleep or slept in a shelter (including now)?: No  Homeless/housing insecurity resource given?: N/A  Living Arrangements: Lives w/ Parent(s)  Is patient a ?: No    Activities of Daily Living Prior to Admission  Functional Status: Independent  Completes ADLs independently?: Yes  Ambulates independently?: Yes  Does patient use assisted devices?: No  Does patient currently own DME?: Yes  What DME does the patient currently own?: Bedside [employer], [employer] [facility]  Does patient have a history of Outpatient Therapy (PT/OT)?: No  Does the patient have a history of Short-Term Rehab?: Yes ([address] [place] [address])  Does patient have a history of HHC?: Yes (Isidro Gonzales does not know what agency was previouslyn used)  Does patient currently have [place] [address]?: No         Patient Information Continued  Income Source: SSI/SSD  Does patient have prescription coverage?: Yes  Within the past 12 months, you worried that your food would run out before you got the money to buy more : Never true  Within the past 12 months, the food you bought just didn't last and you didn't have money to get more : Sometimes true  Does patient receive dialysis treatments?: No  Does patient have a history of substance abuse?: No  Does patient have a history of Mental Health Diagnosis?: Yes  Is patient receiving treatment for mental health?: Yes  Has patient received inpatient treatment related to mental health in the last 2 years?: No         Means of Transportation  Means of Transport to Appts[de-identified] Friends  In the past 12 months, has lack of transportation kept you from medical appointments or from getting medications?: No  In the past 12 months, has lack of transportation kept you from meetings, work, or from getting things needed for daily living?: No        DISCHARGE DETAILS:    Discharge planning discussed with[de-identified] patient  Freedom of Choice: Yes        Were Treatment Team discharge recommendations reviewed with patient/caregiver?: Yes  Did patient/caregiver verbalize understanding of patient care needs?: Yes  Were patient/caregiver advised of the risks associated with not following Treatment Team discharge recommendations?: Yes                   Other Referral/Resources/Interventions Provided:  Referral Comments: Patient stated she would like to return home upon discharge and not return to her daughter's home which is near hospital  Patient stated that she was told she may need PICC/IV abx but 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  : Yes                      Patient was recently discharged to her daughter's home in [facility] but would like to return to her 2nd floor apartment in [place] upon discharge as she is trying to facilitate moving to [facility]  Patient stated she does not have time for [facility] but she also will not be able to administer her own IV ABX if she is dc'd with PICC/IV abx  Patient would like to discuss treatment plan with ID then speak with CM after to discuss discharge plan  Patient is vaccinated for Covid-19 with Moderna vaccine, no booster  Patient is independent in ADLs, owns BSD, walker, cane      CM reviewed d/c planning process including the following: identifying help at home, patient preference for d/c planning needs, Discharge Lounge, [facility] Meds to Bed program, availability of treatment team to discuss questions or concerns patient and/or family may have regarding understanding medications and recognizing signs and symptoms once discharged  CM also encouraged patient to follow up with all recommended appointments after discharge  Patient advised of importance for patient and family to participate in managing patients medical well being

## 2022-05-24 LAB
ANION GAP SERPL CALCULATED.3IONS-SCNC: 4 MMOL/L (ref 4–13)
APTT PPP: 66 SECONDS (ref 23–37)
BASOPHILS # BLD AUTO: 0.05 THOUSANDS/ΜL (ref 0–0.1)
BASOPHILS NFR BLD AUTO: 1 % (ref 0–1)
BUN SERPL-MCNC: 11 MG/DL (ref 5–25)
CALCIUM SERPL-MCNC: 8.7 MG/DL (ref 8.3–10.1)
CHLORIDE SERPL-SCNC: 108 MMOL/L (ref 100–108)
CO2 SERPL-SCNC: 27 MMOL/L (ref 21–32)
CREAT SERPL-MCNC: 0.71 MG/DL (ref 0.6–1.3)
EOSINOPHIL # BLD AUTO: 0.31 THOUSAND/ΜL (ref 0–0.61)
EOSINOPHIL NFR BLD AUTO: 3 % (ref 0–6)
ERYTHROCYTE [DISTWIDTH] IN BLOOD BY AUTOMATED COUNT: 18.7 % (ref 11.6–15.1)
GFR SERPL CREATININE-BSD FRML MDRD: 93 ML/MIN/1.73SQ M
GLUCOSE SERPL-MCNC: 78 MG/DL (ref 65–140)
HCT VFR BLD AUTO: 32.2 % (ref 34.8–46.1)
HGB BLD-MCNC: 10.3 G/DL (ref 11.5–15.4)
IMM GRANULOCYTES # BLD AUTO: 0.04 THOUSAND/UL (ref 0–0.2)
IMM GRANULOCYTES NFR BLD AUTO: 0 % (ref 0–2)
LYMPHOCYTES # BLD AUTO: 2.39 THOUSANDS/ΜL (ref 0.6–4.47)
LYMPHOCYTES NFR BLD AUTO: 22 % (ref 14–44)
MAGNESIUM SERPL-MCNC: 2.2 MG/DL (ref 1.6–2.6)
MCH RBC QN AUTO: 25.9 PG (ref 26.8–34.3)
MCHC RBC AUTO-ENTMCNC: 32 G/DL (ref 31.4–37.4)
MCV RBC AUTO: 81 FL (ref 82–98)
MONOCYTES # BLD AUTO: 0.87 THOUSAND/ΜL (ref 0.17–1.22)
MONOCYTES NFR BLD AUTO: 8 % (ref 4–12)
NEUTROPHILS # BLD AUTO: 6.99 THOUSANDS/ΜL (ref 1.85–7.62)
NEUTS SEG NFR BLD AUTO: 66 % (ref 43–75)
NRBC BLD AUTO-RTO: 0 /100 WBCS
PLATELET # BLD AUTO: 427 THOUSANDS/UL (ref 149–390)
PMV BLD AUTO: 9.5 FL (ref 8.9–12.7)
POTASSIUM SERPL-SCNC: 4.1 MMOL/L (ref 3.5–5.3)
RBC # BLD AUTO: 3.98 MILLION/UL (ref 3.81–5.12)
SODIUM SERPL-SCNC: 139 MMOL/L (ref 136–145)
WBC # BLD AUTO: 10.65 THOUSAND/UL (ref 4.31–10.16)

## 2022-05-24 PROCEDURE — 85730 THROMBOPLASTIN TIME PARTIAL: CPT | Performed by: SURGERY

## 2022-05-24 PROCEDURE — 85025 COMPLETE CBC W/AUTO DIFF WBC: CPT | Performed by: PHYSICIAN ASSISTANT

## 2022-05-24 PROCEDURE — 83735 ASSAY OF MAGNESIUM: CPT | Performed by: PHYSICIAN ASSISTANT

## 2022-05-24 PROCEDURE — 99231 SBSQ HOSP IP/OBS SF/LOW 25: CPT | Performed by: INTERNAL MEDICINE

## 2022-05-24 PROCEDURE — 99232 SBSQ HOSP IP/OBS MODERATE 35: CPT | Performed by: SURGERY

## 2022-05-24 PROCEDURE — 80048 BASIC METABOLIC PNL TOTAL CA: CPT | Performed by: PHYSICIAN ASSISTANT

## 2022-05-24 RX ADMIN — GABAPENTIN 100 MG: 100 CAPSULE ORAL at 08:20

## 2022-05-24 RX ADMIN — METHOCARBAMOL 500 MG: 500 TABLET, FILM COATED ORAL at 05:04

## 2022-05-24 RX ADMIN — ACETAMINOPHEN 975 MG: 325 TABLET, FILM COATED ORAL at 17:32

## 2022-05-24 RX ADMIN — METHOCARBAMOL 500 MG: 500 TABLET, FILM COATED ORAL at 12:24

## 2022-05-24 RX ADMIN — HYDROMORPHONE HYDROCHLORIDE 0.5 MG: 1 INJECTION, SOLUTION INTRAMUSCULAR; INTRAVENOUS; SUBCUTANEOUS at 23:43

## 2022-05-24 RX ADMIN — NYSTATIN: 100000 POWDER TOPICAL at 08:19

## 2022-05-24 RX ADMIN — HEPARIN SODIUM 14 UNITS/KG/HR: 10000 INJECTION, SOLUTION INTRAVENOUS at 10:29

## 2022-05-24 RX ADMIN — METHOCARBAMOL 500 MG: 500 TABLET, FILM COATED ORAL at 00:07

## 2022-05-24 RX ADMIN — OXYCODONE HYDROCHLORIDE 10 MG: 10 TABLET ORAL at 16:14

## 2022-05-24 RX ADMIN — GABAPENTIN 100 MG: 100 CAPSULE ORAL at 16:03

## 2022-05-24 RX ADMIN — NYSTATIN: 100000 POWDER TOPICAL at 21:37

## 2022-05-24 RX ADMIN — HYDROMORPHONE HYDROCHLORIDE 0.5 MG: 1 INJECTION, SOLUTION INTRAMUSCULAR; INTRAVENOUS; SUBCUTANEOUS at 07:22

## 2022-05-24 RX ADMIN — LEVOTHYROXINE SODIUM 150 MCG: 75 TABLET ORAL at 05:04

## 2022-05-24 RX ADMIN — PIPERACILLIN AND TAZOBACTAM 3.38 G: 36; 4.5 INJECTION, POWDER, FOR SOLUTION INTRAVENOUS at 09:38

## 2022-05-24 RX ADMIN — PIPERACILLIN AND TAZOBACTAM 3.38 G: 36; 4.5 INJECTION, POWDER, FOR SOLUTION INTRAVENOUS at 16:03

## 2022-05-24 RX ADMIN — ACETAMINOPHEN 975 MG: 325 TABLET, FILM COATED ORAL at 05:04

## 2022-05-24 RX ADMIN — METHOCARBAMOL 500 MG: 500 TABLET, FILM COATED ORAL at 17:32

## 2022-05-24 RX ADMIN — TRAZODONE HYDROCHLORIDE 75 MG: 50 TABLET ORAL at 21:14

## 2022-05-24 RX ADMIN — METHOCARBAMOL 500 MG: 500 TABLET, FILM COATED ORAL at 23:32

## 2022-05-24 RX ADMIN — PIPERACILLIN AND TAZOBACTAM 3.38 G: 36; 4.5 INJECTION, POWDER, FOR SOLUTION INTRAVENOUS at 21:14

## 2022-05-24 RX ADMIN — SENNOSIDES 8.6 MG: 8.6 TABLET, FILM COATED ORAL at 08:20

## 2022-05-24 RX ADMIN — GABAPENTIN 100 MG: 100 CAPSULE ORAL at 21:14

## 2022-05-24 RX ADMIN — LIDOCAINE 5% 1 PATCH: 700 PATCH TOPICAL at 08:20

## 2022-05-24 RX ADMIN — OXYCODONE HYDROCHLORIDE 10 MG: 10 TABLET ORAL at 05:09

## 2022-05-24 RX ADMIN — NYSTATIN: 100000 POWDER TOPICAL at 16:04

## 2022-05-24 RX ADMIN — CITALOPRAM HYDROBROMIDE 40 MG: 20 TABLET ORAL at 08:20

## 2022-05-24 RX ADMIN — ACETAMINOPHEN 975 MG: 325 TABLET, FILM COATED ORAL at 00:07

## 2022-05-24 RX ADMIN — ACETAMINOPHEN 975 MG: 325 TABLET, FILM COATED ORAL at 23:32

## 2022-05-24 RX ADMIN — PANTOPRAZOLE SODIUM 40 MG: 40 TABLET, DELAYED RELEASE ORAL at 05:04

## 2022-05-24 RX ADMIN — OXYCODONE HYDROCHLORIDE 10 MG: 10 TABLET ORAL at 21:15

## 2022-05-24 RX ADMIN — ACETAMINOPHEN 975 MG: 325 TABLET, FILM COATED ORAL at 12:24

## 2022-05-24 RX ADMIN — PIPERACILLIN AND TAZOBACTAM 3.38 G: 36; 4.5 INJECTION, POWDER, FOR SOLUTION INTRAVENOUS at 03:38

## 2022-05-24 NOTE — PLAN OF CARE
Problem: PAIN - ADULT  Goal: Verbalizes/displays adequate comfort level or baseline comfort level  Description: Interventions:  - Encourage patient to monitor pain and request assistance  - Assess pain using appropriate pain scale  - Administer analgesics based on type and severity of pain and evaluate response  - Implement non-pharmacological measures as appropriate and evaluate response  - Consider cultural and social influences on pain and pain management  - Notify physician/advanced practitioner if interventions unsuccessful or patient reports new pain  Outcome: Progressing     Problem: INFECTION - ADULT  Goal: Absence or prevention of progression during hospitalization  Description: INTERVENTIONS:  - Assess and monitor for signs and symptoms of infection  - Monitor lab/diagnostic results  - Monitor all insertion sites, i e  indwelling lines, tubes, and drains  - Monitor endotracheal if appropriate and nasal secretions for changes in amount and color  - [place] appropriate cooling/warming therapies per order  - Administer medications as ordered  - Instruct and encourage patient and family to use good hand hygiene technique  - Identify and instruct in appropriate isolation precautions for identified infection/condition  Outcome: Progressing  Goal: Absence of fever/infection during neutropenic period  Description: INTERVENTIONS:  - Monitor WBC    Outcome: Progressing     Problem: DISCHARGE PLANNING  Goal: Discharge to home or other facility with appropriate resources  Description: INTERVENTIONS:  - Identify barriers to discharge w/patient and caregiver  - Arrange for needed discharge resources and transportation as appropriate  - Identify discharge learning needs (meds, wound care, etc )  - Arrange for interpretive services to assist at discharge as needed  - Refer to Case Management Department for coordinating discharge planning if the patient needs post-hospital services based on physician/advanced practitioner order or complex needs related to functional status, cognitive ability, or social support system  Outcome: Progressing     Problem: GASTROINTESTINAL - ADULT  Goal: Minimal or absence of nausea and/or vomiting  Description: INTERVENTIONS:  - Administer IV fluids if ordered to ensure adequate hydration  - Maintain NPO status until nausea and vomiting are resolved  - Nasogastric tube if ordered  - Administer ordered antiemetic medications as needed  - Provide nonpharmacologic comfort measures as appropriate  - Advance diet as tolerated, if ordered  - Consider nutrition services referral to assist patient with adequate nutrition and appropriate food choices  Outcome: Progressing  Goal: Maintains or returns to baseline bowel function  Description: INTERVENTIONS:  - Assess bowel function  - Encourage oral fluids to ensure adequate hydration  - Administer IV fluids if ordered to ensure adequate hydration  - Administer ordered medications as needed  - Encourage mobilization and activity  - Consider nutritional services referral to assist patient with adequate nutrition and appropriate food choices  Outcome: Progressing  Goal: Maintains adequate nutritional intake  Description: INTERVENTIONS:  - Monitor percentage of each meal consumed  - Identify factors contributing to decreased intake, treat as appropriate  - Assist with meals as needed  - Monitor I&O, weight, and lab values if indicated  - Obtain nutrition services referral as needed  Outcome: Progressing  Goal: Establish and maintain optimal ostomy function  Description: INTERVENTIONS:  - Assess bowel function  - Encourage oral fluids to ensure adequate hydration  - Administer IV fluids if ordered to ensure adequate hydration   - Administer ordered medications as needed  - Encourage mobilization and activity  - Nutrition services referral to assist patient with appropriate food choices  - Assess stoma site  - Consider wound care consult   Outcome: Progressing  Goal: Oral mucous membranes remain intact  Description: INTERVENTIONS  - Assess oral mucosa and hygiene practices  - Implement preventative oral hygiene regimen  - Implement oral medicated treatments as ordered  - Initiate Nutrition services referral as needed  Outcome: Progressing

## 2022-05-24 NOTE — QUICK NOTE
Nurse-Patient-Provider rounds were completed with the patient's nurse today, Shreya Frey  Leukocytosis continues to improve, remains afebrile  We discussed the plan is to continue post gastrectomy diet with ensures for nutritional supplementation  Continue IV antibiotic regimen per Infectious Disease, appreciate recommendations for transition to PO  Plan to transition back to home Eliquis and off of heparin drip on d/c      We reviewed all of the invasive devices/lines/telemetry orders   - None      DVT Prophylaxis:  Hep gtt (plan to transition to Eliquis on D/C and SCDs     Pain Assessment / Plan:  - Continue current analgesic regimen     Mobility Assessment / Plan:  - Activity as tolerated      Goals / Barriers for discharge:  -Disposition planning once cleared by ID and plan for abx regimen  Case management following; case and discharge needs discussed      All questions and concerns were addressed      I spent greater than 18 minutes reviewing the plan with the patient and the nurse, and coordinating care for the day      Baby Dancer, PA-C  5/24/2022

## 2022-05-24 NOTE — PLAN OF CARE
Problem: PAIN - ADULT  Goal: Verbalizes/displays adequate comfort level or baseline comfort level  Description: Interventions:  - Encourage patient to monitor pain and request assistance  - Assess pain using appropriate pain scale  - Administer analgesics based on type and severity of pain and evaluate response  - Implement non-pharmacological measures as appropriate and evaluate response  - Consider cultural and social influences on pain and pain management  - Notify physician/advanced practitioner if interventions unsuccessful or patient reports new pain  Outcome: Progressing     Problem: INFECTION - ADULT  Goal: Absence or prevention of progression during hospitalization  Description: INTERVENTIONS:  - Assess and monitor for signs and symptoms of infection  - Monitor lab/diagnostic results  - Monitor all insertion sites, i e  indwelling lines, tubes, and drains  - Monitor endotracheal if appropriate and nasal secretions for changes in amount and color  - [place] appropriate cooling/warming therapies per order  - Administer medications as ordered  - Instruct and encourage patient and family to use good hand hygiene technique  - Identify and instruct in appropriate isolation precautions for identified infection/condition  Outcome: Progressing  Goal: Absence of fever/infection during neutropenic period  Description: INTERVENTIONS:  - Monitor WBC    Outcome: Progressing     Problem: DISCHARGE PLANNING  Goal: Discharge to home or other facility with appropriate resources  Description: INTERVENTIONS:  - Identify barriers to discharge w/patient and caregiver  - Arrange for needed discharge resources and transportation as appropriate  - Identify discharge learning needs (meds, wound care, etc )  - Arrange for interpretive services to assist at discharge as needed  - Refer to Case Management Department for coordinating discharge planning if the patient needs post-hospital services based on physician/advanced practitioner order or complex needs related to functional status, cognitive ability, or social support system  Outcome: Progressing     Problem: GASTROINTESTINAL - ADULT  Goal: Minimal or absence of nausea and/or vomiting  Description: INTERVENTIONS:  - Administer IV fluids if ordered to ensure adequate hydration  - Maintain NPO status until nausea and vomiting are resolved  - Nasogastric tube if ordered  - Administer ordered antiemetic medications as needed  - Provide nonpharmacologic comfort measures as appropriate  - Advance diet as tolerated, if ordered  - Consider nutrition services referral to assist patient with adequate nutrition and appropriate food choices  Outcome: Progressing  Goal: Maintains or returns to baseline bowel function  Description: INTERVENTIONS:  - Assess bowel function  - Encourage oral fluids to ensure adequate hydration  - Administer IV fluids if ordered to ensure adequate hydration  - Administer ordered medications as needed  - Encourage mobilization and activity  - Consider nutritional services referral to assist patient with adequate nutrition and appropriate food choices  Outcome: Progressing  Goal: Maintains adequate nutritional intake  Description: INTERVENTIONS:  - Monitor percentage of each meal consumed  - Identify factors contributing to decreased intake, treat as appropriate  - Assist with meals as needed  - Monitor I&O, weight, and lab values if indicated  - Obtain nutrition services referral as needed  Outcome: Progressing  Goal: Establish and maintain optimal ostomy function  Description: INTERVENTIONS:  - Assess bowel function  - Encourage oral fluids to ensure adequate hydration  - Administer IV fluids if ordered to ensure adequate hydration   - Administer ordered medications as needed  - Encourage mobilization and activity  - Nutrition services referral to assist patient with appropriate food choices  - Assess stoma site  - Consider wound care consult   Outcome: Progressing  Goal: Oral mucous membranes remain intact  Description: INTERVENTIONS  - Assess oral mucosa and hygiene practices  - Implement preventative oral hygiene regimen  - Implement oral medicated treatments as ordered  - Initiate Nutrition services referral as needed  Outcome: Progressing

## 2022-05-24 NOTE — ED ATTENDING ATTESTATION
5/20/2022  I saw and evaluated the patient  I have discussed the patient with the resident physician and agree with the resident's findings, assessment and plan as documented in the resident physician's note, unless otherwise documented below  All available laboratory and imaging studies were reviewed by myself  I was present for key portions of any procedure(s) performed by the resident and I was immediately available to provide assistance  I agree with the current assessment done in the Emergency Department  I have conducted an independent evaluation of this patient  Emergency Department Note- Andrea Foley 59 y o  female MRN: [id number]    [facility] Encounter: [id number]    Chief Complaint   Patient presents with    Abdominal Pain     Pt reports lt sided abd pain was seen here last week and admitted for same  Denies n/v/d       Andrea Foley is a 59 y o  female with past surgical history of cholecystectomy, gastric sleeve in 6586, complicated by splenic injury and with recurrent perisplenic abscesses requiring IR drainage, presenting with abdominal pain  Patient reports that she developed left upper quadrant abdominal pain several months ago after having a splenic abscess after having gastric sleeve surgery  She most recently was hospitalized due to abscess requiring drainage, was discharged on 05/14  She reports that over the past several days, pain is worsening, oxycodone all lasts for about an hour before pain worsens  According to daughter, patient has been sleeping a lot more than usual   Patient has had chills  She has not had any fevers  She reports normal bowel movements  There is no chest pain  There is some shortness of breath and pain in left lower chest whenever she takes a deep breath in      REVIEW OF SYSTEMS    Constitutional: denies fevers, chills  Visual/Eyes: no changes in vision  HENT: no rhinorrhea, no sore throat  Cardiac: no chest pain  Respiratory:  As above  GI:  As above  : no burning with urination  Endocrine: no diabetes  Neuro: no focal weakness or numbness, no headaches    Ten systems reviewed and negative unless otherwise noted in HPI and above    PAST MEDICAL HISTORY  Past Medical History:   Diagnosis Date    Disease of thyroid gland     Hernia     Hypertension        SURGICAL HISTORY  Past Surgical History:   Procedure Laterality Date    CHOLECYSTECTOMY      CORONARY ANGIOPLASTY WITH STENT PLACEMENT      IR DRAINAGE TUBE CHECK/CHANGE/REPOSITION/REINSERTION/UPSIZE  5/10/2022    IR DRAINAGE TUBE PLACEMENT  5/7/2022       FAMILY HISTORY  History reviewed  No pertinent family history  CURRENT MEDICATIONS  No current facility-administered medications on file prior to encounter  Current Outpatient Medications on File Prior to Encounter   Medication Sig    acetaminophen (TYLENOL) 325 mg tablet Take 2 tablets (650 mg total) by mouth every 4 (four) hours as needed for mild pain    amLODIPine (NORVASC) 10 mg tablet Take 1 tablet (10 mg total) by mouth in the morning   amoxicillin-clavulanate (AUGMENTIN) 875-125 mg per tablet Take 1 tablet by mouth every 12 (twelve) hours for 21 days    calcium carbonate (TUMS) 500 mg chewable tablet Chew 2 tablets (1,000 mg total)  as needed in the morning and 2 tablets (1,000 mg total) as needed at noon and 2 tablets (1,000 mg total) as needed in the evening (indigestion,heartburn)   citalopram (CeleXA) 40 mg tablet Take 1 tablet (40 mg total) by mouth in the morning   clotrimazole (LOTRIMIN) 1 % cream Apply topically 2 (two) times a day    gabapentin (NEURONTIN) 100 mg capsule Take 1 capsule (100 mg total) by mouth in the morning and 1 capsule (100 mg total) in the evening and 1 capsule (100 mg total) before bedtime  Do all this for 10 days      levothyroxine 150 mcg tablet Take 1 tablet (150 mcg total) by mouth daily in the early morning    Lidocaine 4 % PTCH Apply 1 patch topically daily    methocarbamol (ROBAXIN) 500 mg tablet Take 1 tablet (500 mg total) by mouth every 6 (six) hours    oxyCODONE (ROXICODONE) 5 immediate release tablet Take 1-2 tablets (5-10 mg total) by mouth every 6 (six) hours as needed for moderate pain or severe pain Max Daily Amount: 40 mg    pantoprazole (PROTONIX) 40 mg tablet Take 1 tablet (40 mg total) by mouth daily in the early morning    senna (SENOKOT) 8 6 mg Take 1 tablet (8 6 mg total) by mouth in the morning for 5 days   traZODone (DESYREL) 150 mg tablet Take 0 5 tablets (75 mg total) by mouth daily at bedtime    [DISCONTINUED] sodium chloride, PF, 0 9 % 10 mL by Intracatheter route daily for 120 doses Intracatheter flushing daily       ALLERGIES  No Known Allergies    SOCIAL HISTORY  Social History     Socioeconomic History    Marital status:      Spouse name: None    Number of children: None    Years of education: None    Highest education level: None   Occupational History    None   Tobacco Use    Smoking status: Former Smoker    Smokeless tobacco: Never Used   Substance and Sexual Activity    Alcohol use: No    Drug use: No    Sexual activity: None   Other Topics Concern    None   Social History Narrative    None     Social Determinants of Health     Financial Resource Strain: Not on file   Food Insecurity: Food Insecurity Present    Worried About Running Out of Food in the Last Year: Never true    Fadi of Food in the Last Year: Sometimes true   Transportation Needs: No Transportation Needs    Lack of Transportation (Medical): No    Lack of Transportation (Non-Medical):  No   Physical Activity: Not on file   Stress: Not on file   Social Connections: Not on file   Intimate Partner Violence: Not on file   Housing Stability: Low Risk     Unable to Pay for Housing in the Last Year: No    Number of Places Lived in the Last Year: 1    Unstable Housing in the Last Year: No       PHYSICAL EXAM  /69   Pulse 74   Temp 97 6 °F (36 4 °C)   Resp 18   Ht 5' 3" (1 6 m)   Wt 89 8 kg (197 lb 14 4 oz)   LMP 08/03/2016   SpO2 95%   BMI 35 06 kg/m²   Vital signs and nursing notes reviewed    CONSTITUTIONAL: female appearing stated age resting in bed, in no acute distress  HEENT: atraumatic, normocephalic  Sclera anicteric, conjunctiva are not injected  Moist oral mucosa  CARDIOVASCULAR/CHEST: RRR, no M/R/G  2+ radial pulses  PULMONARY: Breathing comfortably on RA  Breath sounds are equal and clear to auscultation  ABDOMEN: non-distended  BS present, normoactive  There is tenderness with palpation in left upper quadrant  There are incisions that are healing appropriately consistent with history of recent IR drainage placement  No surrounding erythema  No drainage  MSK: moves all extremities, no deformities, no peripheral edema, no calf asymmetry  NEURO: Awake, alert, and oriented x 3  Face symmetric  Moves all extremities spontaneously   No focal neurologic deficits  SKIN: Warm, appears well-perfused  MENTAL STATUS: Normal affect    DIAGNOSTIC STUDIES  Results Reviewed     Procedure Component Value Units Date/Time    Lipase [[id number]]  (Normal) Collected: 05/20/22 2141    Lab Status: Final result Specimen: Blood from Arm, Left Updated: 05/20/22 2240     Lipase 208 u/L     NT-BNP PRO [[id number]]  (Abnormal) Collected: 05/20/22 2141    Lab Status: Final result Specimen: Blood from Arm, Left Updated: 05/20/22 2240     NT-proBNP 168 pg/mL     Comprehensive metabolic panel [[id number]]  (Abnormal) Collected: 05/20/22 2141    Lab Status: Final result Specimen: Blood from Arm, Left Updated: 05/20/22 2240     Sodium 141 mmol/L      Potassium 4 0 mmol/L      Chloride 109 mmol/L      CO2 28 mmol/L      ANION GAP 4 mmol/L      BUN 15 mg/dL      Creatinine 0 76 mg/dL      Glucose 98 mg/dL      Calcium 9 2 mg/dL      Corrected Calcium 10 0 mg/dL      AST 18 U/L      ALT 25 U/L      Alkaline Phosphatase 84 U/L      Total Protein 7 3 g/dL      Albumin 3 0 g/dL      Total Bilirubin 0 15 mg/dL      eGFR 86 ml/min/1 73sq m     Narrative:      National Kidney Disease Foundation guidelines for Chronic Kidney Disease (CKD):     Stage 1 with normal or high GFR (GFR > 90 mL/min/1 73 square meters)    Stage 2 Mild CKD (GFR = 60-89 mL/min/1 73 square meters)    Stage 3A Moderate CKD (GFR = 45-59 mL/min/1 73 square meters)    Stage 3B Moderate CKD (GFR = 30-44 mL/min/1 73 square meters)    Stage 4 Severe CKD (GFR = 15-29 mL/min/1 73 square meters)    Stage 5 End Stage CKD (GFR <15 mL/min/1 73 square meters)  Note: GFR calculation is accurate only with a steady state creatinine    CBC and differential [[id number]]  (Abnormal) Collected: 05/20/22 2141    Lab Status: Final result Specimen: Blood from Arm, Left Updated: 05/20/22 2152     WBC 8 38 Thousand/uL      RBC 4 44 Million/uL      Hemoglobin 11 2 g/dL      Hematocrit 35 2 %      MCV 79 fL      MCH 25 2 pg      MCHC 31 8 g/dL      RDW 18 5 %      MPV 9 2 fL      Platelets 342 Thousands/uL      nRBC 0 /100 WBCs      Neutrophils Relative 52 %      Immat GRANS % 0 %      Lymphocytes Relative 36 %      Monocytes Relative 9 %      Eosinophils Relative 2 %      Basophils Relative 1 %      Neutrophils Absolute 4 37 Thousands/µL      Immature Grans Absolute 0 03 Thousand/uL      Lymphocytes Absolute 3 05 Thousands/µL      Monocytes Absolute 0 72 Thousand/µL      Eosinophils Absolute 0 15 Thousand/µL      Basophils Absolute 0 06 Thousands/µL           PE Study with CT abdomen &pelvis with contrast   Final Result      There is an approximately 2 7 x 1 6 x 1 5 cm fluid collection containing a tiny gas bubble immediately adjacent to the posterior superior aspect of the spleen  This is concerning for abscess  Surgical consultation and follow-up is recommended  Interventional Radiology consultation is recommended        There remains some infiltration of the fat of the left upper lateral abdominal wall overlying the level of the spleen in the area of prior catheter drainage, however, the gas bubbles seen in this area on the May 11, 2022 study are not clearly    demonstrable on the present examination  Clinical correlation is necessary  If further evaluation is indicated, ultrasound of this region could be performed  No evidence of pulmonary embolism is seen  Small pulmonary nodules  Reportedly, the patient does have history of tobacco use  Follow-up CT of the chest in 12 months is suggested  Other findings as described above, please see discussion  This examination demonstrates findings for which clinical and imaging follow-up is recommended and was logged as such in [address] [place] [address] Rd  I personally discussed this study with Sheffieldkirk Howard on 5/21/2022 at 2:02 AM                               Workstation performed: [id number]             PROCEDURES  ECG 12 Lead Documentation Only    Date/Time: 5/20/2022 9:50 PM  Performed by: Vashti Arguello MD  Authorized by: Vashti Arguello MD     Comments:      Normal sinus rhythm, ventricular rate 74, MA interval 162, QRS 86, , normal axis, no ST/T-wave changes to suggest ischemia, no STEMI  No significant change from prior EKG dated 05/06/2022              [address] Criteria for PE    Flowsheet Row Most Recent Value   Wells' Criteria for PE    Clinical signs and symptoms of DVT 0 Filed at: 05/20/2022 2126   PE is primary diagnosis or equally likely 3 Filed at: 05/20/2022 2126   HR >100 0 Filed at: 05/20/2022 2126   Immobilization at least 3 days or Surgery in the previous 4 weeks 1 5 Filed at: 05/20/2022 2126   Previous, objectively diagnosed PE or DVT 1 5 Filed at: 05/20/2022 2126   Hemoptysis 0 Filed at: 05/20/2022 2126   Malignancy with treatment within 6 months or palliative 0 Filed at: 05/20/2022 2126   [address] Criteria Total 6 Filed at: 05/20/2022 2126          ED COURSE  Medications   HYDROmorphone (DILAUDID) injection 0 5 mg (0 5 mg Intravenous Given 5/20/22 2138) barium (READI-CAT 2) suspension 900 mL (900 mL Oral Given 5/20/22 255)     61-year-old female with history of gastric sleeve complicated by splenic injury and recurrent abscesses presenting with worsening left upper quadrant abdominal pain, chills, as well as pleuritic type left lower chest pain  Vital signs reviewed, afebrile and within normal limits  Differential diagnosis includes recurrence of splenic abscess, pleural effusion, pulmonary embolism, versus another etiology of symptoms  Dilaudid administered for pain  Labs reveal CBC without leukocytosis but with anemia and thrombocytosis  CMP is essentially unremarkable, lipase is not consistent with acute pancreatitis  CT chest angiography reveals no evidence of PE  There are small pulmonary nodules which patient is aware of   CT does reveal a 2 7 x 1 6 x 1 5 cm fluid collection containing tiny gas bubble adjacent to posterior superior aspect of the spleen  Case discussed with surgery and patient will be admitted to surgery for further treatment      CLINICAL IMPRESSION  Final diagnoses:   Splenic abscess

## 2022-05-24 NOTE — PLAN OF CARE
Problem: PAIN - ADULT  Goal: Verbalizes/displays adequate comfort level or baseline comfort level  Description: Interventions:  - Encourage patient to monitor pain and request assistance  - Assess pain using appropriate pain scale  - Administer analgesics based on type and severity of pain and evaluate response  - Implement non-pharmacological measures as appropriate and evaluate response  - Consider cultural and social influences on pain and pain management  - Notify physician/advanced practitioner if interventions unsuccessful or patient reports new pain  Outcome: Progressing     Problem: INFECTION - ADULT  Goal: Absence or prevention of progression during hospitalization  Description: INTERVENTIONS:  - Assess and monitor for signs and symptoms of infection  - Monitor lab/diagnostic results  - Monitor all insertion sites, i e  indwelling lines, tubes, and drains  - Monitor endotracheal if appropriate and nasal secretions for changes in amount and color  - [place] appropriate cooling/warming therapies per order  - Administer medications as ordered  - Instruct and encourage patient and family to use good hand hygiene technique  - Identify and instruct in appropriate isolation precautions for identified infection/condition  Outcome: Progressing  Goal: Absence of fever/infection during neutropenic period  Description: INTERVENTIONS:  - Monitor WBC    Outcome: Progressing     Problem: DISCHARGE PLANNING  Goal: Discharge to home or other facility with appropriate resources  Description: INTERVENTIONS:  - Identify barriers to discharge w/patient and caregiver  - Arrange for needed discharge resources and transportation as appropriate  - Identify discharge learning needs (meds, wound care, etc )  - Arrange for interpretive services to assist at discharge as needed  - Refer to Case Management Department for coordinating discharge planning if the patient needs post-hospital services based on physician/advanced practitioner order or complex needs related to functional status, cognitive ability, or social support system  Outcome: Progressing     Problem: GASTROINTESTINAL - ADULT  Goal: Minimal or absence of nausea and/or vomiting  Description: INTERVENTIONS:  - Administer IV fluids if ordered to ensure adequate hydration  - Maintain NPO status until nausea and vomiting are resolved  - Nasogastric tube if ordered  - Administer ordered antiemetic medications as needed  - Provide nonpharmacologic comfort measures as appropriate  - Advance diet as tolerated, if ordered  - Consider nutrition services referral to assist patient with adequate nutrition and appropriate food choices  Outcome: Progressing  Goal: Maintains or returns to baseline bowel function  Description: INTERVENTIONS:  - Assess bowel function  - Encourage oral fluids to ensure adequate hydration  - Administer IV fluids if ordered to ensure adequate hydration  - Administer ordered medications as needed  - Encourage mobilization and activity  - Consider nutritional services referral to assist patient with adequate nutrition and appropriate food choices  Outcome: Progressing  Goal: Maintains adequate nutritional intake  Description: INTERVENTIONS:  - Monitor percentage of each meal consumed  - Identify factors contributing to decreased intake, treat as appropriate  - Assist with meals as needed  - Monitor I&O, weight, and lab values if indicated  - Obtain nutrition services referral as needed  Outcome: Progressing  Goal: Establish and maintain optimal ostomy function  Description: INTERVENTIONS:  - Assess bowel function  - Encourage oral fluids to ensure adequate hydration  - Administer IV fluids if ordered to ensure adequate hydration   - Administer ordered medications as needed  - Encourage mobilization and activity  - Nutrition services referral to assist patient with appropriate food choices  - Assess stoma site  - Consider wound care consult   Outcome: Progressing  Goal: Oral mucous membranes remain intact  Description: INTERVENTIONS  - Assess oral mucosa and hygiene practices  - Implement preventative oral hygiene regimen  - Implement oral medicated treatments as ordered  - Initiate Nutrition services referral as needed  Outcome: Progressing

## 2022-05-24 NOTE — PROGRESS NOTES
Progress Note - Infectious Disease   Beltran Pots 59 y o  female MRN: [id number]  Unit/Bed#: [facility] 628-01 Encounter: [id number]      Impression:  1  Recurrent/persistent splenic abscesses  2  S/P Laparoscopic sleeve gastrectomy complicated by splenic injury requiring IR embolization with subsequent splenic abscesses, IR drainage, Axios stent placement and prolonged IV antibiotic course all at [facility]  3  History of DVT on Eliquis  4  CAD S/P stent placement   5  History of hypothyroidism   6  Intertriginous fungal dermatitis secondary to antibiotic therapy    Recommendations:  Patient is afebrile with elevated WBC count  1  Patient still has noted no improvement in her left flank pain and referred pain to her left shoulder  which she said that she experienced with her initial surgery  Discussed with my surgical colleagues   At this point they feel that symptomatic therapy should continue to be employed  Will obtain pain consult to try and see if we can come up with a enhance regimen that does not require narcotics  2  Will continue piperacillin/tazobactam until discharge and would then switch her back to her p o  Augmentin which we know has reduced the size of her abscess  Antibiotics:  1  Piperacillin/tazobactam, day 5 Rx     Subjective:  Left flank and referred left shoulder pain  Denies fevers, chills, or sweats  Denies nausea, vomiting, or diarrhea  Objective:  Vitals:  Temp:  [97 6 °F (36 4 °C)-98 2 °F (36 8 °C)] 97 6 °F (36 4 °C)  HR:  [65-84] 74  Resp:  [16-20] 18  BP: (106-121)/(63-69) 118/69  SpO2:  [93 %-97 %] 95 %  Temp (24hrs), Av °F (36 7 °C), Min:97 6 °F (36 4 °C), Max:98 2 °F (36 8 °C)  Current: Temperature: 97 6 °F (36 4 °C)    Physical Exam:     General Appearance:  Alert, obese, nontoxic, no acute distress  Throat: Oropharynx moist without lesions    Lips, mucosa, and tongue normal   Neck: Supple, symmetrical, trachea midline, no adenopathy,  no tenderness/mass/nodules   Lungs:   Clear to auscultation bilaterally, no audible wheezes, rhonchi or rales; respirations unlabored   Heart:  Regular rate and rhythm, S1, S2 normal, no murmur, rub or gallop   Abdomen:   Soft, protuberant, striae, surgical scars, non-tender, non-distended, positive bowel sounds  No masses, no organomegaly    Left flank subcostal CVA tenderness   Extremities: Some pain with left shoulder ROM   Skin: Skin color, texture, turgor normal, no rashes or lesions  No draining wounds noted  Invasive Devices  Report    Peripheral Intravenous Line  Duration           Peripheral IV 05/21/22 Distal;Right;Ventral (anterior) Forearm 3 days                Labs, Imaging, & Other studies:   All pertinent labs were personally reviewed  Results from last 7 days   Lab Units 05/24/22  0518 05/23/22  0521 05/22/22  1242 05/22/22  0501   WBC Thousand/uL 10 65* 12 33*  --  6 35   HEMOGLOBIN g/dL 10 3* 9 9* 10 8* 9 5*   PLATELETS Thousands/uL 427* 397*  --  424*     Results from last 7 days   Lab Units 05/24/22  0518 05/23/22  0521 05/22/22  0501 05/21/22  0450 05/20/22  2141   SODIUM mmol/L 139 138 141   < > 141   POTASSIUM mmol/L 4 1 3 8 3 7   < > 4 0   CHLORIDE mmol/L 108 105 108   < > 109*   CO2 mmol/L 27 30 32   < > 28   BUN mg/dL 11 14 16   < > 15   CREATININE mg/dL 0 71 0 79 0 88   < > 0 76   EGFR ml/min/1 73sq m 93 82 72   < > 86   CALCIUM mg/dL 8 7 8 5 8 7   < > 9 2   AST U/L  --   --   --   --  18   ALT U/L  --   --   --   --  25   ALK PHOS U/L  --   --   --   --  84    < > = values in this interval not displayed

## 2022-05-24 NOTE — PROGRESS NOTES
Progress Note - General Surgery  : AG Red Surgery Resident on [address] 59 y o  female MRN: [id number]  Unit/Bed#: [facility] 628-01 Encounter: [id number]      Assessment:  59 y o  female with recurrent perisplenic abscess    AVSS  WBC 10 65 (12 33)      Plan:  Regular diet  No acute surgical intervention, no current indication for splenectomy  Continue antibiotics per ID will discuss abx duration and length with ID today  Restart eliquis today  Pain/nausea control prn      Subjective:   No acute events overnight  Complaining of left upper abdominal and left shoulder pain  States feels like it did prior to her surgery  No nausea or vomiting  No fevers/chills  Objective:     Physical Exam:  General: NAD  HENT: NCAT MMM  Neck: supple, no JVD  CV: nl rate  Lungs: nl wob  No resp distress  ABD: Soft, +TTP in the LUQ, nondistended  Extrem: No CCE  Neuro: AAOx3        I/O       05/20 0701 05/21 0700 05/21 0701 05/22 0700    P  O  0     I V  (mL/kg) 283 3 (3 2) 447 9 (5)    IV Piggyback 100     Total Intake(mL/kg) 383 3 (4 3) 447 9 (5)    Urine (mL/kg/hr)  650 (0 5)    Total Output  650    Net +383 3 -202 1          Unmeasured Urine Occurrence  1 x          Lab, Imaging and other studies: I have personally reviewed pertinent reports    , CBC with diff:   Lab Results   Component Value Date    WBC 10 65 (H) 05/24/2022    HGB 10 3 (L) 05/24/2022    HCT 32 2 (L) 05/24/2022    MCV 81 (L) 05/24/2022     (H) 05/24/2022    MCH 25 9 (L) 05/24/2022    MCHC 32 0 05/24/2022    RDW 18 7 (H) 05/24/2022    MPV 9 5 05/24/2022    NRBC 0 05/24/2022   , BMP/CMP:   Lab Results   Component Value Date    SODIUM 139 05/24/2022    K 4 1 05/24/2022     05/24/2022    CO2 27 05/24/2022    BUN 11 05/24/2022    CREATININE 0 71 05/24/2022    CALCIUM 8 7 05/24/2022    EGFR 93 05/24/2022         VTE Pharmacologic Prophylaxis: Heparin        Melodye Boast, DO  5/24/2022 9:04 AM

## 2022-05-25 LAB
APTT PPP: 73 SECONDS (ref 23–37)
ATRIAL RATE: 74 BPM
P AXIS: 67 DEGREES
PR INTERVAL: 162 MS
QRS AXIS: 8 DEGREES
QRSD INTERVAL: 86 MS
QT INTERVAL: 386 MS
QTC INTERVAL: 428 MS
T WAVE AXIS: 24 DEGREES
VENTRICULAR RATE: 74 BPM

## 2022-05-25 PROCEDURE — 93010 ELECTROCARDIOGRAM REPORT: CPT | Performed by: INTERNAL MEDICINE

## 2022-05-25 PROCEDURE — 99232 SBSQ HOSP IP/OBS MODERATE 35: CPT | Performed by: SURGERY

## 2022-05-25 PROCEDURE — 99231 SBSQ HOSP IP/OBS SF/LOW 25: CPT | Performed by: INTERNAL MEDICINE

## 2022-05-25 PROCEDURE — 85730 THROMBOPLASTIN TIME PARTIAL: CPT | Performed by: SURGERY

## 2022-05-25 RX ADMIN — SENNOSIDES 8.6 MG: 8.6 TABLET, FILM COATED ORAL at 17:46

## 2022-05-25 RX ADMIN — OXYCODONE HYDROCHLORIDE 10 MG: 10 TABLET ORAL at 08:09

## 2022-05-25 RX ADMIN — GABAPENTIN 100 MG: 100 CAPSULE ORAL at 21:20

## 2022-05-25 RX ADMIN — ACETAMINOPHEN 975 MG: 325 TABLET, FILM COATED ORAL at 05:35

## 2022-05-25 RX ADMIN — ACETAMINOPHEN 975 MG: 325 TABLET, FILM COATED ORAL at 12:08

## 2022-05-25 RX ADMIN — PIPERACILLIN AND TAZOBACTAM 3.38 G: 36; 4.5 INJECTION, POWDER, FOR SOLUTION INTRAVENOUS at 21:20

## 2022-05-25 RX ADMIN — APIXABAN 5 MG: 5 TABLET, FILM COATED ORAL at 12:08

## 2022-05-25 RX ADMIN — TRAZODONE HYDROCHLORIDE 75 MG: 50 TABLET ORAL at 21:20

## 2022-05-25 RX ADMIN — OXYCODONE HYDROCHLORIDE 10 MG: 10 TABLET ORAL at 17:48

## 2022-05-25 RX ADMIN — APIXABAN 5 MG: 5 TABLET, FILM COATED ORAL at 17:46

## 2022-05-25 RX ADMIN — CLOTRIMAZOLE: 0.01 CREAM TOPICAL at 08:11

## 2022-05-25 RX ADMIN — OXYCODONE HYDROCHLORIDE 10 MG: 10 TABLET ORAL at 12:09

## 2022-05-25 RX ADMIN — PANTOPRAZOLE SODIUM 40 MG: 40 TABLET, DELAYED RELEASE ORAL at 05:35

## 2022-05-25 RX ADMIN — METHOCARBAMOL 500 MG: 500 TABLET, FILM COATED ORAL at 12:08

## 2022-05-25 RX ADMIN — CITALOPRAM HYDROBROMIDE 40 MG: 20 TABLET ORAL at 08:09

## 2022-05-25 RX ADMIN — PIPERACILLIN AND TAZOBACTAM 3.38 G: 36; 4.5 INJECTION, POWDER, FOR SOLUTION INTRAVENOUS at 15:59

## 2022-05-25 RX ADMIN — GABAPENTIN 100 MG: 100 CAPSULE ORAL at 08:08

## 2022-05-25 RX ADMIN — GABAPENTIN 100 MG: 100 CAPSULE ORAL at 16:00

## 2022-05-25 RX ADMIN — LEVOTHYROXINE SODIUM 150 MCG: 75 TABLET ORAL at 05:35

## 2022-05-25 RX ADMIN — ACETAMINOPHEN 975 MG: 325 TABLET, FILM COATED ORAL at 17:46

## 2022-05-25 RX ADMIN — NYSTATIN: 100000 POWDER TOPICAL at 16:37

## 2022-05-25 RX ADMIN — PIPERACILLIN AND TAZOBACTAM 3.38 G: 36; 4.5 INJECTION, POWDER, FOR SOLUTION INTRAVENOUS at 03:24

## 2022-05-25 RX ADMIN — METHOCARBAMOL 500 MG: 500 TABLET, FILM COATED ORAL at 05:35

## 2022-05-25 RX ADMIN — OXYCODONE HYDROCHLORIDE 10 MG: 10 TABLET ORAL at 21:32

## 2022-05-25 RX ADMIN — NYSTATIN: 100000 POWDER TOPICAL at 21:21

## 2022-05-25 RX ADMIN — LIDOCAINE 5% 1 PATCH: 700 PATCH TOPICAL at 08:09

## 2022-05-25 RX ADMIN — PIPERACILLIN AND TAZOBACTAM 3.38 G: 36; 4.5 INJECTION, POWDER, FOR SOLUTION INTRAVENOUS at 09:29

## 2022-05-25 RX ADMIN — NYSTATIN: 100000 POWDER TOPICAL at 08:11

## 2022-05-25 RX ADMIN — METHOCARBAMOL 500 MG: 500 TABLET, FILM COATED ORAL at 17:45

## 2022-05-25 NOTE — UTILIZATION REVIEW
Continued Stay Review    Date: 5/25                         Current Patient Class: Inpatient Current Level of Care: Med Surg    HPI:57 y o  female initially admitted on 5/21    Assessment/Plan: Perisplenic abscess  Continue Iv antibiotics  Regular post-gastrectomy diet  No indication for surgical intervention at this time  Transition to Eliquis from Heparin drip     Will discuss APS consultation for outpatient pain management strategy    Vital Signs:   05/25/22 07:55:02 97 4 °F (36 3 °C) Abnormal  69 18 124/70 88 94 %     Pertinent Labs/Diagnostic Results:       Results from last 7 days   Lab Units 05/24/22 0518 05/23/22  0521 05/22/22  1242 05/22/22  0501 05/21/22  0450   WBC Thousand/uL 10 65* 12 33*  --  6 35 7 70   HEMOGLOBIN g/dL 10 3* 9 9* 10 8* 9 5* 11 2*   HEMATOCRIT % 32 2* 31 4* 34 3* 30 0* 34 2*   PLATELETS Thousands/uL 427* 397*  --  424* 528*   NEUTROS ABS Thousands/µL 6 99 8 79*  --  3 16 3 59         Results from last 7 days   Lab Units 05/24/22 0518 05/23/22  0521 05/22/22  0501 05/21/22  0450 05/20/22  2141   SODIUM mmol/L 139 138 141 140 141   POTASSIUM mmol/L 4 1 3 8 3 7 3 9 4 0   CHLORIDE mmol/L 108 105 108 107 109*   CO2 mmol/L 27 30 32 28 28   ANION GAP mmol/L 4 3* 1* 5 4   BUN mg/dL 11 14 16 12 15   CREATININE mg/dL 0 71 0 79 0 88 0 65 0 76   EGFR ml/min/1 73sq m 93 82 72 97 86   CALCIUM mg/dL 8 7 8 5 8 7 9 0 9 2   MAGNESIUM mg/dL 2 2 1 8  --   --   --    PHOSPHORUS mg/dL  --  3 3  --   --   --      Results from last 7 days   Lab Units 05/20/22  2141   AST U/L 18   ALT U/L 25   ALK PHOS U/L 84   TOTAL PROTEIN g/dL 7 3   ALBUMIN g/dL 3 0*   TOTAL BILIRUBIN mg/dL 0 15*         Results from last 7 days   Lab Units 05/24/22  0518 05/23/22  0521 05/22/22  0501 05/21/22  0450 05/20/22  2141   GLUCOSE RANDOM mg/dL 78 90 79 72 98         Results from last 7 days   Lab Units 05/25/22  0542 05/24/22  0518 05/23/22  1945 05/21/22  1614 05/21/22  0937 05/21/22  0438   PROTIME seconds  --   --   --   -- 13 6 13 2   INR   --   --   --   --  1 09 1 04   PTT seconds 73* 66* 80*   < > 32  --     < > = values in this interval not displayed  Results from last 7 days   Lab Units 05/20/22  2141   NT-PRO BNP pg/mL 168*             Results from last 7 days   Lab Units 05/20/22  2141   LIPASE u/L 208       Results from last 7 days   Lab Units 05/21/22  1026   CLARITY UA  Clear   COLOR UA  Colorless   SPEC GRAV UA  1 014   PH UA  6 5   GLUCOSE UA mg/dl Negative   KETONES UA mg/dl Negative   BLOOD UA  Negative   PROTEIN UA mg/dl Negative   NITRITE UA  Negative   BILIRUBIN UA  Negative   UROBILINOGEN UA (BE) mg/dl <2 0   LEUKOCYTES UA  Negative   WBC UA /hpf None Seen   RBC UA /hpf 1-2   BACTERIA UA /hpf None Seen   EPITHELIAL CELLS WET PREP /hpf Occasional       Medications:   Scheduled Medications:  acetaminophen, 975 mg, Oral, Q6H JANETT  apixaban, 5 mg, Oral, BID  citalopram, 40 mg, Oral, Daily  clotrimazole, , Topical, BID  gabapentin, 100 mg, Oral, TID  levothyroxine, 150 mcg, Oral, Early Morning  lidocaine, 1 patch, Topical, Daily  methocarbamol, 500 mg, Oral, Q6H JANETT  nystatin, , Topical, TID  pantoprazole, 40 mg, Oral, Early Morning  piperacillin-tazobactam, 3 375 g, Intravenous, Q6H  senna, 1 tablet, Oral, BID  traZODone, 75 mg, Oral, HS      Continuous IV Infusions:    heparin (porcine) 25,000 units in 0 45% NaCl 250 mL infusion (premix)  Rate: 2 6-25 5 mL/hr Dose: 3-30 Units/kg/hr  Weight Dosing Info: 85 kg (Order-Specific)  Freq: Titrated Route: IV  Last Dose: Stopped (05/25/22 1018)  Start: 05/21/22 0930 End: 05/25/22 1007    PRN Meds:  HYDROmorphone, 0 5 mg, Intravenous, Q3H PRN  ondansetron, 4 mg, Intravenous, Q6H PRN  oxyCODONE, 10 mg, Oral, Q4H PRN 5/25 x2  oxyCODONE, 5 mg, Oral, Q4H PRN      Discharge Plan: D    Network Utilization Review Department  ATTENTION: Please call with any questions or concerns to [phone] and carefully listen to the prompts so that you are directed to the right person   All voicemails are confidential   Kinga Starr all requests for admission clinical reviews, approved or denied determinations and any other requests to dedicated fax number below belonging to the campus where the patient is receiving treatment   List of dedicated fax numbers for the Facilities:  [address] DENIALS (Administrative/Medical Necessity) [phone]   [address] (Maternity/NICU/Pediatrics) [phone]   [address] 40 125 [facility]  [address] [address] [facility] [place] [address] [id number] [place] [address] [address] Tolentino [address] [facility] [address] [phone]

## 2022-05-25 NOTE — PROGRESS NOTES
Progress Note - General Surgery  : AG Red Surgery Resident on Leonardo Sylvester 59 y o  female MRN: [id number]  Unit/Bed#: [facility] 628-01 Encounter: [id number]      Assessment:  59 y o  female with perisplenic abscess       Plan:  Regular post-gastrectomy diet  No indication for surgical intervention at this time  Appreciate ID recs, Zosyn transition to Augmentin at discharge  Hep gtt, will discuss transition back to Eliquis  Will discuss APS consultation for outpatient pain management strategy  Anticipate discharge soon, possibly today        Subjective: Endorses stable LUQ/L flank pain      Objective:     Physical Exam:  GEN: NAD   Ab: Soft, ND, abdomen nontender but mild L CVA tenderness  Lung: Normal effort on RA  CV: RRR   Extrem: No CCE   Neuro: A+Ox3       I/O       05/23 0701 05/24 0700 05/24 0701 05/25 0700    P  O   440    IV Piggyback  300    Total Intake(mL/kg)  740 (8 2)    Urine (mL/kg/hr)  350 (0 2)    Total Output  350    Net  +390          Unmeasured Urine Occurrence 2 x 1 x    Unmeasured Stool Occurrence 1 x           Lab, Imaging and other studies: I have personally reviewed pertinent reports    , CBC with diff:   Lab Results   Component Value Date    WBC 10 65 (H) 05/24/2022    HGB 10 3 (L) 05/24/2022    HCT 32 2 (L) 05/24/2022    MCV 81 (L) 05/24/2022     (H) 05/24/2022    MCH 25 9 (L) 05/24/2022    MCHC 32 0 05/24/2022    RDW 18 7 (H) 05/24/2022    MPV 9 5 05/24/2022    NRBC 0 05/24/2022   , BMP/CMP:   Lab Results   Component Value Date    SODIUM 139 05/24/2022    K 4 1 05/24/2022     05/24/2022    CO2 27 05/24/2022    BUN 11 05/24/2022    CREATININE 0 71 05/24/2022    CALCIUM 8 7 05/24/2022    EGFR 93 05/24/2022           Nay Arrington MD  5/25/2022 4:29 AM

## 2022-05-25 NOTE — QUICK NOTE
Nurse-Patient-Provider rounds were completed with the patient's nurse today, Surinder Montalvo  We discussed the plan is to possible discharge this afternoon pending ID and acute pain service consultations  Follow-up recommendations  Heparin drip was discontinued at this time  Re-initiate on her home Eliquis which was confirmed with her pharmacy today  Patient takes 5 mg b i d  We reviewed all of the invasive devices/lines/telemetry orders   - None  DVT Prophylaxis:  - Eliquis     Pain Assessment / Plan:  - Continue current analgesic regimen  Mobility Assessment / Plan:  - Activity as tolerated  Goals / Barriers for discharge:  - DC planning at this time  - follow up ID recommendations  - follow-up APS recommendations  Case management following; case and discharge needs discussed  All questions and concerns were addressed  I spent greater than 7 minutes reviewing the plan with the patient and the nurse, and coordinating care for the day      George Rothman PA-C  5/25/2022

## 2022-05-25 NOTE — PROGRESS NOTES
Progress Note - Infectious Disease   yAla Jerez 59 y o  female MRN: [id number]  Unit/Bed#: [facility] 628-01 Encounter: [id number]      Impression:  1  Recurrent/persistent splenic abscesses  2  S/P Laparoscopic sleeve gastrectomy complicated by splenic injury requiring IR embolization with subsequent splenic abscesses, IR drainage, Axios stent placement and prolonged IV antibiotic course all at [facility]  3  History of DVT on Eliquis  4  CAD S/P stent placement   5  History of hypothyroidism   6  Intertriginous fungal dermatitis secondary to antibiotic therapy    Recommendations:  Patient is afebrile with elevated WBC count  1  Left flank pain and particularly shoulder pain are still prominent  Patient was evaluated by the Acute Pain Service and recommendations appreciated    2  Patient to be discharged today and switched back to her p o  Augmentin which we know has reduced the size of her abscess  She has completed 20 days of potential 28-42 day regimen  3  Patient to follow up with surgery and can also call ID office for virtual visit follow-up    Antibiotics:  1  Piperacillin/tazobactam, day 6 Rx to be switched back to p o  Augmentin on discharge  Completed approximately 20 of 42 day regimen total antibiotic Rx      Subjective:  Left flank and referred left shoulder pain  Denies fevers, chills, or sweats  Denies nausea, vomiting, or diarrhea  Objective:  Vitals:  Temp:  [97 1 °F (36 2 °C)-97 4 °F (36 3 °C)] 97 4 °F (36 3 °C)  HR:  [69-82] 69  Resp:  [16-18] 18  BP: (120-124)/(67-70) 124/70  SpO2:  [94 %-95 %] 94 %  Temp (24hrs), Av 3 °F (36 3 °C), Min:97 1 °F (36 2 °C), Max:97 4 °F (36 3 °C)  Current: Temperature: (!) 97 4 °F (36 3 °C)    Physical Exam:     General Appearance:  Alert, obese, nontoxic, no acute distress  Throat: Oropharynx moist without lesions    Lips, mucosa, and tongue normal   Neck: Supple, symmetrical, trachea midline, no adenopathy,  no tenderness/mass/nodules   Lungs:   Clear to auscultation bilaterally, no audible wheezes, rhonchi or rales; respirations unlabored   Heart:  Regular rate and rhythm, S1, S2 normal, no murmur, rub or gallop   Abdomen:   Soft, protuberant, striae, surgical scars, non-tender, non-distended, positive bowel sounds  No masses, no organomegaly    Left flank subcostal CVA tenderness   Extremities: Some pain with left shoulder ROM   Skin: Skin color, texture, turgor normal, no rashes or lesions  No draining wounds noted  Invasive Devices  Report    Peripheral Intravenous Line  Duration           Peripheral IV 05/24/22 Right Wrist <1 day                Labs, Imaging, & Other studies:   All pertinent labs were personally reviewed  Results from last 7 days   Lab Units 05/24/22  0518 05/23/22  0521 05/22/22  1242 05/22/22  0501   WBC Thousand/uL 10 65* 12 33*  --  6 35   HEMOGLOBIN g/dL 10 3* 9 9* 10 8* 9 5*   PLATELETS Thousands/uL 427* 397*  --  424*     Results from last 7 days   Lab Units 05/24/22  0518 05/23/22  0521 05/22/22  0501 05/21/22  0450 05/20/22  2141   SODIUM mmol/L 139 138 141   < > 141   POTASSIUM mmol/L 4 1 3 8 3 7   < > 4 0   CHLORIDE mmol/L 108 105 108   < > 109*   CO2 mmol/L 27 30 32   < > 28   BUN mg/dL 11 14 16   < > 15   CREATININE mg/dL 0 71 0 79 0 88   < > 0 76   EGFR ml/min/1 73sq m 93 82 72   < > 86   CALCIUM mg/dL 8 7 8 5 8 7   < > 9 2   AST U/L  --   --   --   --  18   ALT U/L  --   --   --   --  25   ALK PHOS U/L  --   --   --   --  84    < > = values in this interval not displayed

## 2022-05-25 NOTE — PLAN OF CARE
Problem: PAIN - ADULT  Goal: Verbalizes/displays adequate comfort level or baseline comfort level  Description: Interventions:  - Encourage patient to monitor pain and request assistance  - Assess pain using appropriate pain scale  - Administer analgesics based on type and severity of pain and evaluate response  - Implement non-pharmacological measures as appropriate and evaluate response  - Consider cultural and social influences on pain and pain management  - Notify physician/advanced practitioner if interventions unsuccessful or patient reports new pain  Outcome: Progressing     Problem: INFECTION - ADULT  Goal: Absence or prevention of progression during hospitalization  Description: INTERVENTIONS:  - Assess and monitor for signs and symptoms of infection  - Monitor lab/diagnostic results  - Monitor all insertion sites, i e  indwelling lines, tubes, and drains  - Monitor endotracheal if appropriate and nasal secretions for changes in amount and color  - [place] appropriate cooling/warming therapies per order  - Administer medications as ordered  - Instruct and encourage patient and family to use good hand hygiene technique  - Identify and instruct in appropriate isolation precautions for identified infection/condition  Outcome: Progressing  Goal: Absence of fever/infection during neutropenic period  Description: INTERVENTIONS:  - Monitor WBC    Outcome: Progressing     Problem: DISCHARGE PLANNING  Goal: Discharge to home or other facility with appropriate resources  Description: INTERVENTIONS:  - Identify barriers to discharge w/patient and caregiver  - Arrange for needed discharge resources and transportation as appropriate  - Identify discharge learning needs (meds, wound care, etc )  - Arrange for interpretive services to assist at discharge as needed  - Refer to Case Management Department for coordinating discharge planning if the patient needs post-hospital services based on physician/advanced practitioner order or complex needs related to functional status, cognitive ability, or social support system  Outcome: Progressing

## 2022-05-25 NOTE — CONSULTS
[address] [facility]  Consult Note - Joanne Mcintosh 1965, 59 y o  female MRN: [id number]  Unit/Bed#: [facility] 628-01 Encounter: [id number]  Primary Care Provider: Ernesto Nation MD   Date and time admitted to hospital: 5/20/2022  9:11 PM    * Splenic abscess  Assessment & Plan  · Admitted on 5/20/22 with left upper quadrant pain radiating to left shoulder  · Found to have recurrent splenic abscess, being treated with IV antibiotics  · No surgery planned  · Patient likely to be discharged today or tomorrow  · APS asked to give recommendations for discharge medications  · Per patient and nursing, patient has been active and independent walking around hallways and performing ADLs  · Continue Tylenol 975 mg p o  q 6 hours scheduled  · Continue Neurontin 100 mg p o  t i d  scheduled  · Continue lidocaine patch, on for 12 hours and off for 12 hours  · Continue Robaxin 500 mg p o  q 6 hours scheduled  · Continue oxycodone 5 mg p o  q 4 hours p r n  moderate pain  · Continue oxycodone 10 mg p o  q 4 hours p r n  severe pain  · Suggest discontinue IV Dilaudid  · Continue bowel regimen while on opioid pain medication to avoid opioid induced constipation  · At discharge, suggest the following:  · Tylenol 975 mg p o  q 8 hours scheduled  · Neurontin 100 mg p o  t i d  scheduled  · Lidocaine patch, on for 12 hours and off for 12 hours  · Robaxin 500 mg p o  q 6 hours scheduled  · Oxycodone 5 mg p o  q 4 hours p r n  moderate to severe pain x3 days, then discontinue  · Bowel regimen while on opioid pain medication  Joanne Mcintosh is a 59 y o  female  with recurrent splenic abscess, left upper quadrant pain with radiation to left shoulder  APS will sign off at this time  Thank you for the consult   All opioids and other analgesics to be written at discretion of primary team  Please contact Acute Pain Service - [facility] via i[employer] from [phone] with additional questions or concerns  See Lizy or Cele for additional contacts and after hours information  History of Present Illness    Admit Date:  5/20/2022  Hospital Day:  4 days  Primary Service:  Surgery-General  Attending Provider:  Lan Barney MD  Reason for Consult / Principal Problem:  Left upper quadrant pain with radiation to left shoulder  HPI: Ayla Jerez is a 59 y o  female who presents with recurrent splenic abscess being treated with IV antibiotics and no need for intervention per primary service  Patient initially admitted on 5/20/22 with left upper quadrant pain radiating to the left shoulder  Found to have the above-noted abscess  Patient was started on a standard acute pain regimen on admission and has continued on that throughout her stay  Currently, the patient complains of 7/10 pain in her left upper quadrant which does radiate intermittently to the left shoulder  Per patient and nursing, patient has been independently ambulating and performing ADLs for several days  Patient has used less p r n  opioid pain medication than is available to her  Patient without a history of chronic pain or opioid dependence  On further questioning, patient expresses that her expectation for her pain is that it will eventually resolve, however is comfortable with her pain being tolerable enough to perform her daily activities  She feels as though she is nearly at that point currently  Current pain location(s):  Left upper quadrant, left shoulder  Pain Scale:   7/10  Quality:  Aching, occasionally sharp  Current Analgesic regimen:    Tylenol 975 mg p o  q 6 hours scheduled  Oxycodone 5 mg p o  q 4 hours p r n  moderate pain  Oxycodone 10 mg p o  q 4 hours p r n  severe pain  Dilaudid 0 5 mg IV q 3 hours p r n  breakthrough pain  Gabapentin 100 mg p o  t i d  scheduled  Robaxin 500 mg p o  q 6 hours scheduled  Lidocaine patch, on for 12 hours and off for 12 hours      Pain History:  None  Pain Management Provider:  None    I have reviewed the patient's controlled substance dispensing history in the Prescription Drug Monitoring Program in compliance with the [facility] regulations before prescribing any controlled substances  Past 1 year review of PDMP:  Fill Date ID   Written Drug Qty Days Prescriber Rx # Pharmacy Refill   Daily Dose* Pymt Type      05/14/2022  1   05/13/2022  Oxycodone Hcl (Ir) 5 MG Tablet    24 00  5 Ad Shahram   [id number]   Pen (3212)    36 00 MME  [place]   03/02/2022  2   02/18/2022  Acetaminophen-Cod #3 Tablet    20 00  5 Da Fel   [id number]   New (4716)   0  18 00 MME  [place]   11/09/2021  2   10/29/2021  Oxycodone-Acetaminophen 5-325    15 00  4 Da Fel   [id number]   Ros (7671)   0  28 12 [place]   09/03/2021  2   09/01/2021  Oxycodone-Acetaminophen 5-325    20 00  3 Ro Cedrick   [id number]   New (4716)   0  50 00 MME  [place]   08/14/2021  2   07/27/2021  Clonazepam 2 MG Tablet    30 00  30 Ma Gor   [id number]   New (5989)   0   [place]   08/02/2021  2   03/23/2021  Clonazepam 1 MG Tablet    90 00  30 Ma Gor   [id number]   New (4716)   1   [place]   07/02/2021  2   06/29/2021  Clonazepam 2 MG Tablet    30 00  30 Ma Gor   [id number]   New (4716)   0   [place]   07/02/2021  2   06/29/2021  Clonazepam 1 MG Tablet    90 00  30 Ma Gor   [id number]   [place] (4716)   0   [place]         Consults    Review of Systems   Gastrointestinal: Positive for abdominal pain  Negative for constipation, diarrhea, nausea and vomiting  Musculoskeletal: Positive for arthralgias  All other systems reviewed and are negative        Historical Information   Past Medical History:   Diagnosis Date    Disease of thyroid gland     Hernia     Hypertension      Past Surgical History:   Procedure Laterality Date    CHOLECYSTECTOMY      CORONARY ANGIOPLASTY WITH STENT PLACEMENT      IR DRAINAGE TUBE CHECK/CHANGE/REPOSITION/REINSERTION/UPSIZE  5/10/2022    IR DRAINAGE TUBE PLACEMENT 5/7/2022     Social History   Social History     Substance and Sexual Activity   Alcohol Use No     Social History     Substance and Sexual Activity   Drug Use No     Social History     Tobacco Use   Smoking Status Former Smoker   Smokeless Tobacco Never Used     Family History: History reviewed  No pertinent family history  Meds/Allergies   all current active meds have been reviewed, current meds:   Current Facility-Administered Medications   Medication Dose Route Frequency    acetaminophen (TYLENOL) tablet 975 mg  975 mg Oral Q6H [facility]    apixaban (ELIQUIS) tablet 5 mg  5 mg Oral BID    citalopram (CeleXA) tablet 40 mg  40 mg Oral Daily    clotrimazole (LOTRIMIN) 1 % cream   Topical BID    gabapentin (NEURONTIN) capsule 100 mg  100 mg Oral TID    HYDROmorphone (DILAUDID) injection 0 5 mg  0 5 mg Intravenous Q3H PRN    levothyroxine tablet 150 mcg  150 mcg Oral Early Morning    lidocaine (LIDODERM) 5 % patch 1 patch  1 patch Topical Daily    methocarbamol (ROBAXIN) tablet 500 mg  500 mg Oral Q6H JANETT    nystatin (MYCOSTATIN) powder   Topical TID    ondansetron (ZOFRAN) injection 4 mg  4 mg Intravenous Q6H PRN    oxyCODONE (ROXICODONE) immediate release tablet 10 mg  10 mg Oral Q4H PRN    oxyCODONE (ROXICODONE) IR tablet 5 mg  5 mg Oral Q4H PRN    pantoprazole (PROTONIX) EC tablet 40 mg  40 mg Oral Early Morning    piperacillin-tazobactam (ZOSYN) 3 375 g in sodium chloride 0 9 % 100 mL IVPB  3 375 g Intravenous Q6H    senna (SENOKOT) tablet 8 6 mg  1 tablet Oral BID    traZODone (DESYREL) tablet 75 mg  75 mg Oral HS    and PTA meds:   Prior to Admission Medications   Prescriptions Last Dose Informant Patient Reported? Taking?    Lidocaine 4 % PTCH   No No   Sig: Apply 1 patch topically daily   acetaminophen (TYLENOL) 325 mg tablet   No No   Sig: Take 2 tablets (650 mg total) by mouth every 4 (four) hours as needed for mild pain   amLODIPine (NORVASC) 10 mg tablet   No No   Sig: Take 1 tablet (10 mg total) by mouth in the morning  amoxicillin-clavulanate (AUGMENTIN) 875-125 mg per tablet   No No   Sig: Take 1 tablet by mouth every 12 (twelve) hours for 21 days   calcium carbonate (TUMS) 500 mg chewable tablet   No No   Sig: Chew 2 tablets (1,000 mg total)  as needed in the morning and 2 tablets (1,000 mg total) as needed at noon and 2 tablets (1,000 mg total) as needed in the evening (indigestion,heartburn)  citalopram (CeleXA) 40 mg tablet   No No   Sig: Take 1 tablet (40 mg total) by mouth in the morning  clotrimazole (LOTRIMIN) 1 % cream   No No   Sig: Apply topically 2 (two) times a day   gabapentin (NEURONTIN) 100 mg capsule   No No   Sig: Take 1 capsule (100 mg total) by mouth in the morning and 1 capsule (100 mg total) in the evening and 1 capsule (100 mg total) before bedtime  Do all this for 10 days  levothyroxine 150 mcg tablet   No No   Sig: Take 1 tablet (150 mcg total) by mouth daily in the early morning   methocarbamol (ROBAXIN) 500 mg tablet   No No   Sig: Take 1 tablet (500 mg total) by mouth every 6 (six) hours   oxyCODONE (ROXICODONE) 5 immediate release tablet   No No   Sig: Take 1-2 tablets (5-10 mg total) by mouth every 6 (six) hours as needed for moderate pain or severe pain Max Daily Amount: 40 mg   pantoprazole (PROTONIX) 40 mg tablet   No No   Sig: Take 1 tablet (40 mg total) by mouth daily in the early morning   senna (SENOKOT) 8 6 mg   No No   Sig: Take 1 tablet (8 6 mg total) by mouth in the morning for 5 days     traZODone (DESYREL) 150 mg tablet   No No   Sig: Take 0 5 tablets (75 mg total) by mouth daily at bedtime      Facility-Administered Medications: None       No Known Allergies    Objective   Temp:  [97 1 °F (36 2 °C)-97 6 °F (36 4 °C)] 97 4 °F (36 3 °C)  HR:  [69-82] 69  Resp:  [16-18] 18  BP: (118-124)/(67-70) 124/70    Intake/Output Summary (Last 24 hours) at 5/25/2022 1433  Last data filed at 5/25/2022 1018  Gross per 24 hour   Intake 1902 37 ml   Output --   Net 1902 37 ml       Physical Exam  Vitals and nursing note reviewed  Constitutional:       General: She is awake  She is not in acute distress  Appearance: She is not ill-appearing, toxic-appearing or diaphoretic  Eyes:      Conjunctiva/sclera: Conjunctivae normal       Pupils: Pupils are equal, round, and reactive to light  Cardiovascular:      Rate and Rhythm: Normal rate and regular rhythm  Skin:     General: Skin is warm and dry  Capillary Refill: Capillary refill takes less than 2 seconds  Neurological:      Mental Status: She is alert and oriented to person, place, and time  GCS: GCS eye subscore is 4  GCS verbal subscore is 5  GCS motor subscore is 6  Psychiatric:         Attention and Perception: Attention normal          Speech: Speech normal          Behavior: Behavior normal  Behavior is cooperative  Lab Results:   I have personally reviewed pertinent labs  , CBC: No results found for: WBC, HGB, HCT, MCV, PLT, ADJUSTEDWBC, MCH, MCHC, RDW, MPV, NRBC, CMP: No results found for: SODIUM, K, CL, CO2, ANIONGAP, BUN, CREATININE, GLUCOSE, CALCIUM, AST, ALT, ALKPHOS, PROT, BILITOT, EGFR, BMP:No results found for: SODIUM, K, CL, CO2, BUN, CREATININE, GLUC, GLUF, CALCIUM, AGAP, EGFR, PT/PTT:  Lab Results   Component Value Date    PTT 73 (H) 05/25/2022    Estimated Creatinine Clearance: 90 8 mL/min (by C-G formula based on SCr of 0 71 mg/dL)  Imaging Studies: I have personally reviewed pertinent reports  EKG, Pathology, and Other Studies: I have personally reviewed pertinent reports  Counseling / Coordination of Care  Greater than 50% of total time was spent with the patient and / or family counseling and / or coordination of care   A description of the counseling / coordination of care:  Patient interview, physical examination, review of PDMP, review of medical record, review of imaging and laboratory data, development of pain management plan, discussion of pain management plan with patient and primary service  Please note that the APS provides consultative services regarding pain management only  With the exception of ketamine and epidural infusions and except when indicated, final decisions regarding starting or changing doses of analgesic medications are at the discretion of the consulting service  Off hours consultation and/or medication management is generally not available      Nguyen Newman PA-C  Acute Pain Service

## 2022-05-25 NOTE — ASSESSMENT & PLAN NOTE
· Admitted on 5/20/22 with left upper quadrant pain radiating to left shoulder  · Found to have recurrent splenic abscess, being treated with IV antibiotics  · No surgery planned  · Patient likely to be discharged today or tomorrow  · APS asked to give recommendations for discharge medications  · Per patient and nursing, patient has been active and independent walking around hallways and performing ADLs  · Continue Tylenol 975 mg p o  q 6 hours scheduled  · Continue Neurontin 100 mg p o  t i d  scheduled  · Continue lidocaine patch, on for 12 hours and off for 12 hours  · Continue Robaxin 500 mg p o  q 6 hours scheduled  · Continue oxycodone 5 mg p o  q 4 hours p r n  moderate pain  · Continue oxycodone 10 mg p o  q 4 hours p r n  severe pain  · Suggest discontinue IV Dilaudid  · Continue bowel regimen while on opioid pain medication to avoid opioid induced constipation  · At discharge, suggest the following:  · Tylenol 975 mg p o  q 8 hours scheduled  · Neurontin 100 mg p o  t i d  scheduled  · Lidocaine patch, on for 12 hours and off for 12 hours  · Robaxin 500 mg p o  q 6 hours scheduled  · Oxycodone 5 mg p o  q 4 hours p r n  moderate to severe pain x3 days, then discontinue  · Bowel regimen while on opioid pain medication

## 2022-05-26 VITALS
WEIGHT: 197.9 LBS | RESPIRATION RATE: 18 BRPM | SYSTOLIC BLOOD PRESSURE: 147 MMHG | OXYGEN SATURATION: 94 % | HEART RATE: 69 BPM | BODY MASS INDEX: 35.07 KG/M2 | HEIGHT: 63 IN | DIASTOLIC BLOOD PRESSURE: 75 MMHG | TEMPERATURE: 97.7 F

## 2022-05-26 PROCEDURE — 99238 HOSP IP/OBS DSCHRG MGMT 30/<: CPT | Performed by: SURGERY

## 2022-05-26 PROCEDURE — NC001 PR NO CHARGE: Performed by: SURGERY

## 2022-05-26 RX ORDER — METHOCARBAMOL 500 MG/1
500 TABLET, FILM COATED ORAL EVERY 6 HOURS SCHEDULED
Qty: 40 TABLET | Refills: 0 | Status: SHIPPED | OUTPATIENT
Start: 2022-05-26

## 2022-05-26 RX ORDER — OXYCODONE HYDROCHLORIDE 5 MG/1
5-10 TABLET ORAL EVERY 4 HOURS PRN
Qty: 36 TABLET | Refills: 0 | Status: SHIPPED | OUTPATIENT
Start: 2022-05-26 | End: 2022-05-29

## 2022-05-26 RX ORDER — GABAPENTIN 100 MG/1
100 CAPSULE ORAL 3 TIMES DAILY
Qty: 30 CAPSULE | Refills: 0 | Status: SHIPPED | OUTPATIENT
Start: 2022-05-26 | End: 2022-06-05

## 2022-05-26 RX ADMIN — CLOTRIMAZOLE: 0.01 CREAM TOPICAL at 08:22

## 2022-05-26 RX ADMIN — OXYCODONE HYDROCHLORIDE 10 MG: 10 TABLET ORAL at 02:44

## 2022-05-26 RX ADMIN — LIDOCAINE 5% 1 PATCH: 700 PATCH TOPICAL at 08:20

## 2022-05-26 RX ADMIN — METHOCARBAMOL 500 MG: 500 TABLET, FILM COATED ORAL at 05:36

## 2022-05-26 RX ADMIN — PIPERACILLIN AND TAZOBACTAM 3.38 G: 36; 4.5 INJECTION, POWDER, FOR SOLUTION INTRAVENOUS at 08:25

## 2022-05-26 RX ADMIN — SENNOSIDES 8.6 MG: 8.6 TABLET, FILM COATED ORAL at 08:19

## 2022-05-26 RX ADMIN — LEVOTHYROXINE SODIUM 150 MCG: 75 TABLET ORAL at 05:36

## 2022-05-26 RX ADMIN — CITALOPRAM HYDROBROMIDE 40 MG: 20 TABLET ORAL at 08:19

## 2022-05-26 RX ADMIN — ACETAMINOPHEN 975 MG: 325 TABLET, FILM COATED ORAL at 11:18

## 2022-05-26 RX ADMIN — METHOCARBAMOL 500 MG: 500 TABLET, FILM COATED ORAL at 11:18

## 2022-05-26 RX ADMIN — PANTOPRAZOLE SODIUM 40 MG: 40 TABLET, DELAYED RELEASE ORAL at 05:36

## 2022-05-26 RX ADMIN — ACETAMINOPHEN 975 MG: 325 TABLET, FILM COATED ORAL at 00:10

## 2022-05-26 RX ADMIN — PIPERACILLIN AND TAZOBACTAM 3.38 G: 36; 4.5 INJECTION, POWDER, FOR SOLUTION INTRAVENOUS at 02:44

## 2022-05-26 RX ADMIN — OXYCODONE HYDROCHLORIDE 10 MG: 10 TABLET ORAL at 08:30

## 2022-05-26 RX ADMIN — NYSTATIN: 100000 POWDER TOPICAL at 08:22

## 2022-05-26 RX ADMIN — METHOCARBAMOL 500 MG: 500 TABLET, FILM COATED ORAL at 00:11

## 2022-05-26 RX ADMIN — GABAPENTIN 100 MG: 100 CAPSULE ORAL at 08:19

## 2022-05-26 RX ADMIN — ACETAMINOPHEN 975 MG: 325 TABLET, FILM COATED ORAL at 05:36

## 2022-05-26 RX ADMIN — APIXABAN 5 MG: 5 TABLET, FILM COATED ORAL at 08:20

## 2022-05-26 NOTE — DISCHARGE INSTRUCTIONS
Please continue the remainder of your course of oral antibiotics as previously prescribed  Please call and schedule a follow up in 2 weeks with the general surgery service  Continue activities and diet as tolerated  Please return with increasing fevers, chills, or worsening pain    Continue follow up with infectious disease on 6/8 @ 1030AM

## 2022-05-26 NOTE — PROGRESS NOTES
Progress Note - General Surgery   Electa Shallow 59 y o  female MRN: [id number]  Unit/Bed#: [facility] 628-01 Encounter: [id number]    Assessment:  59 y o  female with perisplenic abscess     Afebrile, Stable VS on room air  Plan:  Regular post-gastrectomy diet  Appreciate ID recs, transition Augmentin today for discharge  Eliquis  APS recommendations appreciated  Discharge today      Subjective/Objective     Subjective:   No acute events overnight  Continued pain in the left flank and shoulder  No nausea no vomiting  Having bowel function  Objective:     Blood pressure 115/64, pulse 69, temperature 97 8 °F (36 6 °C), resp  rate 15, height 5' 3" (1 6 m), weight 89 8 kg (197 lb 14 4 oz), last menstrual period 08/03/2016, SpO2 94 %, not currently breastfeeding  ,Body mass index is 35 06 kg/m²        Intake/Output Summary (Last 24 hours) at 5/25/2022 2346  Last data filed at 5/25/2022 1018  Gross per 24 hour   Intake 1262 37 ml   Output --   Net 1262 37 ml       Invasive Devices  Report    Peripheral Intravenous Line  Duration           Peripheral IV 05/24/22 Right Wrist 1 day                Physical Exam:   Gen: NAD, Comfortable  Neuro: A&O, No focal deficits  Head: Normal Cephalic, Atraumatic  Eye: EOMI, PERRLA, No scleral icterus  Neck: Supple, No JVD, Midline trachea  CV: RRR, Cap refill <2 sec  Pulm: Normal work of breathing, no respiratory distress  Abd: Soft, Non-Distended, Non-Tender  Ext: No edema, Non-tender  Skin: warm, dry, intact

## 2022-05-26 NOTE — PLAN OF CARE
Problem: PAIN - ADULT  Goal: Verbalizes/displays adequate comfort level or baseline comfort level  Description: Interventions:  - Encourage patient to monitor pain and request assistance  - Assess pain using appropriate pain scale  - Administer analgesics based on type and severity of pain and evaluate response  - Implement non-pharmacological measures as appropriate and evaluate response  - Consider cultural and social influences on pain and pain management  - Notify physician/advanced practitioner if interventions unsuccessful or patient reports new pain  Outcome: Progressing     Problem: INFECTION - ADULT  Goal: Absence or prevention of progression during hospitalization  Description: INTERVENTIONS:  - Assess and monitor for signs and symptoms of infection  - Monitor lab/diagnostic results  - Monitor all insertion sites, i e  indwelling lines, tubes, and drains  - Monitor endotracheal if appropriate and nasal secretions for changes in amount and color  - [place] appropriate cooling/warming therapies per order  - Administer medications as ordered  - Instruct and encourage patient and family to use good hand hygiene technique  - Identify and instruct in appropriate isolation precautions for identified infection/condition  Outcome: Progressing  Goal: Absence of fever/infection during neutropenic period  Description: INTERVENTIONS:  - Monitor WBC    Outcome: Progressing     Problem: DISCHARGE PLANNING  Goal: Discharge to home or other facility with appropriate resources  Description: INTERVENTIONS:  - Identify barriers to discharge w/patient and caregiver  - Arrange for needed discharge resources and transportation as appropriate  - Identify discharge learning needs (meds, wound care, etc )  - Arrange for interpretive services to assist at discharge as needed  - Refer to Case Management Department for coordinating discharge planning if the patient needs post-hospital services based on physician/advanced practitioner order or complex needs related to functional status, cognitive ability, or social support system  Outcome: Progressing     Problem: GASTROINTESTINAL - ADULT  Goal: Minimal or absence of nausea and/or vomiting  Description: INTERVENTIONS:  - Administer IV fluids if ordered to ensure adequate hydration  - Maintain NPO status until nausea and vomiting are resolved  - Nasogastric tube if ordered  - Administer ordered antiemetic medications as needed  - Provide nonpharmacologic comfort measures as appropriate  - Advance diet as tolerated, if ordered  - Consider nutrition services referral to assist patient with adequate nutrition and appropriate food choices  Outcome: Progressing  Goal: Maintains or returns to baseline bowel function  Description: INTERVENTIONS:  - Assess bowel function  - Encourage oral fluids to ensure adequate hydration  - Administer IV fluids if ordered to ensure adequate hydration  - Administer ordered medications as needed  - Encourage mobilization and activity  - Consider nutritional services referral to assist patient with adequate nutrition and appropriate food choices  Outcome: Progressing  Goal: Maintains adequate nutritional intake  Description: INTERVENTIONS:  - Monitor percentage of each meal consumed  - Identify factors contributing to decreased intake, treat as appropriate  - Assist with meals as needed  - Monitor I&O, weight, and lab values if indicated  - Obtain nutrition services referral as needed  Outcome: Progressing  Goal: Establish and maintain optimal ostomy function  Description: INTERVENTIONS:  - Assess bowel function  - Encourage oral fluids to ensure adequate hydration  - Administer IV fluids if ordered to ensure adequate hydration   - Administer ordered medications as needed  - Encourage mobilization and activity  - Nutrition services referral to assist patient with appropriate food choices  - Assess stoma site  - Consider wound care consult   Outcome: Progressing  Goal: Oral mucous membranes remain intact  Description: INTERVENTIONS  - Assess oral mucosa and hygiene practices  - Implement preventative oral hygiene regimen  - Implement oral medicated treatments as ordered  - Initiate Nutrition services referral as needed  Outcome: Progressing

## 2022-05-26 NOTE — DISCHARGE SUMMARY
Discharge Summary    Joaquin Leblanc 59 y o  female MRN: [id number]    Unit/Bed#: [facility] 628-01 Encounter: [id number]    Admission Date: 5/20/2022     Discharge Date:/5/26/2022    Attending: Dr Shabana Rosa    Consultants: ID, APS, IR    Admitting Diagnosis: Splenic abscess [D73 3]  Abdominal pain [R10 9]    Principle Diagnosis: Same    Secondary Diagnosis:  Past Medical History:   Diagnosis Date    Disease of thyroid gland     Hernia     Hypertension      Past Surgical History:   Procedure Laterality Date    CHOLECYSTECTOMY      CORONARY ANGIOPLASTY WITH STENT PLACEMENT      IR DRAINAGE TUBE CHECK/CHANGE/REPOSITION/REINSERTION/UPSIZE  5/10/2022    IR DRAINAGE TUBE PLACEMENT  5/7/2022        Procedures Performed:     Imaging:No results found  Discharge Medications:  See after visit summary for reconciled discharge medications provided to patient and family  Brief HPI (per H/P): Per Dr Elisabeth De Santiago, "Joaquin Leblanc is a 59 y o  female with a past medical history of sleeve gastrectomy (OSH 6588) complicated by splenic injury status post embolization and recurrent perisplenic abscesses requiring IR drainage, cyst gastrostomy, CAD, HTN, CHF, DVT on Eliquis, hypothyroidism who presents with 2 days of worsening abdominal pain        Patient is known to our service (hospitalized 5/6-5/13) for splenic abscess in the setting of sleeve gastrectomy from outside hospital, complicated by splenic injury requiring IR embolization, recurrent abscesses/drainage procedures and cyst gastrostomy  Ultimately required IR drainage during most recent admission and was discharged on Augmentin until 6 3  She was recently seen in the office on 05/16 and doing well at that time  Patient states over the last 2 days she has experienced worsening abdominal pain localized to the left upper quadrant and back  Patient states that it is difficult to take a deep breath in due to the pain    No changes to bowel habits or urination; no fevers or chills  Endorses increasing fatigue  Vital signs are stable, she is afebrile, abdomen is tender left upper quadrant without peritoneal signs, WBC 8  CT scan demonstrating 3 x 1 5 x 1 5 cm collection posterior and adjacent to the spleen  No PE  We will admit to Surgical Service, and administer IV antibiotics, consult IR for consideration of drainage "    Hospital Course: Loraine Peralta is a 59 y o  female who presented 5/20/2022 for above  The patient hospital course was uncomplicated  Patient returned to the ED secondary to recurrent pain in the left upper quadrant  CT scan was obtained in the ED which demonstrated a decreasing splenic abscess; however, she was admitted for pain control  IR was consulted for possible drainage  However, given the small size of the abscess there was felt this could be managed by abx alone  ID was consulted for assistance in antibiotics management  Patient remained afebrile with improvement of leukocytosis during hospitlization  She continued to have persistent pain in the left upper quadrant and left shoulder  APS was consulted for assitance in pain control and recommendations for discharge  Pain is felt to be secondary to diaphragm irritation related to her splenic abscess,   Patient was discharged on HD5  On the day of discharge, the patient was voiding spontaneously, ambulating at baseline, and pain was well controlled on current pain regimen  The patient was sent home with prescriptions for pain oxycodone and gabapentin  She was encouraged to  lidocaine patches OTC  She understood all instructions for discharge  She was also given the names and numbers of the providers as well as instructions for follow up appointments  Condition at Discharge: good     Provisions for Follow-Up Care:  See after visit summary for information related to follow-up care and any pertinent home health orders        Disposition: See After Visit Summary for discharge disposition information  Discharge instructions/Information to patient and family:   See after visit summary for information provided to patient and family  Planned Readmission: No    Discharge Statement   I spent 15 minutes discharging the patient  This time was spent on the day of discharge  I had direct contact with the patient on the day of discharge  Additional documentation is required if more than 30 minutes were spent on discharge

## 2022-05-27 NOTE — UTILIZATION REVIEW
Notification of Discharge   This is a Notification of Discharge from our facility [address] [place] [address]  Please be advised that this patient has been discharge from our facility  Below you will find the admission and discharge date and time including the patients disposition  UTILIZATION REVIEW CONTACT:  Roxana Lloyd  Utilization   Network Utilization Review Department  Phone: [phone] x carefully listen to the prompts  All voicemails are confidential   Email: [email or web address]     PHYSICIAN ADVISORY SERVICES:  FOR [id number] REVIEW - MEDICAL NECESSITY DENIAL  Phone: [phone]  Fax: [phone]  Email: [email or web address]     PRESENTATION DATE: 5/20/2022  9:11 PM  OBERVATION ADMISSION DATE: INPATIENT ADMISSION DATE: 5/21/22  3:04 AM   DISCHARGE DATE: 5/26/2022  2:53 PM  DISPOSITION: Home/Self Care Home/Self Care      IMPORTANT INFORMATION:  Send all requests for admission clinical reviews, approved or denied determinations and any other requests to dedicated fax number below belonging to the campus where the patient is receiving treatment   List of dedicated fax numbers:  [address] DENIALS (Administrative/Medical Necessity) [phone]   [address] (Maternity/NICU/Pediatrics) [phone]   [facility] [phone]   [address] [place] [address] [phone]   [address] [place] [address] [phone]   [address] [phone]   [facility]  [phone]   2205 [facility]  2401 [facility] 1000 [address] [facility] [phone]

## 2022-05-31 DIAGNOSIS — L30.4 INTERTRIGO: ICD-10-CM

## 2022-06-02 ENCOUNTER — TELEPHONE (OUTPATIENT)
Dept: INFECTIOUS DISEASES | Facility: CLINIC | Age: 57
End: 2022-06-02

## 2022-06-02 NOTE — TELEPHONE ENCOUNTER
Pt called for 2 reasons:    1  Pt is concerned about her appt on 6/8 as she has [employer] and knows the ED takes her insurance but she believes outpt isn't accepted  She is also in the process of getting medical assistance through [place]  Wants to make sure she will be able to come in for her appt  Informed her will have colleague take look into it  2  Pt called regarding noticing a little change when taking the antibiotic  States getting rash in groin and breast area, bothers her stomach when taking the medication  She hasn't taken any antinausea medication and stopped taking for a few days the protonix and robaxin for a few days to see if that was causing it  Spoke with Dr Maurisio Muñiz  She can buy antifungal cream OTC for rash in groin and breast  Continue protonix, if can last few days it is for the better, if can't tolerate then can stop and notify our office  Minimum of 28 days abx better for the 42 days  Called pt informed her of recommendations above and will follow up tomorrow see how she is doing and then also follow up at appt  Pt states it's fair

## 2022-06-03 NOTE — TELEPHONE ENCOUNTER
This MA reached out to patient to remind of lab orders to be done, pt verbalize understanding  Pt express concern of different state health insurance, this MA informed patient that labs can be done in  [place] if patient wishes

## 2022-06-06 ENCOUNTER — HOSPITAL ENCOUNTER (INPATIENT)
Facility: HOSPITAL | Age: 57
LOS: 6 days | Discharge: HOME | DRG: 863 | End: 2022-06-15
Attending: EMERGENCY MEDICINE | Admitting: SURGERY
Payer: MEDICARE

## 2022-06-06 ENCOUNTER — APPOINTMENT (EMERGENCY)
Dept: RADIOLOGY | Facility: HOSPITAL | Age: 57
DRG: 863 | End: 2022-06-06
Payer: MEDICARE

## 2022-06-06 DIAGNOSIS — D73.3 SPLENIC ABSCESS: Primary | ICD-10-CM

## 2022-06-06 LAB
ALBUMIN SERPL BCP-MCNC: 2.5 G/DL (ref 3.5–5)
ALP SERPL-CCNC: 92 U/L (ref 46–116)
ALT SERPL W P-5'-P-CCNC: 24 U/L (ref 12–78)
ANION GAP SERPL CALCULATED.3IONS-SCNC: 6 MMOL/L (ref 4–13)
AST SERPL W P-5'-P-CCNC: 16 U/L (ref 5–45)
BASOPHILS # BLD AUTO: 0.04 THOUSANDS/ΜL (ref 0–0.1)
BASOPHILS NFR BLD AUTO: 0 % (ref 0–1)
BILIRUB DIRECT SERPL-MCNC: 0.06 MG/DL (ref 0–0.2)
BILIRUB SERPL-MCNC: 0.23 MG/DL (ref 0.2–1)
BUN SERPL-MCNC: 14 MG/DL (ref 5–25)
CALCIUM SERPL-MCNC: 8.9 MG/DL (ref 8.3–10.1)
CHLORIDE SERPL-SCNC: 107 MMOL/L (ref 100–108)
CO2 SERPL-SCNC: 27 MMOL/L (ref 21–32)
CREAT SERPL-MCNC: 0.88 MG/DL (ref 0.6–1.3)
EOSINOPHIL # BLD AUTO: 0.1 THOUSAND/ΜL (ref 0–0.61)
EOSINOPHIL NFR BLD AUTO: 1 % (ref 0–6)
ERYTHROCYTE [DISTWIDTH] IN BLOOD BY AUTOMATED COUNT: 18.6 % (ref 11.6–15.1)
GFR SERPL CREATININE-BSD FRML MDRD: 72 ML/MIN/1.73SQ M
GLUCOSE SERPL-MCNC: 100 MG/DL (ref 65–140)
HCT VFR BLD AUTO: 34.2 % (ref 34.8–46.1)
HGB BLD-MCNC: 11.1 G/DL (ref 11.5–15.4)
IMM GRANULOCYTES # BLD AUTO: 0.03 THOUSAND/UL (ref 0–0.2)
IMM GRANULOCYTES NFR BLD AUTO: 0 % (ref 0–2)
LIPASE SERPL-CCNC: 150 U/L (ref 73–393)
LYMPHOCYTES # BLD AUTO: 2.74 THOUSANDS/ΜL (ref 0.6–4.47)
LYMPHOCYTES NFR BLD AUTO: 26 % (ref 14–44)
MCH RBC QN AUTO: 26.2 PG (ref 26.8–34.3)
MCHC RBC AUTO-ENTMCNC: 32.5 G/DL (ref 31.4–37.4)
MCV RBC AUTO: 81 FL (ref 82–98)
MONOCYTES # BLD AUTO: 0.99 THOUSAND/ΜL (ref 0.17–1.22)
MONOCYTES NFR BLD AUTO: 9 % (ref 4–12)
NEUTROPHILS # BLD AUTO: 6.78 THOUSANDS/ΜL (ref 1.85–7.62)
NEUTS SEG NFR BLD AUTO: 64 % (ref 43–75)
NRBC BLD AUTO-RTO: 0 /100 WBCS
PLATELET # BLD AUTO: 461 THOUSANDS/UL (ref 149–390)
PMV BLD AUTO: 9.1 FL (ref 8.9–12.7)
POTASSIUM SERPL-SCNC: 3.7 MMOL/L (ref 3.5–5.3)
PROT SERPL-MCNC: 7.6 G/DL (ref 6.4–8.2)
RBC # BLD AUTO: 4.24 MILLION/UL (ref 3.81–5.12)
SODIUM SERPL-SCNC: 140 MMOL/L (ref 136–145)
WBC # BLD AUTO: 10.68 THOUSAND/UL (ref 4.31–10.16)

## 2022-06-06 PROCEDURE — 80048 BASIC METABOLIC PNL TOTAL CA: CPT | Performed by: EMERGENCY MEDICINE

## 2022-06-06 PROCEDURE — 99285 EMERGENCY DEPT VISIT HI MDM: CPT | Performed by: EMERGENCY MEDICINE

## 2022-06-06 PROCEDURE — 36415 COLL VENOUS BLD VENIPUNCTURE: CPT | Performed by: EMERGENCY MEDICINE

## 2022-06-06 PROCEDURE — 96374 THER/PROPH/DIAG INJ IV PUSH: CPT

## 2022-06-06 PROCEDURE — 83690 ASSAY OF LIPASE: CPT | Performed by: EMERGENCY MEDICINE

## 2022-06-06 PROCEDURE — 96375 TX/PRO/DX INJ NEW DRUG ADDON: CPT

## 2022-06-06 PROCEDURE — 80076 HEPATIC FUNCTION PANEL: CPT | Performed by: EMERGENCY MEDICINE

## 2022-06-06 PROCEDURE — 85025 COMPLETE CBC W/AUTO DIFF WBC: CPT | Performed by: EMERGENCY MEDICINE

## 2022-06-06 PROCEDURE — 99285 EMERGENCY DEPT VISIT HI MDM: CPT

## 2022-06-06 RX ORDER — ONDANSETRON 2 MG/ML
4 INJECTION INTRAMUSCULAR; INTRAVENOUS ONCE
Status: COMPLETED | OUTPATIENT
Start: 2022-06-06 | End: 2022-06-06

## 2022-06-06 RX ORDER — DIPHENHYDRAMINE HYDROCHLORIDE 50 MG/ML
12.5 INJECTION INTRAMUSCULAR; INTRAVENOUS ONCE
Status: COMPLETED | OUTPATIENT
Start: 2022-06-07 | End: 2022-06-07

## 2022-06-06 RX ORDER — MORPHINE SULFATE 10 MG/ML
6 INJECTION, SOLUTION INTRAMUSCULAR; INTRAVENOUS ONCE
Status: COMPLETED | OUTPATIENT
Start: 2022-06-06 | End: 2022-06-06

## 2022-06-06 RX ADMIN — ONDANSETRON 4 MG: 2 INJECTION INTRAMUSCULAR; INTRAVENOUS at 22:58

## 2022-06-06 RX ADMIN — MORPHINE SULFATE 6 MG: 10 INJECTION INTRAVENOUS at 22:59

## 2022-06-07 ENCOUNTER — APPOINTMENT (EMERGENCY)
Dept: RADIOLOGY | Facility: HOSPITAL | Age: 57
DRG: 863 | End: 2022-06-07
Payer: MEDICARE

## 2022-06-07 LAB
APTT PPP: 31 SECONDS (ref 23–37)
APTT PPP: 81 SECONDS (ref 23–37)
INR PPP: 1.03 (ref 0.84–1.19)
PROTHROMBIN TIME: 13.1 SECONDS (ref 11.6–14.5)

## 2022-06-07 PROCEDURE — 36415 COLL VENOUS BLD VENIPUNCTURE: CPT

## 2022-06-07 PROCEDURE — 96374 THER/PROPH/DIAG INJ IV PUSH: CPT

## 2022-06-07 PROCEDURE — 96375 TX/PRO/DX INJ NEW DRUG ADDON: CPT

## 2022-06-07 PROCEDURE — 74177 CT ABD & PELVIS W/CONTRAST: CPT

## 2022-06-07 PROCEDURE — 99214 OFFICE O/P EST MOD 30 MIN: CPT | Performed by: INTERNAL MEDICINE

## 2022-06-07 PROCEDURE — 99219 PR INITIAL OBSERVATION CARE/DAY 50 MINUTES: CPT | Performed by: SURGERY

## 2022-06-07 PROCEDURE — 85610 PROTHROMBIN TIME: CPT

## 2022-06-07 PROCEDURE — G1004 CDSM NDSC: HCPCS

## 2022-06-07 PROCEDURE — 85730 THROMBOPLASTIN TIME PARTIAL: CPT | Performed by: SURGERY

## 2022-06-07 PROCEDURE — 85730 THROMBOPLASTIN TIME PARTIAL: CPT

## 2022-06-07 PROCEDURE — NC001 PR NO CHARGE

## 2022-06-07 RX ORDER — HEPARIN SODIUM 1000 [USP'U]/ML
3400 INJECTION, SOLUTION INTRAVENOUS; SUBCUTANEOUS
Status: DISCONTINUED | OUTPATIENT
Start: 2022-06-07 | End: 2022-06-08

## 2022-06-07 RX ORDER — HEPARIN SODIUM 10000 [USP'U]/100ML
3-30 INJECTION, SOLUTION INTRAVENOUS
Status: DISCONTINUED | OUTPATIENT
Start: 2022-06-07 | End: 2022-06-08

## 2022-06-07 RX ORDER — OXYCODONE HYDROCHLORIDE 10 MG/1
10 TABLET ORAL EVERY 4 HOURS PRN
Status: DISCONTINUED | OUTPATIENT
Start: 2022-06-07 | End: 2022-06-15 | Stop reason: HOSPADM

## 2022-06-07 RX ORDER — CITALOPRAM 20 MG/1
40 TABLET ORAL DAILY
Status: DISCONTINUED | OUTPATIENT
Start: 2022-06-07 | End: 2022-06-15 | Stop reason: HOSPADM

## 2022-06-07 RX ORDER — HYDROMORPHONE HCL IN WATER/PF 6 MG/30 ML
0.2 PATIENT CONTROLLED ANALGESIA SYRINGE INTRAVENOUS ONCE
Status: COMPLETED | OUTPATIENT
Start: 2022-06-07 | End: 2022-06-07

## 2022-06-07 RX ORDER — SODIUM CHLORIDE, SODIUM LACTATE, POTASSIUM CHLORIDE, CALCIUM CHLORIDE 600; 310; 30; 20 MG/100ML; MG/100ML; MG/100ML; MG/100ML
125 INJECTION, SOLUTION INTRAVENOUS CONTINUOUS
Status: DISCONTINUED | OUTPATIENT
Start: 2022-06-07 | End: 2022-06-08

## 2022-06-07 RX ORDER — LEVOTHYROXINE SODIUM 0.07 MG/1
150 TABLET ORAL
Status: DISCONTINUED | OUTPATIENT
Start: 2022-06-07 | End: 2022-06-15 | Stop reason: HOSPADM

## 2022-06-07 RX ORDER — OXYCODONE HYDROCHLORIDE 5 MG/1
5 TABLET ORAL EVERY 4 HOURS PRN
Status: DISCONTINUED | OUTPATIENT
Start: 2022-06-07 | End: 2022-06-15 | Stop reason: HOSPADM

## 2022-06-07 RX ORDER — AMLODIPINE BESYLATE 10 MG/1
10 TABLET ORAL DAILY
Status: DISCONTINUED | OUTPATIENT
Start: 2022-06-07 | End: 2022-06-15 | Stop reason: HOSPADM

## 2022-06-07 RX ORDER — ACETAMINOPHEN 325 MG/1
650 TABLET ORAL EVERY 6 HOURS SCHEDULED
Status: DISCONTINUED | OUTPATIENT
Start: 2022-06-07 | End: 2022-06-11

## 2022-06-07 RX ORDER — HEPARIN SODIUM 1000 [USP'U]/ML
6800 INJECTION, SOLUTION INTRAVENOUS; SUBCUTANEOUS
Status: DISCONTINUED | OUTPATIENT
Start: 2022-06-07 | End: 2022-06-08

## 2022-06-07 RX ORDER — TRAZODONE HYDROCHLORIDE 50 MG/1
75 TABLET ORAL
Status: DISCONTINUED | OUTPATIENT
Start: 2022-06-07 | End: 2022-06-15 | Stop reason: HOSPADM

## 2022-06-07 RX ORDER — ONDANSETRON 2 MG/ML
4 INJECTION INTRAMUSCULAR; INTRAVENOUS EVERY 6 HOURS PRN
Status: DISCONTINUED | OUTPATIENT
Start: 2022-06-07 | End: 2022-06-15 | Stop reason: HOSPADM

## 2022-06-07 RX ORDER — HYDROMORPHONE HCL/PF 1 MG/ML
0.5 SYRINGE (ML) INJECTION EVERY 4 HOURS PRN
Status: DISCONTINUED | OUTPATIENT
Start: 2022-06-07 | End: 2022-06-13

## 2022-06-07 RX ORDER — PANTOPRAZOLE SODIUM 40 MG/1
40 TABLET, DELAYED RELEASE ORAL
Status: DISCONTINUED | OUTPATIENT
Start: 2022-06-07 | End: 2022-06-15 | Stop reason: HOSPADM

## 2022-06-07 RX ADMIN — OXYCODONE HYDROCHLORIDE 10 MG: 10 TABLET ORAL at 11:55

## 2022-06-07 RX ADMIN — DIPHENHYDRAMINE HYDROCHLORIDE 12.5 MG: 50 INJECTION, SOLUTION INTRAMUSCULAR; INTRAVENOUS at 00:06

## 2022-06-07 RX ADMIN — ACETAMINOPHEN 650 MG: 325 TABLET, FILM COATED ORAL at 23:24

## 2022-06-07 RX ADMIN — ACETAMINOPHEN 650 MG: 325 TABLET, FILM COATED ORAL at 05:21

## 2022-06-07 RX ADMIN — HYDROMORPHONE HYDROCHLORIDE 0.5 MG: 1 INJECTION, SOLUTION INTRAMUSCULAR; INTRAVENOUS; SUBCUTANEOUS at 22:39

## 2022-06-07 RX ADMIN — OXYCODONE HYDROCHLORIDE 10 MG: 10 TABLET ORAL at 21:30

## 2022-06-07 RX ADMIN — TRAZODONE HYDROCHLORIDE 75 MG: 50 TABLET ORAL at 21:30

## 2022-06-07 RX ADMIN — HEPARIN SODIUM 18 UNITS/KG/HR: 10000 INJECTION, SOLUTION INTRAVENOUS at 05:22

## 2022-06-07 RX ADMIN — PIPERACILLIN AND TAZOBACTAM 3.38 G: 36; 4.5 INJECTION, POWDER, FOR SOLUTION INTRAVENOUS at 22:38

## 2022-06-07 RX ADMIN — ACETAMINOPHEN 650 MG: 325 TABLET, FILM COATED ORAL at 18:07

## 2022-06-07 RX ADMIN — HEPARIN SODIUM 18 UNITS/KG/HR: 10000 INJECTION, SOLUTION INTRAVENOUS at 21:36

## 2022-06-07 RX ADMIN — SODIUM CHLORIDE, SODIUM LACTATE, POTASSIUM CHLORIDE, AND CALCIUM CHLORIDE 125 ML/HR: .6; .31; .03; .02 INJECTION, SOLUTION INTRAVENOUS at 06:09

## 2022-06-07 RX ADMIN — CITALOPRAM HYDROBROMIDE 40 MG: 20 TABLET ORAL at 10:13

## 2022-06-07 RX ADMIN — PIPERACILLIN AND TAZOBACTAM 3.38 G: 36; 4.5 INJECTION, POWDER, FOR SOLUTION INTRAVENOUS at 18:07

## 2022-06-07 RX ADMIN — PIPERACILLIN AND TAZOBACTAM 3.38 G: 36; 4.5 INJECTION, POWDER, FOR SOLUTION INTRAVENOUS at 11:58

## 2022-06-07 RX ADMIN — PIPERACILLIN AND TAZOBACTAM 3.38 G: 36; 4.5 INJECTION, POWDER, FOR SOLUTION INTRAVENOUS at 05:21

## 2022-06-07 RX ADMIN — OXYCODONE HYDROCHLORIDE 10 MG: 10 TABLET ORAL at 16:45

## 2022-06-07 RX ADMIN — AMLODIPINE BESYLATE 10 MG: 10 TABLET ORAL at 10:14

## 2022-06-07 RX ADMIN — IOHEXOL 50 ML: 240 INJECTION, SOLUTION INTRATHECAL; INTRAVASCULAR; INTRAVENOUS; ORAL at 00:07

## 2022-06-07 RX ADMIN — IOHEXOL 65 ML: 350 INJECTION, SOLUTION INTRAVENOUS at 01:30

## 2022-06-07 RX ADMIN — SODIUM CHLORIDE, SODIUM LACTATE, POTASSIUM CHLORIDE, AND CALCIUM CHLORIDE 125 ML/HR: .6; .31; .03; .02 INJECTION, SOLUTION INTRAVENOUS at 16:45

## 2022-06-07 RX ADMIN — LEVOTHYROXINE SODIUM 150 MCG: 75 TABLET ORAL at 06:09

## 2022-06-07 RX ADMIN — OXYCODONE HYDROCHLORIDE 10 MG: 10 TABLET ORAL at 06:24

## 2022-06-07 RX ADMIN — ACETAMINOPHEN 650 MG: 325 TABLET, FILM COATED ORAL at 11:55

## 2022-06-07 RX ADMIN — HYDROMORPHONE HYDROCHLORIDE 0.2 MG: 0.2 INJECTION, SOLUTION INTRAMUSCULAR; INTRAVENOUS; SUBCUTANEOUS at 02:10

## 2022-06-07 RX ADMIN — PANTOPRAZOLE SODIUM 40 MG: 40 TABLET, DELAYED RELEASE ORAL at 05:21

## 2022-06-07 NOTE — TELEMEDICINE
e-Consult (IPC)  - Interventional Radiology  Yolanda Velasquez 59 y o  female MRN: [id number]  Unit/Bed#: [facility] 909-01 Encounter: [id number]          Interventional Radiology has been consulted to evaluate Yolanda Velasquez    We were consulted by Surgery concerning this patient with perisplenic abscess  IP Consult to IR  Consult performed by: [facility]  Consult ordered by: Severo Snipes, MD        06/07/22    Assessment/Recommendation:   Yolanda Velasquez, 59y female with a history of a splenic abscess  Patient has been being managed on antibiotic therapy  Patient returned with c/o abd pain, nausea and chills x 2 days, afebrile, no emesis  Patient had a previous drain placed by IR 5/7/22, drain was removed three days later - collection resolved  CT today showed a redemonstrated fluid collection 2 8 cm within the superior spleen with foci of gas  Unfortunately, there is no safe window to access for aspiration or drain placement  Pocket resides under the diaphragm which is too difficult to access  Recommend continuing antibiotic therapy  Total time spent in review of data, discussion with requesting provider and rendering advice was 22 minutes     Thank you for allowing Interventional Radiology to participate in the care of Yolanda Velasquez  Please don't hesitate to call or TigerText us with any questions       [facility]

## 2022-06-07 NOTE — H&P (VIEW-ONLY)
History  Chief Complaint   Patient presents with    Pain     Had a punctured spleen in aug of last year, having complications from that was admitted here 2 weeks ago  C/o same pain on left side  No fever  Chills and nausea no vomiting already on abx     Trish Ontiveros is an 59y o  year old female with PMHx significant for splenic abscess, hypothyroidism, HTN, who presents to the ED today with abdominal pain and chills for about 2 days  Abscess has recently been managed with IR drainage and abx  Abdominal pain is in the LUQ and L flank, exacerbated by movement and relieved by resting  The pain is throbbing in quality, does radiate to the L shoulder, and currently rated moderate to severe in severity  Patient also endorses nausea, fatigue, difficulty taking deep breaths due to pain  The patient denies fevers, headaches, lightheadedness or syncope, vomiting, chest pain, rhinorrhea, sore throat, cough, changes in usual bowel movements, changes with urination,  pain anywhere else in body  ROS otherwise negative  History provided by:  Medical records and patient   used: No        Prior to Admission Medications   Prescriptions Last Dose Informant Patient Reported? Taking? Lidocaine 4 % PTCH   No No   Sig: Apply 1 patch topically daily   acetaminophen (TYLENOL) 325 mg tablet   No No   Sig: Take 2 tablets (650 mg total) by mouth every 4 (four) hours as needed for mild pain   amLODIPine (NORVASC) 10 mg tablet   No No   Sig: Take 1 tablet (10 mg total) by mouth in the morning  calcium carbonate (TUMS) 500 mg chewable tablet   No No   Sig: Chew 2 tablets (1,000 mg total)  as needed in the morning and 2 tablets (1,000 mg total) as needed at noon and 2 tablets (1,000 mg total) as needed in the evening (indigestion,heartburn)  citalopram (CeleXA) 40 mg tablet   No No   Sig: Take 1 tablet (40 mg total) by mouth in the morning     clotrimazole (LOTRIMIN) 1 % cream   No No   Sig: Apply topically 2 (two) times a day   gabapentin (NEURONTIN) 100 mg capsule   No No   Sig: Take 1 capsule (100 mg total) by mouth 3 (three) times a day for 10 days   levothyroxine 150 mcg tablet   No No   Sig: Take 1 tablet (150 mcg total) by mouth daily in the early morning   methocarbamol (ROBAXIN) 500 mg tablet   No No   Sig: Take 1 tablet (500 mg total) by mouth every 6 (six) hours   pantoprazole (PROTONIX) 40 mg tablet   No No   Sig: Take 1 tablet (40 mg total) by mouth daily in the early morning   senna (SENOKOT) 8 6 mg   No No   Sig: Take 1 tablet (8 6 mg total) by mouth in the morning for 5 days  traZODone (DESYREL) 150 mg tablet   No No   Sig: Take 0 5 tablets (75 mg total) by mouth daily at bedtime      Facility-Administered Medications: None       Past Medical History:   Diagnosis Date    Disease of thyroid gland     Hernia     Hypertension        Past Surgical History:   Procedure Laterality Date    CHOLECYSTECTOMY      CORONARY ANGIOPLASTY WITH STENT PLACEMENT      IR DRAINAGE TUBE CHECK/CHANGE/REPOSITION/REINSERTION/UPSIZE  5/10/2022    IR DRAINAGE TUBE PLACEMENT  5/7/2022       History reviewed  No pertinent family history  I have reviewed and agree with the history as documented  E-Cigarette/Vaping     E-Cigarette/Vaping Substances     Social History     Tobacco Use    Smoking status: Former Smoker    Smokeless tobacco: Never Used   Substance Use Topics    Alcohol use: No    Drug use: No        Review of Systems   Reason unable to perform ROS: please see above for ROS  All other ROS negative         Physical Exam  ED Triage Vitals [06/06/22 2029]   Temperature Pulse Respirations Blood Pressure SpO2   98 1 °F (36 7 °C) 89 16 151/84 98 %      Temp Source Heart Rate Source Patient Position - Orthostatic VS BP Location FiO2 (%)   Temporal Monitor Sitting Left arm --      Pain Score       8             Orthostatic Vital Signs  Vitals:    06/06/22 2029 06/07/22 0005 06/07/22 0215 06/07/22 0500 BP: 151/84 118/59 92/58 101/64   Pulse: 89 76 76    Patient Position - Orthostatic VS: Sitting          Physical Exam    ED Medications  Medications   lactated ringers infusion (has no administration in time range)   ondansetron (ZOFRAN) injection 4 mg (has no administration in time range)   piperacillin-tazobactam (ZOSYN) 3 375 g in sodium chloride 0 9 % 100 mL IVPB (has no administration in time range)   acetaminophen (TYLENOL) tablet 650 mg (has no administration in time range)   oxyCODONE (ROXICODONE) IR tablet 5 mg (has no administration in time range)   oxyCODONE (ROXICODONE) immediate release tablet 10 mg (has no administration in time range)   amLODIPine (NORVASC) tablet 10 mg (has no administration in time range)   citalopram (CeleXA) tablet 40 mg (has no administration in time range)   levothyroxine tablet 150 mcg (has no administration in time range)   pantoprazole (PROTONIX) EC tablet 40 mg (has no administration in time range)   traZODone (DESYREL) tablet 75 mg (has no administration in time range)   heparin (porcine) 25,000 units in 0 45% NaCl 250 mL infusion (premix) (has no administration in time range)   heparin (porcine) injection 6,800 Units (has no administration in time range)   heparin (porcine) injection 3,400 Units (has no administration in time range)   morphine 10 mg/mL injection 6 mg (6 mg Intravenous Given 6/6/22 2259)   ondansetron (ZOFRAN) injection 4 mg (4 mg Intravenous Given 6/6/22 2258)   iohexol (OMNIPAQUE) 240 MG/ML solution 50 mL (50 mL Oral Given 6/7/22 0007)   diphenhydrAMINE (BENADRYL) injection 12 5 mg (12 5 mg Intravenous Given 6/7/22 0006)   iohexol (OMNIPAQUE) 350 MG/ML injection (MULTI-DOSE) 65 mL (65 mL Intravenous Given 6/7/22 0130)   HYDROmorphone HCl (DILAUDID) injection 0 2 mg (0 2 mg Intravenous Given 6/7/22 0210)       Diagnostic Studies  Results Reviewed     Procedure Component Value Units Date/Time    APTT [[id number]]     Lab Status: No result Specimen: Blood Protime-INR [[id number]]     Lab Status: No result Specimen: Blood     Lipase [[id number]]  (Normal) Collected: 06/06/22 2258    Lab Status: Final result Specimen: Blood from Arm, Right Updated: 06/06/22 2346     Lipase 150 u/L     Basic metabolic panel [[id number]] Collected: 06/06/22 2258    Lab Status: Final result Specimen: Blood from Arm, Right Updated: 06/06/22 2346     Sodium 140 mmol/L      Potassium 3 7 mmol/L      Chloride 107 mmol/L      CO2 27 mmol/L      ANION GAP 6 mmol/L      BUN 14 mg/dL      Creatinine 0 88 mg/dL      Glucose 100 mg/dL      Calcium 8 9 mg/dL      eGFR 72 ml/min/1 73sq m     Narrative:      [place] guidelines for Chronic Kidney Disease (CKD):     Stage 1 with normal or high GFR (GFR > 90 mL/min/1 73 square meters)    Stage 2 Mild CKD (GFR = 60-89 mL/min/1 73 square meters)    Stage 3A Moderate CKD (GFR = 45-59 mL/min/1 73 square meters)    Stage 3B Moderate CKD (GFR = 30-44 mL/min/1 73 square meters)    Stage 4 Severe CKD (GFR = 15-29 mL/min/1 73 square meters)    Stage 5 End Stage CKD (GFR <15 mL/min/1 73 square meters)  Note: GFR calculation is accurate only with a steady state creatinine    Hepatic function panel [[id number]]  (Abnormal) Collected: 06/06/22 2258    Lab Status: Final result Specimen: Blood from Arm, Right Updated: 06/06/22 2346     Total Bilirubin 0 23 mg/dL      Bilirubin, Direct 0 06 mg/dL      Alkaline Phosphatase 92 U/L      AST 16 U/L      ALT 24 U/L      Total Protein 7 6 g/dL      Albumin 2 5 g/dL     CBC and differential [[id number]]  (Abnormal) Collected: 06/06/22 2258    Lab Status: Final result Specimen: Blood from Arm, Right Updated: 06/06/22 2320     WBC 10 68 Thousand/uL      RBC 4 24 Million/uL      Hemoglobin 11 1 g/dL      Hematocrit 34 2 %      MCV 81 fL      MCH 26 2 pg      MCHC 32 5 g/dL      RDW 18 6 %      MPV 9 1 fL      Platelets 779 Thousands/uL      nRBC 0 /100 WBCs      Neutrophils Relative 64 %      Immat GRANS % 0 %      Lymphocytes Relative 26 %      Monocytes Relative 9 %      Eosinophils Relative 1 %      Basophils Relative 0 %      Neutrophils Absolute 6 78 Thousands/µL      Immature Grans Absolute 0 03 Thousand/uL      Lymphocytes Absolute 2 74 Thousands/µL      Monocytes Absolute 0 99 Thousand/µL      Eosinophils Absolute 0 10 Thousand/µL      Basophils Absolute 0 04 Thousands/µL                  CT abdomen pelvis with contrast   Final Result by Sydney Pitt MD (06/07 1793)      Redemonstrated fluid collection measuring up to 2 8 cm within the superior spleen with with foci of gas concerning for abscess  Workstation performed: [id number]               Procedures  Procedures      ED Course  ED Course as of 06/07/22 0517   Mon Jun 06, 2022   2321 Dr Noemi Urbano approves PO and IV contrast for CT   2356 Mild pruritic reaction to morphine administration, likely side effect of morphine rather than allergy  No other symptoms consistent with allergic reaction/anaphylaxis  Tue Jun 07, 2022   0249 Red surgery to see patient                                       MDM    Disposition  Final diagnoses:   Splenic abscess     Time reflects when diagnosis was documented in both MDM as applicable and the Disposition within this note     Time User Action Codes Description Comment    6/7/2022  2:43 AM Ann Marie Pro Add [D73 3] Splenic abscess       ED Disposition     ED Disposition   Admit    Condition   Stable    Date/Time   Tue Jun 7, 2022  4:35 AM    Comment   --         Follow-up Information    None         Patient's Medications   Discharge Prescriptions    No medications on file     No discharge procedures on file  PDMP Review       Value Time User    PDMP Reviewed  Yes 5/25/2022  2:37 PM Richard Wilson PA-C           ED Provider  Attending physically available and evaluated Andrea Foley  I managed the patient along with the ED Attending      Electronically Signed by         Edgard Miller DO  06/07/22 7795

## 2022-06-07 NOTE — H&P
H&P Exam - General Surgery   Florian Schaeffer 59 y o  female MRN: [id number]  Unit/Bed#: ED 15 Encounter: [id number]    Assessment/Plan     Assessment:  57yoF laparoscopic sleeve gastrectomy in August 2021 at [facility] in [address] [place] [address], postoperative course c/b splenic bleed requiring IR embolization, cystgastrostomy stent by GI, IR drainage of splenic abscess, prolonged course of antibiotics, now w multiple hospitalizations 2/2 LUQ/L shoulder pain associated with the persistent abscess, requiring repeated IR drainage and prolonged antibiotic courses    6/7 CT a/P: Persistent fluid collection in the superior aspect of the spleen measuring 2 8 x 2 3 x 2 8 cm, interval increase in small foci of gas at the superior aspect of the collection since prior exam on 5/21    Vital stable, afebrile  WBC 10 7, hemoglobin 11 1  CMP WNL, creatinine 0 88    Plan:  - admit to general surgery service, no acute surgical intervention at this time  - IV Zosyn for now, will consult Infectious Disease for further recommendations on antibiotic course  - IR consult for possible aspiration versus drainage of splenic abscess  - heparin gtt given history of DVT and on Eliquis at home  - JAIMIE  - [email or web address]  - pain control    History of Present Illness   HPI:  Florian Schaeffer is a 59 y o  female w PMH of HTN, CAD s/p stentsx4, hx of DVT on Eliquis, CHF, hypothyroidism, surgical history of cholecystectomy, laparoscopic sleeve gastrectomy in August of 2021 at [facility] in [address] [place] [address], postoperative course c/b splenic bleed requiring IR embolization, cystgastrostomy stent by GI, IR drainage of splenic abscess, she was put on a prolonged course of antibiotics starting late 2021 into early 2022   Patient had intermittent LUQ and left shoulder pain since discharge from [facility] in late 2021/early 2022, was visiting her daughter for mother's Day in the [facility] when she originally presented to [facility] ED in May of 2022 (05/07/2022) reporting these continued symptoms, previous IR drain as well as antibiotic course had been both finished at this time, found to have persistent perisplenic abscess on imaging, admitted to the general surgery service at this time and started on IV antibiotics, with IR and ID consult, put on heparin drip for her history of DVT on Eliquis at home  During this original hospitalization at [facility] in early May, IR was able to place a drain in the splenic abscess, GI was able to perform EGD and remove the axios stent previously placed by the GI team at [facility] in [facility], patient was seen by ID and put on extended course of antibiotics  She was discharged on 05/16/2022, but re-presented and 5 days later on 05/21/22 with continued LUQ and left shoulder pain, also reporting fevers and chills at this time, found to have recurrent perisplenic abscesses on imaging  During this 2nd admission at [facility] it was determined by IR that the collection was not amenable to drainage/intervention, patient was instead maintained on antibiotics per ID recommendations, PICC placed in anticipation of prolonged IV antibiotics course, also seen by APS for pain control and assistance with pain regimen given continued and persistent pain despite multiple hospital admissions  Discussions at this time with the surgery team recommended against splenectomy/surgical intervention given her surgical history and numerous recent interventions in the LUQ/abdomen  Patient was again discharged on 05/26, transitioned by ID to p o  Augmentin to be taken in the outpatient setting, also put on a home pain regimen by APS who she would follow in the outpatient setting  At the time of this discharge patient was tolerating diet without nausea or emesis, reporting improved pain of the LUQ/left shoulder      Patient now re-presents to [facility] reporting 2 days of LUQ abdominal pain as well as left shoulder pain, associated chills, associated shortness of breath associated with the pain, also reporting a left mass/lump on her left lateral abdomen below her ribcage  She reports some nausea with the p o  Augmentin that she is taking in the outpatient setting, discuss this with ID who informed her to keep taking the medication, states she is on approximately day 30 of 42  She has outpatient follow-up scheduled with APS but has not seen them yet following discharge on 05/26  Found again to have perisplenic abscess on imaging  Review of Systems   Constitutional: Positive for chills  Negative for appetite change and fever  Respiratory: Positive for shortness of breath  Cardiovascular: Negative for chest pain  Gastrointestinal: Positive for abdominal pain  Negative for abdominal distention, constipation, diarrhea, nausea and vomiting  Musculoskeletal: Negative for back pain and neck pain  Neurological: Negative for dizziness, light-headedness and headaches  Psychiatric/Behavioral: Negative for confusion  All other systems reviewed and are negative        Historical Information   Past Medical History:   Diagnosis Date    Disease of thyroid gland     Hernia     Hypertension      Past Surgical History:   Procedure Laterality Date    CHOLECYSTECTOMY      CORONARY ANGIOPLASTY WITH STENT PLACEMENT      IR DRAINAGE TUBE CHECK/CHANGE/REPOSITION/REINSERTION/UPSIZE  5/10/2022    IR DRAINAGE TUBE PLACEMENT  5/7/2022     Social History   Social History     Substance and Sexual Activity   Alcohol Use No     Social History     Substance and Sexual Activity   Drug Use No     Social History     Tobacco Use   Smoking Status Former Smoker   Smokeless Tobacco Never Used     E-Cigarette/Vaping     E-Cigarette/Vaping Substances     Family History: non-contributory    Meds/Allergies   all medications and allergies reviewed  No Known Allergies    Objective   First Vitals:   Blood Pressure: 151/84 (06/06/22 2029)  Pulse: 89 (06/06/22 2029)  Temperature: 98 1 °F (36 7 °C) (06/06/22 2029)  Temp Source: Temporal (06/06/22 2029)  Respirations: 16 (06/06/22 2029)  SpO2: 98 % (06/06/22 2029)    Current Vitals:   Blood Pressure: 92/58 (06/07/22 0215)  Pulse: 76 (06/07/22 0215)  Temperature: 98 1 °F (36 7 °C) (06/06/22 2029)  Temp Source: Temporal (06/06/22 2029)  Respirations: 20 (06/07/22 0215)  SpO2: 96 % (06/07/22 0215)    No intake or output data in the 24 hours ending 06/07/22 0453    Invasive Devices  Report    Peripheral Intravenous Line  Duration           Peripheral IV 06/07/22 Left Antecubital <1 day                Physical Exam  Vitals reviewed  HENT:      Head: Normocephalic and atraumatic  Mouth/Throat:      Mouth: Mucous membranes are moist    Cardiovascular:      Rate and Rhythm: Normal rate and regular rhythm  Pulses: Normal pulses  Heart sounds: Normal heart sounds  Pulmonary:      Effort: Pulmonary effort is normal       Breath sounds: Normal breath sounds  Abdominal:      General: There is no distension  Palpations: Abdomen is soft  Tenderness: There is abdominal tenderness  There is no guarding or rebound  Comments: LUQ tenderness to palpation, abdomen otherwise soft and nondistended   Musculoskeletal:         General: Normal range of motion  Cervical back: Normal range of motion  Skin:     General: Skin is warm  Capillary Refill: Capillary refill takes less than 2 seconds  Neurological:      Mental Status: She is alert and oriented to person, place, and time  Psychiatric:         Mood and Affect: Mood normal          Lab Results:   I have personally reviewed pertinent lab results    , CBC:   Lab Results   Component Value Date    WBC 10 68 (H) 06/06/2022    HGB 11 1 (L) 06/06/2022    HCT 34 2 (L) 06/06/2022    MCV 81 (L) 06/06/2022     (H) 06/06/2022    MCH 26 2 (L) 06/06/2022    MCHC 32 5 06/06/2022    RDW 18 6 (H) 06/06/2022    MPV 9 1 06/06/2022    NRBC 0 06/06/2022 , CMP:   Lab Results   Component Value Date    SODIUM 140 06/06/2022    K 3 7 06/06/2022     06/06/2022    CO2 27 06/06/2022    BUN 14 06/06/2022    CREATININE 0 88 06/06/2022    CALCIUM 8 9 06/06/2022    AST 16 06/06/2022    ALT 24 06/06/2022    ALKPHOS 92 06/06/2022    EGFR 72 06/06/2022     Imaging: I have personally reviewed pertinent reports  EKG, Pathology, and Other Studies: I have personally reviewed pertinent reports        Code Status: Level 1 - Full Code  Advance Directive and Living Will:      Power of :    POLST:

## 2022-06-07 NOTE — PLAN OF CARE
Problem: PAIN - ADULT  Goal: Verbalizes/displays adequate comfort level or baseline comfort level  Description: Interventions:  - Encourage patient to monitor pain and request assistance  - Assess pain using appropriate pain scale  - Administer analgesics based on type and severity of pain and evaluate response  - Implement non-pharmacological measures as appropriate and evaluate response  - Consider cultural and social influences on pain and pain management  - Notify physician/advanced practitioner if interventions unsuccessful or patient reports new pain  Outcome: Progressing     Problem: INFECTION - ADULT  Goal: Absence or prevention of progression during hospitalization  Description: INTERVENTIONS:  - Assess and monitor for signs and symptoms of infection  - Monitor lab/diagnostic results  - Monitor all insertion sites, i e  indwelling lines, tubes, and drains  - Monitor endotracheal if appropriate and nasal secretions for changes in amount and color  - [place] appropriate cooling/warming therapies per order  - Administer medications as ordered  - Instruct and encourage patient and family to use good hand hygiene technique  - Identify and instruct in appropriate isolation precautions for identified infection/condition  Outcome: Progressing  Goal: Absence of fever/infection during neutropenic period  Description: INTERVENTIONS:  - Monitor WBC    Outcome: Progressing     Problem: SAFETY ADULT  Goal: Patient will remain free of falls  Description: INTERVENTIONS:  - Educate patient/family on patient safety including physical limitations  - Instruct patient to call for assistance with activity   - Consult OT/PT to assist with strengthening/mobility   - Keep Call bell within reach  - Keep bed low and locked with side rails adjusted as appropriate  - Keep care items and personal belongings within reach  - Initiate and maintain comfort rounds  - Make Fall Risk Sign visible to staff  - Apply yellow socks and bracelet for high fall risk patients  - Consider moving patient to room near nurses station  Outcome: Progressing  Goal: Maintain or return to baseline ADL function  Description: INTERVENTIONS:  -  Assess patient's ability to carry out ADLs; assess patient's baseline for ADL function and identify physical deficits which impact ability to perform ADLs (bathing, care of mouth/teeth, toileting, grooming, dressing, etc )  - Assess/evaluate cause of self-care deficits   - Assess range of motion  - Assess patient's mobility; develop plan if impaired  - Assess patient's need for assistive devices and provide as appropriate  - Encourage maximum independence but intervene and supervise when necessary  - Involve family in performance of ADLs  - Assess for home care needs following discharge   - Consider OT consult to assist with ADL evaluation and planning for discharge  - Provide patient education as appropriate  Outcome: Progressing  Goal: Maintains/Returns to pre admission functional level  Description: INTERVENTIONS:  - Perform BMAT or MOVE assessment daily    - Set and communicate daily mobility goal to care team and patient/family/caregiver     - Collaborate with rehabilitation services on mobility goals if consulted  - Out of bed for toileting  - Record patient progress and toleration of activity level   Outcome: Progressing     Problem: DISCHARGE PLANNING  Goal: Discharge to home or other facility with appropriate resources  Description: INTERVENTIONS:  - Identify barriers to discharge w/patient and caregiver  - Arrange for needed discharge resources and transportation as appropriate  - Identify discharge learning needs (meds, wound care, etc )  - Arrange for interpretive services to assist at discharge as needed  - Refer to Case Management Department for coordinating discharge planning if the patient needs post-hospital services based on physician/advanced practitioner order or complex needs related to functional status, cognitive ability, or social support system  Outcome: Progressing     Problem: Knowledge Deficit  Goal: Patient/family/caregiver demonstrates understanding of disease process, treatment plan, medications, and discharge instructions  Description: Complete learning assessment and assess knowledge base    Interventions:  - Provide teaching at level of understanding  - Provide teaching via preferred learning methods  Outcome: Progressing

## 2022-06-07 NOTE — ED ATTENDING ATTESTATION
6/6/2022  I, Giovanni Schultz MD, saw and evaluated the patient  I have discussed the patient with the resident/non-physician practitioner and agree with the resident's/non-physician practitioner's findings, Plan of Care, and MDM as documented in the resident's/non-physician practitioner's note, except where noted  All available labs and Radiology studies were reviewed  I was present for key portions of any procedure(s) performed by the resident/non-physician practitioner and I was immediately available to provide assistance  At this point I agree with the current assessment done in the Emergency Department  I have conducted an independent evaluation of this patient a history and physical is as follows:    ED Course     Emergency Department Note- Mitzi Reilly 59 y o  female MRN: [id number]    Unit/Bed#: ED 15 Encounter: [id number]    Mitzi Reilly is a 59 y o  female who presents with   Chief Complaint   Patient presents with    Pain     Had a punctured spleen in aug of last year, having complications from that was admitted here 2 weeks ago  C/o same pain on left side  No fever  Chills and nausea no vomiting already on abx         History of Present Illness   HPI:  Mitzi Reilly is a 59 y o  female who presents for evaluation of:  Acute recurrent LUQ abdominal pain  Patient has a h/o 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  Patient is currently taking antibiotics  Her LUQ abdominal pain radiates to to her L shoulder  Patient also notes a lump in her LUQ today as well  Review of Systems   Constitutional: Positive for chills  Negative for fever  HENT: Negative for congestion and rhinorrhea  Respiratory: Negative for cough and shortness of breath  Cardiovascular: Negative for chest pain and palpitations  Gastrointestinal: Positive for nausea  Negative for diarrhea and vomiting     Neurological: Positive for light-headedness  Negative for headaches  All other systems reviewed and are negative  Historical Information   Past Medical History:   Diagnosis Date    Disease of thyroid gland     Hernia     Hypertension      Past Surgical History:   Procedure Laterality Date    CHOLECYSTECTOMY      CORONARY ANGIOPLASTY WITH STENT PLACEMENT      IR DRAINAGE TUBE CHECK/CHANGE/REPOSITION/REINSERTION/UPSIZE  5/10/2022    IR DRAINAGE TUBE PLACEMENT  5/7/2022     Social History   Social History     Substance and Sexual Activity   Alcohol Use No     Social History     Substance and Sexual Activity   Drug Use No     Social History     Tobacco Use   Smoking Status Former Smoker   Smokeless Tobacco Never Used     Family History: History reviewed  No pertinent family history  Meds/Allergies   PTA meds:   Prior to Admission Medications   Prescriptions Last Dose Informant Patient Reported? Taking? Lidocaine 4 % PTCH   No No   Sig: Apply 1 patch topically daily   acetaminophen (TYLENOL) 325 mg tablet   No No   Sig: Take 2 tablets (650 mg total) by mouth every 4 (four) hours as needed for mild pain   amLODIPine (NORVASC) 10 mg tablet   No No   Sig: Take 1 tablet (10 mg total) by mouth in the morning  calcium carbonate (TUMS) 500 mg chewable tablet   No No   Sig: Chew 2 tablets (1,000 mg total)  as needed in the morning and 2 tablets (1,000 mg total) as needed at noon and 2 tablets (1,000 mg total) as needed in the evening (indigestion,heartburn)  citalopram (CeleXA) 40 mg tablet   No No   Sig: Take 1 tablet (40 mg total) by mouth in the morning     clotrimazole (LOTRIMIN) 1 % cream   No No   Sig: Apply topically 2 (two) times a day   gabapentin (NEURONTIN) 100 mg capsule   No No   Sig: Take 1 capsule (100 mg total) by mouth 3 (three) times a day for 10 days   levothyroxine 150 mcg tablet   No No   Sig: Take 1 tablet (150 mcg total) by mouth daily in the early morning   methocarbamol (ROBAXIN) 500 mg tablet   No No   Sig: Take 1 tablet (500 mg total) by mouth every 6 (six) hours   pantoprazole (PROTONIX) 40 mg tablet   No No   Sig: Take 1 tablet (40 mg total) by mouth daily in the early morning   senna (SENOKOT) 8 6 mg   No No   Sig: Take 1 tablet (8 6 mg total) by mouth in the morning for 5 days  traZODone (DESYREL) 150 mg tablet   No No   Sig: Take 0 5 tablets (75 mg total) by mouth daily at bedtime      Facility-Administered Medications: None     No Known Allergies    Objective   First Vitals:   Blood Pressure: 151/84 (06/06/22 2029)  Pulse: 89 (06/06/22 2029)  Temperature: 98 1 °F (36 7 °C) (06/06/22 2029)  Temp Source: Temporal (06/06/22 2029)  Respirations: 16 (06/06/22 2029)  SpO2: 98 % (06/06/22 2029)    Current Vitals:   Blood Pressure: 151/84 (06/06/22 2029)  Pulse: 89 (06/06/22 2029)  Temperature: 98 1 °F (36 7 °C) (06/06/22 2029)  Temp Source: Temporal (06/06/22 2029)  Respirations: 16 (06/06/22 2029)  SpO2: 98 % (06/06/22 2029)    No intake or output data in the 24 hours ending 06/06/22 2310    Invasive Devices  Report    Peripheral Intravenous Line  Duration           Peripheral IV 06/06/22 Right Antecubital <1 day                Physical Exam  Vitals and nursing note reviewed  Constitutional:       General: She is not in acute distress  Appearance: Normal appearance  She is well-developed  HENT:      Head: Normocephalic and atraumatic  Right Ear: External ear normal       Left Ear: External ear normal       Nose: Nose normal       Mouth/Throat:      Pharynx: No oropharyngeal exudate  Eyes:      Conjunctiva/sclera: Conjunctivae normal       Pupils: Pupils are equal, round, and reactive to light  Cardiovascular:      Rate and Rhythm: Normal rate and regular rhythm  Pulmonary:      Effort: Pulmonary effort is normal  No respiratory distress  Abdominal:      General: Abdomen is flat  There is no distension  Palpations: Abdomen is soft        Comments: Tender LUQ abdominal mass   Musculoskeletal: General: No deformity  Normal range of motion  Cervical back: Normal range of motion and neck supple  Skin:     General: Skin is warm and dry  Capillary Refill: Capillary refill takes less than 2 seconds  Neurological:      General: No focal deficit present  Mental Status: She is alert and oriented to person, place, and time  Mental status is at baseline  Coordination: Coordination normal    Psychiatric:         Mood and Affect: Mood normal          Behavior: Behavior normal          Thought Content: Thought content normal          Judgment: Judgment normal            Medical Decision Makin  Acute recurrent LUQ abdominal pain and LUQ mass: CTAP; consult general surgery; pain control; CBD    No results found for this or any previous visit (from the past 39 hour(s))  CT abdomen pelvis wo contrast    (Results Pending)         Portions of the record may have been created with voice recognition software  Occasional wrong word or "sound a like" substitutions may have occurred due to the inherent limitations of voice recognition software  Read the chart carefully and recognize, using context, where substitutions have occurred          Critical Care Time  Procedures

## 2022-06-07 NOTE — ED PROVIDER NOTES
History  Chief Complaint   Patient presents with    Pain     Had a punctured spleen in aug of last year, having complications from that was admitted here 2 weeks ago  C/o same pain on left side  No fever  Chills and nausea no vomiting already on abx     Damaris Stroud is an 59y o  year old female with PMHx significant for splenic abscess, hypothyroidism, HTN, who presents to the ED today with abdominal pain and chills for about 2 days  Abscess has recently been managed with IR drainage and abx  Abdominal pain is in the LUQ and L flank, exacerbated by movement and relieved by resting  The pain is throbbing in quality, does radiate to the L shoulder, and currently rated moderate to severe in severity  Patient also endorses nausea, fatigue, difficulty taking deep breaths due to pain  The patient denies fevers, headaches, lightheadedness or syncope, vomiting, chest pain, rhinorrhea, sore throat, cough, changes in usual bowel movements, changes with urination,  pain anywhere else in body  ROS otherwise negative  History provided by:  Medical records and patient   used: No        Prior to Admission Medications   Prescriptions Last Dose Informant Patient Reported? Taking? Lidocaine 4 % PTCH   No No   Sig: Apply 1 patch topically daily   acetaminophen (TYLENOL) 325 mg tablet   No No   Sig: Take 2 tablets (650 mg total) by mouth every 4 (four) hours as needed for mild pain   amLODIPine (NORVASC) 10 mg tablet   No No   Sig: Take 1 tablet (10 mg total) by mouth in the morning  calcium carbonate (TUMS) 500 mg chewable tablet   No No   Sig: Chew 2 tablets (1,000 mg total)  as needed in the morning and 2 tablets (1,000 mg total) as needed at noon and 2 tablets (1,000 mg total) as needed in the evening (indigestion,heartburn)  citalopram (CeleXA) 40 mg tablet   No No   Sig: Take 1 tablet (40 mg total) by mouth in the morning     clotrimazole (LOTRIMIN) 1 % cream   No No   Sig: Apply topically 2 (two) times a day   gabapentin (NEURONTIN) 100 mg capsule   No No   Sig: Take 1 capsule (100 mg total) by mouth 3 (three) times a day for 10 days   levothyroxine 150 mcg tablet   No No   Sig: Take 1 tablet (150 mcg total) by mouth daily in the early morning   methocarbamol (ROBAXIN) 500 mg tablet   No No   Sig: Take 1 tablet (500 mg total) by mouth every 6 (six) hours   pantoprazole (PROTONIX) 40 mg tablet   No No   Sig: Take 1 tablet (40 mg total) by mouth daily in the early morning   senna (SENOKOT) 8 6 mg   No No   Sig: Take 1 tablet (8 6 mg total) by mouth in the morning for 5 days  traZODone (DESYREL) 150 mg tablet   No No   Sig: Take 0 5 tablets (75 mg total) by mouth daily at bedtime      Facility-Administered Medications: None       Past Medical History:   Diagnosis Date    Disease of thyroid gland     Hernia     Hypertension        Past Surgical History:   Procedure Laterality Date    CHOLECYSTECTOMY      CORONARY ANGIOPLASTY WITH STENT PLACEMENT      IR DRAINAGE TUBE CHECK/CHANGE/REPOSITION/REINSERTION/UPSIZE  5/10/2022    IR DRAINAGE TUBE PLACEMENT  5/7/2022       History reviewed  No pertinent family history  I have reviewed and agree with the history as documented  E-Cigarette/Vaping     E-Cigarette/Vaping Substances     Social History     Tobacco Use    Smoking status: Former Smoker    Smokeless tobacco: Never Used   Substance Use Topics    Alcohol use: No    Drug use: No        Review of Systems   Reason unable to perform ROS: please see above for ROS  All other ROS negative         Physical Exam  ED Triage Vitals [06/06/22 2029]   Temperature Pulse Respirations Blood Pressure SpO2   98 1 °F (36 7 °C) 89 16 151/84 98 %      Temp Source Heart Rate Source Patient Position - Orthostatic VS BP Location FiO2 (%)   Temporal Monitor Sitting Left arm --      Pain Score       8             Orthostatic Vital Signs  Vitals:    06/06/22 2029 06/07/22 0005 06/07/22 0215 06/07/22 0500 BP: 151/84 118/59 92/58 101/64   Pulse: 89 76 76    Patient Position - Orthostatic VS: Sitting          Physical Exam    ED Medications  Medications   lactated ringers infusion (has no administration in time range)   ondansetron (ZOFRAN) injection 4 mg (has no administration in time range)   piperacillin-tazobactam (ZOSYN) 3 375 g in sodium chloride 0 9 % 100 mL IVPB (has no administration in time range)   acetaminophen (TYLENOL) tablet 650 mg (has no administration in time range)   oxyCODONE (ROXICODONE) IR tablet 5 mg (has no administration in time range)   oxyCODONE (ROXICODONE) immediate release tablet 10 mg (has no administration in time range)   amLODIPine (NORVASC) tablet 10 mg (has no administration in time range)   citalopram (CeleXA) tablet 40 mg (has no administration in time range)   levothyroxine tablet 150 mcg (has no administration in time range)   pantoprazole (PROTONIX) EC tablet 40 mg (has no administration in time range)   traZODone (DESYREL) tablet 75 mg (has no administration in time range)   heparin (porcine) 25,000 units in 0 45% NaCl 250 mL infusion (premix) (has no administration in time range)   heparin (porcine) injection 6,800 Units (has no administration in time range)   heparin (porcine) injection 3,400 Units (has no administration in time range)   morphine 10 mg/mL injection 6 mg (6 mg Intravenous Given 6/6/22 2259)   ondansetron (ZOFRAN) injection 4 mg (4 mg Intravenous Given 6/6/22 2258)   iohexol (OMNIPAQUE) 240 MG/ML solution 50 mL (50 mL Oral Given 6/7/22 0007)   diphenhydrAMINE (BENADRYL) injection 12 5 mg (12 5 mg Intravenous Given 6/7/22 0006)   iohexol (OMNIPAQUE) 350 MG/ML injection (MULTI-DOSE) 65 mL (65 mL Intravenous Given 6/7/22 0130)   HYDROmorphone HCl (DILAUDID) injection 0 2 mg (0 2 mg Intravenous Given 6/7/22 0210)       Diagnostic Studies  Results Reviewed     Procedure Component Value Units Date/Time    APTT [[id number]]     Lab Status: No result Specimen: Blood Protime-INR [[id number]]     Lab Status: No result Specimen: Blood     Lipase [[id number]]  (Normal) Collected: 06/06/22 2258    Lab Status: Final result Specimen: Blood from Arm, Right Updated: 06/06/22 2346     Lipase 150 u/L     Basic metabolic panel [[id number]] Collected: 06/06/22 2258    Lab Status: Final result Specimen: Blood from Arm, Right Updated: 06/06/22 2346     Sodium 140 mmol/L      Potassium 3 7 mmol/L      Chloride 107 mmol/L      CO2 27 mmol/L      ANION GAP 6 mmol/L      BUN 14 mg/dL      Creatinine 0 88 mg/dL      Glucose 100 mg/dL      Calcium 8 9 mg/dL      eGFR 72 ml/min/1 73sq m     Narrative:      [place] guidelines for Chronic Kidney Disease (CKD):     Stage 1 with normal or high GFR (GFR > 90 mL/min/1 73 square meters)    Stage 2 Mild CKD (GFR = 60-89 mL/min/1 73 square meters)    Stage 3A Moderate CKD (GFR = 45-59 mL/min/1 73 square meters)    Stage 3B Moderate CKD (GFR = 30-44 mL/min/1 73 square meters)    Stage 4 Severe CKD (GFR = 15-29 mL/min/1 73 square meters)    Stage 5 End Stage CKD (GFR <15 mL/min/1 73 square meters)  Note: GFR calculation is accurate only with a steady state creatinine    Hepatic function panel [[id number]]  (Abnormal) Collected: 06/06/22 2258    Lab Status: Final result Specimen: Blood from Arm, Right Updated: 06/06/22 2346     Total Bilirubin 0 23 mg/dL      Bilirubin, Direct 0 06 mg/dL      Alkaline Phosphatase 92 U/L      AST 16 U/L      ALT 24 U/L      Total Protein 7 6 g/dL      Albumin 2 5 g/dL     CBC and differential [[id number]]  (Abnormal) Collected: 06/06/22 2258    Lab Status: Final result Specimen: Blood from Arm, Right Updated: 06/06/22 2320     WBC 10 68 Thousand/uL      RBC 4 24 Million/uL      Hemoglobin 11 1 g/dL      Hematocrit 34 2 %      MCV 81 fL      MCH 26 2 pg      MCHC 32 5 g/dL      RDW 18 6 %      MPV 9 1 fL      Platelets 060 Thousands/uL      nRBC 0 /100 WBCs      Neutrophils Relative 64 %      Immat GRANS % 0 %      Lymphocytes Relative 26 %      Monocytes Relative 9 %      Eosinophils Relative 1 %      Basophils Relative 0 %      Neutrophils Absolute 6 78 Thousands/µL      Immature Grans Absolute 0 03 Thousand/uL      Lymphocytes Absolute 2 74 Thousands/µL      Monocytes Absolute 0 99 Thousand/µL      Eosinophils Absolute 0 10 Thousand/µL      Basophils Absolute 0 04 Thousands/µL                  CT abdomen pelvis with contrast   Final Result by Gracie Carroll MD (06/07 2222)      Redemonstrated fluid collection measuring up to 2 8 cm within the superior spleen with with foci of gas concerning for abscess  Workstation performed: [id number]               Procedures  Procedures      ED Course  ED Course as of 06/07/22 0517   Mon Jun 06, 2022   2321 Dr Francisco Escoto approves PO and IV contrast for CT   2356 Mild pruritic reaction to morphine administration, likely side effect of morphine rather than allergy  No other symptoms consistent with allergic reaction/anaphylaxis  Tue Jun 07, 2022   0249 Red surgery to see patient                                       MDM    Disposition  Final diagnoses:   Splenic abscess     Time reflects when diagnosis was documented in both MDM as applicable and the Disposition within this note     Time User Action Codes Description Comment    6/7/2022  2:43 AM Hilton Rey Add [D73 3] Splenic abscess       ED Disposition     ED Disposition   Admit    Condition   Stable    Date/Time   Tue Jun 7, 2022  4:35 AM    Comment   --         Follow-up Information    None         Patient's Medications   Discharge Prescriptions    No medications on file     No discharge procedures on file  PDMP Review       Value Time User    PDMP Reviewed  Yes 5/25/2022  2:37 PM Mary Kowalski PA-C           ED Provider  Attending physically available and evaluated [facility]  I managed the patient along with the ED Attending      Electronically Signed by         Meriam Curling, DO  06/07/22 3827

## 2022-06-07 NOTE — CONSULTS
Consultation - Infectious Disease   Yolanda Velasquez 59 y o  female MRN: [id number]  Unit/Bed#: [facility] 909-01 Encounter: [id number]      Inpatient consult to Infectious Diseases  Consult performed by: Mireille Norwood MD  Consult ordered by: Severo Snipes, MD          IMPRESSION & RECOMMENDATIONS:   Impression:  1  Persistent splenic abscess R/0 superficial abscess versus phlegmon at former drain site  2  S/P Laparoscopic sleeve gastrectomy complicated by splenic injury requiring IR embolization with subsequent splenic abscesses, IR drainage, Axios stent placement and prolonged IV antibiotic course all at [facility]  3  History of DVT on Eliquis  4  CAD S/P stent placement   5  History of hypothyroidism   6  Intertriginous fungal dermatitis secondary to antibiotic therapy    Recommendations:    Patient was seen with the surgical resident  1  She now has a specific target area for her pain that is not the splenic abscess but a left lateral subcostal firm tender mass at the former drain site  It is unclear whether this is an abscess or phlegmon  Surgical resident aware  Patient may need further imaging such as an ultrasound of the mass itself  2  Pending cultures would continue piperacillin/tazobactam 3 375 g q 6 hours IV      HISTORY OF PRESENT ILLNESS:    Reason for Consult:  Splenic abscess  HPI: Yolanda Velasquez is a 59y o  year old female who is well known to me from prior admissions  Patient was last here from 5/20/22-5/26/22 with recurrent/persistent splenic abscesses and a similar prior admission from 5/6/22-5/13/22  Patient has a history of a laparoscopic sleeve gastrectomy at [facility] in 7/30   This was complicated by a splenic injury requiring IR embolization   In November she was readmitted with perisplenic abscesses requiring IR drainage, cyst gastrostomy in 12/21   She had a prolonged hospitalization requiring long-term antibiotics and was discharged to rehab at the end of December on IV Unasyn for 6 weeks   She also received a course of p o  Augmentin   At the end of her therapy an ultrasound CT scan of the abdomen demonstrated residual left upper quadrant abscess but no further treatment was recommended at that time  Nicole Wilson was last seen at [facility] clinic on 03/29 and no further antibiotics were recommended  Over the next 2 months she continued to have LUQ pain, left shoulder pain fatigue that markedly increased in the 2 weeks prior to her 1st [facility] hospital admission  Her surgeon in [place] advised her to seek further care in [place] where her daughter  lives and she was then initially admitted for further workup and evaluation with residual left upper quadrant fluid collection concerning for developing abscess and possible fistula tract to the skin   She was initially kept NPO, started on IV fluids for IV hydration and resuscitation, placed on an IV antibiotic regimen with consultation to Infectious Disease, her anticoagulation was held and Interventional Radiology was consulted to assess for possible percutaneous drainage   Additionally, the GI service was also consulted to assist with her evaluation management during hospital encounter  Shima Fuentess her 1st hospital stay, the multi disciplinary team worked together to continue treatment for her recurrent perisplenic abscesses   The patient did undergo percutaneous drainage with the catheter left in place by Interventional Radiology that was able to be successfully removed prior to her discharge  [place] diet was advanced as tolerated once no further interventions were deemed necessary   Final results of abscess fluid showed Prevotella denticola, fusobacterium nucleatum, and non beta lactamase producing Haemophilus parainfluenza   Blood cultures are negative  for 5 days of her course the patient was treated with IV piperacillin/tazobactam that was eventually changed to p o  Augmentin 875 mg q 12 hours once her culture results were known  I was concerned at the time that the patient still had marked left flank pain that required narcotics in the absence of a major drainable abscess  It was decided by her primary service that if the pain could be controlled with lesser medication that because of the high risk of further surgery including splenectomy that a conservative course of approximately 4 weeks of appropriate antibiotics should be undertaken to see if there was continued improvement  She was discharged on p o  Augmentin only to be readmitted on 05/20 withmarkedly increased left flank pain with shortness of breath secondary to inability to take a deep breath, feeling cold without fever or true chills  Here the patient was found to be afebrile with a normal WBC count and differential   A PE study including a CT of the abdomen and pelvis with with contrast showed a 2 7 x 1 6 x 1 5 cm fluid collection that contained tiny gas bubbles immediately adjacent to the posterior superior aspect of the spleen that was concerning for an abscess  IR was consulted and felt that the abscess that was described was actually smaller than what existed previously and is now too small to justify a drainage tube placement and risky needle aspiration  The patient was restarted on piperacillin/tazobactam 3 375 g q 6 hours her pain appear to persist despite the abscess appearing to be responding to antibiotics  It was made clear to the time that if she failed to respond to conservative measures that further surgery could be necessary  At time of discharge patient completed approximately 21 of a prospective 42 day regimen of total antibiotics  She was switch back to p o  Augmentin on discharge  Should be noted that she still had left flank pain and radiation to the left shoulder which was evaluated by the acute Pain Service who gave recommendations    She had an appointment to be seen in follow-up as outpatient on 06/08  Patient appeared for admission to the emergency room yesterday complaining of LUQ abdominal pain radiating to left shoulder  She also has felt a lump at the left lateral subcostal area former drain site that is the site of most of her pain  A repeat CT scan of the abdomen redemonstrated a fluid collection measuring up to 2 8 cm within the superior spleen with foci of gas concerning for abscess  The patient was placed back on piperacillin/tazobactam   IR was reconsulted and they again stated that there is unfortunately no safe window to access for aspiration or drain placement  The pocket resided under the diaphragm and area too difficult to access  Therefore they just recommended continuing antibiotic therapy  Review of Systems positive findings include painful tender mass left lateral subcostal area with pain radiating to left shoulder, feeling of warmth diaphoresis with chills, intertriginous rash  A [id number] point system-based review of systems is otherwise negative  PAST MEDICAL HISTORY:  Past Medical History:   Diagnosis Date    Disease of thyroid gland     Hernia     Hypertension      Past Surgical History:   Procedure Laterality Date    CHOLECYSTECTOMY      CORONARY ANGIOPLASTY WITH STENT PLACEMENT      IR DRAINAGE TUBE CHECK/CHANGE/REPOSITION/REINSERTION/UPSIZE  5/10/2022    IR DRAINAGE TUBE PLACEMENT  5/7/2022       FAMILY HISTORY:  Non-contributory    SOCIAL HISTORY:  Social History   , currently living with her daughter  Social History     Substance and Sexual Activity   Alcohol Use No     Social History     Substance and Sexual Activity   Drug Use No     Social History     Tobacco Use   Smoking Status Former Smoker   Smokeless Tobacco Never Used       ALLERGIES:  No Known Allergies    MEDICATIONS:  All current active medications have been reviewed        PHYSICAL EXAM:  Temp:  [97 8 °F (36 6 °C)-98 1 °F (36 7 °C)] 97 8 °F (36 6 °C)  HR: [75-89] 75  Resp:  [16-20] 18  BP: ()/(47-84) 116/60  SpO2:  [88 %-98 %] 94 %  Temp (24hrs), Av °F (36 7 °C), Min:97 8 °F (36 6 °C), Max:98 1 °F (36 7 °C)  Current: Temperature: 97 8 °F (36 6 °C)  No intake or output data in the 24 hours ending 22 1207    General Appearance:  Appearing obese alert , nontoxic, and in no distress except for some discomfort left lateral subcostal area, appears stated age   Head:  Normocephalic, without obvious abnormality, atraumatic   Eyes:  PERRL, conjunctiva pink and sclera anicteric, both eyes   Nose: Nares normal, mucosa normal, no drainage   Throat: Oropharynx moist without lesions; lips, mucosa, and tongue normal; teeth and gums normal   Neck: Supple, symmetrical, trachea midline, no adenopathy, no tenderness/mass/nodules   Back:   Symmetric, no curvature, ROM normal, no CVA tenderness   Lungs:   Clear to auscultation bilaterally, no audible wheezes, rhonchi and rales, respirations unlabored   Chest Wall:  No tenderness or deformity   Heart:  Regular rate and rhythm, S1, S2 normal, no murmur, rub or gallop   Abdomen:   Soft, striae, protuberant with approximately 5 cm firm tender left lateral subcostal mass, non-distended, positive bowel sounds, no masses, no organomegaly    Left flank subcostal tenderness   Extremities: Extremities normal, atraumatic, no cyanosis, clubbing or edema   Skin: Skin color, texture, turgor normal, no rashes or lesions  No draining wounds noted  Lymph nodes: Cervical, supraclavicular, and axillary nodes normal   Neurologic: Alert and oriented times 3, extremity strength 5/5 and symmetric           Invasive Devices:   Peripheral IV 22 Left Antecubital (Active)   Site Assessment WDL 22 0003       Peripheral IV 22 Left;Ventral (anterior) Forearm (Active)       LABS, IMAGING, & OTHER STUDIES:  Lab Results:      I have personally reviewed pertinent labs      Results from last 7 days   Lab Units 22  9468   WBC Thousand/uL 10 68*   HEMOGLOBIN g/dL 11 1*   PLATELETS Thousands/uL 461*     Results from last 7 days   Lab Units 06/06/22  2258   SODIUM mmol/L 140   POTASSIUM mmol/L 3 7   CHLORIDE mmol/L 107   CO2 mmol/L 27   BUN mg/dL 14   CREATININE mg/dL 0 88   EGFR ml/min/1 73sq m 72   CALCIUM mg/dL 8 9   AST U/L 16   ALT U/L 24   ALK PHOS U/L 92           Imaging Studies:   I have personally reviewed pertinent imaging study reports and images in PACS  EKG, Pathology, and Other Studies:   I have personally reviewed pertinent reports

## 2022-06-08 LAB
ANION GAP SERPL CALCULATED.3IONS-SCNC: 7 MMOL/L (ref 4–13)
APTT PPP: 153 SECONDS (ref 23–37)
BUN SERPL-MCNC: 9 MG/DL (ref 5–25)
CALCIUM SERPL-MCNC: 8.6 MG/DL (ref 8.3–10.1)
CHLORIDE SERPL-SCNC: 105 MMOL/L (ref 100–108)
CO2 SERPL-SCNC: 27 MMOL/L (ref 21–32)
CREAT SERPL-MCNC: 0.64 MG/DL (ref 0.6–1.3)
ERYTHROCYTE [DISTWIDTH] IN BLOOD BY AUTOMATED COUNT: 18.7 % (ref 11.6–15.1)
GFR SERPL CREATININE-BSD FRML MDRD: 97 ML/MIN/1.73SQ M
GLUCOSE P FAST SERPL-MCNC: 94 MG/DL (ref 65–99)
GLUCOSE SERPL-MCNC: 94 MG/DL (ref 65–140)
HCT VFR BLD AUTO: 35.9 % (ref 34.8–46.1)
HGB BLD-MCNC: 11.3 G/DL (ref 11.5–15.4)
MAGNESIUM SERPL-MCNC: 1.8 MG/DL (ref 1.6–2.6)
MCH RBC QN AUTO: 26.4 PG (ref 26.8–34.3)
MCHC RBC AUTO-ENTMCNC: 31.5 G/DL (ref 31.4–37.4)
MCV RBC AUTO: 84 FL (ref 82–98)
PHOSPHATE SERPL-MCNC: 3.8 MG/DL (ref 2.7–4.5)
PLATELET # BLD AUTO: 460 THOUSANDS/UL (ref 149–390)
PMV BLD AUTO: 9.8 FL (ref 8.9–12.7)
POTASSIUM SERPL-SCNC: 3.7 MMOL/L (ref 3.5–5.3)
RBC # BLD AUTO: 4.28 MILLION/UL (ref 3.81–5.12)
SODIUM SERPL-SCNC: 139 MMOL/L (ref 136–145)
WBC # BLD AUTO: 12.57 THOUSAND/UL (ref 4.31–10.16)

## 2022-06-08 PROCEDURE — 84100 ASSAY OF PHOSPHORUS: CPT

## 2022-06-08 PROCEDURE — 99214 OFFICE O/P EST MOD 30 MIN: CPT | Performed by: INTERNAL MEDICINE

## 2022-06-08 PROCEDURE — 85730 THROMBOPLASTIN TIME PARTIAL: CPT | Performed by: SURGERY

## 2022-06-08 PROCEDURE — 99232 SBSQ HOSP IP/OBS MODERATE 35: CPT | Performed by: SURGERY

## 2022-06-08 PROCEDURE — 83735 ASSAY OF MAGNESIUM: CPT

## 2022-06-08 PROCEDURE — 80048 BASIC METABOLIC PNL TOTAL CA: CPT

## 2022-06-08 PROCEDURE — 85027 COMPLETE CBC AUTOMATED: CPT

## 2022-06-08 RX ORDER — MAGNESIUM SULFATE HEPTAHYDRATE 40 MG/ML
2 INJECTION, SOLUTION INTRAVENOUS ONCE
Status: COMPLETED | OUTPATIENT
Start: 2022-06-08 | End: 2022-06-08

## 2022-06-08 RX ORDER — POTASSIUM CHLORIDE 20 MEQ/1
40 TABLET, EXTENDED RELEASE ORAL ONCE
Status: COMPLETED | OUTPATIENT
Start: 2022-06-08 | End: 2022-06-08

## 2022-06-08 RX ORDER — LIDOCAINE 50 MG/G
1 PATCH TOPICAL DAILY
Status: DISCONTINUED | OUTPATIENT
Start: 2022-06-08 | End: 2022-06-15 | Stop reason: HOSPADM

## 2022-06-08 RX ADMIN — POTASSIUM CHLORIDE 40 MEQ: 1500 TABLET, EXTENDED RELEASE ORAL at 12:32

## 2022-06-08 RX ADMIN — HYDROMORPHONE HYDROCHLORIDE 0.5 MG: 1 INJECTION, SOLUTION INTRAMUSCULAR; INTRAVENOUS; SUBCUTANEOUS at 13:41

## 2022-06-08 RX ADMIN — OXYCODONE HYDROCHLORIDE 10 MG: 10 TABLET ORAL at 12:31

## 2022-06-08 RX ADMIN — TRAZODONE HYDROCHLORIDE 75 MG: 50 TABLET ORAL at 21:53

## 2022-06-08 RX ADMIN — MAGNESIUM SULFATE HEPTAHYDRATE 2 G: 40 INJECTION, SOLUTION INTRAVENOUS at 13:28

## 2022-06-08 RX ADMIN — ACETAMINOPHEN 650 MG: 325 TABLET, FILM COATED ORAL at 05:22

## 2022-06-08 RX ADMIN — LEVOTHYROXINE SODIUM 150 MCG: 75 TABLET ORAL at 05:22

## 2022-06-08 RX ADMIN — PIPERACILLIN AND TAZOBACTAM 3.38 G: 36; 4.5 INJECTION, POWDER, FOR SOLUTION INTRAVENOUS at 18:34

## 2022-06-08 RX ADMIN — PANTOPRAZOLE SODIUM 40 MG: 40 TABLET, DELAYED RELEASE ORAL at 05:22

## 2022-06-08 RX ADMIN — CITALOPRAM HYDROBROMIDE 40 MG: 20 TABLET ORAL at 08:14

## 2022-06-08 RX ADMIN — OXYCODONE HYDROCHLORIDE 10 MG: 10 TABLET ORAL at 19:51

## 2022-06-08 RX ADMIN — SODIUM CHLORIDE, SODIUM LACTATE, POTASSIUM CHLORIDE, AND CALCIUM CHLORIDE 125 ML/HR: .6; .31; .03; .02 INJECTION, SOLUTION INTRAVENOUS at 00:03

## 2022-06-08 RX ADMIN — ACETAMINOPHEN 650 MG: 325 TABLET, FILM COATED ORAL at 18:28

## 2022-06-08 RX ADMIN — APIXABAN 5 MG: 5 TABLET, FILM COATED ORAL at 08:54

## 2022-06-08 RX ADMIN — ACETAMINOPHEN 650 MG: 325 TABLET, FILM COATED ORAL at 11:33

## 2022-06-08 RX ADMIN — PIPERACILLIN AND TAZOBACTAM 3.38 G: 36; 4.5 INJECTION, POWDER, FOR SOLUTION INTRAVENOUS at 11:34

## 2022-06-08 RX ADMIN — OXYCODONE HYDROCHLORIDE 10 MG: 10 TABLET ORAL at 08:14

## 2022-06-08 RX ADMIN — AMLODIPINE BESYLATE 10 MG: 10 TABLET ORAL at 08:14

## 2022-06-08 RX ADMIN — PIPERACILLIN AND TAZOBACTAM 3.38 G: 36; 4.5 INJECTION, POWDER, FOR SOLUTION INTRAVENOUS at 06:19

## 2022-06-08 RX ADMIN — HYDROMORPHONE HYDROCHLORIDE 0.5 MG: 1 INJECTION, SOLUTION INTRAMUSCULAR; INTRAVENOUS; SUBCUTANEOUS at 21:53

## 2022-06-08 RX ADMIN — LIDOCAINE 5% 1 PATCH: 700 PATCH TOPICAL at 08:14

## 2022-06-08 RX ADMIN — APIXABAN 5 MG: 5 TABLET, FILM COATED ORAL at 18:29

## 2022-06-08 NOTE — CASE MANAGEMENT
Case Management Assessment & Discharge Planning Note    Patient name Trish Ontiveros  Location [facility] 909/[facility] 840-15 MRN [id number]  : 1965 Date 2022       Current Admission Date: 2022  Current Admission Diagnosis:Splenic abscess   Patient Active Problem List    Diagnosis Date Noted   Ines Kellogg 2022    Pulmonary nodule 2022    Morbid obesity due to excess calories ([address] ) 2022    Splenic abscess 2022      LOS (days): 0  Geometric Mean LOS (GMLOS) (days):   Days to GMLOS:     OBJECTIVE:              Current admission status: Observation       Preferred Pharmacy:   [id number], [address]  [address]  Phone: [phone] Fax: [phone]    Primary Care Provider: Vanna Patterson MD    Primary Insurance: [facility] CARE  Secondary Insurance:     ASSESSMENT:  [address] [facility] Representative - Daughter   Primary Phone: [phone] (Home)                              Patient Information  Admitted from[de-identified] Home  Mental Status: Alert  During Assessment patient was accompanied by: Other-Comment (niece)  Assessment information provided by[de-identified] Patient  Primary Caregiver: Self  Support Systems: Family members, Daughter  What city do you live in?: [address] [place] [address], however has been staying with her daughter in [place] for the last month (home setup below is for daughters house in [place])  Home entry access options   Select all that apply : Stairs  Number of steps to enter home : 2  Do the steps have railings?: No  Type of Current Residence: [facility]  In the last 12 months, was there a time when you were not able to pay the mortgage or rent on time?: No  In the last 12 months, how many places have you lived?: 2  In the last 12 months, was there a time when you did not have a steady place to sleep or slept in a shelter (including now)?: No  Living Arrangements: Lives w/ Daughter (for the last month has been staying with daughter Griselda Pointer in [place])    Activities of Daily Living Prior to Admission  Functional Status: Independent  Completes ADLs independently?: Yes  Ambulates independently?: Yes  Does patient use assisted devices?: No  Does patient currently own DME?: Yes  What DME does the patient currently own?: Rikki Reyna  Does patient have a history of Outpatient Therapy (PT/OT)?: No  Does the patient have a history of Short-Term Rehab?: Yes ([facility]raida Feliz in [place])  Does patient have a history of HHC?: No  Does patient currently have [facility]?: No         Patient Information Continued  Does patient have prescription coverage?: Yes  Within the past 12 months, you worried that your food would run out before you got the money to buy more : Sometimes true (however states that it is because she is picky with what she is willing to eat and sometimes she has to wait on delivery)  Within the past 12 months, the food you bought just didn't last and you didn't have money to get more : Sometimes true (states because she is picky with what she eats (specific ensures) and sometimes she has to wait on delivery)  Does patient receive dialysis treatments?: No  Does patient have a history of substance abuse?: No  Does patient have a history of Mental Health Diagnosis?: No         Means of Transportation  Means of Transport to Appts[de-identified] Family transport (states that either her sister or her daughter help her with transportation)  In the past 12 months, has lack of transportation kept you from medical appointments or from getting medications?: No  In the past 12 months, has lack of transportation kept you from meetings, work, or from getting things needed for daily living?: No        DISCHARGE DETAILS:    Discharge planning discussed with[de-identified] patient and niece  Freedom of Choice: Yes  Comments - Freedom of Choice: discussed FOC     Were Treatment Team discharge recommendations reviewed with patient/caregiver?: Yes  Did patient/caregiver verbalize understanding of patient care needs?: Yes  Were patient/caregiver advised of the risks associated with not following Treatment Team discharge recommendations?: Yes                   Other Referral/Resources/Interventions Provided:  Referral Comments: patient is requesting therapy eval - spoke with Evelia Harrington to notify her of patients request         Treatment Team Recommendation: Home  Discharge Destination Plan[de-identified] Home        Additional Comments: patient confirms that she has received 2 [address] vaccines / patient states that at this time her plan is to return with her daughter in [place] as she would really like to be more local and continue with health care providers out here  She plans on ultimately switching insurances when she is able to offically move  CM reviewed d/c planning process including the following: identifying help at home, patient preference for d/c planning needs, Discharge Lounge, [facility] Meds to Bed program, availability of treatment team to discuss questions or concerns patient and/or family may have regarding understanding medications and recognizing signs and symptoms once discharged  CM also encouraged patient to follow up with all recommended appointments after discharge  Patient advised of importance for patient and family to participate in managing patients medical well being  Patient/caregiver received discharge checklist   Content reviewed  Patient/caregiver encouraged to participate in discharge plan of care prior to discharge home

## 2022-06-08 NOTE — PROGRESS NOTES
Progress Note - Infectious Disease   Florian Schaeffer 59 y o  female MRN: [id number]  Unit/Bed#: [facility] 909-01 Encounter: [id number]      Impression:  1  Persistent splenic abscess R/0 superficial abscess versus phlegmon at former drain site  2  S/P Laparoscopic sleeve gastrectomy complicated by splenic injury requiring IR embolization with subsequent splenic abscesses, IR drainage, Axios stent placement and prolonged IV antibiotic course all at [facility]  3  History of DVT on Eliquis  4  CAD S/P stent placement   5  History of hypothyroidism   6  Intertriginous fungal dermatitis secondary to antibiotic therapy    Recommendations:  Patient is afebrile with modestly elevated WBC count  1  Patient has increased pain over the left lateral subcostal area with the previously described firm mass which is now showing increasing superficial erythema   It is clear that antibiotics alone are not going to remedy this issue and that further investigation is necessary  Will discuss with primary surgical service    Antibiotics:  1  Piperacillin/tazobactam 3 375 g q 6 hours IV    Subjective:  Complains of increasing left lateral subcostal pain and redness  Denies fevers, chills, or sweats  Denies nausea, vomiting, or diarrhea  Objective:  Vitals:  Temp:  [97 6 °F (36 4 °C)-98 4 °F (36 9 °C)] 97 6 °F (36 4 °C)  HR:  [77-85] 77  BP: ()/(45-76) 86/45  SpO2:  [90 %-93 %] 92 %  Temp (24hrs), Av 1 °F (36 7 °C), Min:97 6 °F (36 4 °C), Max:98 4 °F (36 9 °C)  Current: Temperature: 97 6 °F (36 4 °C)    Physical Exam:     General Appearance:  Alert, obese nontoxic, no acute distress  But having some left-sided abdominal discomfort   Throat: Oropharynx moist without lesions    Lips, mucosa, and tongue normal   Neck: Supple, symmetrical, trachea midline, no adenopathy,  no tenderness/mass/nodules   Lungs:   Clear to auscultation bilaterally, no audible wheezes, rhonchi or rales; respirations unlabored Heart:  Regular rate and rhythm, S1, S2 normal, no murmur, rub or gallop   Abdomen:   Soft, striae, approximately 5 cm firm tender left lateral subcostal mass with overlying spreading erythema  non-distended, positive bowel sounds    No masses, no organomegaly    Left flank subcostal tenderness   Extremities: Extremities normal, atraumatic, no clubbing, cyanosis or edema   Skin: As above         Invasive Devices  Report    Peripheral Intravenous Line  Duration           Peripheral IV 06/07/22 Left Antecubital 1 day    Peripheral IV 06/07/22 Left;Ventral (anterior) Forearm 1 day                Labs, Imaging, & Other studies:   All pertinent labs were personally reviewed  Results from last 7 days   Lab Units 06/08/22  0602 06/06/22  2258   WBC Thousand/uL 12 57* 10 68*   HEMOGLOBIN g/dL 11 3* 11 1*   PLATELETS Thousands/uL 460* 461*     Results from last 7 days   Lab Units 06/08/22  0602 06/06/22  2258   SODIUM mmol/L 139 140   POTASSIUM mmol/L 3 7 3 7   CHLORIDE mmol/L 105 107   CO2 mmol/L 27 27   BUN mg/dL 9 14   CREATININE mg/dL 0 64 0 88   EGFR ml/min/1 73sq m 97 72   CALCIUM mg/dL 8 6 8 9   AST U/L  --  16   ALT U/L  --  24   ALK PHOS U/L  --  92

## 2022-06-08 NOTE — QUICK NOTE
Nurse-Patient-Provider rounds were completed with the patient's nurse today, Karissa Escobar  We discussed the plan is to Stop heparin drip, restart her Eliqous which I verified with her she takes at home, 5mg bid  Discontinue fluids, Lidoderm patch to area of scar tissue  Continue Antibiotics for full course, check CBC in am     We reviewed all of the invasive devices/lines/telemetry orders  - nonee  DVT Prophylaxis:  - venodynes  - Eliqous    Pain Assessment / Plan:  - Continue current analgesic regimen  Mobility Assessment / Plan:  - Activity as tolerated       Goals / Barriers for discharge:   - restart Eliqous , continue pain control  Case management following; case and discharge needs discussed  All questions and concerns were addressed  I spent greater than 7 minutes reviewing the plan with the patient and the nurse, and coordinating care for the day      SHANTI Jeffries  6/8/2022

## 2022-06-08 NOTE — UTILIZATION REVIEW
Initial Clinical Review    Admission: Date/Time/Statement:   Admission Orders (From admission, onward)     Ordered        06/07/22 0447  Place in Observation  Once                      Orders Placed This Encounter   Procedures    Place in Observation     Standing Status:   Standing     Number of Occurrences:   1     Order Specific Question:   Level of Care     Answer:   Med Surg [16]     ED Arrival Information     Expected   -    Arrival   6/6/2022 20:18    Acuity   Urgent            Means of arrival   Walk-In    Escorted by   Family Member    Service   Surgery-General    Admission type   Urgent            Arrival complaint   Back Pain            Chief Complaint   Patient presents with    Pain     Had a punctured spleen in aug of last year, having complications from that was admitted here 2 weeks ago  C/o same pain on left side  No fever  Chills and nausea no vomiting already on abx     /2021 in [facility] ,[address] [place] [address]  Initial Presentation: 59 y o  female with a pmh of splenic abscess, hypothyroidism and HTN, she has a laparoscopic sleeve gastrectomy in [facility] in [address]  Her post op course was complicated with splenic bleed requiring IR embolization, cystgastrostomy stent by GI, IR drainage of splenic abscess, prolonged course of abx's and multiple hospitalizations  She presents to the ED with abdominal pain and chills for bout two days  Her abscess has recently been managed with IR drainage and abx  Her pain is located in the LUQ and L flank, worsens with movement and relieved by resting  The pain is throbbing in quality, it radiates to the L shoulder, rated moderate to severe in intensity  She also endorses nausea , fatigue, and difficulty taking deep breaths due to pain  She is admitted to observation status due to a persistent splenic abscess seen on CT scan 2 8 x 2 3 x 2 8 cm  Plan' IV zosyn  Consult ID for recommendations on abx's, IR consult for possible aspiration vrs drainage of splenic abscess  Heparin gtt, NPO and IVF's along with pain management  Infectious Disease - 6/7  - Persistent splenic abscess vrs phlegmon at former drain site  She may need further imaging such as an ultrasound of the mass itself  Started on Zosyn 3 375 g q6 pending cultures  Interventional Radiology  - 6/7 -  Small recurrent collection which is too small for a drain to be placed  The superior splenic collection is challenging to reach, likely needing transpleural approach raising risk fo empyema related complication  Favor conservative management for now  Date: 6/8/2022   Day 2: She reports continued left flank pain  After review of imaging and IR commentary  Superficial R flank mass is likely a hematoma vrs seroma and is the source of her pain  Splenic abscess remains stable on repeat imaging  Plan, Reg house diet, Pain and nausea control, incentive spirometry, OOB ambulate, continue extended course of Zosyn pending cultures, Transition heparin gtt back to [facility]       ED Triage Vitals [06/06/22 2029]   Temperature Pulse Respirations Blood Pressure SpO2   98 1 °F (36 7 °C) 89 16 151/84 98 %      Temp Source Heart Rate Source Patient Position - Orthostatic VS BP Location FiO2 (%)   Temporal Monitor Sitting Left arm --      Pain Score       8          Wt Readings from Last 1 Encounters:   05/21/22 89 8 kg (197 lb 14 4 oz)     Additional Vital Signs:   Date/Time Temp Pulse Resp BP MAP (mmHg) SpO2 O2 Device Patient Position - Orthostatic VS   06/08/22 15:23:15 97 6 °F (36 4 °C) 77 -- 86/45 Abnormal  59 92 % -- --   06/08/22 0800 -- -- -- -- -- -- None (Room air) --     Date/Time Temp Pulse Resp BP MAP (mmHg) SpO2 O2 Device Patient Position - Orthostatic VS   06/08/22 05:24:40 98 3 °F (36 8 °C) 85 -- 127/76 93 93 % -- --   06/07/22 21:57:20 97 9 °F (36 6 °C) 78 -- 95/53 67 90 % -- --   06/07/22 19:42:17 98 4 °F (36 9 °C) 79 -- 93/53 66 91 % -- --   06/07/22 15:35:55 98 °F (36 7 °C) 73 -- 95/51 66 92 % -- --   06/07/22 10:15:40 -- 75 -- 116/60 79 94 % -- --   06/07/22 1014 -- -- -- 116/60 -- -- -- --   06/07/22 07:17:13 -- 82 -- 91/48 Abnormal  62 88 % Abnormal  -- --   06/07/22 0620 -- -- -- 100/50 -- -- -- --   06/07/22 06:12:55 97 8 °F (36 6 °C) 76 -- 93/47 Abnormal  62 92 % -- --   06/07/22 0515 -- 80 18 104/65 -- 95 % None (Room air) Lying   06/07/22 0500 -- -- -- 101/64 77 -- -- --   06/07/22 0215 -- 76 20 92/58 70 96 % None (Room air) --   06/07/22 0005 -- 76 20 118/59 -- 96 % None (Room air) --   06/06/22 2247 -- -- -- -- -- -- None (Room air) --   06/06/22 2029 98 1 °F (36 7 °C) 89 16 151/84 -- 98 % None (Room air) Sitting           Pertinent Labs/Diagnostic Test Results:   CT abdomen pelvis with contrast   Final Result by Merle Diego MD (06/07 0754)      Redemonstrated fluid collection measuring up to 2 8 cm within the superior spleen with with foci of gas concerning for abscess              Workstation performed: [id number]               Results from last 7 days   Lab Units 06/08/22 0602 06/06/22  2258   WBC Thousand/uL 12 57* 10 68*   HEMOGLOBIN g/dL 11 3* 11 1*   HEMATOCRIT % 35 9 34 2*   PLATELETS Thousands/uL 460* 461*   NEUTROS ABS Thousands/µL  --  6 78         Results from last 7 days   Lab Units 06/08/22  0602 06/06/22  2258   SODIUM mmol/L 139 140   POTASSIUM mmol/L 3 7 3 7   CHLORIDE mmol/L 105 107   CO2 mmol/L 27 27   ANION GAP mmol/L 7 6   BUN mg/dL 9 14   CREATININE mg/dL 0 64 0 88   EGFR ml/min/1 73sq m 97 72   CALCIUM mg/dL 8 6 8 9   MAGNESIUM mg/dL 1 8  --    PHOSPHORUS mg/dL 3 8  --      Results from last 7 days   Lab Units 06/06/22  2258   AST U/L 16   ALT U/L 24   ALK PHOS U/L 92   TOTAL PROTEIN g/dL 7 6   ALBUMIN g/dL 2 5*   TOTAL BILIRUBIN mg/dL 0 23   BILIRUBIN DIRECT mg/dL 0 06       Results from last 7 days   Lab Units 06/08/22  0602 06/06/22  2258   GLUCOSE RANDOM mg/dL 94 100       Results from last 7 days   Lab Units 06/07/22  1128 06/07/22  0528   PROTIME seconds  --  13 1   INR   --  1 03 PTT seconds 81* 31       Results from last 7 days   Lab Units 06/06/22  2258   LIPASE u/L 150       ED Treatment:   Medication Administration from 06/06/2022 2018 to 06/07/2022 0556       Date/Time Order Dose Route Action Comments     06/06/2022 2259 morphine 10 mg/mL injection 6 mg 6 mg Intravenous Given      06/06/2022 2258 ondansetron (ZOFRAN) injection 4 mg 4 mg Intravenous Given      06/07/2022 0007 iohexol (OMNIPAQUE) 240 MG/ML solution 50 mL 50 mL Oral Given      06/07/2022 0006 diphenhydrAMINE (BENADRYL) injection 12 5 mg 12 5 mg Intravenous Given      06/07/2022 0130 iohexol (OMNIPAQUE) 350 MG/ML injection (MULTI-DOSE) 65 mL 65 mL Intravenous Given      06/07/2022 0210 HYDROmorphone HCl (DILAUDID) injection 0 2 mg 0 2 mg Intravenous Given      06/07/2022 0521 piperacillin-tazobactam (ZOSYN) 3 375 g in sodium chloride 0 9 % 100 mL IVPB 3 375 g Intravenous New Bag      06/07/2022 0521 acetaminophen (TYLENOL) tablet 650 mg 650 mg Oral Given      06/07/2022 0521 pantoprazole (PROTONIX) EC tablet 40 mg 40 mg Oral Given      06/07/2022 0522 heparin (porcine) 25,000 units in 0 45% NaCl 250 mL infusion (premix) 18 Units/kg/hr Intravenous New Bag         Past Medical History:   Diagnosis Date    Disease of thyroid gland     Hernia     Hypertension      Present on Admission:   Splenic abscess      Admitting Diagnosis: Splenic abscess [D73 3]  Back pain [M54 9]  Age/Sex: 59 y o  female       Admission Orders:  Scheduled Medications:  acetaminophen, 650 mg, Oral, Q6H [address]  amLODIPine, 10 mg, Oral, Daily  citalopram, 40 mg, Oral, Daily  levothyroxine, 150 mcg, Oral, Early Morning  lidocaine, 1 patch, Topical, Daily  pantoprazole, 40 mg, Oral, Early Morning  piperacillin-tazobactam, 3 375 g, Intravenous, Q6H  traZODone, 75 mg, Oral, HS  Eliquis 5 mg po bid - started 6/8 am   Mag sulfate 2g IVPB - 6/8 x1   Potassium 40 meq po once - 6/8 x1       Continuous IV Infusions:  heparin (porcine), 3-30 Units/kg/hr (Order-Specific), Intravenous, Titrated - d/c'd 6/8 @ 07:56  lactated ringers, 125 mL/hr, Intravenous, Continuous - d/c'd 6/8 @ 07:56      PRN Meds:  heparin (porcine), 3,400 Units, Intravenous, Q1H PRN  heparin (porcine), 6,800 Units, Intravenous, Q1H PRN  HYDROmorphone, 0 5 mg, Intravenous, Q4H PRN-  6/7 x 1 -6/8 x 1   ondansetron, 4 mg, Intravenous, Q6H PRN  oxyCODONE, 10 mg, Oral, Q4H PRN - 6/7 x 4 -6/8 x 2   oxyCODONE, 5 mg, Oral, Q4H PRN      Nursing Orders - VS - SCD to le's - I & O q shift - up as tolerated - Diet regular house    Network Utilization Review Department  ATTENTION: Please call with any questions or concerns to [phone] and carefully listen to the prompts so that you are directed to the right person  All voicemails are confidential   Kinga Starr all requests for admission clinical reviews, approved or denied determinations and any other requests to dedicated fax number below belonging to the campus where the patient is receiving treatment   List of dedicated fax numbers for the Facilities:  [address] DENIALS (Administrative/Medical Necessity) [phone]   [address] (Maternity/NICU/Pediatrics) [phone]   [address]  [address] Medical [place] [address] [address] [place] [address] Evelyn Tolent[address] [facility] [place] [address] [phone]

## 2022-06-08 NOTE — PROGRESS NOTES
Progress Note - general Surgery  Andrea Foley 59 y o  female MRN: [id number]  Unit/Bed#: [facility] 909-01 Encounter: [id number]    Assessment:  59 y o  female with known splenic abscess, who now presents for persistent left flank pain  Upon review of imaging as well as IR commentary, superficial left flank mass is likely to be a hematoma versus seroma, and is the source of her pain  Splenic abscess remains stable on repeat imaging  Patient remains hemodynamically stable with no fever or white count  Plan:  - Diet Regular; Regular House  - Pain and Nausea control PRN  - Incentive spirometry  - OOB, ambulate  - continue conservative management  - continue IV extended course of Zosyn  ID following  - will transition heparin drip back to p o  Eliquis  - potential discharge once pain is controlled    Subjective/Objective     Subjective:  Patient states that she is still having left flank pain  Able tolerated diet  Denies lightheadedness or weakness  Objective:   Vitals: Blood pressure 127/76, pulse 85, temperature 98 3 °F (36 8 °C), resp  rate 18, last menstrual period 08/03/2016, SpO2 93 %, not currently breastfeeding  ,There is no height or weight on file to calculate BMI  I/O     None          Physical Exam:  Gen: NAD, Aox3, Comfortable in bed  Chest: Normal work of breathing, no respiratory distress  Abd: Soft, ND, left flank tenderness  Palpable 6 x 3 cm mass, nonfluctuant, no erythema  Ext: No Edema  Skin: Warm, Dry, Intact      Lab, Imaging and other studies: I have personally reviewed pertinent reports      VTE Pharmacologic Prophylaxis: Heparin  VTE Mechanical Prophylaxis: sequential compression device        Jorge L Mejia MD  6/8/2022  6:26 AM

## 2022-06-09 ENCOUNTER — APPOINTMENT (INPATIENT)
Dept: RADIOLOGY | Facility: HOSPITAL | Age: 57
DRG: 863 | End: 2022-06-09
Payer: MEDICARE

## 2022-06-09 LAB
ERYTHROCYTE [DISTWIDTH] IN BLOOD BY AUTOMATED COUNT: 18.6 % (ref 11.6–15.1)
HCT VFR BLD AUTO: 30.7 % (ref 34.8–46.1)
HGB BLD-MCNC: 9.7 G/DL (ref 11.5–15.4)
MCH RBC QN AUTO: 26.4 PG (ref 26.8–34.3)
MCHC RBC AUTO-ENTMCNC: 31.6 G/DL (ref 31.4–37.4)
MCV RBC AUTO: 83 FL (ref 82–98)
PLATELET # BLD AUTO: 414 THOUSANDS/UL (ref 149–390)
PMV BLD AUTO: 9.4 FL (ref 8.9–12.7)
RBC # BLD AUTO: 3.68 MILLION/UL (ref 3.81–5.12)
WBC # BLD AUTO: 9.94 THOUSAND/UL (ref 4.31–10.16)

## 2022-06-09 PROCEDURE — 85027 COMPLETE CBC AUTOMATED: CPT | Performed by: SURGERY

## 2022-06-09 PROCEDURE — 76705 ECHO EXAM OF ABDOMEN: CPT

## 2022-06-09 PROCEDURE — NC001 PR NO CHARGE: Performed by: INTERNAL MEDICINE

## 2022-06-09 PROCEDURE — 99232 SBSQ HOSP IP/OBS MODERATE 35: CPT | Performed by: SURGERY

## 2022-06-09 PROCEDURE — 99232 SBSQ HOSP IP/OBS MODERATE 35: CPT | Performed by: INTERNAL MEDICINE

## 2022-06-09 RX ADMIN — LEVOTHYROXINE SODIUM 150 MCG: 75 TABLET ORAL at 05:18

## 2022-06-09 RX ADMIN — HYDROMORPHONE HYDROCHLORIDE 0.5 MG: 1 INJECTION, SOLUTION INTRAMUSCULAR; INTRAVENOUS; SUBCUTANEOUS at 16:39

## 2022-06-09 RX ADMIN — ACETAMINOPHEN 650 MG: 325 TABLET, FILM COATED ORAL at 18:16

## 2022-06-09 RX ADMIN — PANTOPRAZOLE SODIUM 40 MG: 40 TABLET, DELAYED RELEASE ORAL at 05:18

## 2022-06-09 RX ADMIN — HYDROMORPHONE HYDROCHLORIDE 0.5 MG: 1 INJECTION, SOLUTION INTRAMUSCULAR; INTRAVENOUS; SUBCUTANEOUS at 09:03

## 2022-06-09 RX ADMIN — PIPERACILLIN AND TAZOBACTAM 3.38 G: 36; 4.5 INJECTION, POWDER, FOR SOLUTION INTRAVENOUS at 05:18

## 2022-06-09 RX ADMIN — OXYCODONE HYDROCHLORIDE 10 MG: 10 TABLET ORAL at 12:59

## 2022-06-09 RX ADMIN — PIPERACILLIN AND TAZOBACTAM 3.38 G: 36; 4.5 INJECTION, POWDER, FOR SOLUTION INTRAVENOUS at 12:59

## 2022-06-09 RX ADMIN — ACETAMINOPHEN 650 MG: 325 TABLET, FILM COATED ORAL at 00:34

## 2022-06-09 RX ADMIN — PIPERACILLIN AND TAZOBACTAM 3.38 G: 36; 4.5 INJECTION, POWDER, FOR SOLUTION INTRAVENOUS at 00:35

## 2022-06-09 RX ADMIN — TRAZODONE HYDROCHLORIDE 75 MG: 50 TABLET ORAL at 21:09

## 2022-06-09 RX ADMIN — AMLODIPINE BESYLATE 10 MG: 10 TABLET ORAL at 08:01

## 2022-06-09 RX ADMIN — APIXABAN 5 MG: 5 TABLET, FILM COATED ORAL at 08:01

## 2022-06-09 RX ADMIN — PIPERACILLIN AND TAZOBACTAM 3.38 G: 36; 4.5 INJECTION, POWDER, FOR SOLUTION INTRAVENOUS at 18:17

## 2022-06-09 RX ADMIN — PIPERACILLIN AND TAZOBACTAM 3.38 G: 36; 4.5 INJECTION, POWDER, FOR SOLUTION INTRAVENOUS at 23:03

## 2022-06-09 RX ADMIN — OXYCODONE HYDROCHLORIDE 10 MG: 10 TABLET ORAL at 21:09

## 2022-06-09 RX ADMIN — CITALOPRAM HYDROBROMIDE 40 MG: 20 TABLET ORAL at 08:01

## 2022-06-09 RX ADMIN — ACETAMINOPHEN 650 MG: 325 TABLET, FILM COATED ORAL at 12:58

## 2022-06-09 RX ADMIN — ACETAMINOPHEN 650 MG: 325 TABLET, FILM COATED ORAL at 05:18

## 2022-06-09 RX ADMIN — OXYCODONE HYDROCHLORIDE 10 MG: 10 TABLET ORAL at 07:59

## 2022-06-09 RX ADMIN — ACETAMINOPHEN 650 MG: 325 TABLET, FILM COATED ORAL at 23:42

## 2022-06-09 NOTE — PROGRESS NOTES
Progress Note - general Surgery  Andrea Foley 59 y o  female MRN: [id number]  Unit/Bed#: [facility] 909-01 Encounter: [id number]    Assessment:  59 y o  female with known splenic abscess, who presented with persistent  left flank pain and palpable mass  Patient has a left flank hematoma versus fluid collection at the site of her previous drainage tube  She shows no systemic symptoms, however the area has now become locally erythematous and she complains of increased tenderness  Plan pain and palpable mass:  - Diet Regular; Regular House  - Pain and Nausea control PRN  - follow-up white count this morning  - continue IV antibiotics  - will discuss need for additional imaging to assess maturation of left flank fluid collection    Subjective/Objective     Subjective:  Patient states that she is having more left flank this morning compared to yesterday and notes skin changes  Denies fever chills  Objective:   Vitals: Blood pressure 107/58, pulse 79, temperature 97 8 °F (36 6 °C), resp  rate 18, last menstrual period 08/03/2016, SpO2 91 %, not currently breastfeeding  ,There is no height or weight on file to calculate BMI  I/O       06/07 0701 06/08 0700 06/08 0701 06/09 0700          Unmeasured Urine Occurrence  1 x          Physical Exam:  Gen: NAD, Aox3, Comfortable in bed  Chest: Normal work of breathing, no respiratory distress  Abd: Soft, ND, left flank is tender  There is mild erythema extending from the flank towards the back  Ext: No Edema  Skin: Warm, Dry, Intact      Lab, Imaging and other studies: I have personally reviewed pertinent reports      VTE Pharmacologic Prophylaxis:  Eliquis  VTE Mechanical Prophylaxis: sequential compression device        Jorge L Mejia MD  6/9/2022  6:05 AM

## 2022-06-09 NOTE — CASE MANAGEMENT
Case Management Discharge Planning Note    Patient name Mitzi Reilly  Location [facility]09/[facility] 937-94 MRN [id number]  : 1965 Date 2022       Current Admission Date: 2022  Current Admission Diagnosis:Splenic abscess   Patient Active Problem List    Diagnosis Date Noted   Mara Randall 2022    Pulmonary nodule 2022    Morbid obesity due to excess calories ([address] ) 2022    Splenic abscess 2022      LOS (days): 0  Geometric Mean LOS (GMLOS) (days):   Days to GMLOS:     OBJECTIVE:            Current admission status: Observation   Preferred Pharmacy:   [id number] [address]  Phone: [phone] Fax: [phone]    Primary Care Provider: Shasha De La Rosa MD    Primary Insurance: MEDICARE MISC REPLACEMENT  [facility]    DISCHARGE DETAILS:             Additional Comments: patient is not medically stable for d/c at this time  ID is following  Plan is for ultrasound

## 2022-06-09 NOTE — UTILIZATION REVIEW
Continued Stay Review    Date: 6/9/2022                          Current Patient Class:  Observation   Current Level of Care:  Med Surg   Pt  Admitted on 6/7 in observation status changed to inpatient status  On 6/9 due to need for continued treatment IV abx's , worsening  06/09/22 1408  Inpatient Admission          Transfer Service: Surgery-General       Question Answer   Level of Care Med Surg   Estimated length of stay More than 2 Midnights   Certification I certify that inpatient services are medically necessary for this patient for a duration of greater than two midnights  See H&P and MD Progress Notes for additional information about the patient's course of treatment  HPI:57 y o  female initially admitted on 6/7 with known splenic abscess, she presented with persistent L flank pain and palpable mass  Assessment/Plan: 6/9 - Pt with no systemic symptoms, however the area has now become locally erythematous  She complains of increased pain  Vital Signs:   Date/Time Temp Pulse Resp BP MAP (mmHg) SpO2 O2 Device Patient Position - Orthostatic VS   06/08/22 21:45:27 97 8 °F (36 6 °C) 79 -- 107/58 74 91 % -- --   06/08/22 21:45:08 97 8 °F (36 6 °C) 77 -- 107/58 74 91 % -- --   06/08/22 20:41:09 98 °F (36 7 °C) -- -- 109/59 76 -- -- --   06/08/22 15:23:15 97 6 °F (36 4 °C) 77 -- 86/45 Abnormal  59 92 % -- --         Pertinent Labs/Diagnostic Results:     CT Abd & Pelvis 6/7  - Redemonstrated fluid collection measuring up to 2 8 cm within the superior spleen with interval increase in  foci of gas concerning for abscess         Results from last 7 days   Lab Units 06/09/22  0514 06/08/22  0602 06/06/22  2258   WBC Thousand/uL 9 94 12 57* 10 68*   HEMOGLOBIN g/dL 9 7* 11 3* 11 1*   HEMATOCRIT % 30 7* 35 9 34 2*   PLATELETS Thousands/uL 414* 460* 461*   NEUTROS ABS Thousands/µL  --   --  6 78         Results from last 7 days   Lab Units 06/08/22  0602 06/06/22  2258   SODIUM mmol/L 139 140 POTASSIUM mmol/L 3 7 3 7   CHLORIDE mmol/L 105 107   CO2 mmol/L 27 27   ANION GAP mmol/L 7 6   BUN mg/dL 9 14   CREATININE mg/dL 0 64 0 88   EGFR ml/min/1 73sq m 97 72   CALCIUM mg/dL 8 6 8 9   MAGNESIUM mg/dL 1 8  --    PHOSPHORUS mg/dL 3 8  --      Results from last 7 days   Lab Units 06/06/22  2258   AST U/L 16   ALT U/L 24   ALK PHOS U/L 92   TOTAL PROTEIN g/dL 7 6   ALBUMIN g/dL 2 5*   TOTAL BILIRUBIN mg/dL 0 23   BILIRUBIN DIRECT mg/dL 0 06     Results from last 7 days   Lab Units 06/08/22  0602 06/06/22  2258   GLUCOSE RANDOM mg/dL 94 100       Results from last 7 days   Lab Units 06/08/22  0635 06/07/22  1128 06/07/22  0528   PROTIME seconds  --   --  13 1   INR   --   --  1 03   PTT seconds 153* 81* 31       Results from last 7 days   Lab Units 06/06/22  2258   LIPASE u/L 150         Medications:   Scheduled Medications:  acetaminophen, 650 mg, Oral, Q6H JANETT  amLODIPine, 10 mg, Oral, Daily  apixaban, 5 mg, Oral, BID  citalopram, 40 mg, Oral, Daily  levothyroxine, 150 mcg, Oral, Early Morning  lidocaine, 1 patch, Topical, Daily  pantoprazole, 40 mg, Oral, Early Morning  piperacillin-tazobactam, 3 375 g, Intravenous, Q6H  traZODone, 75 mg, Oral, HS  Mag sulfate 2g ivpb 6/8 x 1   Potassium 40 meq po once - 6/8 x 1       Continuous IV Infusions:     PRN Meds:  HYDROmorphone, 0 5 mg, Intravenous, Q4H PRN - 6/8 x 2 - 6/9 x 1    ondansetron, 4 mg, Intravenous, Q6H PRN  oxyCODONE, 10 mg, Oral, Q4H PRN - 6/8 x 3 - 6/9 x 2    oxyCODONE, 5 mg, Oral, Q4H PRN        Discharge Plan: TBD     Network Utilization Review Department  ATTENTION: Please call with any questions or concerns to [phone] and carefully listen to the prompts so that you are directed to the right person  All voicemails are confidential   Finis Feeling all requests for admission clinical reviews, approved or denied determinations and any other requests to dedicated fax number below belonging to the campus where the patient is receiving treatment   List of dedicated fax numbers for the Facilities:  [address] [place] [address] DENIALS (Administrative/Medical Necessity) [phone]   [address]  (Maternity/NICU/Pediatrics) [phone]   [address]  [address] [place] [address] [address] [place] [address] Evelyn Morales [address] [facility] [address] [facility] [phone]

## 2022-06-09 NOTE — PLAN OF CARE
Patient remained A&O x4  Continued with pain to the left flank area with redness and tenderness to the touch  Oxycodone gives little to none relief but she says that hydromorphone helps  Monitoring continued     Problem: PAIN - ADULT  Goal: Verbalizes/displays adequate comfort level or baseline comfort level  Description: Interventions:  - Encourage patient to monitor pain and request assistance  - Assess pain using appropriate pain scale  - Administer analgesics based on type and severity of pain and evaluate response  - Implement non-pharmacological measures as appropriate and evaluate response  - Consider cultural and social influences on pain and pain management  - Notify physician/advanced practitioner if interventions unsuccessful or patient reports new pain  Outcome: Progressing

## 2022-06-09 NOTE — PROGRESS NOTES
Progress Note - Infectious Disease   Chance Abbott 59 y o  female MRN: [id number]  Unit/Bed#: [facility] 909-01 Encounter: [id number]      Impression:  1  Persistent splenic abscess R/0 superficial abscess versus phlegmon at former drain site  2  S/P Laparoscopic sleeve gastrectomy complicated by splenic injury requiring IR embolization with subsequent splenic abscesses, IR drainage, Axios stent placement and prolonged IV antibiotic course all at [facility]  3  History of DVT on Eliquis  4  CAD S/P stent placement   5  History of hypothyroidism   6  Intertriginous fungal dermatitis secondary to antibiotic therapy    Recommendations:  Patient is afebrile with normal WBC count  1  Patient still has increased pain over the left lateral subcostal area with the previously described firm mass which is  showing increasing superficial erythema   It is clear that antibiotics alone are not going to remedy this issue and that further investigation is necessary  Seen with surgery  Agree with ultrasound of mass  Antibiotics:  1  Piperacillin/tazobactam 3 375 g q 6 hours IV, day 4 Rx    Subjective:  Complains of increasing left lateral subcostal pain and redness  Denies fevers, chills, or sweats  Denies nausea, vomiting, or diarrhea  Objective:  Vitals:  Temp:  [97 5 °F (36 4 °C)-98 °F (36 7 °C)] 97 5 °F (36 4 °C)  HR:  [71-79] 71  BP: ()/(45-68) 139/68  SpO2:  [91 %-93 %] 93 %  Temp (24hrs), Av 7 °F (36 5 °C), Min:97 5 °F (36 4 °C), Max:98 °F (36 7 °C)  Current: Temperature: 97 5 °F (36 4 °C)    Physical Exam:     General Appearance:  Alert, obese nontoxic, no acute distress  But having some left-sided abdominal discomfort   Throat: Oropharynx moist without lesions    Lips, mucosa, and tongue normal   Neck: Supple, symmetrical, trachea midline, no adenopathy,  no tenderness/mass/nodules   Lungs:   Clear to auscultation bilaterally, no audible wheezes, rhonchi or rales; respirations unlabored   Heart:  Regular rate and rhythm, S1, S2 normal, no murmur, rub or gallop   Abdomen:   Soft, striae, approximately 5 cm firm tender left lateral subcostal mass with overlying spreading mild erythema  non-distended, positive bowel sounds    No masses, no organomegaly    Left flank subcostal tenderness   Extremities: Extremities normal, atraumatic, no clubbing, cyanosis or edema   Skin: As above         Invasive Devices  Report    Peripheral Intravenous Line  Duration           Peripheral IV 06/07/22 Left Antecubital 2 days                Labs, Imaging, & Other studies:   All pertinent labs were personally reviewed  Results from last 7 days   Lab Units 06/09/22  0514 06/08/22  0602 06/06/22  2258   WBC Thousand/uL 9 94 12 57* 10 68*   HEMOGLOBIN g/dL 9 7* 11 3* 11 1*   PLATELETS Thousands/uL 414* 460* 461*     Results from last 7 days   Lab Units 06/08/22  0602 06/06/22  2258   SODIUM mmol/L 139 140   POTASSIUM mmol/L 3 7 3 7   CHLORIDE mmol/L 105 107   CO2 mmol/L 27 27   BUN mg/dL 9 14   CREATININE mg/dL 0 64 0 88   EGFR ml/min/1 73sq m 97 72   CALCIUM mg/dL 8 6 8 9   AST U/L  --  16   ALT U/L  --  24   ALK PHOS U/L  --  92

## 2022-06-09 NOTE — TELEMEDICINE
e-Consult (IPC)  - Interventional Radiology  Andrea Foley 59 y o  female MRN: [id number]  Unit/Bed#: [facility] 909-01 Encounter: [id number]          Interventional Radiology has been consulted to evaluate Andrea Foley    We were consulted by surgery concerning this patient with splenic abscess  IP Consult to IR  Consult performed by: Kiran Meyer MD  Consult ordered by: Jt Rucker MD        06/09/22    Assessment/Recommendation:   59year-old female with history of gastric sleeve gastrectomy, splenic injury, resulting in persistent splenic abscess  Patient now has a superficial abscess with a tract that seems to extend to the perisplenic abscess, which is partially seen on the CT of the abdomen and pelvis, and also on the ultrasound of the abdomen  Patient has been on Eliquis for DVT, with last dose this AM on 6/9  We will plan for perisplenic abscess drain placement through the superficial collection at the left flank and through the existing tract, with the approach and modality to be determined by the performing IR physician  Please make patient NPO at midnight       Total time spent in review of data, discussion with requesting provider and rendering advice was 5 minutes  Thank you for allowing Interventional Radiology to participate in the care of Andrea Foley  Please don't hesitate to call or TigerText us with any questions       Kiran Meyer MD

## 2022-06-10 LAB
APTT PPP: 40 SECONDS (ref 23–37)
ERYTHROCYTE [DISTWIDTH] IN BLOOD BY AUTOMATED COUNT: 18.6 % (ref 11.6–15.1)
HCT VFR BLD AUTO: 34.2 % (ref 34.8–46.1)
HGB BLD-MCNC: 10.8 G/DL (ref 11.5–15.4)
INR PPP: 1.12 (ref 0.84–1.19)
MCH RBC QN AUTO: 26 PG (ref 26.8–34.3)
MCHC RBC AUTO-ENTMCNC: 31.6 G/DL (ref 31.4–37.4)
MCV RBC AUTO: 82 FL (ref 82–98)
PLATELET # BLD AUTO: 474 THOUSANDS/UL (ref 149–390)
PMV BLD AUTO: 8.9 FL (ref 8.9–12.7)
PROTHROMBIN TIME: 14 SECONDS (ref 11.6–14.5)
RBC # BLD AUTO: 4.15 MILLION/UL (ref 3.81–5.12)
WBC # BLD AUTO: 8.53 THOUSAND/UL (ref 4.31–10.16)

## 2022-06-10 PROCEDURE — 85027 COMPLETE CBC AUTOMATED: CPT | Performed by: STUDENT IN AN ORGANIZED HEALTH CARE EDUCATION/TRAINING PROGRAM

## 2022-06-10 PROCEDURE — 85730 THROMBOPLASTIN TIME PARTIAL: CPT | Performed by: STUDENT IN AN ORGANIZED HEALTH CARE EDUCATION/TRAINING PROGRAM

## 2022-06-10 PROCEDURE — 99232 SBSQ HOSP IP/OBS MODERATE 35: CPT | Performed by: SURGERY

## 2022-06-10 PROCEDURE — 85610 PROTHROMBIN TIME: CPT | Performed by: STUDENT IN AN ORGANIZED HEALTH CARE EDUCATION/TRAINING PROGRAM

## 2022-06-10 PROCEDURE — 99232 SBSQ HOSP IP/OBS MODERATE 35: CPT | Performed by: INTERNAL MEDICINE

## 2022-06-10 RX ORDER — HEPARIN SODIUM 10000 [USP'U]/100ML
3-20 INJECTION, SOLUTION INTRAVENOUS
Status: DISCONTINUED | OUTPATIENT
Start: 2022-06-10 | End: 2022-06-11

## 2022-06-10 RX ADMIN — ACETAMINOPHEN 650 MG: 325 TABLET, FILM COATED ORAL at 23:11

## 2022-06-10 RX ADMIN — LEVOTHYROXINE SODIUM 150 MCG: 75 TABLET ORAL at 05:00

## 2022-06-10 RX ADMIN — ACETAMINOPHEN 650 MG: 325 TABLET, FILM COATED ORAL at 18:26

## 2022-06-10 RX ADMIN — AMLODIPINE BESYLATE 10 MG: 10 TABLET ORAL at 07:40

## 2022-06-10 RX ADMIN — PIPERACILLIN AND TAZOBACTAM 3.38 G: 36; 4.5 INJECTION, POWDER, FOR SOLUTION INTRAVENOUS at 11:29

## 2022-06-10 RX ADMIN — ACETAMINOPHEN 650 MG: 325 TABLET, FILM COATED ORAL at 11:29

## 2022-06-10 RX ADMIN — OXYCODONE HYDROCHLORIDE 10 MG: 10 TABLET ORAL at 23:13

## 2022-06-10 RX ADMIN — TRAZODONE HYDROCHLORIDE 75 MG: 50 TABLET ORAL at 23:10

## 2022-06-10 RX ADMIN — PIPERACILLIN AND TAZOBACTAM 3.38 G: 36; 4.5 INJECTION, POWDER, FOR SOLUTION INTRAVENOUS at 18:38

## 2022-06-10 RX ADMIN — PIPERACILLIN AND TAZOBACTAM 3.38 G: 36; 4.5 INJECTION, POWDER, FOR SOLUTION INTRAVENOUS at 23:13

## 2022-06-10 RX ADMIN — CITALOPRAM HYDROBROMIDE 40 MG: 20 TABLET ORAL at 07:40

## 2022-06-10 RX ADMIN — HEPARIN SODIUM 11.8 UNITS/KG/HR: 10000 INJECTION, SOLUTION INTRAVENOUS at 18:47

## 2022-06-10 RX ADMIN — OXYCODONE HYDROCHLORIDE 10 MG: 10 TABLET ORAL at 07:39

## 2022-06-10 RX ADMIN — ONDANSETRON 4 MG: 2 INJECTION INTRAMUSCULAR; INTRAVENOUS at 18:30

## 2022-06-10 RX ADMIN — PANTOPRAZOLE SODIUM 40 MG: 40 TABLET, DELAYED RELEASE ORAL at 05:00

## 2022-06-10 RX ADMIN — HYDROMORPHONE HYDROCHLORIDE 0.5 MG: 1 INJECTION, SOLUTION INTRAMUSCULAR; INTRAVENOUS; SUBCUTANEOUS at 11:29

## 2022-06-10 RX ADMIN — OXYCODONE HYDROCHLORIDE 10 MG: 10 TABLET ORAL at 18:26

## 2022-06-10 RX ADMIN — PIPERACILLIN AND TAZOBACTAM 3.38 G: 36; 4.5 INJECTION, POWDER, FOR SOLUTION INTRAVENOUS at 04:59

## 2022-06-10 RX ADMIN — ACETAMINOPHEN 650 MG: 325 TABLET, FILM COATED ORAL at 05:00

## 2022-06-10 NOTE — PLAN OF CARE
Patient NPO for IR intervention today     Problem: PAIN - ADULT  Goal: Verbalizes/displays adequate comfort level or baseline comfort level  Description: Interventions:  - Encourage patient to monitor pain and request assistance  - Assess pain using appropriate pain scale  - Administer analgesics based on type and severity of pain and evaluate response  - Implement non-pharmacological measures as appropriate and evaluate response  - Consider cultural and social influences on pain and pain management  - Notify physician/advanced practitioner if interventions unsuccessful or patient reports new pain  Outcome: Progressing     Problem: SAFETY ADULT  Goal: Patient will remain free of falls  Description: INTERVENTIONS:  - Educate patient/family on patient safety including physical limitations  - Instruct patient to call for assistance with activity   - Consult OT/PT to assist with strengthening/mobility   - Keep Call bell within reach  - Keep bed low and locked with side rails adjusted as appropriate  - Keep care items and personal belongings within reach  - Initiate and maintain comfort rounds  - Make Fall Risk Sign visible to staff  - Offer Toileting every  Hours, in advance of need  - Initiate/Maintain alarm  - Obtain necessary fall risk management equipment:   - Apply yellow socks and bracelet for high fall risk patients  - Consider moving patient to room near nurses station  Outcome: Progressing  Goal: Maintain or return to baseline ADL function  Description: INTERVENTIONS:  -  Assess patient's ability to carry out ADLs; assess patient's baseline for ADL function and identify physical deficits which impact ability to perform ADLs (bathing, care of mouth/teeth, toileting, grooming, dressing, etc )  - Assess/evaluate cause of self-care deficits   - Assess range of motion  - Assess patient's mobility; develop plan if impaired  - Assess patient's need for assistive devices and provide as appropriate  - Encourage maximum independence but intervene and supervise when necessary  - Involve family in performance of ADLs  - Assess for home care needs following discharge   - Consider OT consult to assist with ADL evaluation and planning for discharge  - Provide patient education as appropriate  Outcome: Progressing  Goal: Maintains/Returns to pre admission functional level  Description: INTERVENTIONS:  - Perform BMAT or MOVE assessment daily    - Set and communicate daily mobility goal to care team and patient/family/caregiver  - Collaborate with rehabilitation services on mobility goals if consulted  - Perform Range of Motion times a day  - Reposition patient ever hours    - Dangle patient  times a day  - Stand patient  times a day  - Ambulate patient  times a day  - Out of bed to chair  times a day   - Out of bed for meals times a day  - Out of bed for toileting  - Record patient progress and toleration of activity level   Outcome: Progressing

## 2022-06-10 NOTE — PROGRESS NOTES
Progress Note - General Surgery   Mandie Schwartz 59 y o  female MRN: [id number]  Unit/Bed#: [facility] 909-01 Encounter: [id number]    Assessment:  56yoF w known splenic abscess, who presented with persistent  left flank pain and palpable mass, presumed fluid collection at the site of her previous drainage tube, w/o systemic symptoms, area has now become locally erythematous w increased tenderness    6/9 US L flank: Lobular subcutaneous abdominal wall left flank fluid collection including small linear sinus tract extending into the intercostal space measuring 7 8 x 1 8 x 3 cm    Vitals stable, afebrile  Labs pending    Plan:  - NPO for IR procedure today  - IR on board, appreciate recs  - ID on board, appreciate recs for abx guidance, c/w IV zosyn  - holding eliquis for procedure  - monitor for fever/WBC  - pain control  - encourage ambulation    Subjective/Objective   Subjective:   Patient doing well, reports continued pain in the left flank subcutaneous mass, remains NPO for IR today but was tolerating diet without nausea or emesis, voiding without issues, continues to have bowel function  Objective:     Blood pressure 134/74, pulse 77, temperature 97 6 °F (36 4 °C), resp  rate 18, last menstrual period 08/03/2016, SpO2 94 %, not currently breastfeeding  ,There is no height or weight on file to calculate BMI        Intake/Output Summary (Last 24 hours) at 6/10/2022 0399  Last data filed at 6/9/2022 2235  Gross per 24 hour   Intake 600 ml   Output --   Net 600 ml       Invasive Devices  Report    Peripheral Intravenous Line  Duration           Peripheral IV 06/07/22 Left Antecubital 3 days                Physical Exam:   General: NAD  Skin: Warm, dry, anicteric  HEENT: Normocephalic, atraumatic  CV: RRR, no m/r/g  Pulm: CTA b/l, no inc WOB  Abd: Soft, ND/NT, left flank subcutaneous mass with tenderness to palpation  MSK: Symmetric, no edema, no tenderness, no deformity  Neuro: AOx3, GCS 15    Lab, Imaging and other studies:I have personally reviewed pertinent lab results    , CBC: No results found for: WBC, HGB, HCT, MCV, PLT, ADJUSTEDWBC, MCH, MCHC, RDW, MPV, NRBC, CMP: No results found for: SODIUM, K, CL, CO2, ANIONGAP, BUN, CREATININE, GLUCOSE, CALCIUM, AST, ALT, ALKPHOS, PROT, BILITOT, EGFR  VTE Pharmacologic Prophylaxis: Sequential compression device (Venodyne)   VTE Mechanical Prophylaxis: sequential compression device

## 2022-06-10 NOTE — PROGRESS NOTES
Progress Note - Infectious Disease   Mitzi Reilly 59 y o  female MRN: [id number]  Unit/Bed#: [facility] 909-01 Encounter: [id number]      Impression:  1  Persistent splenic abscess R/0 superficial abscess versus phlegmon at former drain site  2  S/P Laparoscopic sleeve gastrectomy complicated by splenic injury requiring IR embolization with subsequent splenic abscesses, IR drainage, Axios stent placement and prolonged IV antibiotic course all at [facility]  3  History of DVT on Eliquis  4  CAD S/P stent placement   5  History of hypothyroidism   6  Intertriginous fungal dermatitis secondary to antibiotic therapy    Recommendations:  Patient is afebrile with normal WBC count when last taken  1  Patient still has increased pain over the left lateral subcostal area with the previously described firm mass which today has less erythema   It is clear that antibiotics alone are not going to remedy this issue and that further investigation is necessary  Ultrasound reveals a fluid-filled mass that is 7 8 cm in length  2  IR to do aspiration/drainage with CNS   Antibiotics:  1  Piperacillin/tazobactam 3 375 g q 6 hours IV, day 5 Rx    Subjective:  Still has left lateral subcostal pain  Denies fevers, chills, or sweats  Denies nausea, vomiting, or diarrhea  Objective:  Vitals:  Temp:  [97 3 °F (36 3 °C)-97 7 °F (36 5 °C)] 97 6 °F (36 4 °C)  HR:  [71-86] 71  Resp:  [16-18] 16  BP: (134-136)/(64-74) 135/74  SpO2:  [93 %-97 %] 97 %  Temp (24hrs), Av 5 °F (36 4 °C), Min:97 3 °F (36 3 °C), Max:97 7 °F (36 5 °C)  Current: Temperature: 97 6 °F (36 4 °C)    Physical Exam:     General Appearance:  Alert, obese nontoxic, no acute distress  But having some left-sided abdominal discomfort   Throat: Oropharynx moist without lesions    Lips, mucosa, and tongue normal   Neck: Supple, symmetrical, trachea midline, no adenopathy,  no tenderness/mass/nodules   Lungs:   Clear to auscultation bilaterally, no audible wheezes, rhonchi or rales; respirations unlabored   Heart:  Regular rate and rhythm, S1, S2 normal, no murmur, rub or gallop   Abdomen:   Soft, striae, approximately 5 cm firm tender left lateral subcostal mass now with minimal erythema  non-distended, positive bowel sounds  No masses, no organomegaly    Left flank subcostal tenderness   Extremities: Extremities normal, atraumatic, no clubbing, cyanosis or edema   Skin: As above         Invasive Devices  Report    Peripheral Intravenous Line  Duration           Peripheral IV 06/09/22 Distal;Dorsal (posterior); Right Wrist 1 day                Labs, Imaging, & Other studies:   All pertinent labs were personally reviewed  Results from last 7 days   Lab Units 06/09/22  0514 06/08/22  0602 06/06/22  2258   WBC Thousand/uL 9 94 12 57* 10 68*   HEMOGLOBIN g/dL 9 7* 11 3* 11 1*   PLATELETS Thousands/uL 414* 460* 461*     Results from last 7 days   Lab Units 06/08/22  0602 06/06/22  2258   SODIUM mmol/L 139 140   POTASSIUM mmol/L 3 7 3 7   CHLORIDE mmol/L 105 107   CO2 mmol/L 27 27   BUN mg/dL 9 14   CREATININE mg/dL 0 64 0 88   EGFR ml/min/1 73sq m 97 72   CALCIUM mg/dL 8 6 8 9   AST U/L  --  16   ALT U/L  --  24   ALK PHOS U/L  --  92

## 2022-06-11 ENCOUNTER — APPOINTMENT (INPATIENT)
Dept: RADIOLOGY | Facility: HOSPITAL | Age: 57
DRG: 863 | End: 2022-06-11
Payer: MEDICARE

## 2022-06-11 LAB
APTT PPP: 37 SECONDS (ref 23–37)
APTT PPP: 89 SECONDS (ref 23–37)
ERYTHROCYTE [DISTWIDTH] IN BLOOD BY AUTOMATED COUNT: 18.7 % (ref 11.6–15.1)
HCT VFR BLD AUTO: 35.7 % (ref 34.8–46.1)
HGB BLD-MCNC: 11.2 G/DL (ref 11.5–15.4)
INR PPP: 1.07 (ref 0.84–1.19)
MCH RBC QN AUTO: 26.5 PG (ref 26.8–34.3)
MCHC RBC AUTO-ENTMCNC: 31.4 G/DL (ref 31.4–37.4)
MCV RBC AUTO: 84 FL (ref 82–98)
PLATELET # BLD AUTO: 493 THOUSANDS/UL (ref 149–390)
PMV BLD AUTO: 8.8 FL (ref 8.9–12.7)
PROTHROMBIN TIME: 13.4 SECONDS (ref 11.6–14.5)
RBC # BLD AUTO: 4.23 MILLION/UL (ref 3.81–5.12)
WBC # BLD AUTO: 6.26 THOUSAND/UL (ref 4.31–10.16)

## 2022-06-11 PROCEDURE — 87070 CULTURE OTHR SPECIMN AEROBIC: CPT

## 2022-06-11 PROCEDURE — 87075 CULTR BACTERIA EXCEPT BLOOD: CPT

## 2022-06-11 PROCEDURE — 85730 THROMBOPLASTIN TIME PARTIAL: CPT | Performed by: SURGERY

## 2022-06-11 PROCEDURE — 99152 MOD SED SAME PHYS/QHP 5/>YRS: CPT

## 2022-06-11 PROCEDURE — C1769 GUIDE WIRE: HCPCS

## 2022-06-11 PROCEDURE — 99232 SBSQ HOSP IP/OBS MODERATE 35: CPT | Performed by: INTERNAL MEDICINE

## 2022-06-11 PROCEDURE — C1887 CATHETER, GUIDING: HCPCS

## 2022-06-11 PROCEDURE — 49406 IMAGE CATH FLUID PERI/RETRO: CPT | Performed by: INTERNAL MEDICINE

## 2022-06-11 PROCEDURE — 85610 PROTHROMBIN TIME: CPT | Performed by: SURGERY

## 2022-06-11 PROCEDURE — 87205 SMEAR GRAM STAIN: CPT

## 2022-06-11 PROCEDURE — 0W9F30Z DRAINAGE OF ABDOMINAL WALL WITH DRAINAGE DEVICE, PERCUTANEOUS APPROACH: ICD-10-PCS | Performed by: INTERNAL MEDICINE

## 2022-06-11 PROCEDURE — 99153 MOD SED SAME PHYS/QHP EA: CPT

## 2022-06-11 PROCEDURE — 87076 CULTURE ANAEROBE IDENT EACH: CPT

## 2022-06-11 PROCEDURE — 85027 COMPLETE CBC AUTOMATED: CPT | Performed by: SURGERY

## 2022-06-11 PROCEDURE — 99152 MOD SED SAME PHYS/QHP 5/>YRS: CPT | Performed by: INTERNAL MEDICINE

## 2022-06-11 PROCEDURE — 99232 SBSQ HOSP IP/OBS MODERATE 35: CPT | Performed by: SURGERY

## 2022-06-11 PROCEDURE — C1894 INTRO/SHEATH, NON-LASER: HCPCS

## 2022-06-11 PROCEDURE — 87186 SC STD MICRODIL/AGAR DIL: CPT

## 2022-06-11 PROCEDURE — 10030 IMG GID FLU COLL DRG SFT TIS: CPT

## 2022-06-11 PROCEDURE — 87077 CULTURE AEROBIC IDENTIFY: CPT

## 2022-06-11 RX ORDER — GABAPENTIN 100 MG/1
100 CAPSULE ORAL 3 TIMES DAILY
Status: DISCONTINUED | OUTPATIENT
Start: 2022-06-11 | End: 2022-06-15 | Stop reason: HOSPADM

## 2022-06-11 RX ORDER — MIDAZOLAM HYDROCHLORIDE 2 MG/2ML
INJECTION, SOLUTION INTRAMUSCULAR; INTRAVENOUS CODE/TRAUMA/SEDATION MEDICATION
Status: COMPLETED | OUTPATIENT
Start: 2022-06-11 | End: 2022-06-11

## 2022-06-11 RX ORDER — FENTANYL CITRATE 50 UG/ML
INJECTION, SOLUTION INTRAMUSCULAR; INTRAVENOUS CODE/TRAUMA/SEDATION MEDICATION
Status: COMPLETED | OUTPATIENT
Start: 2022-06-11 | End: 2022-06-11

## 2022-06-11 RX ORDER — HEPARIN SODIUM 10000 [USP'U]/100ML
3-30 INJECTION, SOLUTION INTRAVENOUS
Status: DISCONTINUED | OUTPATIENT
Start: 2022-06-11 | End: 2022-06-13

## 2022-06-11 RX ORDER — LIDOCAINE WITH 8.4% SOD BICARB 0.9%(10ML)
SYRINGE (ML) INJECTION CODE/TRAUMA/SEDATION MEDICATION
Status: COMPLETED | OUTPATIENT
Start: 2022-06-11 | End: 2022-06-11

## 2022-06-11 RX ORDER — HYDROMORPHONE HYDROCHLORIDE 4 MG/ML
INJECTION, SOLUTION INTRAMUSCULAR; INTRAVENOUS; SUBCUTANEOUS CODE/TRAUMA/SEDATION MEDICATION
Status: COMPLETED | OUTPATIENT
Start: 2022-06-11 | End: 2022-06-11

## 2022-06-11 RX ORDER — ACETAMINOPHEN 325 MG/1
975 TABLET ORAL EVERY 6 HOURS SCHEDULED
Status: DISCONTINUED | OUTPATIENT
Start: 2022-06-11 | End: 2022-06-15 | Stop reason: HOSPADM

## 2022-06-11 RX ORDER — METHOCARBAMOL 500 MG/1
500 TABLET, FILM COATED ORAL EVERY 6 HOURS SCHEDULED
Status: DISCONTINUED | OUTPATIENT
Start: 2022-06-11 | End: 2022-06-15 | Stop reason: HOSPADM

## 2022-06-11 RX ORDER — SODIUM CHLORIDE, SODIUM LACTATE, POTASSIUM CHLORIDE, CALCIUM CHLORIDE 600; 310; 30; 20 MG/100ML; MG/100ML; MG/100ML; MG/100ML
75 INJECTION, SOLUTION INTRAVENOUS CONTINUOUS
Status: DISCONTINUED | OUTPATIENT
Start: 2022-06-11 | End: 2022-06-11

## 2022-06-11 RX ORDER — METHOCARBAMOL 500 MG/1
500 TABLET, FILM COATED ORAL EVERY 6 HOURS SCHEDULED
Status: DISCONTINUED | OUTPATIENT
Start: 2022-06-11 | End: 2022-06-11

## 2022-06-11 RX ORDER — HYDROMORPHONE HCL/PF 1 MG/ML
0.5 SYRINGE (ML) INJECTION ONCE
Status: COMPLETED | OUTPATIENT
Start: 2022-06-11 | End: 2022-06-11

## 2022-06-11 RX ADMIN — PIPERACILLIN AND TAZOBACTAM 3.38 G: 36; 4.5 INJECTION, POWDER, FOR SOLUTION INTRAVENOUS at 05:35

## 2022-06-11 RX ADMIN — METHOCARBAMOL 500 MG: 500 TABLET, FILM COATED ORAL at 17:10

## 2022-06-11 RX ADMIN — ACETAMINOPHEN 650 MG: 325 TABLET, FILM COATED ORAL at 05:43

## 2022-06-11 RX ADMIN — HYDROMORPHONE HYDROCHLORIDE 0.5 MG: 1 INJECTION, SOLUTION INTRAMUSCULAR; INTRAVENOUS; SUBCUTANEOUS at 12:56

## 2022-06-11 RX ADMIN — OXYCODONE HYDROCHLORIDE 5 MG: 10 TABLET ORAL at 12:55

## 2022-06-11 RX ADMIN — TRAZODONE HYDROCHLORIDE 75 MG: 50 TABLET ORAL at 22:17

## 2022-06-11 RX ADMIN — FENTANYL CITRATE 50 MCG: 50 INJECTION INTRAMUSCULAR; INTRAVENOUS at 08:52

## 2022-06-11 RX ADMIN — LIDOCAINE 5% 1 PATCH: 700 PATCH TOPICAL at 10:15

## 2022-06-11 RX ADMIN — HYDROMORPHONE HYDROCHLORIDE 0.5 MG: 1 INJECTION, SOLUTION INTRAMUSCULAR; INTRAVENOUS; SUBCUTANEOUS at 11:03

## 2022-06-11 RX ADMIN — FENTANYL CITRATE 50 MCG: 50 INJECTION INTRAMUSCULAR; INTRAVENOUS at 08:32

## 2022-06-11 RX ADMIN — HYDROMORPHONE HYDROCHLORIDE 0.5 MG: 4 INJECTION, SOLUTION INTRAMUSCULAR; INTRAVENOUS; SUBCUTANEOUS at 08:57

## 2022-06-11 RX ADMIN — MIDAZOLAM 1 MG: 1 INJECTION INTRAMUSCULAR; INTRAVENOUS at 08:32

## 2022-06-11 RX ADMIN — OXYCODONE HYDROCHLORIDE 10 MG: 10 TABLET ORAL at 05:43

## 2022-06-11 RX ADMIN — MIDAZOLAM 0.5 MG: 1 INJECTION INTRAMUSCULAR; INTRAVENOUS at 09:04

## 2022-06-11 RX ADMIN — MIDAZOLAM 0.5 MG: 1 INJECTION INTRAMUSCULAR; INTRAVENOUS at 09:19

## 2022-06-11 RX ADMIN — LEVOTHYROXINE SODIUM 150 MCG: 75 TABLET ORAL at 05:43

## 2022-06-11 RX ADMIN — HEPARIN SODIUM 18 UNITS/KG/HR: 10000 INJECTION, SOLUTION INTRAVENOUS at 11:51

## 2022-06-11 RX ADMIN — ACETAMINOPHEN 650 MG: 325 TABLET, FILM COATED ORAL at 11:04

## 2022-06-11 RX ADMIN — FENTANYL CITRATE 25 MCG: 50 INJECTION INTRAMUSCULAR; INTRAVENOUS at 08:48

## 2022-06-11 RX ADMIN — AMLODIPINE BESYLATE 10 MG: 10 TABLET ORAL at 10:16

## 2022-06-11 RX ADMIN — IODIXANOL 20 ML: 320 INJECTION, SOLUTION INTRAVASCULAR at 09:41

## 2022-06-11 RX ADMIN — OXYCODONE HYDROCHLORIDE 10 MG: 10 TABLET ORAL at 17:11

## 2022-06-11 RX ADMIN — OXYCODONE HYDROCHLORIDE 10 MG: 10 TABLET ORAL at 22:18

## 2022-06-11 RX ADMIN — HEPARIN SODIUM 18 UNITS/KG/HR: 10000 INJECTION, SOLUTION INTRAVENOUS at 22:19

## 2022-06-11 RX ADMIN — MIDAZOLAM 0.5 MG: 1 INJECTION INTRAMUSCULAR; INTRAVENOUS at 08:48

## 2022-06-11 RX ADMIN — FENTANYL CITRATE 50 MCG: 50 INJECTION INTRAMUSCULAR; INTRAVENOUS at 09:11

## 2022-06-11 RX ADMIN — PIPERACILLIN AND TAZOBACTAM 3.38 G: 36; 4.5 INJECTION, POWDER, FOR SOLUTION INTRAVENOUS at 22:19

## 2022-06-11 RX ADMIN — HYDROMORPHONE HYDROCHLORIDE 0.5 MG: 4 INJECTION, SOLUTION INTRAMUSCULAR; INTRAVENOUS; SUBCUTANEOUS at 09:05

## 2022-06-11 RX ADMIN — CITALOPRAM HYDROBROMIDE 40 MG: 20 TABLET ORAL at 10:16

## 2022-06-11 RX ADMIN — FENTANYL CITRATE 25 MCG: 50 INJECTION INTRAMUSCULAR; INTRAVENOUS at 08:38

## 2022-06-11 RX ADMIN — PIPERACILLIN AND TAZOBACTAM 3.38 G: 36; 4.5 INJECTION, POWDER, FOR SOLUTION INTRAVENOUS at 16:17

## 2022-06-11 RX ADMIN — HYDROMORPHONE HYDROCHLORIDE 0.5 MG: 1 INJECTION, SOLUTION INTRAMUSCULAR; INTRAVENOUS; SUBCUTANEOUS at 16:19

## 2022-06-11 RX ADMIN — MIDAZOLAM 0.5 MG: 1 INJECTION INTRAMUSCULAR; INTRAVENOUS at 08:38

## 2022-06-11 RX ADMIN — OXYCODONE HYDROCHLORIDE 10 MG: 10 TABLET ORAL at 10:16

## 2022-06-11 RX ADMIN — MIDAZOLAM 0.5 MG: 1 INJECTION INTRAMUSCULAR; INTRAVENOUS at 08:54

## 2022-06-11 RX ADMIN — MIDAZOLAM 0.5 MG: 1 INJECTION INTRAMUSCULAR; INTRAVENOUS at 09:13

## 2022-06-11 RX ADMIN — SODIUM CHLORIDE, SODIUM LACTATE, POTASSIUM CHLORIDE, AND CALCIUM CHLORIDE 75 ML/HR: .6; .31; .03; .02 INJECTION, SOLUTION INTRAVENOUS at 06:25

## 2022-06-11 RX ADMIN — PIPERACILLIN AND TAZOBACTAM 3.38 G: 36; 4.5 INJECTION, POWDER, FOR SOLUTION INTRAVENOUS at 10:16

## 2022-06-11 RX ADMIN — GABAPENTIN 100 MG: 100 CAPSULE ORAL at 22:17

## 2022-06-11 RX ADMIN — GABAPENTIN 100 MG: 100 CAPSULE ORAL at 17:10

## 2022-06-11 RX ADMIN — Medication 10 ML: at 08:49

## 2022-06-11 RX ADMIN — PANTOPRAZOLE SODIUM 40 MG: 40 TABLET, DELAYED RELEASE ORAL at 05:43

## 2022-06-11 RX ADMIN — HYDROMORPHONE HYDROCHLORIDE 0.5 MG: 1 INJECTION, SOLUTION INTRAMUSCULAR; INTRAVENOUS; SUBCUTANEOUS at 16:17

## 2022-06-11 RX ADMIN — ACETAMINOPHEN 975 MG: 325 TABLET ORAL at 17:10

## 2022-06-11 NOTE — DISCHARGE INSTRUCTIONS
Flank Pain   AMBULATORY CARE:   Flank pain  is felt in the area below your ribcage and above your hip bones, often in the lower back  Your pain may be dull or so severe that you cannot get comfortable  The pain may stay in one area or radiate to another area  It may worsen and lighten in waves  Flank pain is often a sign of problems with your urinary tract, such as a kidney stone or infection  Seek care immediately if:   You have a fever  Your heart is fluttering or jumping  You see blood in your urine  Your pain radiates into your lower abdomen and genital area  You have intense pain in your low back next to your spine  You are much more tired than usual and have no desire to eat  You have a headache and your muscles jerk  Contact your healthcare provider if:   You have an upset stomach and are vomiting  You have to urinate more often, and with urgency  Your pain worsens or does not improve, and you cannot get comfortable  You pass a stone when you urinate  You have questions or concerns about your condition or care

## 2022-06-11 NOTE — BRIEF OP NOTE (RAD/CATH)
INTERVENTIONAL RADIOLOGY PROCEDURE NOTE    Date: 6/11/2022    Procedure: Perisplenic drain placement, left upper quadrant superficial drain placement    Preoperative diagnosis:   1  Splenic abscess         Postoperative diagnosis: Same  Surgeon: Jona Patel MD     Assistant: None  No qualified resident was available  Blood loss: 2 mL    Specimens: Purulent fluid from superficial soft tissue sent to the laboratory  Findings: Superficial soft tissue fluid collection at the left upper quadrant present with a tract leading deeper into the abdomen  A small fistulous connection was seen to the stomach  A 10 2 Togolese drain was placed through the superficial collection, through the tract, and the tip of the catheter placed in the perisplenic region  A smaller 8 Togolese drain was placed within the superficial collection  Complications: None immediate  Anesthesia: conscious sedation and local     We will plan for drain check in 2 weeks

## 2022-06-11 NOTE — SEDATION DOCUMENTATION
Drainage tubes placed by Dr Kasey Larson  VSS throughout procedure  Specimens sent to lab and dry dressing in place  Report called to [employer]   IR Procedure Bedrest Start Time is 0698

## 2022-06-11 NOTE — UTILIZATION REVIEW
Inpatient Admission Authorization Request   NOTIFICATION OF INPATIENT ADMISSION/INPATIENT AUTHORIZATION REQUEST   SERVICING FACILITY:   [facility]  Address: [address] [place] [address], [address] [place] [address]  Tax ID: [id number]  NPI: [id number]  Place of Service: Inpatient [address] [facility] Code: 24     ATTENDING PROVIDER:  Attending Name and NPI#: Ernestina Fuentes Md [[id number]]  Address: [address] [place] [address], [address] [place] [address]  Phone: [phone]     UTILIZATION REVIEW CONTACT:  Katerina Michael Utilization   Network Utilization Review Department  Phone: [phone]  Fax: [phone]  Email: [email or web address]     PHYSICIAN ADVISORY SERVICES:  FOR XAHX-NL-QVET REVIEW - MEDICAL NECESSITY DENIAL  Phone: [phone]  Fax: [phone]  Email: [email or web address]     TYPE OF REQUEST:  Inpatient Status     ADMISSION INFORMATION:  ADMISSION DATE/TIME: 6/9/22  2:08 PM  PATIENT DIAGNOSIS CODE/DESCRIPTION:  Splenic abscess [D73 3]  Back pain [M54 9]  DISCHARGE DATE/TIME: No discharge date for patient encounter  IMPORTANT INFORMATION:  Please contact the Katerina Michael directly with any questions or concerns regarding this request  Department voicemails are confidential     Send requests for admission clinical reviews, concurrent reviews, approvals, and administrative denials due to lack of clinical to fax [phone]

## 2022-06-11 NOTE — PROGRESS NOTES
Progress Note - general Surgery  Mandie Schwartz 59 y o  female MRN: [id number]  Unit/Bed#: [facility] 909-01 Encounter: [id number]    Assessment:  59 y o  female   with known splenic abscess, who presented with a persistent right flank palpable painful mass  Mass is likely fluid collection from site of previous drainage tube  She has no systemic symptoms and is only experiencing pain at the site  Plan:  - Diet NPO  - Pain and Nausea control PRN  - Incentive spirometry  - OOB, ambulate  - plan for aspiration of superficial left flank mass with IR today  - heparin drip stopped at 2:00 a m   - will resume Eliquis post procedure    Subjective/Objective     Subjective:  No changes overnight  Patient still reports LUQ tenderness at site of palpable mass  Objective:   Vitals: Blood pressure 110/67, pulse 70, temperature 97 8 °F (36 6 °C), resp  rate 16, last menstrual period 08/03/2016, SpO2 95 %, not currently breastfeeding  ,There is no height or weight on file to calculate BMI  I/O       06/09 0701  06/10 0700 06/10 0701  06/11 0700    P  O  600 0    Total Intake 600 0    Net +600 0                Physical Exam:  Gen: NAD, Aox3, Comfortable in bed  Chest: Normal work of breathing, no respiratory distress  Abd: Soft, ND, LUQ tenderness with palpable mass  There is some mild erythema that is extending across the flank  Ext: No Edema  Skin: Warm, Dry, Intact      Lab, Imaging and other studies:   I have personally reviewed pertinent reports    , CBC with diff:   Lab Results   Component Value Date    WBC 8 53 06/10/2022    HGB 10 8 (L) 06/10/2022    HCT 34 2 (L) 06/10/2022    MCV 82 06/10/2022     (H) 06/10/2022    MCH 26 0 (L) 06/10/2022    MCHC 31 6 06/10/2022    RDW 18 6 (H) 06/10/2022    MPV 8 9 06/10/2022   , BMP/CMP: No results found for: SODIUM, K, CL, CO2, ANIONGAP, BUN, CREATININE, GLUCOSE, CALCIUM, AST, ALT, ALKPHOS, PROT, BILITOT, EGFR  VTE Pharmacologic Prophylaxis: Fondaparinux (Arixtra)  VTE Mechanical Prophylaxis: sequential compression device        Daniel Dupree MD  6/11/2022  6:37 AM

## 2022-06-11 NOTE — INTERVAL H&P NOTE
Update: (This section must be completed if the H&P was completed greater than 24 hrs to procedure or admission)    H&P reviewed  After examining the patient, I find no changed to the H&P since it had been written  /60   Pulse 71   Temp 97 8 °F (36 6 °C)   Resp 19   LMP 08/03/2016   SpO2 94%     Patient re-evaluated  Accept as history and physical     We will proceed with drain placement today      Wei Jesus MD/June 11, 2022/9:37 AM

## 2022-06-11 NOTE — CASE MANAGEMENT
Case Management Discharge Planning Note    [facility] name [facility] Lui  [facility] [id number]  : 1965 Date 2022       Current Admission Date: 2022  Current Admission Diagnosis:Splenic abscess   Patient Active Problem List    Diagnosis Date Noted   Mayda Gates 2022    Pulmonary nodule 2022    Morbid obesity due to excess calories ([address] ) 2022    Splenic abscess 2022      LOS (days): 2  Geometric Mean LOS (GMLOS) (days): 2 20  Days to GMLOS:0 1     OBJECTIVE:  Risk of Unplanned Readmission Score: 13 73         Current admission status: Inpatient   Preferred Pharmacy:   [id number], [address] [place], [place] [address] [address]  Phone: [phone] Fax: [phone]    Primary Care Provider: Osbaldo Banerjee MD    Primary Insurance: MEDICARE [address] [facility]  Secondary Insurance: [id number]    DISCHARGE DETAILS:    Discharge planning discussed with[de-identified] patient                                     Other Referral/Resources/Interventions Provided:  Referral Comments: patient has been provided with resources for transport, food, as well as finding a doctor

## 2022-06-11 NOTE — UTILIZATION REVIEW
Continued Stay Review    Date: 6/9/2022                          Current Patient Class:  Observation   Current Level of Care:  Med Surg   Pt  Admitted on 6/7 in observation status changed to inpatient status  On 6/9 due to need for continued treatment IV abx's , worsening  06/09/22 1408  Inpatient Admission          Transfer Service: Surgery-General       Question Answer   Level of Care Med Surg   Estimated length of stay More than 2 Midnights   Certification I certify that inpatient services are medically necessary for this patient for a duration of greater than two midnights  See H&P and MD Progress Notes for additional information about the patient's course of treatment  HPI:57 y o  female initially admitted on 6/7 with known splenic abscess, she presented with persistent L flank pain and palpable mass  Assessment/Plan: 6/9 - Pt with no systemic symptoms, however the area has now become locally erythematous  She complains of increased pain  Vital Signs:   Date/Time Temp Pulse Resp BP MAP (mmHg) SpO2 O2 Device Patient Position - Orthostatic VS   06/08/22 21:45:27 97 8 °F (36 6 °C) 79 -- 107/58 74 91 % -- --   06/08/22 21:45:08 97 8 °F (36 6 °C) 77 -- 107/58 74 91 % -- --   06/08/22 20:41:09 98 °F (36 7 °C) -- -- 109/59 76 -- -- --   06/08/22 15:23:15 97 6 °F (36 4 °C) 77 -- 86/45 Abnormal  59 92 % -- --         Pertinent Labs/Diagnostic Results:     CT Abd & Pelvis 6/7  - Redemonstrated fluid collection measuring up to 2 8 cm within the superior spleen with interval increase in  foci of gas concerning for abscess         Results from last 7 days   Lab Units 06/11/22  1128 06/10/22  1844 06/09/22  0514 06/08/22  0602 06/06/22  2258   WBC Thousand/uL 6 26 8 53 9 94 12 57* 10 68*   HEMOGLOBIN g/dL 11 2* 10 8* 9 7* 11 3* 11 1*   HEMATOCRIT % 35 7 34 2* 30 7* 35 9 34 2*   PLATELETS Thousands/uL 493* 474* 414* 460* 461*   NEUTROS ABS Thousands/µL  --   --   --   --  6 78         Results from last 7 days   Lab Units 06/08/22  0602 06/06/22  2258   SODIUM mmol/L 139 140   POTASSIUM mmol/L 3 7 3 7   CHLORIDE mmol/L 105 107   CO2 mmol/L 27 27   ANION GAP mmol/L 7 6   BUN mg/dL 9 14   CREATININE mg/dL 0 64 0 88   EGFR ml/min/1 73sq m 97 72   CALCIUM mg/dL 8 6 8 9   MAGNESIUM mg/dL 1 8  --    PHOSPHORUS mg/dL 3 8  --      Results from last 7 days   Lab Units 06/06/22  2258   AST U/L 16   ALT U/L 24   ALK PHOS U/L 92   TOTAL PROTEIN g/dL 7 6   ALBUMIN g/dL 2 5*   TOTAL BILIRUBIN mg/dL 0 23   BILIRUBIN DIRECT mg/dL 0 06     Results from last 7 days   Lab Units 06/08/22  0602 06/06/22  2258   GLUCOSE RANDOM mg/dL 94 100       Results from last 7 days   Lab Units 06/11/22  1128 06/10/22  1844 06/08/22  0635 06/07/22  1128 06/07/22  0528   PROTIME seconds 13 4 14 0  --   --  13 1   INR  1 07 1 12  --   --  1 03   PTT seconds 37 40* 153*   < > 31    < > = values in this interval not displayed         Results from last 7 days   Lab Units 06/06/22  2258   LIPASE u/L 150         Medications:   Scheduled Medications:  acetaminophen, 650 mg, Oral, Q6H JANETT  amLODIPine, 10 mg, Oral, Daily  apixaban, 5 mg, Oral, BID  citalopram, 40 mg, Oral, Daily  levothyroxine, 150 mcg, Oral, Early Morning  lidocaine, 1 patch, Topical, Daily  pantoprazole, 40 mg, Oral, Early Morning  piperacillin-tazobactam, 3 375 g, Intravenous, Q6H  traZODone, 75 mg, Oral, HS  Mag sulfate 2g ivpb 6/8 x 1   Potassium 40 meq po once - 6/8 x 1       Continuous IV Infusions:  heparin (porcine), 3-30 Units/kg/hr (Order-Specific), Intravenous, Titrated      PRN Meds:  HYDROmorphone, 0 5 mg, Intravenous, Q4H PRN - 6/8 x 2 - 6/9 x 1    ondansetron, 4 mg, Intravenous, Q6H PRN  oxyCODONE, 10 mg, Oral, Q4H PRN - 6/8 x 3 - 6/9 x 2    oxyCODONE, 5 mg, Oral, Q4H PRN        Discharge Plan: TBD     Network Utilization Review Department  ATTENTION: Please call with any questions or concerns to [phone] and carefully listen to the prompts so that you are directed to the right person  All voicemails are confidential   Cb Tolu all requests for admission clinical reviews, approved or denied determinations and any other requests to dedicated fax number below belonging to the campus where the patient is receiving treatment   List of dedicated fax numbers for the Facilities:  [address] DENIALS (Administrative/Medical Necessity) [phone]   [address] (Maternity/NICU/Pediatrics) [phone] [address]  [address] [address] [facility] [place] [address] [address] [place] [address] [address] [facility] [place] [address] [phone]

## 2022-06-12 LAB
APTT PPP: 125 SECONDS (ref 23–37)
APTT PPP: 77 SECONDS (ref 23–37)
APTT PPP: 94 SECONDS (ref 23–37)

## 2022-06-12 PROCEDURE — 85730 THROMBOPLASTIN TIME PARTIAL: CPT | Performed by: SURGERY

## 2022-06-12 PROCEDURE — 99232 SBSQ HOSP IP/OBS MODERATE 35: CPT | Performed by: INTERNAL MEDICINE

## 2022-06-12 PROCEDURE — 85730 THROMBOPLASTIN TIME PARTIAL: CPT | Performed by: INTERNAL MEDICINE

## 2022-06-12 PROCEDURE — 99232 SBSQ HOSP IP/OBS MODERATE 35: CPT | Performed by: STUDENT IN AN ORGANIZED HEALTH CARE EDUCATION/TRAINING PROGRAM

## 2022-06-12 RX ADMIN — GABAPENTIN 100 MG: 100 CAPSULE ORAL at 22:18

## 2022-06-12 RX ADMIN — METHOCARBAMOL 500 MG: 500 TABLET, FILM COATED ORAL at 05:40

## 2022-06-12 RX ADMIN — PANTOPRAZOLE SODIUM 40 MG: 40 TABLET, DELAYED RELEASE ORAL at 05:40

## 2022-06-12 RX ADMIN — ACETAMINOPHEN 975 MG: 325 TABLET ORAL at 18:48

## 2022-06-12 RX ADMIN — METHOCARBAMOL 500 MG: 500 TABLET, FILM COATED ORAL at 23:34

## 2022-06-12 RX ADMIN — HYDROMORPHONE HYDROCHLORIDE 0.5 MG: 1 INJECTION, SOLUTION INTRAMUSCULAR; INTRAVENOUS; SUBCUTANEOUS at 09:08

## 2022-06-12 RX ADMIN — PIPERACILLIN AND TAZOBACTAM 3.38 G: 36; 4.5 INJECTION, POWDER, FOR SOLUTION INTRAVENOUS at 16:19

## 2022-06-12 RX ADMIN — OXYCODONE HYDROCHLORIDE 10 MG: 10 TABLET ORAL at 16:19

## 2022-06-12 RX ADMIN — METHOCARBAMOL 500 MG: 500 TABLET, FILM COATED ORAL at 11:31

## 2022-06-12 RX ADMIN — OXYCODONE HYDROCHLORIDE 10 MG: 10 TABLET ORAL at 05:41

## 2022-06-12 RX ADMIN — GABAPENTIN 100 MG: 100 CAPSULE ORAL at 16:19

## 2022-06-12 RX ADMIN — CITALOPRAM HYDROBROMIDE 40 MG: 20 TABLET ORAL at 09:00

## 2022-06-12 RX ADMIN — PIPERACILLIN AND TAZOBACTAM 3.38 G: 36; 4.5 INJECTION, POWDER, FOR SOLUTION INTRAVENOUS at 22:18

## 2022-06-12 RX ADMIN — GABAPENTIN 100 MG: 100 CAPSULE ORAL at 09:00

## 2022-06-12 RX ADMIN — ACETAMINOPHEN 975 MG: 325 TABLET ORAL at 11:30

## 2022-06-12 RX ADMIN — ACETAMINOPHEN 975 MG: 325 TABLET ORAL at 23:34

## 2022-06-12 RX ADMIN — HYDROMORPHONE HYDROCHLORIDE 0.5 MG: 1 INJECTION, SOLUTION INTRAMUSCULAR; INTRAVENOUS; SUBCUTANEOUS at 01:14

## 2022-06-12 RX ADMIN — METHOCARBAMOL 500 MG: 500 TABLET, FILM COATED ORAL at 00:16

## 2022-06-12 RX ADMIN — METHOCARBAMOL 500 MG: 500 TABLET, FILM COATED ORAL at 18:48

## 2022-06-12 RX ADMIN — OXYCODONE HYDROCHLORIDE 10 MG: 10 TABLET ORAL at 11:31

## 2022-06-12 RX ADMIN — AMLODIPINE BESYLATE 10 MG: 10 TABLET ORAL at 09:00

## 2022-06-12 RX ADMIN — TRAZODONE HYDROCHLORIDE 75 MG: 50 TABLET ORAL at 22:18

## 2022-06-12 RX ADMIN — LEVOTHYROXINE SODIUM 150 MCG: 75 TABLET ORAL at 05:40

## 2022-06-12 RX ADMIN — ACETAMINOPHEN 975 MG: 325 TABLET ORAL at 00:16

## 2022-06-12 RX ADMIN — PIPERACILLIN AND TAZOBACTAM 3.38 G: 36; 4.5 INJECTION, POWDER, FOR SOLUTION INTRAVENOUS at 11:23

## 2022-06-12 RX ADMIN — PIPERACILLIN AND TAZOBACTAM 3.38 G: 36; 4.5 INJECTION, POWDER, FOR SOLUTION INTRAVENOUS at 05:41

## 2022-06-12 RX ADMIN — HEPARIN SODIUM 13 UNITS/KG/HR: 10000 INJECTION, SOLUTION INTRAVENOUS at 19:57

## 2022-06-12 NOTE — PROGRESS NOTES
Progress Note - Infectious Disease   Florian Schaeffer 59 y o  female MRN: [id number]  Unit/Bed#: [facility] 909-01 Encounter: [id number]      Impression:  1  Persistent splenic abscess  with superficial abscess at former drain site  S/P perisplenic and superficial drain placement by IR POD 1  2  S/P Laparoscopic sleeve gastrectomy complicated by splenic injury requiring IR embolization with subsequent splenic abscesses, IR drainage, Axios stent placement and prolonged IV antibiotic course all at [facility]  3  History of DVT on Eliquis  4  CAD S/P stent placement   5  History of hypothyroidism   6  Intertriginous fungal dermatitis secondary to antibiotic therapy    Recommendations:  Patient is afebrile with normal WBC count when last taken  Although the patient says overall her pain is better than it was before the IR drainage procedures there is still as expected some discomfort related to the procedure itself  1  IR performed perisplenic drain placement and left upper quadrant superficial drain placement   Description noted including a small fistulous connection to stomach  Check culture results which are negative so far and will change antibiotic as indicated  2  Would like to see the patient's pain reach a tolerable point so that discharge would not necessitate an immediate readmission    Antibiotics:  1  Piperacillin/tazobactam 3 375 g q 6 hours IV, day 7 Rx    Subjective:  Has operative site pain where drains placed  Overall her pain is better than it was prior to the procedure  Denies fevers, chills, or sweats  Denies nausea, vomiting, or diarrhea        Objective:  Vitals:  Temp:  [97 7 °F (36 5 °C)-98 °F (36 7 °C)] 97 9 °F (36 6 °C)  HR:  [66] 66  Resp:  [16-18] 16  BP: ()/(47-62) 108/58  SpO2:  [94 %-95 %] 95 %  Temp (24hrs), Av 8 °F (36 6 °C), Min:97 7 °F (36 5 °C), Max:98 °F (36 7 °C)  Current: Temperature: 97 9 °F (36 6 °C)    Physical Exam:     General Appearance: Alert, obese nontoxic, no acute distress  But having some left-sided abdominal discomfort   Throat: Oropharynx moist without lesions  Lips, mucosa, and tongue normal   Neck: Supple, symmetrical, trachea midline, no adenopathy,  no tenderness/mass/nodules   Lungs:   Clear to auscultation bilaterally, no audible wheezes, rhonchi or rales; respirations unlabored   Heart:  Regular rate and rhythm, S1, S2 normal, no murmur, rub or gallop   Abdomen:   Soft, striae, has 2 LUIS ENRIQUE drains with sanguinopurulent drainage left upper quadrant that appears to be decreasing positive bowel sounds  No masses, no organomegaly    Left flank subcostal tenderness at site of drains   Extremities: Extremities normal, atraumatic, no clubbing, cyanosis or edema   Skin: As above         Invasive Devices  Report    Peripheral Intravenous Line  Duration           Peripheral IV 06/09/22 Distal;Dorsal (posterior); Right Wrist 3 days          Drain  Duration           Abscess Drain Abdomen 1 day    Abscess Drain Abdomen 1 day                Labs, Imaging, & Other studies:   All pertinent labs were personally reviewed  Results from last 7 days   Lab Units 06/11/22  1128 06/10/22  1844 06/09/22  0514   WBC Thousand/uL 6 26 8 53 9 94   HEMOGLOBIN g/dL 11 2* 10 8* 9 7*   PLATELETS Thousands/uL 493* 474* 414*     Results from last 7 days   Lab Units 06/08/22  0602 06/06/22  2258   SODIUM mmol/L 139 140   POTASSIUM mmol/L 3 7 3 7   CHLORIDE mmol/L 105 107   CO2 mmol/L 27 27   BUN mg/dL 9 14   CREATININE mg/dL 0 64 0 88   EGFR ml/min/1 73sq m 97 72   CALCIUM mg/dL 8 6 8 9   AST U/L  --  16   ALT U/L  --  24   ALK PHOS U/L  --  92     Results from last 7 days   Lab Units 06/11/22  0932   GRAM STAIN RESULT  4+ Polys  No organisms seen   BODY FLUID CULTURE, STERILE  No growth

## 2022-06-12 NOTE — PROGRESS NOTES
Progress Note - Infectious Disease   Loraine Flaming 59 y o  female MRN: [id number]  Unit/Bed#: [facility] 909-01 Encounter: [id number]      Impression:  1  Persistent splenic abscess  with superficial abscess at former drain site  S/P perisplenic and superficial drain placement by IR POD 0  2  S/P Laparoscopic sleeve gastrectomy complicated by splenic injury requiring IR embolization with subsequent splenic abscesses, IR drainage, Axios stent placement and prolonged IV antibiotic course all at [facility]  3  History of DVT on Eliquis  4  CAD S/P stent placement   5  History of hypothyroidism   6  Intertriginous fungal dermatitis secondary to antibiotic therapy    Recommendations:  Patient is afebrile with normal WBC count  1  IR performed perisplenic drain placement and left upper quadrant superficial drain placement today  Description noted including a small fistulous connection to stomach  Check culture results and will change antibiotic as indicated    Antibiotics:  1  Piperacillin/tazobactam 3 375 g q 6 hours IV, day 6 Rx    Subjective:  Has operative site pain where drains placed  Denies fevers, chills, or sweats  Denies nausea, vomiting, or diarrhea  Objective:  Vitals:  Temp:  [97 3 °F (36 3 °C)-97 8 °F (36 6 °C)] 97 3 °F (36 3 °C)  HR:  [65-79] 71  Resp:  [15-22] 16  BP: ()/(55-79) 104/59  SpO2:  [93 %-100 %] 94 %  Temp (24hrs), Av 6 °F (36 4 °C), Min:97 3 °F (36 3 °C), Max:97 8 °F (36 6 °C)  Current: Temperature: (!) 97 3 °F (36 3 °C)    Physical Exam:     General Appearance:  Alert, obese nontoxic, no acute distress  But having some left-sided abdominal discomfort   Throat: Oropharynx moist without lesions    Lips, mucosa, and tongue normal   Neck: Supple, symmetrical, trachea midline, no adenopathy,  no tenderness/mass/nodules   Lungs:   Clear to auscultation bilaterally, no audible wheezes, rhonchi or rales; respirations unlabored   Heart:  Regular rate and rhythm, S1, S2 normal, no murmur, rub or gallop   Abdomen:   Soft, striae, has 2 LUIS ENRIQUE drains with sanguinopurulent drainage left upper quadrant, positive bowel sounds  No masses, no organomegaly    Left flank subcostal tenderness at site of drains   Extremities: Extremities normal, atraumatic, no clubbing, cyanosis or edema   Skin: As above         Invasive Devices  Report    Peripheral Intravenous Line  Duration           Peripheral IV 06/09/22 Distal;Dorsal (posterior); Right Wrist 2 days          Drain  Duration           Abscess Drain Abdomen <1 day    Abscess Drain Abdomen <1 day                Labs, Imaging, & Other studies:   All pertinent labs were personally reviewed  Results from last 7 days   Lab Units 06/11/22  1128 06/10/22  1844 06/09/22  0514   WBC Thousand/uL 6 26 8 53 9 94   HEMOGLOBIN g/dL 11 2* 10 8* 9 7*   PLATELETS Thousands/uL 493* 474* 414*     Results from last 7 days   Lab Units 06/08/22  0602 06/06/22  2258   SODIUM mmol/L 139 140   POTASSIUM mmol/L 3 7 3 7   CHLORIDE mmol/L 105 107   CO2 mmol/L 27 27   BUN mg/dL 9 14   CREATININE mg/dL 0 64 0 88   EGFR ml/min/1 73sq m 97 72   CALCIUM mg/dL 8 6 8 9   AST U/L  --  16   ALT U/L  --  24   ALK PHOS U/L  --  92

## 2022-06-12 NOTE — PROGRESS NOTES
Progress Note - General Surgery   Gillian Shook 59 y o  female MRN: [id number]  Unit/Bed#: [facility] 909-01 Encounter: [id number]    Assessment:  56yoF w known splenic abscess, who presented with persistent  left flank pain and palpable mass, fluid collection at the site of her previous drainage tube, w/o systemic symptoms, area has now become locally erythematous w increased tenderness, subcutaneous left flank collection noted to be increasing in size, now s/p drain placement x2 by IR on 6/11 one into superficial collection and another into the perisplenic collection    Vitals stable, afebrile  Labs pending    Drain x2 on the L, 38cc total serosang   recorded    Plan:  - reg diet  - L sided IR drain x2, monitor output and character  - IR on board, appreciate recs  - ID on board, appreciate recs for abx guidance, c/w IV zosyn  - hep gtt  - pain control  - encourage ambulation    Subjective/Objective   Subjective:   Reporting some abdominal pain around the drain sites mainly the more medial drain on the left, tolerating diet without nausea or emesis, passing flatus but has not had a BM in a few days, voiding without issues, ambulating  Objective:     Blood pressure 109/61, pulse 66, temperature 98 °F (36 7 °C), resp  rate 18, last menstrual period 08/03/2016, SpO2 95 %, not currently breastfeeding  ,There is no height or weight on file to calculate BMI  Intake/Output Summary (Last 24 hours) at 6/12/2022 0708  Last data filed at 6/12/2022 0101  Gross per 24 hour   Intake 10 ml   Output 338 ml   Net -328 ml       Invasive Devices  Report    Peripheral Intravenous Line  Duration           Peripheral IV 06/09/22 Distal;Dorsal (posterior); Right Wrist 3 days    Peripheral IV 06/12/22 Dorsal (posterior); Left Forearm <1 day          Drain  Duration           Abscess Drain Abdomen <1 day    Abscess Drain Abdomen <1 day                Physical Exam:   General: NAD  Skin: Warm, dry, anicteric  HEENT: Normocephalic, atraumatic  CV: RRR, no m/r/g  Pulm: CTA b/l, no inc WOB  Abd: Soft, ND, tender around drain sites, L sided drain x2 serosang  MSK: Symmetric, no edema, no tenderness, no deformity  Neuro: AOx3, GCS 15    Lab, Imaging and other studies:  I have personally reviewed pertinent lab results    , CBC:   Lab Results   Component Value Date    WBC 6 26 06/11/2022    HGB 11 2 (L) 06/11/2022    HCT 35 7 06/11/2022    MCV 84 06/11/2022     (H) 06/11/2022    MCH 26 5 (L) 06/11/2022    MCHC 31 4 06/11/2022    RDW 18 7 (H) 06/11/2022    MPV 8 8 (L) 06/11/2022   , CMP: No results found for: SODIUM, K, CL, CO2, ANIONGAP, BUN, CREATININE, GLUCOSE, CALCIUM, AST, ALT, ALKPHOS, PROT, BILITOT, EGFR  VTE Pharmacologic Prophylaxis: Sequential compression device (Venodyne)  and Heparin  VTE Mechanical Prophylaxis: sequential compression device

## 2022-06-12 NOTE — PROGRESS NOTES
Progress Note - General Surgery   Freddy Austin 59 y o  female MRN: [id number]  Unit/Bed#: [facility] 909-01 Encounter: [id number]    Assessment:  56yoF w known splenic abscess, who presented with persistent  left flank pain and palpable mass, fluid collection at the site of her previous drainage tube, w/o systemic symptoms, area has now become locally erythematous w increased tenderness, subcutaneous left flank collection noted to be increasing in size, now s/p drain placement x2 by IR on 6/11 one into superficial collection and another into the perisplenic collection     Vitals stable, afebrile  No labs this morning  IR cx show no growth, 4+ polys, no organisms seen    Drain x2 on the L, 45cc total serosang    Plan:  - reg diet  - L sided IR drain x2, monitor output and character  - follow IR cultures  - IR on board, appreciate recs  - ID on board, appreciate recs for abx guidance, c/w IV zosyn  - hep gtt  - pain control  - encourage ambulation  - dispo planning    Subjective/Objective   Subjective:   Patient doing well, continues have some pain on the left at the LUIS ENRIQUE insertion sites, tolerating diet without nausea or emesis, continues to have bowel function, voiding without issues, out of bed ambulating without issues  Objective:     Blood pressure 121/65, pulse 64, temperature 97 7 °F (36 5 °C), resp  rate 16, last menstrual period 08/03/2016, SpO2 97 %, not currently breastfeeding  ,There is no height or weight on file to calculate BMI  Intake/Output Summary (Last 24 hours) at 6/13/2022 0615  Last data filed at 6/12/2022 2100  Gross per 24 hour   Intake --   Output 45 ml   Net -45 ml       Invasive Devices  Report    Peripheral Intravenous Line  Duration           Peripheral IV 06/09/22 Distal;Dorsal (posterior); Right Wrist 4 days    Peripheral IV 06/12/22 Distal;Left;Ventral (anterior) Forearm <1 day          Drain  Duration           Abscess Drain Abdomen 1 day    Abscess Drain Abdomen 1 day Physical Exam:  General: NAD  Skin: Warm, dry, anicteric  HEENT: Normocephalic, atraumatic  CV: RRR, no m/r/g  Pulm: CTA b/l, no inc WOB  Abd: Soft, ND/NT, LUIS ENRIQUE x2 on the left serosanguineous  MSK: Symmetric, no edema, no tenderness, no deformity  Neuro: AOx3, GCS 15    Lab, Imaging and other studies:I have personally reviewed pertinent lab results    , CBC: No results found for: WBC, HGB, HCT, MCV, PLT, ADJUSTEDWBC, MCH, MCHC, RDW, MPV, NRBC, CMP: No results found for: SODIUM, K, CL, CO2, ANIONGAP, BUN, CREATININE, GLUCOSE, CALCIUM, AST, ALT, ALKPHOS, PROT, BILITOT, EGFR  VTE Pharmacologic Prophylaxis: Sequential compression device (Venodyne)  and Heparin  VTE Mechanical Prophylaxis: sequential compression device

## 2022-06-13 LAB
APTT PPP: 52 SECONDS (ref 23–37)
APTT PPP: 67 SECONDS (ref 23–37)
BACTERIA SPEC ANAEROBE CULT: NORMAL

## 2022-06-13 PROCEDURE — 85730 THROMBOPLASTIN TIME PARTIAL: CPT | Performed by: SURGERY

## 2022-06-13 PROCEDURE — 99232 SBSQ HOSP IP/OBS MODERATE 35: CPT | Performed by: SURGERY

## 2022-06-13 PROCEDURE — 99231 SBSQ HOSP IP/OBS SF/LOW 25: CPT | Performed by: INTERNAL MEDICINE

## 2022-06-13 RX ORDER — NYSTATIN 100000 [USP'U]/G
POWDER TOPICAL 2 TIMES DAILY
Status: DISCONTINUED | OUTPATIENT
Start: 2022-06-13 | End: 2022-06-15 | Stop reason: HOSPADM

## 2022-06-13 RX ADMIN — ACETAMINOPHEN 975 MG: 325 TABLET ORAL at 05:51

## 2022-06-13 RX ADMIN — PANTOPRAZOLE SODIUM 40 MG: 40 TABLET, DELAYED RELEASE ORAL at 05:51

## 2022-06-13 RX ADMIN — APIXABAN 5 MG: 5 TABLET, FILM COATED ORAL at 17:37

## 2022-06-13 RX ADMIN — METHOCARBAMOL 500 MG: 500 TABLET, FILM COATED ORAL at 05:51

## 2022-06-13 RX ADMIN — GABAPENTIN 100 MG: 100 CAPSULE ORAL at 17:37

## 2022-06-13 RX ADMIN — GABAPENTIN 100 MG: 100 CAPSULE ORAL at 20:38

## 2022-06-13 RX ADMIN — PIPERACILLIN AND TAZOBACTAM 3.38 G: 36; 4.5 INJECTION, POWDER, FOR SOLUTION INTRAVENOUS at 19:19

## 2022-06-13 RX ADMIN — ACETAMINOPHEN 975 MG: 325 TABLET ORAL at 11:47

## 2022-06-13 RX ADMIN — APIXABAN 5 MG: 5 TABLET, FILM COATED ORAL at 11:48

## 2022-06-13 RX ADMIN — METHOCARBAMOL 500 MG: 500 TABLET, FILM COATED ORAL at 17:37

## 2022-06-13 RX ADMIN — DAPTOMYCIN 550 MG: 500 INJECTION, POWDER, LYOPHILIZED, FOR SOLUTION INTRAVENOUS at 20:47

## 2022-06-13 RX ADMIN — PIPERACILLIN AND TAZOBACTAM 3.38 G: 36; 4.5 INJECTION, POWDER, FOR SOLUTION INTRAVENOUS at 11:47

## 2022-06-13 RX ADMIN — GABAPENTIN 100 MG: 100 CAPSULE ORAL at 08:40

## 2022-06-13 RX ADMIN — METHOCARBAMOL 500 MG: 500 TABLET, FILM COATED ORAL at 11:48

## 2022-06-13 RX ADMIN — NYSTATIN: 100000 POWDER TOPICAL at 17:38

## 2022-06-13 RX ADMIN — ONDANSETRON 4 MG: 2 INJECTION INTRAMUSCULAR; INTRAVENOUS at 20:38

## 2022-06-13 RX ADMIN — OXYCODONE HYDROCHLORIDE 10 MG: 10 TABLET ORAL at 08:40

## 2022-06-13 RX ADMIN — AMLODIPINE BESYLATE 10 MG: 10 TABLET ORAL at 08:40

## 2022-06-13 RX ADMIN — PIPERACILLIN AND TAZOBACTAM 3.38 G: 36; 4.5 INJECTION, POWDER, FOR SOLUTION INTRAVENOUS at 05:52

## 2022-06-13 RX ADMIN — CITALOPRAM HYDROBROMIDE 40 MG: 20 TABLET ORAL at 08:40

## 2022-06-13 RX ADMIN — LEVOTHYROXINE SODIUM 150 MCG: 75 TABLET ORAL at 05:51

## 2022-06-13 RX ADMIN — ACETAMINOPHEN 975 MG: 325 TABLET ORAL at 17:37

## 2022-06-13 RX ADMIN — TRAZODONE HYDROCHLORIDE 75 MG: 50 TABLET ORAL at 23:05

## 2022-06-13 RX ADMIN — LIDOCAINE 5% 1 PATCH: 700 PATCH TOPICAL at 08:40

## 2022-06-13 NOTE — CASE MANAGEMENT
Case Management Discharge Planning Note    Patient name Keshia Hall  Location [facility] 909/[facility] 752-37 MRN [id number]  : 1965 Date 2022       Current Admission Date: 2022  Current Admission Diagnosis:Splenic abscess   Patient Active Problem List    Diagnosis Date Noted   Nancy Mane 2022    Pulmonary nodule 2022    Morbid obesity due to excess calories ([address] ) 2022    Splenic abscess 2022      LOS (days): 4  Geometric Mean LOS (GMLOS) (days): 2 20  Days to GMLOS:-1 7     OBJECTIVE:  Risk of Unplanned Readmission Score: 13 93         Current admission status: Inpatient   Preferred Pharmacy:   [facility]/pharmacy #[id number] Macias [address] [place] [address]  Phone: [phone] Fax: [phone]    Primary Care Provider: Yannick Fernandez MD    Primary Insurance: MEDICARE [address] [facility]  Secondary Insurance: [address] [place] [address]    DISCHARGE DETAILS:         Other Referral/Resources/Interventions Provided:  Referral Comments: Patient requested to speak with CM  Upon entering the room the patient called her partner Jamison Corrigan  Juan Izaguirreverniki, the patient will discharge back to her house which is located at [address] [place] [address] in [address]  Referrals have now been sent to agencies in that area in hopes of finding an accepting [facility] agency for SN

## 2022-06-13 NOTE — PROGRESS NOTES
Progress Note - Infectious Disease   Rogers Cover 59 y o  female MRN: [id number]  Unit/Bed#: [facility] 909-01 Encounter: [id number]      Impression:  1  Persistent splenic abscess  with superficial abscess at former drain site  S/P perisplenic and superficial drain placement by IR POD 2  2  S/P Laparoscopic sleeve gastrectomy complicated by splenic injury requiring IR embolization with subsequent splenic abscesses, IR drainage, Axios stent placement and prolonged IV antibiotic course all at [facility]  3  History of DVT on Eliquis  4  CAD S/P stent placement   5  History of hypothyroidism   6  Intertriginous fungal dermatitis secondary to antibiotic therapy    Recommendations:  Patient is afebrile with normal WBC count when last taken  Overall pain is much improved with some residual pain secondary to drainage procedure  1  IR performed perisplenic drain placement and left upper quadrant superficial drain placement   Description noted including a small fistulous connection to stomach  Check culture results which so far showing 1+ Enterococcus faecium and Candida albicans  Check susceptibilities  2  Pending results of Enterococcus faecium susceptibility will add daptomycin 550 mg Q 24 hours IV  Cannot use linezolid secondary to her anti depressive use  Antibiotics:  1  Piperacillin/tazobactam 3 375 g q 6 hours IV, day 7 Rx  2  Daptomycin 550 mg Q 24 hours IV, day 1 Rx     Subjective:  Has operative site pain where drains placed  Overall her pain is better than it was prior to the procedure  Denies fevers, chills, or sweats  Denies nausea, vomiting, or diarrhea        Objective:  Vitals:  Temp:  [97 7 °F (36 5 °C)-97 8 °F (36 6 °C)] 97 8 °F (36 6 °C)  HR:  [64-89] 89  BP: (116-141)/(65-71) 141/71  SpO2:  [96 %-98 %] 96 %  Temp (24hrs), Av 8 °F (36 6 °C), Min:97 7 °F (36 5 °C), Max:97 8 °F (36 6 °C)  Current: Temperature: 97 8 °F (36 6 °C)    Physical Exam:     General Appearance:  Alert, obese nontoxic, no acute distress  But having some left-sided abdominal discomfort   Throat: Oropharynx moist without lesions  Lips, mucosa, and tongue normal   Neck: Supple, symmetrical, trachea midline, no adenopathy,  no tenderness/mass/nodules   Lungs:   Clear to auscultation bilaterally, no audible wheezes, rhonchi or rales; respirations unlabored   Heart:  Regular rate and rhythm, S1, S2 normal, no murmur, rub or gallop   Abdomen:   Soft, striae, has 2 LUIS ENRIQUE drains with sanguinopurulent drainage left upper quadrant that appears to be decreasing positive bowel sounds    No masses, no organomegaly    Left flank subcostal tenderness at site of drains   Extremities: Extremities normal, atraumatic, no clubbing, cyanosis or edema   Skin: As above         Invasive Devices  Report    Peripheral Intravenous Line  Duration           Peripheral IV 06/12/22 Distal;Left;Ventral (anterior) Forearm <1 day          Drain  Duration           Abscess Drain Abdomen 2 days    Abscess Drain Abdomen 2 days                Labs, Imaging, & Other studies:   All pertinent labs were personally reviewed  Results from last 7 days   Lab Units 06/11/22  1128 06/10/22  1844 06/09/22  0514   WBC Thousand/uL 6 26 8 53 9 94   HEMOGLOBIN g/dL 11 2* 10 8* 9 7*   PLATELETS Thousands/uL 493* 474* 414*     Results from last 7 days   Lab Units 06/08/22  0602 06/06/22  2258   SODIUM mmol/L 139 140   POTASSIUM mmol/L 3 7 3 7   CHLORIDE mmol/L 105 107   CO2 mmol/L 27 27   BUN mg/dL 9 14   CREATININE mg/dL 0 64 0 88   EGFR ml/min/1 73sq m 97 72   CALCIUM mg/dL 8 6 8 9   AST U/L  --  16   ALT U/L  --  24   ALK PHOS U/L  --  92     Results from last 7 days   Lab Units 06/11/22  0932   GRAM STAIN RESULT  4+ Polys  No organisms seen   BODY FLUID CULTURE, STERILE  1+ Growth of Enterococcus faecium*  1+ Growth of Candida albicans*

## 2022-06-13 NOTE — CASE MANAGEMENT
Case Management Discharge Planning Note    Patient name Kirstin Rowan  [facility] [id number]  : 1965 Date 2022       Current Admission Date: 2022  Current Admission Diagnosis:Splenic abscess   Patient Active Problem List    Diagnosis Date Noted   Leopold Bodo 2022    Pulmonary nodule 2022    Morbid obesity due to excess calories ([address] ) 2022    Splenic abscess 2022      LOS (days): 4  Geometric Mean LOS (GMLOS) (days): 2 20  Days to GMLOS:-1 6     OBJECTIVE:  Risk of Unplanned Readmission Score: 13 93         Current admission status: Inpatient   Preferred Pharmacy:   [facility]/pharmacy #[id number]  [facility] [address]  Phone: [phone] Fax: [phone]    Primary Care Provider: Mian Ruiz MD    Primary Insurance: MEDICARE [address] [facility]  Secondary Insurance: [address] [place] [address]    DISCHARGE DETAILS:    Discharge planning discussed with[de-identified] patient  Freedom of Choice: Yes          Other Referral/Resources/Interventions Provided:  Referral Comments: patient now with LUIS ENRIQUE drains and is recommended to d/c to home with VNA  Patient has been staying at [facility] in [place] for the last few months, however her insurance is [place] based  If the patient were to d/c and get VNA she would need to discharge back to her home in [place] in order to get the services recommended  Patient is in agreement with returning to [place] temporarily as she states that there are less stairs there for her to navigate, and is hopeful to get VNA for her LUIS ENRIQUE drains  Patient states that there are 2 BURT  Her 80year old mother will be there to assist as will her partner Melissa Talamantes (spelling?)  Patient is open to VNA agencies at this time  Referrals have been placed and currently awaiting accepting agency                                               IMM Given (Date):: 22  IMM Given to[de-identified] Patient

## 2022-06-13 NOTE — TELEMEDICINE
e-Consult (IPC)  - Interventional Radiology  Debo Ohara 59 y o  female MRN: [id number]  [facility] Encounter: [id number]          Interventional Radiology has been consulted to evaluate Debo Ohara    We were consulted by surgery concerning this patient with IR drain x2 into splenic collection  IP Consult to IR  Consult performed by: Dotty Aguilera PA-C  Consult ordered by: Scar Wilson PA-C        06/13/22    Assessment/Recommendation:     59year old female with pmh of gastric sleeve gastrectomy, splenic injury, resulting in splenic fluid collection  IR drain placement x 2 6/11/22  ID requesting drain check for low outputs and positioning      - will plan for drain checks per IR scheduling  Will attempt tomorrow    Total time spent in review of data, discussion with requesting provider and rendering advice was 15 minutes     Thank you for allowing Interventional Radiology to participate in the care of Debo Ohara  Please don't hesitate to call or TigerText us with any questions       Dotty Aguilera PA-C

## 2022-06-14 ENCOUNTER — APPOINTMENT (INPATIENT)
Dept: RADIOLOGY | Facility: HOSPITAL | Age: 57
DRG: 863 | End: 2022-06-14
Payer: MEDICARE

## 2022-06-14 PROCEDURE — 76080 X-RAY EXAM OF FISTULA: CPT

## 2022-06-14 PROCEDURE — 0WPFX0Z REMOVAL OF DRAINAGE DEVICE FROM ABDOMINAL WALL, EXTERNAL APPROACH: ICD-10-PCS | Performed by: RADIOLOGY

## 2022-06-14 PROCEDURE — 99232 SBSQ HOSP IP/OBS MODERATE 35: CPT | Performed by: INTERNAL MEDICINE

## 2022-06-14 PROCEDURE — 76080 X-RAY EXAM OF FISTULA: CPT | Performed by: RADIOLOGY

## 2022-06-14 PROCEDURE — C1769 GUIDE WIRE: HCPCS

## 2022-06-14 PROCEDURE — 49424 ASSESS CYST CONTRAST INJECT: CPT | Performed by: RADIOLOGY

## 2022-06-14 PROCEDURE — 99232 SBSQ HOSP IP/OBS MODERATE 35: CPT | Performed by: SURGERY

## 2022-06-14 PROCEDURE — 49424 ASSESS CYST CONTRAST INJECT: CPT

## 2022-06-14 RX ORDER — KETOROLAC TROMETHAMINE 30 MG/ML
15 INJECTION, SOLUTION INTRAMUSCULAR; INTRAVENOUS ONCE
Status: COMPLETED | OUTPATIENT
Start: 2022-06-15 | End: 2022-06-15

## 2022-06-14 RX ADMIN — NYSTATIN: 100000 POWDER TOPICAL at 09:46

## 2022-06-14 RX ADMIN — GABAPENTIN 100 MG: 100 CAPSULE ORAL at 16:33

## 2022-06-14 RX ADMIN — PIPERACILLIN AND TAZOBACTAM 3.38 G: 36; 4.5 INJECTION, POWDER, FOR SOLUTION INTRAVENOUS at 00:02

## 2022-06-14 RX ADMIN — PIPERACILLIN AND TAZOBACTAM 3.38 G: 36; 4.5 INJECTION, POWDER, FOR SOLUTION INTRAVENOUS at 22:17

## 2022-06-14 RX ADMIN — OXYCODONE HYDROCHLORIDE 10 MG: 10 TABLET ORAL at 00:03

## 2022-06-14 RX ADMIN — PANTOPRAZOLE SODIUM 40 MG: 40 TABLET, DELAYED RELEASE ORAL at 05:23

## 2022-06-14 RX ADMIN — PIPERACILLIN AND TAZOBACTAM 3.38 G: 36; 4.5 INJECTION, POWDER, FOR SOLUTION INTRAVENOUS at 16:35

## 2022-06-14 RX ADMIN — METHOCARBAMOL 500 MG: 500 TABLET, FILM COATED ORAL at 16:39

## 2022-06-14 RX ADMIN — IOHEXOL 5 ML: 300 INJECTION, SOLUTION INTRAVENOUS at 15:52

## 2022-06-14 RX ADMIN — APIXABAN 5 MG: 5 TABLET, FILM COATED ORAL at 16:39

## 2022-06-14 RX ADMIN — METHOCARBAMOL 500 MG: 500 TABLET, FILM COATED ORAL at 23:05

## 2022-06-14 RX ADMIN — ACETAMINOPHEN 975 MG: 325 TABLET ORAL at 23:05

## 2022-06-14 RX ADMIN — METHOCARBAMOL 500 MG: 500 TABLET, FILM COATED ORAL at 00:02

## 2022-06-14 RX ADMIN — APIXABAN 5 MG: 5 TABLET, FILM COATED ORAL at 09:44

## 2022-06-14 RX ADMIN — TRAZODONE HYDROCHLORIDE 75 MG: 50 TABLET ORAL at 22:17

## 2022-06-14 RX ADMIN — METHOCARBAMOL 500 MG: 500 TABLET, FILM COATED ORAL at 11:00

## 2022-06-14 RX ADMIN — ACETAMINOPHEN 975 MG: 325 TABLET ORAL at 16:39

## 2022-06-14 RX ADMIN — OXYCODONE HYDROCHLORIDE 10 MG: 10 TABLET ORAL at 16:34

## 2022-06-14 RX ADMIN — CITALOPRAM HYDROBROMIDE 40 MG: 20 TABLET ORAL at 09:44

## 2022-06-14 RX ADMIN — PIPERACILLIN AND TAZOBACTAM 3.38 G: 36; 4.5 INJECTION, POWDER, FOR SOLUTION INTRAVENOUS at 10:50

## 2022-06-14 RX ADMIN — GABAPENTIN 100 MG: 100 CAPSULE ORAL at 21:10

## 2022-06-14 RX ADMIN — ACETAMINOPHEN 975 MG: 325 TABLET ORAL at 11:00

## 2022-06-14 RX ADMIN — ACETAMINOPHEN 975 MG: 325 TABLET ORAL at 00:02

## 2022-06-14 RX ADMIN — DAPTOMYCIN 550 MG: 500 INJECTION, POWDER, LYOPHILIZED, FOR SOLUTION INTRAVENOUS at 17:16

## 2022-06-14 RX ADMIN — OXYCODONE HYDROCHLORIDE 10 MG: 10 TABLET ORAL at 21:10

## 2022-06-14 RX ADMIN — OXYCODONE HYDROCHLORIDE 10 MG: 10 TABLET ORAL at 05:23

## 2022-06-14 RX ADMIN — AMLODIPINE BESYLATE 10 MG: 10 TABLET ORAL at 09:45

## 2022-06-14 RX ADMIN — METHOCARBAMOL 500 MG: 500 TABLET, FILM COATED ORAL at 05:23

## 2022-06-14 RX ADMIN — OXYCODONE HYDROCHLORIDE 10 MG: 10 TABLET ORAL at 11:02

## 2022-06-14 RX ADMIN — LEVOTHYROXINE SODIUM 150 MCG: 75 TABLET ORAL at 05:23

## 2022-06-14 RX ADMIN — GABAPENTIN 100 MG: 100 CAPSULE ORAL at 09:44

## 2022-06-14 RX ADMIN — PIPERACILLIN AND TAZOBACTAM 3.38 G: 36; 4.5 INJECTION, POWDER, FOR SOLUTION INTRAVENOUS at 05:29

## 2022-06-14 NOTE — PROGRESS NOTES
Progress Note - Infectious Disease   Maya Organ 59 y o  female MRN: [id number]  Unit/Bed#: [facility] 909-01 Encounter: [id number]      Impression:  1  Persistent splenic abscess  with superficial abscess at former drain site  S/P perisplenic and superficial drain placement by IR POD 3  2  S/P Laparoscopic sleeve gastrectomy complicated by splenic injury requiring IR embolization with subsequent splenic abscesses, IR drainage, Axios stent placement and prolonged IV antibiotic course all at [facility]  3  History of DVT on Eliquis  4  CAD S/P stent placement   5  History of hypothyroidism   6  Intertriginous fungal dermatitis secondary to antibiotic therapy    Recommendations:  Patient is afebrile with normal WBC count when last taken  Her drains have been removed by IR  Overall pain is much improved with minimal residual pain  1  IR performed perisplenic drain placement and left upper quadrant superficial drain placement 6/11  Description noted including a small fistulous connection to stomach  Check culture results which so far showing 1+ Enterococcus faecium and Candida albicans  Check susceptibilities  Will hopefully be able to switch to oral regimen once they are available or potentially decide to observe off further antibiotics with follow-up ultrasound/CT scan as outpatient   2  Pending results of Enterococcus faecium susceptibility will continue daptomycin 550 mg Q 24 hours IV and piperacillin/tazobactam 3 375 g q 6 hours IV  Cannot use linezolid secondary to her anti depressive use  3  Check repeat CBC, diff and BMP     Antibiotics:  1  Piperacillin/tazobactam 3 375 g q 6 hours IV, day 8 Rx  2  Daptomycin 550 mg Q 24 hours IV, day 2 Rx     Subjective:  Operative site pain is currently minimal  Denies fevers, chills, or sweats  Denies nausea, vomiting, or diarrhea        Objective:  Vitals:  Temp:  [97 4 °F (36 3 °C)-97 8 °F (36 6 °C)] 97 4 °F (36 3 °C)  HR:  [72-79] 79  Resp:  [20] 20  BP: (119-120)/(63-65) 120/65  SpO2:  [95 %-96 %] 95 %  Temp (24hrs), Av 6 °F (36 4 °C), Min:97 4 °F (36 3 °C), Max:97 8 °F (36 6 °C)  Current: Temperature: (!) 97 4 °F (36 3 °C)    Physical Exam:     General Appearance:  Alert, obese nontoxic, no acute distress  But having some left-sided abdominal discomfort   Throat: Oropharynx moist without lesions  Lips, mucosa, and tongue normal   Neck: Supple, symmetrical, trachea midline, no adenopathy,  no tenderness/mass/nodules   Lungs:   Clear to auscultation bilaterally, no audible wheezes, rhonchi or rales; respirations unlabored   Heart:  Regular rate and rhythm, S1, S2 normal, no murmur, rub or gallop   Abdomen:   Soft, striae, LUIS ENRIQUE drains have been removed  positive bowel sounds    No masses, no organomegaly    Left flank subcostal tenderness is minimal   Extremities: Extremities normal, atraumatic, no clubbing, cyanosis or edema   Skin: As above         Invasive Devices  Report    Peripheral Intravenous Line  Duration           Peripheral IV 22 Distal;Left;Ventral (anterior) Forearm 1 day                Labs, Imaging, & Other studies:   All pertinent labs were personally reviewed  Results from last 7 days   Lab Units 22  1128 06/10/22  1844 22  0514   WBC Thousand/uL 6 26 8 53 9 94   HEMOGLOBIN g/dL 11 2* 10 8* 9 7*   PLATELETS Thousands/uL 493* 474* 414*     Results from last 7 days   Lab Units 22  0602   SODIUM mmol/L 139   POTASSIUM mmol/L 3 7   CHLORIDE mmol/L 105   CO2 mmol/L 27   BUN mg/dL 9   CREATININE mg/dL 0 64   EGFR ml/min/1 73sq m 97   CALCIUM mg/dL 8 6     Results from last 7 days   Lab Units 22  0932   GRAM STAIN RESULT  4+ Polys  No organisms seen   BODY FLUID CULTURE, STERILE  1+ Growth of Enterococcus faecium*  1+ Growth of Candida albicans*

## 2022-06-14 NOTE — PLAN OF CARE
Problem: PAIN - ADULT  Goal: Verbalizes/displays adequate comfort level or baseline comfort level  Description: Interventions:  - Encourage patient to monitor pain and request assistance  - Assess pain using appropriate pain scale  - Administer analgesics based on type and severity of pain and evaluate response  - Implement non-pharmacological measures as appropriate and evaluate response  - Consider cultural and social influences on pain and pain management  - Notify physician/advanced practitioner if interventions unsuccessful or patient reports new pain  Outcome: Progressing     Problem: INFECTION - ADULT  Goal: Absence or prevention of progression during hospitalization  Description: INTERVENTIONS:  - Assess and monitor for signs and symptoms of infection  - Monitor lab/diagnostic results  - Monitor all insertion sites, i e  indwelling lines, tubes, and drains  - Monitor endotracheal if appropriate and nasal secretions for changes in amount and color  - [place] appropriate cooling/warming therapies per order  - Administer medications as ordered  - Instruct and encourage patient and family to use good hand hygiene technique  - Identify and instruct in appropriate isolation precautions for identified infection/condition  Outcome: Progressing  Goal: Absence of fever/infection during neutropenic period  Description: INTERVENTIONS:  - Monitor WBC    Outcome: Progressing     Problem: SAFETY ADULT  Goal: Patient will remain free of falls  Description: INTERVENTIONS:  - Educate patient/family on patient safety including physical limitations  - Instruct patient to call for assistance with activity   - Consult OT/PT to assist with strengthening/mobility   - Keep Call bell within reach  - Keep bed low and locked with side rails adjusted as appropriate  - Keep care items and personal belongings within reach  - Initiate and maintain comfort rounds  - Make Fall Risk Sign visible to staff  - Offer Toileting every 3 Hours, in advance of need  - Initiate/Maintain 3alarm  - Obtain necessary fall risk management equipment: 3  - Apply yellow socks and bracelet for high fall risk patients  - Consider moving patient to room near nurses station  Outcome: Progressing  Goal: Maintain or return to baseline ADL function  Description: INTERVENTIONS:  -  Assess patient's ability to carry out ADLs; assess patient's baseline for ADL function and identify physical deficits which impact ability to perform ADLs (bathing, care of mouth/teeth, toileting, grooming, dressing, etc )  - Assess/evaluate cause of self-care deficits   - Assess range of motion  - Assess patient's mobility; develop plan if impaired  - Assess patient's need for assistive devices and provide as appropriate  - Encourage maximum independence but intervene and supervise when necessary  - Involve family in performance of ADLs  - Assess for home care needs following discharge   - Consider OT consult to assist with ADL evaluation and planning for discharge  - Provide patient education as appropriate  Outcome: Progressing  Goal: Maintains/Returns to pre admission functional level  Description: INTERVENTIONS:  - Perform BMAT or MOVE assessment daily    - Set and communicate daily mobility goal to care team and patient/family/caregiver  - Collaborate with rehabilitation services on mobility goals if consulted  - Perform Range of Motion 3 times a day  - Reposition patient every 3 hours    - Dangle patient 3 times a day  - Stand patient 3 times a day  - Ambulate patient 3 times a day  - Out of bed to chair 3 times a day   - Out of bed for meals 3 times a day  - Out of bed for toileting  - Record patient progress and toleration of activity level   Outcome: Progressing     Problem: DISCHARGE PLANNING  Goal: Discharge to home or other facility with appropriate resources  Description: INTERVENTIONS:  - Identify barriers to discharge w/patient and caregiver  - Arrange for needed discharge resources and transportation as appropriate  - Identify discharge learning needs (meds, wound care, etc )  - Arrange for interpretive services to assist at discharge as needed  - Refer to Case Management Department for coordinating discharge planning if the patient needs post-hospital services based on physician/advanced practitioner order or complex needs related to functional status, cognitive ability, or social support system  Outcome: Progressing     Problem: Knowledge Deficit  Goal: Patient/family/caregiver demonstrates understanding of disease process, treatment plan, medications, and discharge instructions  Description: Complete learning assessment and assess knowledge base    Interventions:  - Provide teaching at level of understanding  - Provide teaching via preferred learning methods  Outcome: Progressing     Problem: Potential for Falls  Goal: Patient will remain free of falls  Description: INTERVENTIONS:  - Educate patient/family on patient safety including physical limitations  - Instruct patient to call for assistance with activity   - Consult OT/PT to assist with strengthening/mobility   - Keep Call bell within reach  - Keep bed low and locked with side rails adjusted as appropriate  - Keep care items and personal belongings within reach  - Initiate and maintain comfort rounds  - Make Fall Risk Sign visible to staff  - Offer Toileting every 3 Hours, in advance of need  - Initiate/Maintain 3alarm  - Obtain necessary fall risk management equipment: 3  - Apply yellow socks and bracelet for high fall risk patients  - Consider moving patient to room near nurses station  Outcome: Progressing     Problem: MOBILITY - ADULT  Goal: Maintain or return to baseline ADL function  Description: INTERVENTIONS:  -  Assess patient's ability to carry out ADLs; assess patient's baseline for ADL function and identify physical deficits which impact ability to perform ADLs (bathing, care of mouth/teeth, toileting, grooming, dressing, etc )  - Assess/evaluate cause of self-care deficits   - Assess range of motion  - Assess patient's mobility; develop plan if impaired  - Assess patient's need for assistive devices and provide as appropriate  - Encourage maximum independence but intervene and supervise when necessary  - Involve family in performance of ADLs  - Assess for home care needs following discharge   - Consider OT consult to assist with ADL evaluation and planning for discharge  - Provide patient education as appropriate  Outcome: Progressing  Goal: Maintains/Returns to pre admission functional level  Description: INTERVENTIONS:  - Perform BMAT or MOVE assessment daily    - Set and communicate daily mobility goal to care team and patient/family/caregiver  - Collaborate with rehabilitation services on mobility goals if consulted  - Perform Range of Motion 3 times a day  - Reposition patient every 3 hours    - Dangle patient 3 times a day  - Stand patient 3 times a day  - Ambulate patient 3 times a day  - Out of bed to chair 3 times a day   - Out of bed for meals 3 times a day  - Out of bed for toileting  - Record patient progress and toleration of activity level   Outcome: Progressing

## 2022-06-14 NOTE — CASE MANAGEMENT
Case Management Discharge Planning Note    Patient name Keshia Hall  [facility] [id number]  : 1965 Date 2022       Current Admission Date: 2022  Current Admission Diagnosis:Splenic abscess   Patient Active Problem List    Diagnosis Date Noted   Nancy Hydea 2022    Pulmonary nodule 2022    Morbid obesity due to excess calories ([address] ) 2022    Splenic abscess 2022      LOS (days): 5  Geometric Mean LOS (GMLOS) (days): 2 20  Days to GMLOS:-2 6     OBJECTIVE:  Risk of Unplanned Readmission Score: 14 29         Current admission status: Inpatient   Preferred Pharmacy:   [id number] [address]  Phone: [phone] Fax: [phone]    Primary Care Provider: Yannick Fernandez MD    Primary Insurance: MEDICARE [facility]  Secondary Insurance: [facility]    DISCHARGE DETAILS:       Additional Comments: pt approached CM in the hallway today stating that she spoke with one of her providers last night and was told that she should stay in [place] so that they continue to follow up with her post discharge  Patient unable to have VNA in [place], as she has [address] [place] [address] as her insurance carrier  Patient states she cannot drive back and forth between [address] [place] [address] and [place] because it is too much driving  She also states that she is refusing to go home without VNA  Patient continues to state that "I will just get meliton care for the VNA"  CM explained to the patient that there is criteria that needs to be met in order to qualify and that she is more than welcome to call and speak with the finance department to discuss payment plans and options, however meliton care is not something that is given to each patient that asks for it  Pt insistent that she is going to get meliton care for VNA  CM explained that [facility] does not service [place] and that the other agencies are not through [email or web address]    The home care agencies in [place] are not in network with [address] [place] [address] and therefore will not accept her without gaurenteed payment  Patient remains insistent at this time that she needs to stay in [place] to follow up with Barbara Gagnon MD but to also get VNA in [place], which unfortunately is not an option for the patient at this time  CM did reach out to resident on case to help facilitate further discussions as a group as currently the patient is insisting on certain aspects and follows up that are currently not an option for the patient  Currently awaiting further discussion with attending and patient at this time to continue to assist with discharge planning

## 2022-06-14 NOTE — UTILIZATION REVIEW
Continued Stay Review    Date: 6/14/22                          Current Patient Class:  IP   Current Level of Care:  MS    HPI:57 y o  female with hx CAD s/p stent initially admitted on 6/7 as OBS converted to IP 6/9/22 with persistent splenic abscess  with superficial abscess at former drain site  Area became erythematous, increasing tendernessPt had  perisplenic drain placement and left upper quadrant superficial drain placement by IR 6/11/22 with purulent fluid from superficial soft tissue  ID following   Assessment/Plan: 6/14/22 POD #3  IR culture- enterococcus faecium, candida albicans, no anaerobes  Pt continues on IV Zosyn and IV  Daptomycin added 6/13  LUIS ENRIQUE drains x2 , draining 30 ml serosang drainage   Pt afebrile, no labs this am   Pt reporting pain in anterior drain that began this morning  Pain 8/10 LLQ per nursing notes   Denies  fever, chills  Pt nauseated last evening, no vomiting  Abdomen soft  Pt receiving po prn narcotic analgesic for pain  Heparin drip d/c'ed yesterday with pt on Eliquis   Plan- IR for drain check , hopefully today          Vital Signs:  Date/Time Temp Pulse Resp BP MAP (mmHg) SpO2   06/14/22 07:08:31 97 8 °F (36 6 °C) 72 -- 119/65 83 95 %   06/13/22 23:09:13 97 7 °F (36 5 °C) 75 20 120/63 82 96 %   06/13/22 2200 -- -- -- -- -- --   06/13/22 14:51:53 97 8 °F (36 6 °C) 89 -- 141/71 94 96 %   06/13/22 0840 -- -- -- -- -- --   06/13/22 07:20:57 97 8 °F (36 6 °C) 79 -- 116/70 85 98 %   06/12/22 22:30:37 97 7 °F (36 5 °C) 64 -- 121/65 84 97 %   06/12/22 2100 -- -- -- -- -- 97 %   06/12/22 15:18:46 97 9 °F (36 6 °C) -- 16 108/58 75 --         Pertinent Labs/Diagnostic Results:       Results from last 7 days   Lab Units 06/11/22  1128 06/10/22  1844 06/09/22  0514 06/08/22  0602   WBC Thousand/uL 6 26 8 53 9 94 12 57*   HEMOGLOBIN g/dL 11 2* 10 8* 9 7* 11 3*   HEMATOCRIT % 35 7 34 2* 30 7* 35 9   PLATELETS Thousands/uL 493* 474* 414* 460*         Results from last 7 days   Lab Units 06/08/22  0602   SODIUM mmol/L 139   POTASSIUM mmol/L 3 7   CHLORIDE mmol/L 105   CO2 mmol/L 27   ANION GAP mmol/L 7   BUN mg/dL 9   CREATININE mg/dL 0 64   EGFR ml/min/1 73sq m 97   CALCIUM mg/dL 8 6   MAGNESIUM mg/dL 1 8   PHOSPHORUS mg/dL 3 8             Results from last 7 days   Lab Units 06/08/22  0602   GLUCOSE RANDOM mg/dL 94               Results from last 7 days   Lab Units 06/13/22  0555 06/12/22  2356 06/12/22  1618 06/11/22  1756 06/11/22  1128 06/10/22  1844   PROTIME seconds  --   --   --   --  13 4 14 0   INR   --   --   --   --  1 07 1 12   PTT seconds 52* 67* 77*   < > 37 40*    < > = values in this interval not displayed                 Results from last 7 days   Lab Units 06/11/22  0932   GRAM STAIN RESULT  4+ Polys  No organisms seen   BODY FLUID CULTURE, STERILE  1+ Growth of Enterococcus faecium*  1+ Growth of Candida albicans*                 Medications:   Scheduled Medications:  acetaminophen, 975 mg, Oral, Q6H JANETT  amLODIPine, 10 mg, Oral, Daily  apixaban, 5 mg, Oral, BID  citalopram, 40 mg, Oral, Daily  DAPTOmycin, 550 mg, Intravenous, Q24H  gabapentin, 100 mg, Oral, TID  levothyroxine, 150 mcg, Oral, Early Morning  lidocaine, 1 patch, Topical, Daily  methocarbamol, 500 mg, Oral, Q6H JANETT  nystatin, , Topical, BID  pantoprazole, 40 mg, Oral, Early Morning  piperacillin-tazobactam, 3 375 g, Intravenous, Q6H  traZODone, 75 mg, Oral, HS      Continuous IV Infusions:    heparin (porcine) 25,000 units in 0 45% NaCl 250 mL infusion (premix)  Rate: 2 6-25 5 mL/hr Dose: 3-30 Units/kg/hr  Weight Dosing Info: 85 kg (Order-Specific)  Freq: Titrated Route: IV  Last Dose: Stopped (06/13/22 1145)  Start: 06/11/22 1130 End: 06/13/22 1117  PRN Meds:  ondansetron, 4 mg, Intravenous, Q6H PRN x1 6/13  oxyCODONE, 10 mg, Oral, Q4H PRN x1 6/13, x3 6/14  oxyCODONE, 5 mg, Oral, Q4H PRN        Discharge Plan: TBD    Network Utilization Review Department  ATTENTION: Please call with any questions or concerns to [phone] and carefully listen to the prompts so that you are directed to the right person  All voicemails are confidential   Rina Lomas all requests for admission clinical reviews, approved or denied determinations and any other requests to dedicated fax number below belonging to the campus where the patient is receiving treatment   List of dedicated fax numbers for the Facilities:  [address] DENIALS (Administrative/Medical Necessity) [phone]   [address] (Maternity/NICU/Pediatrics) [phone]   [address]  [address] [address] [facility] [place] [address] [address] [place] [address] [address] [place] [address] [phone]

## 2022-06-14 NOTE — PROGRESS NOTES
Progress Note - Red Surgery   Mandie Schwartz 59 y o  female MRN: [id number]  Unit/Bed#: [facility] 909-01 Encounter: [id number]    Assessment:  60 yo female with known splenic abscess  Presented with persistent pain in left flank, palpable mass, fluid collection at site of previous drainage tube, no systemic symptoms  Area became erythematous with increasing tenderness, subcutaneous collection was noted to be increasing in size  S/p two L IR drain placement 6/11, one in superficial collection and one in perisplenic collection  Afebrile, Stable VS on room air  No labs this morning  IR cx: enterococcus faecium, candida albicans, no anaerobes  LUIS ENRIQUE: Two on left - 30 cc; serosang  Plan:  - Regular diet  - Monitor two left IR drains  - Plan for IR drain check today  - Abx per ID: daptomycin, continue IV zosyn  - DVT ppx  - Encourage ambulation  - CM: VNA, dispo planning     Subjective/Objective     Subjective:   No acute events overnight  Patient reporting pain in anterior drain that began this morning  Denies additional pain, fever, chills, SOB, chest pain, diarrhea  Reports some nausea last night without vomiting  Objective:    Blood pressure 120/63, pulse 75, temperature 97 7 °F (36 5 °C), resp  rate 20, last menstrual period 08/03/2016, SpO2 96 %, not currently breastfeeding  ,There is no height or weight on file to calculate BMI        Intake/Output Summary (Last 24 hours) at 6/14/2022 0537  Last data filed at 6/14/2022 0002  Gross per 24 hour   Intake 810 ml   Output 30 ml   Net 780 ml       Invasive Devices  Report    Peripheral Intravenous Line  Duration           Peripheral IV 06/12/22 Distal;Left;Ventral (anterior) Forearm 1 day          Drain  Duration           Abscess Drain Abdomen 2 days    Abscess Drain Abdomen 2 days                Physical Exam:   Gen: Appears uncomfortable  Neuro: A&O  Head: Normal Cephalic, Atraumatic  Eye: No scleral icterus  Neck: No JVD, Midline trachea  CV: Regular rate, Cap refill <2 sec  Pulm: Normal work of breathing, no respiratory distress  Abd: Soft, Non-Distended, Tender surrounding anterior drain  Drains in place    Ext: No edema, Non-tender  Skin: Warm, dry, intact    ---  Winifred Mcmahon, M4

## 2022-06-15 VITALS
HEART RATE: 79 BPM | BODY MASS INDEX: 35.08 KG/M2 | WEIGHT: 197.97 LBS | TEMPERATURE: 97.8 F | OXYGEN SATURATION: 96 % | RESPIRATION RATE: 16 BRPM | SYSTOLIC BLOOD PRESSURE: 120 MMHG | DIASTOLIC BLOOD PRESSURE: 67 MMHG | HEIGHT: 63 IN

## 2022-06-15 LAB
ANION GAP SERPL CALCULATED.3IONS-SCNC: 3 MMOL/L (ref 4–13)
ANISOCYTOSIS BLD QL SMEAR: PRESENT
BASOPHILS # BLD MANUAL: 0.05 THOUSAND/UL (ref 0–0.1)
BASOPHILS NFR MAR MANUAL: 1 % (ref 0–1)
BUN SERPL-MCNC: 13 MG/DL (ref 5–25)
CALCIUM SERPL-MCNC: 9.1 MG/DL (ref 8.3–10.1)
CHLORIDE SERPL-SCNC: 105 MMOL/L (ref 100–108)
CO2 SERPL-SCNC: 31 MMOL/L (ref 21–32)
CREAT SERPL-MCNC: 0.88 MG/DL (ref 0.6–1.3)
EOSINOPHIL # BLD MANUAL: 0.21 THOUSAND/UL (ref 0–0.4)
EOSINOPHIL NFR BLD MANUAL: 4 % (ref 0–6)
ERYTHROCYTE [DISTWIDTH] IN BLOOD BY AUTOMATED COUNT: 19 % (ref 11.6–15.1)
GFR SERPL CREATININE-BSD FRML MDRD: 72 ML/MIN/1.73SQ M
GLUCOSE SERPL-MCNC: 97 MG/DL (ref 65–140)
HCT VFR BLD AUTO: 38.7 % (ref 34.8–46.1)
HGB BLD-MCNC: 11.7 G/DL (ref 11.5–15.4)
LYMPHOCYTES # BLD AUTO: 1.61 THOUSAND/UL (ref 0.6–4.47)
LYMPHOCYTES # BLD AUTO: 31 % (ref 14–44)
MACROCYTES BLD QL AUTO: PRESENT
MCH RBC QN AUTO: 26 PG (ref 26.8–34.3)
MCHC RBC AUTO-ENTMCNC: 30.2 G/DL (ref 31.4–37.4)
MCV RBC AUTO: 86 FL (ref 82–98)
MONOCYTES # BLD AUTO: 0.41 THOUSAND/UL (ref 0–1.22)
MONOCYTES NFR BLD: 8 % (ref 4–12)
NEUTROPHILS # BLD MANUAL: 2.8 THOUSAND/UL (ref 1.85–7.62)
NEUTS SEG NFR BLD AUTO: 54 % (ref 43–75)
OVALOCYTES BLD QL SMEAR: PRESENT
PLATELET # BLD AUTO: 494 THOUSANDS/UL (ref 149–390)
PLATELET BLD QL SMEAR: ABNORMAL
PMV BLD AUTO: 8.9 FL (ref 8.9–12.7)
POLYCHROMASIA BLD QL SMEAR: PRESENT
POTASSIUM SERPL-SCNC: 4.2 MMOL/L (ref 3.5–5.3)
RBC # BLD AUTO: 4.5 MILLION/UL (ref 3.81–5.12)
RBC MORPH BLD: PRESENT
SODIUM SERPL-SCNC: 139 MMOL/L (ref 136–145)
TARGETS BLD QL SMEAR: PRESENT
VARIANT LYMPHS # BLD AUTO: 2 %
WBC # BLD AUTO: 5.18 THOUSAND/UL (ref 4.31–10.16)

## 2022-06-15 PROCEDURE — 99232 SBSQ HOSP IP/OBS MODERATE 35: CPT | Performed by: INTERNAL MEDICINE

## 2022-06-15 PROCEDURE — NC001 PR NO CHARGE: Performed by: SURGERY

## 2022-06-15 PROCEDURE — 85027 COMPLETE CBC AUTOMATED: CPT | Performed by: INTERNAL MEDICINE

## 2022-06-15 PROCEDURE — 85007 BL SMEAR W/DIFF WBC COUNT: CPT | Performed by: INTERNAL MEDICINE

## 2022-06-15 PROCEDURE — 99238 HOSP IP/OBS DSCHRG MGMT 30/<: CPT | Performed by: SURGERY

## 2022-06-15 PROCEDURE — 80048 BASIC METABOLIC PNL TOTAL CA: CPT | Performed by: INTERNAL MEDICINE

## 2022-06-15 RX ORDER — METHOCARBAMOL 500 MG/1
500 TABLET, FILM COATED ORAL 4 TIMES DAILY
Qty: 24 TABLET | Refills: 0 | Status: SHIPPED | OUTPATIENT
Start: 2022-06-15 | End: 2022-06-21

## 2022-06-15 RX ADMIN — LEVOTHYROXINE SODIUM 150 MCG: 75 TABLET ORAL at 05:30

## 2022-06-15 RX ADMIN — ACETAMINOPHEN 975 MG: 325 TABLET ORAL at 16:19

## 2022-06-15 RX ADMIN — APIXABAN 5 MG: 5 TABLET, FILM COATED ORAL at 16:19

## 2022-06-15 RX ADMIN — APIXABAN 5 MG: 5 TABLET, FILM COATED ORAL at 08:44

## 2022-06-15 RX ADMIN — ACETAMINOPHEN 975 MG: 325 TABLET ORAL at 05:30

## 2022-06-15 RX ADMIN — CITALOPRAM HYDROBROMIDE 40 MG: 20 TABLET ORAL at 08:44

## 2022-06-15 RX ADMIN — PIPERACILLIN AND TAZOBACTAM 3.38 G: 36; 4.5 INJECTION, POWDER, FOR SOLUTION INTRAVENOUS at 05:32

## 2022-06-15 RX ADMIN — PIPERACILLIN AND TAZOBACTAM 3.38 G: 36; 4.5 INJECTION, POWDER, FOR SOLUTION INTRAVENOUS at 10:27

## 2022-06-15 RX ADMIN — ACETAMINOPHEN 975 MG: 325 TABLET ORAL at 10:29

## 2022-06-15 RX ADMIN — PANTOPRAZOLE SODIUM 40 MG: 40 TABLET, DELAYED RELEASE ORAL at 05:30

## 2022-06-15 RX ADMIN — METHOCARBAMOL 500 MG: 500 TABLET, FILM COATED ORAL at 05:31

## 2022-06-15 RX ADMIN — AMLODIPINE BESYLATE 10 MG: 10 TABLET ORAL at 08:44

## 2022-06-15 RX ADMIN — METHOCARBAMOL 500 MG: 500 TABLET, FILM COATED ORAL at 10:29

## 2022-06-15 RX ADMIN — METHOCARBAMOL 500 MG: 500 TABLET, FILM COATED ORAL at 16:19

## 2022-06-15 RX ADMIN — GABAPENTIN 100 MG: 100 CAPSULE ORAL at 08:44

## 2022-06-15 RX ADMIN — GABAPENTIN 100 MG: 100 CAPSULE ORAL at 16:19

## 2022-06-15 RX ADMIN — KETOROLAC TROMETHAMINE 15 MG: 30 INJECTION, SOLUTION INTRAMUSCULAR; INTRAVENOUS at 00:22

## 2022-06-15 NOTE — CASE MANAGEMENT
Case Management Discharge Planning Note    Patient name Joanne Mcintosh  Location [facility] 909/[facility] 791-52 MRN [id number]  : 1965 Date 6/15/2022       Current Admission Date: 2022  Current Admission Diagnosis:Splenic abscess   Patient Active Problem List    Diagnosis Date Noted   Micah Sams 2022    Pulmonary nodule 2022    Morbid obesity due to excess calories ([address] ) 2022    Splenic abscess 2022      LOS (days): 6  Geometric Mean LOS (GMLOS) (days): 3 50  Days to GMLOS:-2 4     OBJECTIVE:  Risk of Unplanned Readmission Score: 13 21         Current admission status: Inpatient   [facility]acy[facility]   [id number] [address] [place] [address]  Phone: [phone] Fax: [phone]    Primary Care Provider: Ernesto Nation MD    Primary Insurance: MEDICARE [address] [facility]  Secondary Insurance: [address] [place] [address]    DISCHARGE DETAILS:                   Additional Comments: Patient has had both LUIS ENRIQUE drains removed at this time  SN has been cancelled for drain care

## 2022-06-15 NOTE — QUICK NOTE
Will discuss D/C planning and antibiotics with Dr Theta Mcburney later today  IR drains removed last evening and question over VNA no longer an issue at this point  She is ambulating and we are discussing D/C plan  No further update in computer on cultures at this time

## 2022-06-15 NOTE — DISCHARGE SUMMARY
Discharge Summary - general Surgery   Nancy Walker 59 y o  female MRN: [id number]  [facility] Encounter: [id number]    Admission Date: 6/6/2022     Discharge Date: 6/15/2022     Admitting Diagnosis: Splenic abscess [D73 3]  Back pain [M54 9]    Discharge Diagnosis: Principal Problem:    Splenic abscess  Left subcuticular mass      Attending and Service:   Ez Fishman MD; general surgery    Consulting Physician(s): Interventional Radiology  Infectious Disease    Procedures Performed:   6/14 EUS, cystgastrostomy with stent     Imaging:  Procedure: IR drainage tube check/change/reposition/reinsertion/upsize    Result Date: 6/14/2022  Narrative: Drainage catheter check x2 Clinical History: Status post perisplenic and subcutaneous drainage with minimal clear, serosanguineous drainage Fluoroscopy time: 2 0 minutes ( 60 mGy ) Contrast: 5 mL of iohexol (OMNIPAQUE) Images: 513 Sedation time: 0 Procedure: A  view demonstrates 2 left upper quadrant pigtail catheters  Both catheters were prepped and draped in usual sterile fashion and local anesthesia obtained with a 1% lidocaine solution  Both catheters were transected and removed over a heavy wire  A Kumpe catheter was advanced and contrast injection confirms no residual cavity  Given the minimal output, the catheters were discontinued  The patient tolerated the procedure well and left the department in stable condition       Impression: Impression: Resolution of the abscess cavity and purulent drainage with discontinuation of the tubes Workstation performed: [facility] Course:     HPI:  Nancy Walker is a 59 y o  female w PMH of HTN, CAD s/p stentsx4, hx of DVT on Eliquis, CHF, hypothyroidism, surgical history of cholecystectomy, laparoscopic sleeve gastrectomy in August of 2021 at [facility] in [facility], postoperative course c/b splenic bleed requiring IR embolization, cystgastrostomy stent by GI, IR drainage of splenic abscess, she was put on a prolonged course of antibiotics starting late 2021 into early 2022  Patient had intermittent LUQ and left shoulder pain since discharge from [facility] in late 2021/early 2022, was visiting her daughter for mother's Day in the [facility] when she originally presented to [facility] ED in May of 2022 (05/07/2022) reporting these continued symptoms, previous IR drain as well as antibiotic course had been both finished at this time, found to have persistent perisplenic abscess on imaging, admitted to the general surgery service at this time and started on IV antibiotics, with IR and ID consult, put on heparin drip for her history of DVT on Eliquis at home  During this original hospitalization at [facility] in early May, IR was able to place a drain in the splenic abscess, GI was able to perform EGD and remove the axios stent previously placed by the GI team at [facility], patient was seen by ID and put on extended course of antibiotics  She was discharged on 05/16/2022, but re-presented and 5 days later on 05/21/22 with continued LUQ and left shoulder pain, also reporting fevers and chills at this time, found to have recurrent perisplenic abscesses on imaging  During this 2nd admission at [facility] it was determined by IR that the collection was not amenable to drainage/intervention, patient was instead maintained on antibiotics per ID recommendations, PICC placed in anticipation of prolonged IV antibiotics course, also seen by APS for pain control and assistance with pain regimen given continued and persistent pain despite multiple hospital admissions  Discussions at this time with the surgery team recommended against splenectomy/surgical intervention given her surgical history and numerous recent interventions in the LUQ/abdomen  Patient was again discharged on 05/26, transitioned by ID to p o   Augmentin to be taken in the outpatient setting, also put on a home pain regimen by APS who she would follow in the outpatient setting  At the time of this discharge patient was tolerating diet without nausea or emesis, reporting improved pain of the LUQ/left shoulder      Patient now re[facility] [place] reporting 2 days of LUQ abdominal pain as well as left shoulder pain, associated chills, associated shortness of breath associated with the pain, also reporting a left mass/lump on her left lateral abdomen below her ribcage  She reports some nausea with the p o  Augmentin that she is taking in the outpatient setting, discuss this with ID who informed her to keep taking the medication, states she is on approximately day 30 of 42  She has outpatient follow-up scheduled with APS but has not seen them yet following discharge on 05/26  Found again to have perisplenic abscess on imaging  6/7 CT a/P: Persistent fluid collection in the superior aspect of the spleen measuring 2 8 x 2 3 x 2 8 cm, interval increase in small foci of gas at the superior aspect of the collection since prior exam on 5/21 6/9 US L flank: Lobular subcutaneous abdominal wall left flank fluid collection including small linear sinus tract extending into the intercostal space measuring 7 8 x 1 8 x 3 cm    6/11 IR: Superficial soft tissue fluid collection at the left upper quadrant present with a tract leading deeper into the abdomen  A small fistulous connection was seen to the stomach  6/11 IR drain placed into subcutaneous collection and splenic abscess  IR fluid cx/gram stain: 1+ enterococcus faecium, 1+ candida albicans, 4+ polys, no organisms    Patient began experiencing symptomatic relief s/p IR drain placement with a decrease in the size of her left flank mass  Daughter hospitalization she remained afebrile with WBC in normal range  Her drains were removed on 06/14 and were observed to be only serosanguineous in nature    She continued antibiotics until  6/15, on the day of discharge, giving her a total of 36 of her initially scheduled 42 days of antibiotics  She was closely followed by infectious disease during her stay, to ensure that no further antibiotics were necessary, and no other infectious sources were isolated are identified  Patient was discharged on HD#6  On the day of discharge, the patient was voiding spontaneously, ambulating at baseline, and pain was well controlled  The patient was sent home with medication for pain, and strict instructions for when to follow up  She was scheduled for a repeat CT scan 3 weeks from the day of discharge to reassess the abscess and left flank collection  She understood all instructions for discharge  She was also given the names and numbers of the providers as well as instructions for follow up appointments  Condition at Discharge: good     Discharge instructions/Information to patient and family:   See after visit summary for information provided to patient and family  Provisions for Follow-Up Care:  See after visit summary for information related to follow-up care and any pertinent home health orders  Disposition: Home    Planned Readmission: No    Discharge Statement   I spent 30 minutes discharging the patient  This time was spent on the day of discharge  I had direct contact with the patient on the day of discharge  Additional documentation is required if more than 30 minutes were spent on discharge  Discharge Medications:  See after visit summary for reconciled discharge medications provided to patient and family        Murray Garcia MD  6/15/2022  3:21 PM

## 2022-06-15 NOTE — PLAN OF CARE
Problem: PAIN - ADULT  Goal: Verbalizes/displays adequate comfort level or baseline comfort level  Description: Interventions:  - Encourage patient to monitor pain and request assistance  - Assess pain using appropriate pain scale  - Administer analgesics based on type and severity of pain and evaluate response  - Implement non-pharmacological measures as appropriate and evaluate response  - Consider cultural and social influences on pain and pain management  - Notify physician/advanced practitioner if interventions unsuccessful or patient reports new pain  Outcome: Progressing     Problem: INFECTION - ADULT  Goal: Absence or prevention of progression during hospitalization  Description: INTERVENTIONS:  - Assess and monitor for signs and symptoms of infection  - Monitor lab/diagnostic results  - Monitor all insertion sites, i e  indwelling lines, tubes, and drains  - Monitor endotracheal if appropriate and nasal secretions for changes in amount and color  - [place] appropriate cooling/warming therapies per order  - Administer medications as ordered  - Instruct and encourage patient and family to use good hand hygiene technique  - Identify and instruct in appropriate isolation precautions for identified infection/condition  Outcome: Progressing  Goal: Absence of fever/infection during neutropenic period  Description: INTERVENTIONS:  - Monitor WBC    Outcome: Progressing     Problem: SAFETY ADULT  Goal: Patient will remain free of falls  Description: INTERVENTIONS:  - Educate patient/family on patient safety including physical limitations  - Instruct patient to call for assistance with activity   - Consult OT/PT to assist with strengthening/mobility   - Keep Call bell within reach  - Keep bed low and locked with side rails adjusted as appropriate  - Keep care items and personal belongings within reach  - Initiate and maintain comfort rounds  - Make Fall Risk Sign visible to staff  - Offer Toileting every 3 Hours, in advance of need  - Initiate/Maintain 3alarm  - Obtain necessary fall risk management equipment: 3  - Apply yellow socks and bracelet for high fall risk patients  - Consider moving patient to room near nurses station  Outcome: Progressing  Goal: Maintain or return to baseline ADL function  Description: INTERVENTIONS:  -  Assess patient's ability to carry out ADLs; assess patient's baseline for ADL function and identify physical deficits which impact ability to perform ADLs (bathing, care of mouth/teeth, toileting, grooming, dressing, etc )  - Assess/evaluate cause of self-care deficits   - Assess range of motion  - Assess patient's mobility; develop plan if impaired  - Assess patient's need for assistive devices and provide as appropriate  - Encourage maximum independence but intervene and supervise when necessary  - Involve family in performance of ADLs  - Assess for home care needs following discharge   - Consider OT consult to assist with ADL evaluation and planning for discharge  - Provide patient education as appropriate  Outcome: Progressing  Goal: Maintains/Returns to pre admission functional level  Description: INTERVENTIONS:  - Perform BMAT or MOVE assessment daily    - Set and communicate daily mobility goal to care team and patient/family/caregiver  - Collaborate with rehabilitation services on mobility goals if consulted  - Perform Range of Motion 3 times a day  - Reposition patient every 3 hours    - Dangle patient 3 times a day  - Stand patient 3 times a day  - Ambulate patient 3 times a day  - Out of bed to chair 3 times a day   - Out of bed for meals 3 times a day  - Out of bed for toileting  - Record patient progress and toleration of activity level   Outcome: Progressing     Problem: DISCHARGE PLANNING  Goal: Discharge to home or other facility with appropriate resources  Description: INTERVENTIONS:  - Identify barriers to discharge w/patient and caregiver  - Arrange for needed discharge resources and transportation as appropriate  - Identify discharge learning needs (meds, wound care, etc )  - Arrange for interpretive services to assist at discharge as needed  - Refer to Case Management Department for coordinating discharge planning if the patient needs post-hospital services based on physician/advanced practitioner order or complex needs related to functional status, cognitive ability, or social support system  Outcome: Progressing     Problem: Knowledge Deficit  Goal: Patient/family/caregiver demonstrates understanding of disease process, treatment plan, medications, and discharge instructions  Description: Complete learning assessment and assess knowledge base    Interventions:  - Provide teaching at level of understanding  - Provide teaching via preferred learning methods  Outcome: Progressing     Problem: Potential for Falls  Goal: Patient will remain free of falls  Description: INTERVENTIONS:  - Educate patient/family on patient safety including physical limitations  - Instruct patient to call for assistance with activity   - Consult OT/PT to assist with strengthening/mobility   - Keep Call bell within reach  - Keep bed low and locked with side rails adjusted as appropriate  - Keep care items and personal belongings within reach  - Initiate and maintain comfort rounds  - Make Fall Risk Sign visible to staff  - Offer Toileting every 3 Hours, in advance of need  - Initiate/Maintain 3alarm  - Obtain necessary fall risk management equipment: 3  - Apply yellow socks and bracelet for high fall risk patients  - Consider moving patient to room near nurses station  Outcome: Progressing     Problem: MOBILITY - ADULT  Goal: Maintain or return to baseline ADL function  Description: INTERVENTIONS:  -  Assess patient's ability to carry out ADLs; assess patient's baseline for ADL function and identify physical deficits which impact ability to perform ADLs (bathing, care of mouth/teeth, toileting, grooming, dressing, etc )  - Assess/evaluate cause of self-care deficits   - Assess range of motion  - Assess patient's mobility; develop plan if impaired  - Assess patient's need for assistive devices and provide as appropriate  - Encourage maximum independence but intervene and supervise when necessary  - Involve family in performance of ADLs  - Assess for home care needs following discharge   - Consider OT consult to assist with ADL evaluation and planning for discharge  - Provide patient education as appropriate  Outcome: Progressing  Goal: Maintains/Returns to pre admission functional level  Description: INTERVENTIONS:  - Perform BMAT or MOVE assessment daily    - Set and communicate daily mobility goal to care team and patient/family/caregiver  - Collaborate with rehabilitation services on mobility goals if consulted  - Perform Range of Motion 3 times a day  - Reposition patient every 3 hours    - Dangle patient 3 times a day  - Stand patient 3 times a day  - Ambulate patient 3 times a day  - Out of bed to chair 3 times a day   - Out of bed for meals 3 times a day  - Out of bed for toileting  - Record patient progress and toleration of activity level   Outcome: Progressing

## 2022-06-15 NOTE — PROGRESS NOTES
Progress Note - Infectious Disease   Kirstin Rowan 59 y o  female MRN: [id number]  Unit/Bed#: [facility] 909-01 Encounter: [id number]      Impression:  1  Persistent splenic abscess  with superficial abscess at former drain site  S/P perisplenic and superficial drain placement by IR POD 4  2  S/P Laparoscopic sleeve gastrectomy complicated by splenic injury requiring IR embolization with subsequent splenic abscesses, IR drainage, Axios stent placement and prolonged IV antibiotic course all at [facility]  3  History of DVT on Eliquis  4  CAD S/P stent placement   5  History of hypothyroidism   6  Intertriginous fungal dermatitis secondary to antibiotic therapy    Recommendations:  Patient is afebrile with normal WBC count and BMP  Her drains have been removed by IR  Overall pain is much improved with minimal residual pain directly over former drain site  1  IR performed perisplenic drain placement and left upper quadrant superficial drain placement 6/11  Description noted including a small fistulous connection to stomach  Culture results which so far showing 1+ Enterococcus faecium and Shanika albicans  Susceptibilities of the Enterococcus faecium show that it is vancomycin susceptible but resistant to doxycycline  At this point with normal WBC count and differential as well as adequate drainage of the superficial abscess areas further antibiotics are no longer mandatory   2  Will discontinue current piperacillin/tazobactam and daptomycin and observe as outpatient off antibiotics  3  Patient was seen with surgery service  She will be discharged with follow-up by them with repeat ultrasound/CT scans of LUQ abdomen to be certain that superficial abscess areas do not recur and that the splenic abscess continues to decrease  Antibiotics:  1  None    Subjective:  Operative site pain is currently minimal  Denies fevers, chills, or sweats    Denies nausea, vomiting, or diarrhea  Objective:  Vitals:  Temp:  [97 2 °F (36 2 °C)-97 4 °F (36 3 °C)] 97 2 °F (36 2 °C)  HR:  [71-79] 71  Resp:  [16-18] 16  BP: ()/(48-70) 121/70  SpO2:  [92 %-97 %] 97 %  Temp (24hrs), Av 3 °F (36 3 °C), Min:97 2 °F (36 2 °C), Max:97 4 °F (36 3 °C)  Current: Temperature: (!) 97 2 °F (36 2 °C)    Physical Exam:     General Appearance:  Alert, obese nontoxic, no acute distress  But having some left-sided abdominal discomfort   Throat: Oropharynx moist without lesions  Lips, mucosa, and tongue normal   Neck: Supple, symmetrical, trachea midline, no adenopathy,  no tenderness/mass/nodules   Lungs:   Clear to auscultation bilaterally, no audible wheezes, rhonchi or rales; respirations unlabored   Heart:  Regular rate and rhythm, S1, S2 normal, no murmur, rub or gallop   Abdomen:   Soft, striae, LUIS ENRIQUE drains have been removed with some indurated tender area remaining  positive bowel sounds    No masses, no organomegaly    Left flank subcostal tenderness is minimal   Extremities: Extremities normal, atraumatic, no clubbing, cyanosis or edema   Skin: As above         Invasive Devices  Report    Peripheral Intravenous Line  Duration           Peripheral IV 22 Distal;Left;Ventral (anterior) Forearm 2 days                Labs, Imaging, & Other studies:   All pertinent labs were personally reviewed  Results from last 7 days   Lab Units 06/15/22  0558 22  1128 06/10/22  1844   WBC Thousand/uL 5 18 6 26 8 53   HEMOGLOBIN g/dL 11 7 11 2* 10 8*   PLATELETS Thousands/uL 494* 493* 474*     Results from last 7 days   Lab Units 06/15/22  0558   SODIUM mmol/L 139   POTASSIUM mmol/L 4 2   CHLORIDE mmol/L 105   CO2 mmol/L 31   BUN mg/dL 13   CREATININE mg/dL 0 88   EGFR ml/min/1 73sq m 72   CALCIUM mg/dL 9 1     Results from last 7 days   Lab Units 22  0932   GRAM STAIN RESULT  4+ Polys  No organisms seen   BODY FLUID CULTURE, STERILE  1+ Growth of Enterococcus faecium*  1+ Growth of Candida albicans*

## 2022-06-15 NOTE — PROGRESS NOTES
Progress Note - Red Surgery   Mandie Schwartz 59 y o  female MRN: [id number]  Unit/Bed#: [facility] 909-01 Encounter: [id number]    Assessment:  58 yo female with known perisplenic abscess  Presented with left flank pain, palpable mass, fluid collection, no systemic symptoms  Subcutaneous collection was noted to be increasing in size  IR drains x2 placed 6/11  IR drain checks and subsequent removal of both drains on 6/14  Pain resolving  Afebrile, Stable VS on room air  IR cx: 1+ enterococcus faecium, candida albicans, no anaerobes  Plan:  - F/u CBC, BMP  - Regular diet  - Abx per ID: IV daptomycin, IV zosyn; awaiting cx sensitivities   - Eliquis DVT ppx  - Encourage ambulation  - Dispo planning     Subjective/Objective     Subjective:   Reports overnight pain at site of former anterior drain in LUQ  Pain is now controlled  Denies fever, chills, nausea, vomiting, diarrhea, SOB, chest pain  Endorses normal appetite and use of incentive spirometer  Objective:     Blood pressure 100/56, pulse 75, temperature (!) 97 4 °F (36 3 °C), resp  rate 18, last menstrual period 08/03/2016, SpO2 92 %, not currently breastfeeding  ,There is no height or weight on file to calculate BMI  Intake/Output Summary (Last 24 hours) at 6/15/2022 0543  Last data filed at 6/14/2022 1746  Gross per 24 hour   Intake 750 ml   Output 30 ml   Net 720 ml       Invasive Devices  Report    Peripheral Intravenous Line  Duration           Peripheral IV 06/12/22 Distal;Left;Ventral (anterior) Forearm 2 days                Physical Exam:   Gen: NAD, Comfortable  Neuro: A&O  Head: Normal Cephalic, Atraumatic  Eye: No scleral icterus  Neck: No JVD, Midline trachea  CV: Regular rate, Cap refill <2 sec  Pulm: Normal work of breathing, no respiratory distress  Abd: Soft, Non-Distended, Tender in LUQ and left flank  Gauze dressing in place over former drain sites    Ext: No edema, Non-tender  Skin: Warm, dry, intact    ---  [place] [facility], M4

## 2022-06-16 LAB
BACTERIA SPEC BFLD CULT: ABNORMAL
BACTERIA SPEC BFLD CULT: ABNORMAL
GRAM STN SPEC: ABNORMAL
GRAM STN SPEC: ABNORMAL

## 2022-06-16 NOTE — UTILIZATION REVIEW
Notification of Discharge   This is a Notification of Discharge from our facility [address]  Please be advised that this patient has been discharge from our facility  Below you will find the admission and discharge date and time including the patients disposition  UTILIZATION REVIEW CONTACT:  Laura Fair  Utilization   Network Utilization Review Department  Phone: [phone] x carefully listen to the prompts  All voicemails are confidential   Email: [email or web address]     PHYSICIAN ADVISORY SERVICES:  FOR [id number]-[id number] REVIEW - MEDICAL NECESSITY DENIAL  Phone: [phone]  Fax: [phone]  Email: [email or web address]     PRESENTATION DATE: 6/6/2022 10:30 PM  OBERVATION ADMISSION DATE:   INPATIENT ADMISSION DATE: 6/9/22  2:08 PM   DISCHARGE DATE: 6/15/2022  6:28 PM  DISPOSITION: Home/Self Care Home/Self Care      IMPORTANT INFORMATION:  Send all requests for admission clinical reviews, approved or denied determinations and any other requests to dedicated fax number below belonging to the campus where the patient is receiving treatment   List of dedicated fax numbers:  [address] DENIALS (Administrative/Medical Necessity) [phone]   [address] (Maternity/NICU/Pediatrics) [phone]   Anabel Golden [phone]   130 [facility] [phone]   [address] [place] [address] [phone]   [address] [phone]   [facility]  [phone]   [address] [place] [address]  2401 [facility] 1000 [facility] [phone]

## 2022-06-17 RX ORDER — CLOTRIMAZOLE 1 %
CREAM (GRAM) TOPICAL
Qty: 30 G | Refills: 0 | Status: SHIPPED | OUTPATIENT
Start: 2022-06-17

## 2022-06-24 DIAGNOSIS — D73.3 SPLENIC ABSCESS: Primary | ICD-10-CM

## 2022-06-27 ENCOUNTER — TELEPHONE (OUTPATIENT)
Dept: SURGERY | Facility: CLINIC | Age: 57
End: 2022-06-27

## 2022-06-27 ENCOUNTER — TELEPHONE (OUTPATIENT)
Dept: INFECTIOUS DISEASES | Facility: CLINIC | Age: 57
End: 2022-06-27

## 2022-06-27 ENCOUNTER — HOSPITAL ENCOUNTER (OUTPATIENT)
Dept: RADIOLOGY | Facility: HOSPITAL | Age: 57
Discharge: HOME | End: 2022-06-27

## 2022-06-27 ENCOUNTER — DOCUMENTATION (OUTPATIENT)
Dept: INTERVENTIONAL RADIOLOGY/VASCULAR | Facility: CLINIC | Age: 57
End: 2022-06-27

## 2022-06-27 ENCOUNTER — HOSPITAL ENCOUNTER (OUTPATIENT)
Dept: NON INVASIVE DIAGNOSTICS | Facility: HOSPITAL | Age: 57
Discharge: HOME | End: 2022-06-27
Attending: INTERNAL MEDICINE
Payer: MEDICARE

## 2022-06-27 ENCOUNTER — TELEPHONE (OUTPATIENT)
Dept: INTERVENTIONAL RADIOLOGY/VASCULAR | Facility: CLINIC | Age: 57
End: 2022-06-27

## 2022-06-27 ENCOUNTER — TREATMENT (OUTPATIENT)
Dept: INTERVENTIONAL RADIOLOGY/VASCULAR | Facility: CLINIC | Age: 57
End: 2022-06-27

## 2022-06-27 DIAGNOSIS — D73.3 SPLENIC ABSCESS: ICD-10-CM

## 2022-06-27 PROCEDURE — 74150 CT ABDOMEN W/O CONTRAST: CPT

## 2022-06-27 PROCEDURE — G1004 CDSM NDSC: HCPCS

## 2022-06-27 NOTE — TELEPHONE ENCOUNTER
Patient calls today regarding CT scan  States that we didn't do auth  We did not order CT scan  We do not have patient on tx at this time  Contacted  Surg  - spoke to Steward  Let her know they needed to do auth if needed  Informed patient that she would need to reschedule because auth may not be able to be completed prior to today's slot that she previously had  Unfortunately, she had already drank the barium

## 2022-06-27 NOTE — SEDATION DOCUMENTATION
Pt taken to CT scan for follow up scan w/o contrast post IR drainage removal earlier in June  IR doc will review    Pt's previous noted reveiwed by staff  Note by general surgery states that pt's insurance is not covered at [facility]  Pt advised to follow up with general surgery office   Pt currently has no drainage tubes inserted to check

## 2022-06-27 NOTE — CONSULTS
Interventional Radiology  Consultation 6/27/2022     Consults  Trish Ontiveros   1965   [id number]    Assessment/Plan:  Patient s/p laparoscopic sleeve gastrectomy at [facility] in [facility] in August 2021 with splenic injury, coil embolization of the spleen and subsequent drainage tube placement due to a splenic abscess  The drains were removed in [place] in February 2022  She was seen at [facility] on 5/7/22 for pain and IR placed a splenic drain then removed it several weeks later and placed it again on 6/11 which was then removed on 6/14 prior to discharge  General surgery then ordered a 2 week follow-up CT scan for today  The patient also came into IR today for a follow-up tube check which was never canceled when the tubes were removed on 6/14  I ended up seeing the patient and spending several hours with her since her CT scan had been canceled for some reason this morning  CT scan showed a smaller splenic abscess and smaller superficial abscess  Since the study was ordered by surgery, I called Dr Kendal Liu and she will follow up with the patient in surgery clinic  I will also have the patient follow-up with Dr Ty Presley from ID and Dr Javier Peralta from GI  I do not believe that new drains should be placed at this time  I favor obtaining lab work and to have the patient follow-up with surgery, ID and GI  The patient is not a good candidate for splenectomy given her co-morbidities  I also think that her follow-up may have to be done in [place] in [place] rather than in [place] due to insurance reasons but I am not sure of that  I will see the patient tomorrow virtually and discuss these findings and plan with her  Medical Problems             Problem List     Splenic abscess    Intertrigo    Pulmonary nodule    Morbid obesity due to excess calories (HCC)              Subjective:     Patient ID: Trish Ontiveros is a 59 y o  female  History of Present Illness  Patient with continued abdominal pain  Patient s/p laparoscopicsleeve gastrectomy at [facility] in [facility] in August 2021 with splenic injury, coil embolization of the spleen and subsequent drainage tube placement due to a splenic abscess  The drains were removed in [place] in February 2022  She was seen at [facility] on 5/7/22 for pain and IR placed a splenic drain then removed it several weeks later and placed it again on 6/11 which was then removed on 6/14 prior to discharge  General surgery then ordered a 2 week follow-up CT scan for today  The patient also came into IR today for a follow-up tube check which was never canceled when the tubes were removed on 6/14  I ended up seeing the patient and spending several hours with her since her CT scan had been canceled for some reason this morning  CT scan showed smaller splenic abscess and smaller superficial abscess  Review of Systems      Past Medical History:   Diagnosis Date    Disease of thyroid gland     Hernia     Hypertension         Past Surgical History:   Procedure Laterality Date    CHOLECYSTECTOMY      CORONARY ANGIOPLASTY WITH STENT PLACEMENT      IR DRAINAGE TUBE CHECK/CHANGE/REPOSITION/REINSERTION/UPSIZE  5/10/2022    IR DRAINAGE TUBE CHECK/CHANGE/REPOSITION/REINSERTION/UPSIZE  6/14/2022    IR DRAINAGE TUBE PLACEMENT  5/7/2022    IR DRAINAGE TUBE PLACEMENT  6/11/2022        Social History     Tobacco Use   Smoking Status Former Smoker   Smokeless Tobacco Never Used        Social History     Substance and Sexual Activity   Alcohol Use Not Currently        Social History     Substance and Sexual Activity   Drug Use Not Currently        No Known Allergies    Current Outpatient Medications   Medication Sig Dispense Refill    acetaminophen (TYLENOL) 325 mg tablet Take 2 tablets (650 mg total) by mouth every 4 (four) hours as needed for mild pain  0    amLODIPine (NORVASC) 10 mg tablet Take 1 tablet (10 mg total) by mouth in the morning    0    calcium carbonate (TUMS) 500 mg chewable tablet Chew 2 tablets (1,000 mg total)  as needed in the morning and 2 tablets (1,000 mg total) as needed at noon and 2 tablets (1,000 mg total) as needed in the evening (indigestion,heartburn)  0    citalopram (CeleXA) 40 mg tablet Take 1 tablet (40 mg total) by mouth in the morning  0    clotrimazole (LOTRIMIN) 1 % cream APPLY TO AFFECTED AREA TWICE A DAY 30 g 0    gabapentin (NEURONTIN) 100 mg capsule Take 1 capsule (100 mg total) by mouth 3 (three) times a day for 10 days 30 capsule 0    levothyroxine 150 mcg tablet Take 1 tablet (150 mcg total) by mouth daily in the early morning  0    Lidocaine 4 % PTCH Apply 1 patch topically daily  0    methocarbamol (ROBAXIN) 500 mg tablet Take 1 tablet (500 mg total) by mouth every 6 (six) hours 40 tablet 0    methocarbamol (ROBAXIN) 500 mg tablet Take 1 tablet (500 mg total) by mouth 4 (four) times a day for 6 days 24 tablet 0    pantoprazole (PROTONIX) 40 mg tablet Take 1 tablet (40 mg total) by mouth daily in the early morning 30 tablet 0    senna (SENOKOT) 8 6 mg Take 1 tablet (8 6 mg total) by mouth in the morning for 5 days  5 tablet 0    traZODone (DESYREL) 150 mg tablet Take 0 5 tablets (75 mg total) by mouth daily at bedtime  0     No current facility-administered medications for this encounter  Objective: There were no vitals filed for this visit  Physical Exam    Drain sites healed with no focal tenderness on the skin or redness      No results found for: BNP   Lab Results   Component Value Date    WBC 5 18 06/15/2022    HGB 11 7 06/15/2022    HCT 38 7 06/15/2022    MCV 86 06/15/2022     (H) 06/15/2022     Lab Results   Component Value Date    INR 1 07 06/11/2022    INR 1 12 06/10/2022    INR 1 03 06/07/2022    PROTIME 13 4 06/11/2022    PROTIME 14 0 06/10/2022    PROTIME 13 1 06/07/2022     Lab Results   Component Value Date    PTT 52 (H) 06/13/2022     I have personally reviewed pertinent imaging and laboratory results  Code Status: Prior  Advance Directive and Living Will:      Power of :    POLST:      IR has been consulted to evaluate the patient, determine the appropriate procedure, and whether or not a procedure can and should be performed  Thank you for allowing me to participate in the care of Coty  Please don't hesitate to call, text, email, or TigerText with any questions  This text is generated with voice recognition software  There may be translation, syntax,  or grammatical errors  If you have any questions, please contact the dictating provider  I have spent 180 minutes with Patient  today in which greater than 50% of this time was spent in counseling/coordination of care regarding Diagnostic results and plan of care

## 2022-06-27 NOTE — TELEPHONE ENCOUNTER
IR Clinic Follow-Up:    Patient clinical visit in 1 day  Schedule visit for the first available IR Physician: No                *If no, schedule for:   IR Physician: Shannan    Type of Visit: Virtual Regular Visit - Video    Additional Details: Ploease schedule tomorrow or Wednesday 4pm virtually for follow-up

## 2022-06-27 NOTE — TELEPHONE ENCOUNTER
Patient called our office stating that she had a CTScan schedule for today 6/27/22 but was canceled  She was very upset because no one called her and she drank the barium for today's test  When looking into why it was canceled it stated: Provider (Sara Talavera PENDING CPT [address] UP WITH INFECTIOUS DISEASE PROVIDER PT UNDERSTOOD AND WE WILL CANCEL THIS [id number] [address]) Infectious Disease department called us stating they don't have anything to do with seeing this patient  After investing further into her chart she was in the ED on 6/6/22 for Splenic Abscess and was ordered to have a CT Scan by Asim House MD Cosigned by Dr Adriana Sales  I took it upon myself to call for authorization and was informed that the doctor Nathaneal [facility] and [facility] is NOT par with her insurance  She is advised to call the insurance company to find out where she can go

## 2022-06-29 ENCOUNTER — TELEPHONE (OUTPATIENT)
Dept: SURGERY | Facility: CLINIC | Age: 57
End: 2022-06-29

## 2022-06-29 ENCOUNTER — TELEPHONE (OUTPATIENT)
Dept: OTHER | Facility: OTHER | Age: 57
End: 2022-06-29

## 2022-06-29 NOTE — TELEPHONE ENCOUNTER
Patient calling stating she had been scheduled for 07/11/22 with GI, Dr Janine Mahoney and she states she does not have a car and is seeing provider in the same vicinity as another office and is asking if she can be scheduled tomorrow (if possible) to be seen

## 2022-06-29 NOTE — TELEPHONE ENCOUNTER
Dr Gabrielle Whitaker asked to have patient make a f/u appointment  I have [facility] office calling patient where her insurance participates  Patient's request to have appt there

## 2022-06-30 ENCOUNTER — DOCUMENTATION (OUTPATIENT)
Dept: SURGERY | Facility: CLINIC | Age: 57
End: 2022-06-30

## 2022-06-30 ENCOUNTER — TELEPHONE (OUTPATIENT)
Dept: SURGERY | Facility: CLINIC | Age: 57
End: 2022-06-30

## 2022-06-30 NOTE — TELEPHONE ENCOUNTER
Spoke w patient  Patient was a no show to her appointment today  Dr Jesus Pennington advises patient to return to her original bariatric surgeon

## 2022-06-30 NOTE — PROGRESS NOTES
Patient was scheduled for an appointment today but they canceled  I reviewed her record:   Assessment:  Recurrent splenic abscess secondary to hematoma following surgery  Follow-up CT scan reveals phlegmonous change which is to be expected, but no obvious abscess  Pertinent images and available reads personally reviewed  Reviewed the CT scan images as well as CT scan report from 27 June 2022  Pertinent notes reviewed  I reviewed the last 2 narrative summaries from her hospitalizations  Plan:  No need for splenectomy  Follow-up with bariatric surgeon  Follow-up with PCP  Chief Complaint:  I am here for follow-up    HPI  Patient is a 12-year-old woman with a very complicated abdominal history and recent hospitalization to [facility] on the acute care surgery service  This is the synopsis from the discharge summary for her admission earlier this month:  Dot [place] a 59 y o  female w PMH of HTN, CAD s/p stentsx4, hx of DVT on Eliquis, CHF, hypothyroidism, surgical history of cholecystectomy, laparoscopic sleeve gastrectomy in August of 2021 at [facility] in [facility], postoperative course c/b splenic bleed requiring IR embolization, cystgastrostomy stent by GI, IR drainage of splenic abscess, she was put on a prolonged course of antibiotics starting late 2021 into early 2022   Patient had intermittent LUQ and left shoulder pain since discharge from [facility] in late 2021/early 2022, was visiting her daughter for mother's Day in the [facility] when she originally presented to [facility] in May of 2022 (05/07/2022) reporting these continued symptoms, previous IR drain as well as antibiotic course had been both finished at this time, found to have persistent perisplenic abscess on imaging, admitted to the general surgery service at this time and started on IV antibiotics, with IR and ID consult, put on heparin drip for her history of DVT on Eliquis at home   During this original hospitalization at [facility] early May, IR was able to place a drain in the splenic abscess, GI was able to perform EGD and remove the axios stent previously placed by the GI team at [facility] in [facility], patient was seen by ID and put on extended course of antibiotics   She was discharged on 05/16/2022, but re-presented and 5 days later on 05/21/22 with continued LUQ and left shoulder pain, also reporting fevers and chills at this time, found to have recurrent perisplenic abscesses on imaging   During this 2nd admission at [facility] was determined by IR that the collection was not amenable to drainage/intervention, patient was instead maintained on antibiotics per ID recommendations, PICC placed in anticipation of prolonged IV antibiotics course, also seen by APS for pain control and assistance with pain regimen given continued and persistent pain despite multiple hospital admissions   Discussions at this time with the surgery team recommended against splenectomy/surgical intervention given her surgical history and numerous recent interventions in the LUQ/abdomen   Patient was again discharged on 05/26, transitioned by ID to p o  Augmentin to be taken in the outpatient setting, also put on a home pain regimen by APS who she would follow in the outpatient setting   At the time of this discharge patient was tolerating diet without nausea or emesis, reporting improved pain of the LUQ/left shoulder      Patient now re-presents to [facility] reporting 2 days of LUQ abdominal pain as well as left shoulder pain, associated chills, associated shortness of breath associated with the pain, also reporting a left mass/lump on her left lateral abdomen below her ribcage   She reports some nausea with the p o  Augmentin that she is taking in the outpatient setting, discuss this with ID who informed her to keep taking the medication, states she is on approximately day 30 of 42   She has outpatient follow-up scheduled with APS but has not seen them yet following discharge on 05/26   Found again to have perisplenic abscess on imaging      6/7 CT a/P: Persistent fluid collection in the superior aspect of the spleen measuring 2 8 x 2 3 x 2 8 cm, interval increase in small foci of gas at the superior aspect of the collection since prior exam on 5/21  6/9 US L flank: Lobular subcutaneous abdominal wall left flank fluid collection including small linear sinus tract extending into the intercostal space measuring 7 8 x 1 8 x 3 cm     6/11 IR: Superficial soft tissue fluid collection at the left upper quadrant present with a tract leading deeper into the abdomen  A small fistulous connection was seen to the stomach      6/11 IR drain placed into subcutaneous collection and splenic abscess  IR fluid cx/gram stain: 1+ enterococcus faecium, 1+ candida albicans, 4+ polys, no organisms     Patient began experiencing symptomatic relief s/p IR drain placement with a decrease in the size of her left flank mass  Daughter hospitalization she remained afebrile with WBC in normal range  Her drains were removed on 06/14 and were observed to be only serosanguineous in nature  She continued antibiotics until  6/15, on the day of discharge, giving her a total of 36 of her initially scheduled 42 days of antibiotics  She was closely followed by infectious disease during her stay, to ensure that no further antibiotics were necessary, and no other infectious sources were isolated are identified      Patient was discharged on HD#6  On the day of discharge, the patient was voiding spontaneously, ambulating at baseline, and pain was well controlled  The patient was sent home with medication for pain, and strict instructions for when to follow up  She was scheduled for a repeat CT scan 3 weeks from the day of discharge to reassess the abscess and left flank collection  She understood all instructions for discharge   She was also given the names and numbers of the providers as well as instructions for follow up appointments  She underwent a CT scan on 27 June 2022 as previously planned:   IMPRESSION:     Status post interval drainage of the previously seen splenic abscess  Resolution of foci of gas with interval decrease in size of the amorphous region of low attenuation centered within the left spleen, which is favored to be on the basis of phlegmonous change rather than residual drainable collection    Prior IR drainage tube check from 6/14/2022 demonstrated resolution of the abscess cavity and purulent drainage

## 2022-07-06 ENCOUNTER — TELEPHONE (OUTPATIENT)
Dept: INFECTIOUS DISEASES | Facility: CLINIC | Age: 57
End: 2022-07-06

## 2022-08-22 NOTE — ED NOTES
CM discussed with patient and spouse that home health RN is recommended for home. Patient and spouse are agreeable to [facility] for visiting nurse. Referral sent.    LR infusion held due to incompatibility with other medications at this time  Pt being transported to the floor  No complaints from pt at this time        Kelby Meyer, STUART  06/07/22 [id number]

## 2022-09-19 ENCOUNTER — TELEPHONE (OUTPATIENT)
Dept: INFECTIOUS DISEASES | Facility: CLINIC | Age: 57
End: 2022-09-19

## 2022-09-19 NOTE — TELEPHONE ENCOUNTER
Pt calls stating that PCP is referring her back to ID to be seen for abnormal labs and imaging  That she was to follow with surgeon as there was scarring but that she would have to see ID first  Pt is requesting appt in [employer]  Informed pt that we would need those records and referral about seeing ID again  Informed her as well that Alexis Tee likes to follow up with his pts and doesn't go to [place] office  Called over to Dr Michelle Sterling office which is an Urgent Care to see if they could provide information and referral  The  was taking a look into it, she stated they will fax over all the information that they have and she did confirm that it stated to follow up with ID again in the note

## 2022-09-21 ENCOUNTER — TELEPHONE (OUTPATIENT)
Dept: PALLIATIVE MEDICINE | Facility: CLINIC | Age: 57
End: 2022-09-21

## 2022-09-21 NOTE — TELEPHONE ENCOUNTER
Called lmom for pt stating that Dr Ravi Moyer reviewed her chart and at this time no need for follow up  There is not much more can offer  Should continue to follow up with PCP and also surgeon as she has mentioned

## 2022-09-23 NOTE — TELEPHONE ENCOUNTER
Patient calls back  She wants to discuss further with Patti Stone  She has PCP      CB: [phone]

## 2022-09-27 ENCOUNTER — HOSPITAL ENCOUNTER (EMERGENCY)
Facility: HOSPITAL | Age: 57
Discharge: HOME | End: 2022-09-28
Attending: EMERGENCY MEDICINE
Payer: MEDICARE

## 2022-09-27 DIAGNOSIS — R10.9 LEFT FLANK PAIN: Primary | ICD-10-CM

## 2022-09-27 PROCEDURE — 99285 EMERGENCY DEPT VISIT HI MDM: CPT

## 2022-09-27 PROCEDURE — 99284 EMERGENCY DEPT VISIT MOD MDM: CPT | Performed by: EMERGENCY MEDICINE

## 2022-09-28 ENCOUNTER — APPOINTMENT (EMERGENCY)
Dept: RADIOLOGY | Facility: HOSPITAL | Age: 57
End: 2022-09-28
Payer: MEDICARE

## 2022-09-28 VITALS
SYSTOLIC BLOOD PRESSURE: 155 MMHG | OXYGEN SATURATION: 95 % | RESPIRATION RATE: 18 BRPM | TEMPERATURE: 98 F | DIASTOLIC BLOOD PRESSURE: 88 MMHG | HEART RATE: 78 BPM

## 2022-09-28 LAB
ANION GAP SERPL CALCULATED.3IONS-SCNC: 4 MMOL/L (ref 4–13)
BASOPHILS # BLD AUTO: 0.07 THOUSANDS/ΜL (ref 0–0.1)
BASOPHILS NFR BLD AUTO: 1 % (ref 0–1)
BILIRUB UR QL STRIP: NEGATIVE
BUN SERPL-MCNC: 15 MG/DL (ref 5–25)
CALCIUM SERPL-MCNC: 8.6 MG/DL (ref 8.3–10.1)
CHLORIDE SERPL-SCNC: 107 MMOL/L (ref 96–108)
CLARITY UR: CLEAR
CO2 SERPL-SCNC: 29 MMOL/L (ref 21–32)
COLOR UR: NORMAL
CREAT SERPL-MCNC: 0.68 MG/DL (ref 0.6–1.3)
EOSINOPHIL # BLD AUTO: 0.13 THOUSAND/ΜL (ref 0–0.61)
EOSINOPHIL NFR BLD AUTO: 2 % (ref 0–6)
ERYTHROCYTE [DISTWIDTH] IN BLOOD BY AUTOMATED COUNT: 15.7 % (ref 11.6–15.1)
GFR SERPL CREATININE-BSD FRML MDRD: 96 ML/MIN/1.73SQ M
GLUCOSE SERPL-MCNC: 90 MG/DL (ref 65–140)
GLUCOSE UR STRIP-MCNC: NEGATIVE MG/DL
HCT VFR BLD AUTO: 37.9 % (ref 34.8–46.1)
HGB BLD-MCNC: 12.1 G/DL (ref 11.5–15.4)
HGB UR QL STRIP.AUTO: NEGATIVE
IMM GRANULOCYTES # BLD AUTO: 0 THOUSAND/UL (ref 0–0.2)
IMM GRANULOCYTES NFR BLD AUTO: 0 % (ref 0–2)
KETONES UR STRIP-MCNC: NEGATIVE MG/DL
LEUKOCYTE ESTERASE UR QL STRIP: NEGATIVE
LYMPHOCYTES # BLD AUTO: 2.64 THOUSANDS/ΜL (ref 0.6–4.47)
LYMPHOCYTES NFR BLD AUTO: 38 % (ref 14–44)
MCH RBC QN AUTO: 26.5 PG (ref 26.8–34.3)
MCHC RBC AUTO-ENTMCNC: 31.9 G/DL (ref 31.4–37.4)
MCV RBC AUTO: 83 FL (ref 82–98)
MONOCYTES # BLD AUTO: 0.69 THOUSAND/ΜL (ref 0.17–1.22)
MONOCYTES NFR BLD AUTO: 10 % (ref 4–12)
NEUTROPHILS # BLD AUTO: 3.43 THOUSANDS/ΜL (ref 1.85–7.62)
NEUTS SEG NFR BLD AUTO: 49 % (ref 43–75)
NITRITE UR QL STRIP: NEGATIVE
NRBC BLD AUTO-RTO: 0 /100 WBCS
PH UR STRIP.AUTO: 6.5 [PH]
PLATELET # BLD AUTO: 387 THOUSANDS/UL (ref 149–390)
PMV BLD AUTO: 9.1 FL (ref 8.9–12.7)
POTASSIUM SERPL-SCNC: 3.7 MMOL/L (ref 3.5–5.3)
PROT UR STRIP-MCNC: NEGATIVE MG/DL
RBC # BLD AUTO: 4.57 MILLION/UL (ref 3.81–5.12)
SODIUM SERPL-SCNC: 140 MMOL/L (ref 135–147)
SP GR UR STRIP.AUTO: 1.01 (ref 1–1.03)
UROBILINOGEN UR STRIP-ACNC: <2 MG/DL
WBC # BLD AUTO: 6.96 THOUSAND/UL (ref 4.31–10.16)

## 2022-09-28 PROCEDURE — G1004 CDSM NDSC: HCPCS

## 2022-09-28 PROCEDURE — 36415 COLL VENOUS BLD VENIPUNCTURE: CPT

## 2022-09-28 PROCEDURE — 80048 BASIC METABOLIC PNL TOTAL CA: CPT

## 2022-09-28 PROCEDURE — 81003 URINALYSIS AUTO W/O SCOPE: CPT

## 2022-09-28 PROCEDURE — 74177 CT ABD & PELVIS W/CONTRAST: CPT

## 2022-09-28 PROCEDURE — 85025 COMPLETE CBC W/AUTO DIFF WBC: CPT

## 2022-09-28 RX ORDER — ACETAMINOPHEN 325 MG/1
650 TABLET ORAL ONCE
Status: COMPLETED | OUTPATIENT
Start: 2022-09-28 | End: 2022-09-28

## 2022-09-28 RX ADMIN — ACETAMINOPHEN 650 MG: 325 TABLET, FILM COATED ORAL at 00:57

## 2022-09-28 RX ADMIN — IOHEXOL 100 ML: 350 INJECTION, SOLUTION INTRAVENOUS at 05:03

## 2022-09-28 NOTE — ED ATTENDING ATTESTATION
9/27/2022  IAllison MD, saw and evaluated the patient  I have discussed the patient with the resident/non-physician practitioner and agree with the resident's/non-physician practitioner's findings, Plan of Care, and MDM as documented in the resident's/non-physician practitioner's note, except where noted  All available labs and Radiology studies were reviewed  I was present for key portions of any procedure(s) performed by the resident/non-physician practitioner and I was immediately available to provide assistance  At this point I agree with the current assessment done in the Emergency Department  I have conducted an independent evaluation of this patient a history and physical is as follows:    ED Course  ED Course as of 09/28/22 0101   Tue Sep 27, 2022   2307 Per resident h&p: 60 YO F with h/o CHF; COPD; PE on apixaban; gastric sleeve surgery last year complicated by splenic rupture and re accumulation of fluid around spleen worsening dyspnea over the last week; with L flank discomfort; no urinary symptoms PE POx 100% RA; no distress; lungs CTA; L flank tenderness I/P Dyspnea and L flank pain CTCAP; BMP; CBC; BNP; ECG; Tn     Emergency Department Note- Celine Moser 59 y o  female MRN: [id number]    Unit/Bed#: ED 26 Encounter: [id number]    Celine Moser is a 59 y o  female who presents with   Chief Complaint   Patient presents with    Shortness of Breath     Pt states has L flank pain d/t spleen issues since it was punctured last year when she got a gastric sleeve  She states she gets fluid build up on that side which makes it hard to breath & usually has to drain  History of Present Illness   HPI:  Celine Moser is a 59 y o  female who presents for evaluation of:  Left flank and left upper quadrant discomfort reminiscent of prior episodes of post splenic injury complications  When the patient takes a deep breath, she has some discomfort    Otherwise she has no shortness of breath or difficulty breathing  She states compliance with her medical regimen including her anticoagulation  She denies any cough, fevers, sputum production, central abdominal pain, and chest pain  Review of Systems   Constitutional: Negative for chills and fever  HENT: Negative for congestion and rhinorrhea  Respiratory: Negative for cough and shortness of breath  Cardiovascular: Negative for chest pain and palpitations  Gastrointestinal: Positive for abdominal pain (Left upper quadrant)  Negative for nausea and vomiting  Genitourinary: Positive for flank pain ( left side)  Negative for dysuria  Musculoskeletal: Negative for back pain and neck pain  Neurological: Negative for light-headedness and headaches  All other systems reviewed and are negative  Historical Information   Past Medical History:   Diagnosis Date    Disease of thyroid gland     Hernia     Hypertension      Past Surgical History:   Procedure Laterality Date    CHOLECYSTECTOMY      CORONARY ANGIOPLASTY WITH STENT PLACEMENT      IR DRAINAGE TUBE CHECK/CHANGE/REPOSITION/REINSERTION/UPSIZE  5/10/2022    IR DRAINAGE TUBE CHECK/CHANGE/REPOSITION/REINSERTION/UPSIZE  6/14/2022    IR DRAINAGE TUBE PLACEMENT  5/7/2022    IR DRAINAGE TUBE PLACEMENT  6/11/2022     Social History   Social History     Substance and Sexual Activity   Alcohol Use Not Currently     Social History     Substance and Sexual Activity   Drug Use Not Currently     Social History     Tobacco Use   Smoking Status Former Smoker   Smokeless Tobacco Never Used     Family History: History reviewed  No pertinent family history  Meds/Allergies   PTA meds:   Prior to Admission Medications   Prescriptions Last Dose Informant Patient Reported? Taking?    Lidocaine 4 % PTCH   No No   Sig: Apply 1 patch topically daily   acetaminophen (TYLENOL) 325 mg tablet   No No   Sig: Take 2 tablets (650 mg total) by mouth every 4 (four) hours as needed for mild pain   amLODIPine (NORVASC) 10 mg tablet   No No   Sig: Take 1 tablet (10 mg total) by mouth in the morning  calcium carbonate (TUMS) 500 mg chewable tablet   No No   Sig: Chew 2 tablets (1,000 mg total)  as needed in the morning and 2 tablets (1,000 mg total) as needed at noon and 2 tablets (1,000 mg total) as needed in the evening (indigestion,heartburn)  citalopram (CeleXA) 40 mg tablet   No No   Sig: Take 1 tablet (40 mg total) by mouth in the morning  clotrimazole (LOTRIMIN) 1 % cream   No No   Sig: APPLY TO AFFECTED AREA TWICE A DAY   gabapentin (NEURONTIN) 100 mg capsule   No No   Sig: Take 1 capsule (100 mg total) by mouth 3 (three) times a day for 10 days   levothyroxine 150 mcg tablet   No No   Sig: Take 1 tablet (150 mcg total) by mouth daily in the early morning   methocarbamol (ROBAXIN) 500 mg tablet   No No   Sig: Take 1 tablet (500 mg total) by mouth every 6 (six) hours   methocarbamol (ROBAXIN) 500 mg tablet   No No   Sig: Take 1 tablet (500 mg total) by mouth 4 (four) times a day for 6 days   pantoprazole (PROTONIX) 40 mg tablet   No No   Sig: Take 1 tablet (40 mg total) by mouth daily in the early morning   senna (SENOKOT) 8 6 mg   No No   Sig: Take 1 tablet (8 6 mg total) by mouth in the morning for 5 days     traZODone (DESYREL) 150 mg tablet   No No   Sig: Take 0 5 tablets (75 mg total) by mouth daily at bedtime      Facility-Administered Medications: None     No Known Allergies    Objective   First Vitals:   Blood Pressure: 151/99 (09/27/22 2101)  Pulse: 83 (09/27/22 2101)  Temperature: 98 °F (36 7 °C) (09/27/22 2101)  Temp Source: Oral (09/27/22 2101)  Respirations: 19 (09/27/22 2101)  SpO2: 100 % (09/27/22 2101)    Current Vitals:   Blood Pressure: 100/68 (09/28/22 0030)  Pulse: 86 (09/28/22 0030)  Temperature: 98 °F (36 7 °C) (09/27/22 2101)  Temp Source: Oral (09/27/22 2101)  Respirations: 18 (09/28/22 0030)  SpO2: 97 % (09/28/22 0030)    No intake or output data in the 24 hours ending 09/28/22 0102    Invasive Devices  Report    Peripheral Intravenous Line  Duration           Peripheral IV 22 Left Antecubital <1 day                Physical Exam  Vitals and nursing note reviewed  Constitutional:       General: She is not in acute distress  Appearance: Normal appearance  She is well-developed  HENT:      Head: Normocephalic and atraumatic  Right Ear: External ear normal       Left Ear: External ear normal       Nose: Nose normal       Mouth/Throat:      Pharynx: No oropharyngeal exudate  Eyes:      Conjunctiva/sclera: Conjunctivae normal       Pupils: Pupils are equal, round, and reactive to light  Cardiovascular:      Rate and Rhythm: Normal rate and regular rhythm  Pulmonary:      Effort: Pulmonary effort is normal  No respiratory distress  Abdominal:      General: Abdomen is flat  There is no distension  Palpations: Abdomen is soft  Musculoskeletal:         General: No deformity  Normal range of motion  Cervical back: Normal range of motion and neck supple  Skin:     General: Skin is warm and dry  Capillary Refill: Capillary refill takes less than 2 seconds  Neurological:      General: No focal deficit present  Mental Status: She is alert and oriented to person, place, and time  Mental status is at baseline  Coordination: Coordination normal    Psychiatric:         Mood and Affect: Mood normal          Behavior: Behavior normal          Thought Content: Thought content normal          Judgment: Judgment normal            Medical Decision Makin  Left flank and left upper quadrant abdominal pain:  CT scan abdomen and pelvis; BMP; urinalysis      Recent Results (from the past 36 hour(s))   CBC and differential    Collection Time: 22 12:27 AM   Result Value Ref Range    WBC 6 96 4 31 - 10 16 Thousand/uL    RBC 4 57 3 81 - 5 12 Million/uL    Hemoglobin 12 1 11 5 - 15 4 g/dL    Hematocrit 37 9 34 8 - 46 1 %    MCV 83 82 - 98 fL    MCH 26 5 (L) 26 8 - 34 3 pg    MCHC 31 9 31 4 - 37 4 g/dL    RDW 15 7 (H) 11 6 - 15 1 %    MPV 9 1 8 9 - 12 7 fL    Platelets 240 107 - 029 Thousands/uL    nRBC 0 /100 WBCs    Neutrophils Relative 49 43 - 75 %    Immat GRANS % 0 0 - 2 %    Lymphocytes Relative 38 14 - 44 %    Monocytes Relative 10 4 - 12 %    Eosinophils Relative 2 0 - 6 %    Basophils Relative 1 0 - 1 %    Neutrophils Absolute 3 43 1 85 - 7 62 Thousands/µL    Immature Grans Absolute 0 00 0 00 - 0 20 Thousand/uL    Lymphocytes Absolute 2 64 0 60 - 4 47 Thousands/µL    Monocytes Absolute 0 69 0 17 - 1 22 Thousand/µL    Eosinophils Absolute 0 13 0 00 - 0 61 Thousand/µL    Basophils Absolute 0 07 0 00 - 0 10 Thousands/µL   Basic metabolic panel    Collection Time: 09/28/22 12:27 AM   Result Value Ref Range    Sodium 140 135 - 147 mmol/L    Potassium 3 7 3 5 - 5 3 mmol/L    Chloride 107 96 - 108 mmol/L    CO2 29 21 - 32 mmol/L    ANION GAP 4 4 - 13 mmol/L    BUN 15 5 - 25 mg/dL    Creatinine 0 68 0 60 - 1 30 mg/dL    Glucose 90 65 - 140 mg/dL    Calcium 8 6 8 3 - 10 1 mg/dL    eGFR 96 ml/min/1 73sq m     PE Study with CT abdomen &pelvis with contrast    (Results Pending)         Portions of the record may have been created with voice recognition software  Occasional wrong word or "sound a like" substitutions may have occurred due to the inherent limitations of voice recognition software  Read the chart carefully and recognize, using context, where substitutions have occurred            Critical Care Time  Procedures

## 2022-09-28 NOTE — ED PROVIDER NOTES
History  Chief Complaint   Patient presents with    Shortness of Breath     Pt states has L flank pain d/t spleen issues since it was punctured last year when she got a gastric sleeve  She states she gets fluid build up on that side which makes it hard to breath & usually has to drain  59 Y O F with PMH of CHF, hx of DVT on Eliqis, hx of gastric sleeve surgery with complications of splenic rupture requiring IR drainage multiple times in the last year, presents for dyspnea and left flank tenderness  She states this feels similar to the previous times she was admitted to hospital for drainage for fluid around her spleen  Her SOB has been progressively getting worse in the last week  Denies fever, chills, CP, palpations, abdominal pain, urinary symptoms, leg swelling, recent travel or illness  Prior to Admission Medications   Prescriptions Last Dose Informant Patient Reported? Taking? Lidocaine 4 % PTCH   No No   Sig: Apply 1 patch topically daily   acetaminophen (TYLENOL) 325 mg tablet   No No   Sig: Take 2 tablets (650 mg total) by mouth every 4 (four) hours as needed for mild pain   amLODIPine (NORVASC) 10 mg tablet   No No   Sig: Take 1 tablet (10 mg total) by mouth in the morning  calcium carbonate (TUMS) 500 mg chewable tablet   No No   Sig: Chew 2 tablets (1,000 mg total)  as needed in the morning and 2 tablets (1,000 mg total) as needed at noon and 2 tablets (1,000 mg total) as needed in the evening (indigestion,heartburn)  citalopram (CeleXA) 40 mg tablet   No No   Sig: Take 1 tablet (40 mg total) by mouth in the morning     clotrimazole (LOTRIMIN) 1 % cream   No No   Sig: APPLY TO AFFECTED AREA TWICE A DAY   gabapentin (NEURONTIN) 100 mg capsule   No No   Sig: Take 1 capsule (100 mg total) by mouth 3 (three) times a day for 10 days   levothyroxine 150 mcg tablet   No No   Sig: Take 1 tablet (150 mcg total) by mouth daily in the early morning   methocarbamol (ROBAXIN) 500 mg tablet   No No   Sig: Take 1 tablet (500 mg total) by mouth every 6 (six) hours   methocarbamol (ROBAXIN) 500 mg tablet   No No   Sig: Take 1 tablet (500 mg total) by mouth 4 (four) times a day for 6 days   pantoprazole (PROTONIX) 40 mg tablet   No No   Sig: Take 1 tablet (40 mg total) by mouth daily in the early morning   senna (SENOKOT) 8 6 mg   No No   Sig: Take 1 tablet (8 6 mg total) by mouth in the morning for 5 days  traZODone (DESYREL) 150 mg tablet   No No   Sig: Take 0 5 tablets (75 mg total) by mouth daily at bedtime      Facility-Administered Medications: None       Past Medical History:   Diagnosis Date    Disease of thyroid gland     Hernia     Hypertension        Past Surgical History:   Procedure Laterality Date    CHOLECYSTECTOMY      CORONARY ANGIOPLASTY WITH STENT PLACEMENT      IR DRAINAGE TUBE CHECK/CHANGE/REPOSITION/REINSERTION/UPSIZE  5/10/2022    IR DRAINAGE TUBE CHECK/CHANGE/REPOSITION/REINSERTION/UPSIZE  6/14/2022    IR DRAINAGE TUBE PLACEMENT  5/7/2022    IR DRAINAGE TUBE PLACEMENT  6/11/2022       History reviewed  No pertinent family history  I have reviewed and agree with the history as documented  E-Cigarette/Vaping     E-Cigarette/Vaping Substances     Social History     Tobacco Use    Smoking status: Former Smoker    Smokeless tobacco: Never Used   Substance Use Topics    Alcohol use: Not Currently    Drug use: Not Currently        Review of Systems   Constitutional: Negative for chills and fever  HENT: Negative for ear pain and sore throat  Eyes: Negative for pain and visual disturbance  Respiratory: Negative for cough and shortness of breath  Cardiovascular: Negative for chest pain and palpitations  Gastrointestinal: Negative for abdominal pain and vomiting  Genitourinary: Negative for dysuria and hematuria  Musculoskeletal: Negative for arthralgias and back pain  Skin: Negative for color change and rash  Neurological: Negative for seizures and syncope  All other systems reviewed and are negative  Physical Exam  ED Triage Vitals [09/27/22 2101]   Temperature Pulse Respirations Blood Pressure SpO2   98 °F (36 7 °C) 83 19 151/99 100 %      Temp Source Heart Rate Source Patient Position - Orthostatic VS BP Location FiO2 (%)   Oral Monitor Lying Right arm --      Pain Score       9             Orthostatic Vital Signs  Vitals:    09/28/22 0030 09/28/22 0200 09/28/22 0300 09/28/22 0400   BP: 100/68 125/68 116/70 155/88   Pulse: 86 76 76 78   Patient Position - Orthostatic VS: Lying Lying Lying Lying       Physical Exam  Vitals and nursing note reviewed  Constitutional:       General: She is not in acute distress  Appearance: She is well-developed  She is not ill-appearing, toxic-appearing or diaphoretic  HENT:      Head: Normocephalic and atraumatic  Eyes:      Conjunctiva/sclera: Conjunctivae normal    Cardiovascular:      Rate and Rhythm: Normal rate and regular rhythm  Pulses: No decreased pulses  Heart sounds: No murmur heard  Pulmonary:      Effort: Pulmonary effort is normal  No tachypnea, accessory muscle usage or respiratory distress  Breath sounds: Normal breath sounds  No stridor  No decreased breath sounds, wheezing, rhonchi or rales  Chest:      Chest wall: No crepitus  Abdominal:      Palpations: Abdomen is soft  There is no hepatomegaly, splenomegaly or mass  Tenderness: There is abdominal tenderness  There is no guarding or rebound  Musculoskeletal:      Cervical back: Neck supple  Right lower leg: No tenderness  No edema  Left lower leg: No tenderness  No edema  Skin:     General: Skin is warm and dry  Capillary Refill: Capillary refill takes less than 2 seconds  Neurological:      General: No focal deficit present  Mental Status: She is alert and oriented to person, place, and time     Psychiatric:         Mood and Affect: Mood normal          Behavior: Behavior normal          ED Medications  Medications   acetaminophen (TYLENOL) tablet 650 mg (650 mg Oral Given 9/28/22 0057)   iohexol (OMNIPAQUE) 350 MG/ML injection (SINGLE-DOSE) 100 mL (100 mL Intravenous Given 9/28/22 8993)       Diagnostic Studies  Results Reviewed     Procedure Component Value Units Date/Time    UA w Reflex to Microscopic w Reflex to Culture [[id number]] Collected: 09/28/22 0418    Lab Status: Final result Specimen: Urine, Clean Catch Updated: 09/28/22 0458     Color, UA Light Yellow     Clarity, UA Clear     Specific Gravity, UA 1 014     pH, UA 6 5     Leukocytes, UA Negative     Nitrite, UA Negative     Protein, UA Negative mg/dl      Glucose, UA Negative mg/dl      Ketones, UA Negative mg/dl      Urobilinogen, UA <2 0 mg/dl      Bilirubin, UA Negative     Occult Blood, UA Negative    Basic metabolic panel [[id number]] Collected: 09/28/22 0027    Lab Status: Final result Specimen: Blood from Arm, Left Updated: 09/28/22 0058     Sodium 140 mmol/L      Potassium 3 7 mmol/L      Chloride 107 mmol/L      CO2 29 mmol/L      ANION GAP 4 mmol/L      BUN 15 mg/dL      Creatinine 0 68 mg/dL      Glucose 90 mg/dL      Calcium 8 6 mg/dL      eGFR 96 ml/min/1 73sq m     Narrative:      [place] guidelines for Chronic Kidney Disease (CKD):     Stage 1 with normal or high GFR (GFR > 90 mL/min/1 73 square meters)    Stage 2 Mild CKD (GFR = 60-89 mL/min/1 73 square meters)    Stage 3A Moderate CKD (GFR = 45-59 mL/min/1 73 square meters)    Stage 3B Moderate CKD (GFR = 30-44 mL/min/1 73 square meters)    Stage 4 Severe CKD (GFR = 15-29 mL/min/1 73 square meters)    Stage 5 End Stage CKD (GFR <15 mL/min/1 73 square meters)  Note: GFR calculation is accurate only with a steady state creatinine    CBC and differential [[id number]]  (Abnormal) Collected: 09/28/22 0027    Lab Status: Final result Specimen: Blood from Arm, Left Updated: 09/28/22 0038     WBC 6 96 Thousand/uL      RBC 4 57 Million/uL Hemoglobin 12 1 g/dL      Hematocrit 37 9 %      MCV 83 fL      MCH 26 5 pg      MCHC 31 9 g/dL      RDW 15 7 %      MPV 9 1 fL      Platelets 245 Thousands/uL      nRBC 0 /100 WBCs      Neutrophils Relative 49 %      Immat GRANS % 0 %      Lymphocytes Relative 38 %      Monocytes Relative 10 %      Eosinophils Relative 2 %      Basophils Relative 1 %      Neutrophils Absolute 3 43 Thousands/µL      Immature Grans Absolute 0 00 Thousand/uL      Lymphocytes Absolute 2 64 Thousands/µL      Monocytes Absolute 0 69 Thousand/µL      Eosinophils Absolute 0 13 Thousand/µL      Basophils Absolute 0 07 Thousands/µL                  CT abdomen pelvis w contrast   Final Result by Makenna Babin MD (09/28 0539)      1  Redemonstrated sequela of splenic injury and infection with prior vascular embolization  Stable small hypodensity within and adjacent to the spleen (measures 3 x 2 3 x 3 cm)  There is no rim enhancement or surrounding inflammatory change  Additional subcentimeter hypodense focus within superomedial spleen is unchanged  2   Colonic diverticulosis  Workstation performed: [id number]               Procedures  Procedures      ED Course  ED Course as of 10/01/22 1902   Sat Oct 01, 2022   1809 CT abdomen pelvis w contrast                             SBIRT 20yo+    Flowsheet Row Most Recent Value   SBIRT (23 yo +)    In order to provide better care to our patients, we are screening all of our patients for alcohol and drug use  Would it be okay to ask you these screening questions? No Filed at: 09/28/2022 0352                MDM  Number of Diagnoses or Management Options  Left flank pain  Diagnosis management comments: 59 Y O F with PMH of CHF, hx of DVT on Eliqis, hx of gastric sleeve surgery with complications of splenic rupture requiring IR drainage multiple times in the last year, presents for dyspnea and left flank tenderness  Pt  Is not tachycardic or tachypnic  Physical exam is benign   Basic labs and UA negative  CT abdomen/pelvis only remarkable for chronic changes  Care transitioned to Dr Babita Soto at sign out  Disposition  Final diagnoses:   Left flank pain     Time reflects when diagnosis was documented in both MDM as applicable and the Disposition within this note     Time User Action Codes Description Comment    9/28/2022  6:36 AM Valarie Sherine Patricia Leiva [R10 9] Left flank pain       ED Disposition     ED Disposition   Discharge    Condition   Stable    Date/Time   Wed Sep 28, 2022  6:36 AM    Comment   Gabriela Mallory discharge to home/self care  Follow-up Information     Follow up With Specialties Details Why Contact Info    Hai Martinez MD Internal Medicine In 1 week As needed, If symptoms worsen Michele  [phone]            Discharge Medication List as of 9/28/2022  6:37 AM      CONTINUE these medications which have NOT CHANGED    Details   acetaminophen (TYLENOL) 325 mg tablet Take 2 tablets (650 mg total) by mouth every 4 (four) hours as needed for mild pain, Starting Fri 5/13/2022, No Print      amLODIPine (NORVASC) 10 mg tablet Take 1 tablet (10 mg total) by mouth in the morning , Starting Sat 5/14/2022, No Print      calcium carbonate (TUMS) 500 mg chewable tablet Chew 2 tablets (1,000 mg total)  as needed in the morning and 2 tablets (1,000 mg total) as needed at noon and 2 tablets (1,000 mg total) as needed in the evening (indigestion,heartburn)  , Starting Fri 5/13/2022, No Print      citalopram (CeleXA) 40 mg tablet Take 1 tablet (40 mg total) by mouth in the morning , Starting Fri 5/13/2022, No Print      clotrimazole (LOTRIMIN) 1 % cream APPLY TO AFFECTED AREA TWICE A DAY, Normal      gabapentin (NEURONTIN) 100 mg capsule Take 1 capsule (100 mg total) by mouth 3 (three) times a day for 10 days, Starting Thu 5/26/2022, Until Sun 6/5/2022, Normal      levothyroxine 150 mcg tablet Take 1 tablet (150 mcg total) by mouth daily in the early morning, Starting Sat 5/14/2022, No Print      Lidocaine 4 % PTCH Apply 1 patch topically daily, Starting Fri 5/13/2022, No Print      methocarbamol (ROBAXIN) 500 mg tablet Take 1 tablet (500 mg total) by mouth every 6 (six) hours, Starting Thu 5/26/2022, Normal      pantoprazole (PROTONIX) 40 mg tablet Take 1 tablet (40 mg total) by mouth daily in the early morning, Starting Fri 5/13/2022, Until Sun 6/12/2022, Normal      senna (SENOKOT) 8 6 mg Take 1 tablet (8 6 mg total) by mouth in the morning for 5 days  , Starting Sat 5/14/2022, Until Thu 5/19/2022, Normal      traZODone (DESYREL) 150 mg tablet Take 0 5 tablets (75 mg total) by mouth daily at bedtime, Starting Fri 5/13/2022, No Print           No discharge procedures on file  PDMP Review       Value Time User    PDMP Reviewed  Yes 5/25/2022  2:37 PM Kassandra Aguirre PA-C           ED Provider  Attending physically available and evaluated Adri Matthews  I managed the patient along with the ED Attending      Electronically Signed by         Vito Wilson DO  10/01/22 2853

## 2022-09-28 NOTE — DISCHARGE INSTRUCTIONS
Continue to monitor symptoms at home  Please follow up with your PCP/GI as needed  Please return to the Emergency Department if you experience worsening of your current symptoms, uncontrollable nausea/vomiting, high fevers, blood in urine/vomit/stool, or any other concerning symptoms

## 2022-09-28 NOTE — ED CARE HANDOFF
Emergency Department Sign Out Note        Sign out and transfer of care from Dr Neftali Jordan  See Separate Emergency Department note  The patient, Loida Douglas, was evaluated by the previous provider for left sided flank pain  Workup Completed:  Labs    ED Course / Workup Pending (followup):  CT Abd/Pelvis                                  ED Course as of 09/29/22 0245   Wed Sep 28, 2022   0237 SO: Hx of fluid collection around the spleen requiring drainage, developed Left flank pain and SOB, Stable, plan if CT shows fluid then admit, no fluid then dc w/outpt follow up     Procedures  MDM  Number of Diagnoses or Management Options  Diagnosis management comments: Patient is a 12-year-old female who presents to the ED for evaluation of left-sided flank pain  CT abdomen pelvis reviewed  Stable compared to prior  Patient evaluated by me  Resting comfortably, states she was able to sleep and has experienced some significant relief following the ED  She denies any new or worsening complaints or concerns at this time  Discussed results of CT scan and all findings including incidental findings  Patient expressed verbal understanding  Physical exam shows no abdominal tenderness  Vital signs are stable on the monitor  Discussed results and plan with patient  Advised on need for outpatient follow up with PCP and GI  Given return precautions verbally and in discharge instructions  All questions answered  Patient expressed verbal understanding and is agreeable with plan for discharge with outpatient follow up        Disposition  Final diagnoses:   Left flank pain     Time reflects when diagnosis was documented in both MDM as applicable and the Disposition within this note     Time User Action Codes Description Comment    9/28/2022  6:36 AM Silvio Sheppard Add [R10 9] Left flank pain       ED Disposition     ED Disposition   Discharge    Condition   Stable    Date/Time   Wed Sep 28, 2022  6:36 AM [address] discharge to home/self care  Follow-up Information     Follow up With Specialties Details Why Contact Info    Pinky Arrington MD Internal Medicine In 1 week As needed, If symptoms worsen Michele  [phone]          Discharge Medication List as of 9/28/2022  6:37 AM      CONTINUE these medications which have NOT CHANGED    Details   acetaminophen (TYLENOL) 325 mg tablet Take 2 tablets (650 mg total) by mouth every 4 (four) hours as needed for mild pain, Starting Fri 5/13/2022, No Print      amLODIPine (NORVASC) 10 mg tablet Take 1 tablet (10 mg total) by mouth in the morning , Starting Sat 5/14/2022, No Print      calcium carbonate (TUMS) 500 mg chewable tablet Chew 2 tablets (1,000 mg total)  as needed in the morning and 2 tablets (1,000 mg total) as needed at noon and 2 tablets (1,000 mg total) as needed in the evening (indigestion,heartburn)  , Starting Fri 5/13/2022, No Print      citalopram (CeleXA) 40 mg tablet Take 1 tablet (40 mg total) by mouth in the morning , Starting Fri 5/13/2022, No Print      clotrimazole (LOTRIMIN) 1 % cream APPLY TO AFFECTED AREA TWICE A DAY, Normal      gabapentin (NEURONTIN) 100 mg capsule Take 1 capsule (100 mg total) by mouth 3 (three) times a day for 10 days, Starting Thu 5/26/2022, Until Sun 6/5/2022, Normal      levothyroxine 150 mcg tablet Take 1 tablet (150 mcg total) by mouth daily in the early morning, Starting Sat 5/14/2022, No Print      Lidocaine 4 % PTCH Apply 1 patch topically daily, Starting Fri 5/13/2022, No Print      methocarbamol (ROBAXIN) 500 mg tablet Take 1 tablet (500 mg total) by mouth every 6 (six) hours, Starting Thu 5/26/2022, Normal      pantoprazole (PROTONIX) 40 mg tablet Take 1 tablet (40 mg total) by mouth daily in the early morning, Starting Fri 5/13/2022, Until Sun 6/12/2022, Normal      senna (SENOKOT) 8 6 mg Take 1 tablet (8 6 mg total) by mouth in the morning for 5 days  , Starting Sat 5/14/2022, Until Thu 5/19/2022, Normal      traZODone (DESYREL) 150 mg tablet Take 0 5 tablets (75 mg total) by mouth daily at bedtime, Starting Fri 5/13/2022, No Print           No discharge procedures on file         ED Provider  Electronically Signed by     Danielle Aparicio DO  09/29/22 1072

## 2023-03-23 ENCOUNTER — APPOINTMENT (EMERGENCY)
Dept: CT IMAGING | Facility: HOSPITAL | Age: 58
End: 2023-03-23

## 2023-03-23 ENCOUNTER — HOSPITAL ENCOUNTER (EMERGENCY)
Facility: HOSPITAL | Age: 58
Discharge: HOME | End: 2023-03-23
Attending: EMERGENCY MEDICINE

## 2023-03-23 VITALS
DIASTOLIC BLOOD PRESSURE: 72 MMHG | OXYGEN SATURATION: 100 % | BODY MASS INDEX: 36.59 KG/M2 | SYSTOLIC BLOOD PRESSURE: 138 MMHG | WEIGHT: 206.57 LBS | HEART RATE: 72 BPM | RESPIRATION RATE: 18 BRPM

## 2023-03-23 DIAGNOSIS — R21 RASH: ICD-10-CM

## 2023-03-23 DIAGNOSIS — R10.13 EPIGASTRIC ABDOMINAL PAIN: Primary | ICD-10-CM

## 2023-03-23 DIAGNOSIS — R11.0 NAUSEA: ICD-10-CM

## 2023-03-23 LAB
ALBUMIN SERPL BCP-MCNC: 4.1 G/DL (ref 3.5–5)
ALP SERPL-CCNC: 90 U/L (ref 34–104)
ALT SERPL W P-5'-P-CCNC: 14 U/L (ref 7–52)
ANION GAP SERPL CALCULATED.3IONS-SCNC: 7 MMOL/L (ref 4–13)
AST SERPL W P-5'-P-CCNC: 26 U/L (ref 13–39)
BACTERIA UR QL AUTO: ABNORMAL /HPF
BASOPHILS # BLD AUTO: 0.07 THOUSANDS/ÂΜL (ref 0–0.1)
BASOPHILS NFR BLD AUTO: 1 % (ref 0–1)
BILIRUB SERPL-MCNC: 0.48 MG/DL (ref 0.2–1)
BILIRUB UR QL STRIP: NEGATIVE
BUN SERPL-MCNC: 14 MG/DL (ref 5–25)
CALCIUM SERPL-MCNC: 9.1 MG/DL (ref 8.4–10.2)
CHLORIDE SERPL-SCNC: 102 MMOL/L (ref 96–108)
CLARITY UR: CLEAR
CO2 SERPL-SCNC: 27 MMOL/L (ref 21–32)
COLOR UR: YELLOW
CREAT SERPL-MCNC: 0.85 MG/DL (ref 0.6–1.3)
EOSINOPHIL # BLD AUTO: 0.07 THOUSAND/ÂΜL (ref 0–0.61)
EOSINOPHIL NFR BLD AUTO: 1 % (ref 0–6)
ERYTHROCYTE [DISTWIDTH] IN BLOOD BY AUTOMATED COUNT: 15.1 % (ref 11.6–15.1)
GFR SERPL CREATININE-BSD FRML MDRD: 74 ML/MIN/1.73SQ M
GLUCOSE SERPL-MCNC: 86 MG/DL (ref 65–140)
GLUCOSE UR STRIP-MCNC: NEGATIVE MG/DL
HCT VFR BLD AUTO: 40.9 % (ref 34.8–46.1)
HGB BLD-MCNC: 13.4 G/DL (ref 11.5–15.4)
HGB UR QL STRIP.AUTO: ABNORMAL
IMM GRANULOCYTES # BLD AUTO: 0.01 THOUSAND/UL (ref 0–0.2)
IMM GRANULOCYTES NFR BLD AUTO: 0 % (ref 0–2)
KETONES UR STRIP-MCNC: NEGATIVE MG/DL
LEUKOCYTE ESTERASE UR QL STRIP: NEGATIVE
LIPASE SERPL-CCNC: 30 U/L (ref 11–82)
LYMPHOCYTES # BLD AUTO: 1.98 THOUSANDS/ÂΜL (ref 0.6–4.47)
LYMPHOCYTES NFR BLD AUTO: 22 % (ref 14–44)
MCH RBC QN AUTO: 28.2 PG (ref 26.8–34.3)
MCHC RBC AUTO-ENTMCNC: 32.8 G/DL (ref 31.4–37.4)
MCV RBC AUTO: 86 FL (ref 82–98)
MONOCYTES # BLD AUTO: 0.76 THOUSAND/ÂΜL (ref 0.17–1.22)
MONOCYTES NFR BLD AUTO: 8 % (ref 4–12)
NEUTROPHILS # BLD AUTO: 6.17 THOUSANDS/ÂΜL (ref 1.85–7.62)
NEUTS SEG NFR BLD AUTO: 68 % (ref 43–75)
NITRITE UR QL STRIP: NEGATIVE
NON-SQ EPI CELLS URNS QL MICRO: ABNORMAL /HPF
NRBC BLD AUTO-RTO: 0 /100 WBCS
PH UR STRIP.AUTO: 6.5 [PH] (ref 4.5–8)
PLATELET # BLD AUTO: 360 THOUSANDS/UL (ref 149–390)
PMV BLD AUTO: 9.6 FL (ref 8.9–12.7)
POTASSIUM SERPL-SCNC: 4.6 MMOL/L (ref 3.5–5.3)
PROT SERPL-MCNC: 7.3 G/DL (ref 6.4–8.4)
PROT UR STRIP-MCNC: NEGATIVE MG/DL
RBC # BLD AUTO: 4.76 MILLION/UL (ref 3.81–5.12)
RBC #/AREA URNS AUTO: ABNORMAL /HPF
SODIUM SERPL-SCNC: 136 MMOL/L (ref 135–147)
SP GR UR STRIP.AUTO: 1.02 (ref 1–1.03)
URINE COMMENT: ABNORMAL
UROBILINOGEN UR QL STRIP.AUTO: 0.2 E.U./DL
WBC # BLD AUTO: 9.06 THOUSAND/UL (ref 4.31–10.16)
WBC #/AREA URNS AUTO: ABNORMAL /HPF

## 2023-03-23 RX ORDER — ONDANSETRON 2 MG/ML
4 INJECTION INTRAMUSCULAR; INTRAVENOUS ONCE
Status: COMPLETED | OUTPATIENT
Start: 2023-03-23 | End: 2023-03-23

## 2023-03-23 RX ORDER — ACETAMINOPHEN 500 MG
500 TABLET ORAL EVERY 6 HOURS PRN
Qty: 30 TABLET | Refills: 0 | Status: SHIPPED | OUTPATIENT
Start: 2023-03-23

## 2023-03-23 RX ORDER — MORPHINE SULFATE 4 MG/ML
4 INJECTION, SOLUTION INTRAMUSCULAR; INTRAVENOUS ONCE
Status: COMPLETED | OUTPATIENT
Start: 2023-03-23 | End: 2023-03-23

## 2023-03-23 RX ORDER — SUCRALFATE 1 G/1
1 TABLET ORAL 4 TIMES DAILY
Qty: 28 TABLET | Refills: 0 | Status: SHIPPED | OUTPATIENT
Start: 2023-03-23 | End: 2023-03-30

## 2023-03-23 RX ORDER — ONDANSETRON 4 MG/1
4 TABLET, ORALLY DISINTEGRATING ORAL EVERY 6 HOURS PRN
Qty: 20 TABLET | Refills: 0 | Status: SHIPPED | OUTPATIENT
Start: 2023-03-23

## 2023-03-23 RX ORDER — PANTOPRAZOLE SODIUM 40 MG/10ML
40 INJECTION, POWDER, LYOPHILIZED, FOR SOLUTION INTRAVENOUS ONCE
Status: COMPLETED | OUTPATIENT
Start: 2023-03-23 | End: 2023-03-23

## 2023-03-23 RX ADMIN — PANTOPRAZOLE SODIUM 40 MG: 40 INJECTION, POWDER, FOR SOLUTION INTRAVENOUS at 17:01

## 2023-03-23 RX ADMIN — IOHEXOL 100 ML: 350 INJECTION, SOLUTION INTRAVENOUS at 18:56

## 2023-03-23 RX ADMIN — MORPHINE SULFATE 2 MG: 2 INJECTION, SOLUTION INTRAMUSCULAR; INTRAVENOUS at 19:24

## 2023-03-23 RX ADMIN — IOHEXOL 50 ML: 300 INJECTION, SOLUTION INTRAVENOUS at 18:56

## 2023-03-23 RX ADMIN — MORPHINE SULFATE 4 MG: 4 INJECTION INTRAVENOUS at 16:58

## 2023-03-23 RX ADMIN — ONDANSETRON 4 MG: 2 INJECTION INTRAMUSCULAR; INTRAVENOUS at 16:55

## 2023-03-24 NOTE — DISCHARGE INSTRUCTIONS
Please refer to the attached information for strict return instructions  If symptoms worsen or new symptoms develop please return to the ER  Please follow up with a primary care physician as well as with bariatrics  Continue your protonix as previously prescribed  Use additional prescribed medications today as directed

## 2023-03-24 NOTE — ED PROVIDER NOTES
History  Chief Complaint   Patient presents with   • Rash     Pt reports rash on L arm and R leg that started today  Pt reports itchiness and burning, concern for shingles  • Diarrhea     Pt also reports multiple episodes of diarrhea that started today  Gilma Barraza is a 58 yo F presenting with abdominal pain, nausea, and sensation of fullness/bloating to abdomen over the past days-weeks as well as diarrhea today  She has a history of bariatric surgery several years ago through an outside institution  The pain in upper abdomen seems to worsen shortly after eating  Also reports being full very quickly  Notes nausea without vomiting  She is trying to get follow up with bariatrics and PCP but recently switched insurance and does not yet have local follow up  Reports several episodes of watery diarrhea today  This has only been going on since this am  No blood in stool  No fevers/chills  No known sick contacts or suspicious food intake  She also notes rash to R lower leg and L arm over the past 2-3 days  Notes itching and slight burning to this area  No specific preceding allergen noted  She notes the rash preceded the itching/burning  History provided by:  Patient   used: No    Diarrhea  Associated symptoms: abdominal pain    Associated symptoms: no chills, no fever and no vomiting        Prior to Admission Medications   Prescriptions Last Dose Informant Patient Reported? Taking? Lidocaine 4 % PTCH   No No   Sig: Apply 1 patch topically daily   amLODIPine (NORVASC) 10 mg tablet   No No   Sig: Take 1 tablet (10 mg total) by mouth in the morning  calcium carbonate (TUMS) 500 mg chewable tablet   No No   Sig: Chew 2 tablets (1,000 mg total)  as needed in the morning and 2 tablets (1,000 mg total) as needed at noon and 2 tablets (1,000 mg total) as needed in the evening (indigestion,heartburn)     citalopram (CeleXA) 40 mg tablet   No No   Sig: Take 1 tablet (40 mg total) by mouth in the morning  clotrimazole (LOTRIMIN) 1 % cream   No No   Sig: APPLY TO AFFECTED AREA TWICE A DAY   gabapentin (NEURONTIN) 100 mg capsule   No No   Sig: Take 1 capsule (100 mg total) by mouth 3 (three) times a day for 10 days   levothyroxine 150 mcg tablet   No No   Sig: Take 1 tablet (150 mcg total) by mouth daily in the early morning   methocarbamol (ROBAXIN) 500 mg tablet   No No   Sig: Take 1 tablet (500 mg total) by mouth every 6 (six) hours   methocarbamol (ROBAXIN) 500 mg tablet   No No   Sig: Take 1 tablet (500 mg total) by mouth 4 (four) times a day for 6 days   pantoprazole (PROTONIX) 40 mg tablet   No No   Sig: Take 1 tablet (40 mg total) by mouth daily in the early morning   senna (SENOKOT) 8 6 mg   No No   Sig: Take 1 tablet (8 6 mg total) by mouth in the morning for 5 days  traZODone (DESYREL) 150 mg tablet   No No   Sig: Take 0 5 tablets (75 mg total) by mouth daily at bedtime      Facility-Administered Medications: None       Past Medical History:   Diagnosis Date   • Disease of thyroid gland    • Hernia    • Hypertension        Past Surgical History:   Procedure Laterality Date   • CHOLECYSTECTOMY     • CORONARY ANGIOPLASTY WITH STENT PLACEMENT     • IR DRAINAGE TUBE CHECK/CHANGE/REPOSITION/REINSERTION/UPSIZE  5/10/2022   • IR DRAINAGE TUBE CHECK/CHANGE/REPOSITION/REINSERTION/UPSIZE  6/14/2022   • IR DRAINAGE TUBE PLACEMENT  5/7/2022   • IR DRAINAGE TUBE PLACEMENT  6/11/2022       History reviewed  No pertinent family history  I have reviewed and agree with the history as documented  E-Cigarette/Vaping     E-Cigarette/Vaping Substances     Social History     Tobacco Use   • Smoking status: Former   • Smokeless tobacco: Never   Substance Use Topics   • Alcohol use: Not Currently   • Drug use: Not Currently       Review of Systems   Constitutional: Negative for chills and fever  HENT: Negative for congestion, rhinorrhea and sore throat  Eyes: Negative for pain and visual disturbance  Respiratory: Negative for cough, shortness of breath and wheezing  Cardiovascular: Negative for chest pain and palpitations  Gastrointestinal: Positive for abdominal pain, diarrhea and nausea  Negative for blood in stool, constipation and vomiting  Genitourinary: Negative for dysuria, frequency and urgency  Musculoskeletal: Negative for back pain, neck pain and neck stiffness  Skin: Positive for rash  Negative for wound  Neurological: Negative for dizziness, weakness, light-headedness and numbness  Physical Exam  Physical Exam  Constitutional:       General: She is not in acute distress  Appearance: She is well-developed  She is not diaphoretic  HENT:      Head: Normocephalic and atraumatic  Right Ear: External ear normal       Left Ear: External ear normal    Eyes:      Conjunctiva/sclera: Conjunctivae normal       Pupils: Pupils are equal, round, and reactive to light  Cardiovascular:      Rate and Rhythm: Normal rate and regular rhythm  Heart sounds: Normal heart sounds  No murmur heard  No friction rub  No gallop  Pulmonary:      Effort: Pulmonary effort is normal  No respiratory distress  Breath sounds: Normal breath sounds  No wheezing  Abdominal:      General: There is no distension  Palpations: Abdomen is soft  Tenderness: There is abdominal tenderness  There is no right CVA tenderness, left CVA tenderness or guarding  Comments: TTP to epigastrium without rigidity, rebound, or guarding  No CVAT  Musculoskeletal:      Cervical back: Normal range of motion and neck supple  Lymphadenopathy:      Cervical: No cervical adenopathy  Skin:     General: Skin is warm and dry  Capillary Refill: Capillary refill takes less than 2 seconds  Findings: No erythema or rash  Comments: Mild dermatitic appearing rash to L forearm and R lower leg  No vesicular lesions  No significant erythema/increased warmth      Neurological:      Mental Status: She is alert and oriented to person, place, and time  Motor: No abnormal muscle tone  Coordination: Coordination normal    Psychiatric:         Behavior: Behavior normal          Thought Content:  Thought content normal          Judgment: Judgment normal          Vital Signs  ED Triage Vitals   Temp Pulse Respirations Blood Pressure SpO2   -- 03/23/23 1544 03/23/23 1544 03/23/23 1547 03/23/23 1544    104 18 159/68 98 %      Temp src Heart Rate Source Patient Position - Orthostatic VS BP Location FiO2 (%)   -- 03/23/23 1544 03/23/23 1544 03/23/23 1544 --    Monitor Sitting Right arm       Pain Score       03/23/23 1658       9           Vitals:    03/23/23 1544 03/23/23 1547 03/23/23 1828   BP:  159/68 138/72   Pulse: 104  72   Patient Position - Orthostatic VS: Sitting  Sitting         Visual Acuity      ED Medications  Medications   morphine injection 4 mg (4 mg Intravenous Given 3/23/23 1658)   ondansetron (ZOFRAN) injection 4 mg (4 mg Intravenous Given 3/23/23 1655)   pantoprazole (PROTONIX) injection 40 mg (40 mg Intravenous Given 3/23/23 1701)   iohexol (OMNIPAQUE) 350 MG/ML injection (SINGLE-DOSE) 100 mL (100 mL Intravenous Given 3/23/23 1856)   iohexol (OMNIPAQUE) 300 mg/mL injection 50 mL (50 mL Intravenous Given 3/23/23 1856)   morphine injection 2 mg (2 mg Intravenous Given 3/23/23 1924)       Diagnostic Studies  Results Reviewed     Procedure Component Value Units Date/Time    Urine Microscopic [[id number]]  (Abnormal) Collected: 03/23/23 1814    Lab Status: Final result Specimen: Urine, Clean Catch Updated: 03/23/23 1851     RBC, UA 0-1 /hpf      WBC, UA None Seen /hpf      Epithelial Cells None Seen /hpf      Bacteria, UA Occasional /hpf      URINE COMMENT Starch crystals present- OCC    Urine Macroscopic, POC [[id number]]  (Abnormal) Collected: 03/23/23 1714    Lab Status: Final result Specimen: Urine Updated: 03/23/23 1716     Color, UA Yellow     Clarity, UA Clear     pH, UA 6 5 Leukocytes, UA Negative     Nitrite, UA Negative     Protein, UA Negative mg/dl      Glucose, UA Negative mg/dl      Ketones, UA Negative mg/dl      Urobilinogen, UA 0 2 E U /dl      Bilirubin, UA Negative     Occult Blood, UA Trace     Specific Wallace, UA 1 020    Narrative:      CLINITEK RESULT    Comprehensive metabolic panel [[id number]] Collected: 03/23/23 1644    Lab Status: Final result Specimen: Blood from Arm, Right Updated: 03/23/23 1711     Sodium 136 mmol/L      Potassium 4 6 mmol/L      Chloride 102 mmol/L      CO2 27 mmol/L      ANION GAP 7 mmol/L      BUN 14 mg/dL      Creatinine 0 85 mg/dL      Glucose 86 mg/dL      Calcium 9 1 mg/dL      AST 26 U/L      ALT 14 U/L      Alkaline Phosphatase 90 U/L      Total Protein 7 3 g/dL      Albumin 4 1 g/dL      Total Bilirubin 0 48 mg/dL      eGFR 74 ml/min/1 73sq m     Narrative:      [place] guidelines for Chronic Kidney Disease (CKD):   •  Stage 1 with normal or high GFR (GFR > 90 mL/min/1 73 square meters)  •  Stage 2 Mild CKD (GFR = 60-89 mL/min/1 73 square meters)  •  Stage 3A Moderate CKD (GFR = 45-59 mL/min/1 73 square meters)  •  Stage 3B Moderate CKD (GFR = 30-44 mL/min/1 73 square meters)  •  Stage 4 Severe CKD (GFR = 15-29 mL/min/1 73 square meters)  •  Stage 5 End Stage CKD (GFR <15 mL/min/1 73 square meters)  Note: GFR calculation is accurate only with a steady state creatinine    Lipase [[id number]]  (Normal) Collected: 03/23/23 1644    Lab Status: Final result Specimen: Blood from Arm, Right Updated: 03/23/23 1711     Lipase 30 u/L     CBC and differential [[id number]] Collected: 03/23/23 1644    Lab Status: Final result Specimen: Blood from Arm, Right Updated: 03/23/23 1650     WBC 9 06 Thousand/uL      RBC 4 76 Million/uL      Hemoglobin 13 4 g/dL      Hematocrit 40 9 %      MCV 86 fL      MCH 28 2 pg      MCHC 32 8 g/dL      RDW 15 1 %      MPV 9 6 fL      Platelets 895 Thousands/uL      nRBC 0 /100 WBCs Neutrophils Relative 68 %      Immat GRANS % 0 %      Lymphocytes Relative 22 %      Monocytes Relative 8 %      Eosinophils Relative 1 %      Basophils Relative 1 %      Neutrophils Absolute 6 17 Thousands/µL      Immature Grans Absolute 0 01 Thousand/uL      Lymphocytes Absolute 1 98 Thousands/µL      Monocytes Absolute 0 76 Thousand/µL      Eosinophils Absolute 0 07 Thousand/µL      Basophils Absolute 0 07 Thousands/µL                  CT abdomen pelvis with contrast   Final Result by Katrina Pierce MD (03/23 2040)      No acute inflammatory process identified  Other incidental findings are stable as described above  Workstation performed: [id number]                    Procedures  Procedures         ED Course                               SBIRT 22yo+    Flowsheet Row Most Recent Value   SBIRT (25 yo +)    In order to provide better care to our patients, we are screening all of our patients for alcohol and drug use  Would it be okay to ask you these screening questions? No Filed at: 03/23/2023 1718                    Medical Decision Making  Nausea, upper abdominal pain, early satiety noted by patient over weeks  History of bariatric surgery noted  TTP to epigastrium without peritoneal signs  Suspect gastritis/PUD, given history of bariatric surgery will also check CT abd/pelvis with PO and IV contrast  Will check labs including CBC for WBC count, CMP for electrolytes, LFT's, lipase for pancreatitis  Will provide morphine for pain, Zofran for nausea, protonix as pt did not yet have today, IV fluids  Disposition pending workup/imaging  Mild dermatitic rash to L forearm, R lower leg  Rash is non-vesicular and does not appear cellulitic  No specific allergen noted by history  Supportive care as needed in discharge  F/u with PCP for same   ----------------------------  ED lab workup grossly WNL  No acute inflammatory intra-abdominal pathology or apparent post-surgical complications noted   Pain significantly improved, nausea controlled with ED medications  Pt felt stable for discharge - will start course of carafate, Zofran PRN, continue protonix daily, dietary restrictions discussed  F/u with PCP and bariatrics recommended, referrals provided  Return to ED indications discussed  Amount and/or Complexity of Data Reviewed  Labs: ordered  Radiology: ordered  Risk  OTC drugs  Prescription drug management  Disposition  Final diagnoses:   Epigastric abdominal pain   Rash   Nausea     Time reflects when diagnosis was documented in both MDM as applicable and the Disposition within this note     Time User Action Codes Description Comment    3/23/2023  9:20 PM Shearon Mosley Add [R10 13] Epigastric abdominal pain     3/23/2023  9:20 PM Shearon Mosley Add [R21] Rash     3/23/2023  9:21 PM Shearon Mosley Add [R11 0] Nausea       ED Disposition     ED Disposition   Discharge    Condition   Stable    Date/Time   u Mar 23, 2023  9:19 PM    Comment   Pérez Navas discharge to home/self care                 Follow-up Information     Follow up With Specialties Details Why [address] [facility] Emergency Department Emergency Medicine  If symptoms worsen [facility] [id number]  [address] [place] [address] Emergency Department, [address] [place] [address], [id number]          Discharge Medication List as of 3/23/2023  9:22 PM      START taking these medications    Details   acetaminophen (TYLENOL) 500 mg tablet Take 1 tablet (500 mg total) by mouth every 6 (six) hours as needed for mild pain or moderate pain, Starting u 3/23/2023, Normal      diphenhydrAMINE (BENADRYL) 2 % cream Apply topically 3 (three) times a day as needed for itching, Starting Thu 3/23/2023, Normal      ondansetron (ZOFRAN-ODT) 4 mg disintegrating tablet Take 1 tablet (4 mg total) by mouth every 6 (six) hours as needed for nausea or vomiting, Starting Thu 3/23/2023, Normal      sucralfate (CARAFATE) 1 g tablet Take 1 tablet (1 g total) by mouth 4 (four) times a day for 7 days, Starting Thu 3/23/2023, Until Thu 3/30/2023, Normal         CONTINUE these medications which have NOT CHANGED    Details   amLODIPine (NORVASC) 10 mg tablet Take 1 tablet (10 mg total) by mouth in the morning , Starting Sat 5/14/2022, No Print      calcium carbonate (TUMS) 500 mg chewable tablet Chew 2 tablets (1,000 mg total)  as needed in the morning and 2 tablets (1,000 mg total) as needed at noon and 2 tablets (1,000 mg total) as needed in the evening (indigestion,heartburn)  , Starting Fri 5/13/2022, No Print      citalopram (CeleXA) 40 mg tablet Take 1 tablet (40 mg total) by mouth in the morning , Starting Fri 5/13/2022, No Print      clotrimazole (LOTRIMIN) 1 % cream APPLY TO AFFECTED AREA TWICE A DAY, Normal      gabapentin (NEURONTIN) 100 mg capsule Take 1 capsule (100 mg total) by mouth 3 (three) times a day for 10 days, Starting Thu 5/26/2022, Until Sun 6/5/2022, Normal      levothyroxine 150 mcg tablet Take 1 tablet (150 mcg total) by mouth daily in the early morning, Starting Sat 5/14/2022, No Print      Lidocaine 4 % PTCH Apply 1 patch topically daily, Starting Fri 5/13/2022, No Print      methocarbamol (ROBAXIN) 500 mg tablet Take 1 tablet (500 mg total) by mouth every 6 (six) hours, Starting Thu 5/26/2022, Normal      pantoprazole (PROTONIX) 40 mg tablet Take 1 tablet (40 mg total) by mouth daily in the early morning, Starting Fri 5/13/2022, Until Sun 6/12/2022, Normal      senna (SENOKOT) 8 6 mg Take 1 tablet (8 6 mg total) by mouth in the morning for 5 days  , Starting Sat 5/14/2022, Until Thu 5/19/2022, Normal      traZODone (DESYREL) 150 mg tablet Take 0 5 tablets (75 mg total) by mouth daily at bedtime, Starting Fri 5/13/2022, No Print                 PDMP Review       Value Time User    PDMP Reviewed  Yes 5/25/2022  2:37 PM Adam Ghosh PA-C ED Provider  Electronically Signed by           Sharan Benton PA-C  03/23/23 2696

## 2023-05-03 ENCOUNTER — HOSPITAL ENCOUNTER (EMERGENCY)
Facility: HOSPITAL | Age: 58
Discharge: HOME | End: 2023-05-03
Attending: EMERGENCY MEDICINE

## 2023-05-03 ENCOUNTER — APPOINTMENT (EMERGENCY)
Dept: RADIOLOGY | Facility: HOSPITAL | Age: 58
End: 2023-05-03

## 2023-05-03 VITALS
TEMPERATURE: 98.4 F | SYSTOLIC BLOOD PRESSURE: 125 MMHG | WEIGHT: 247.58 LBS | HEART RATE: 90 BPM | BODY MASS INDEX: 43.86 KG/M2 | RESPIRATION RATE: 18 BRPM | DIASTOLIC BLOOD PRESSURE: 66 MMHG | OXYGEN SATURATION: 99 %

## 2023-05-03 DIAGNOSIS — J42 CHRONIC BRONCHITIS (HCC): Primary | ICD-10-CM

## 2023-05-03 LAB
ALBUMIN SERPL BCP-MCNC: 3.9 G/DL (ref 3.5–5)
ALP SERPL-CCNC: 78 U/L (ref 34–104)
ALT SERPL W P-5'-P-CCNC: 16 U/L (ref 7–52)
ANION GAP SERPL CALCULATED.3IONS-SCNC: 5 MMOL/L (ref 4–13)
AST SERPL W P-5'-P-CCNC: 16 U/L (ref 13–39)
ATRIAL RATE: 89 BPM
BASOPHILS # BLD AUTO: 0.09 THOUSANDS/ÂΜL (ref 0–0.1)
BASOPHILS NFR BLD AUTO: 1 % (ref 0–1)
BILIRUB SERPL-MCNC: 0.39 MG/DL (ref 0.2–1)
BNP SERPL-MCNC: 22 PG/ML (ref 0–100)
BUN SERPL-MCNC: 12 MG/DL (ref 5–25)
CALCIUM SERPL-MCNC: 9.2 MG/DL (ref 8.4–10.2)
CHLORIDE SERPL-SCNC: 104 MMOL/L (ref 96–108)
CO2 SERPL-SCNC: 28 MMOL/L (ref 21–32)
CREAT SERPL-MCNC: 0.82 MG/DL (ref 0.6–1.3)
EOSINOPHIL # BLD AUTO: 0.13 THOUSAND/ÂΜL (ref 0–0.61)
EOSINOPHIL NFR BLD AUTO: 2 % (ref 0–6)
ERYTHROCYTE [DISTWIDTH] IN BLOOD BY AUTOMATED COUNT: 15.2 % (ref 11.6–15.1)
GFR SERPL CREATININE-BSD FRML MDRD: 78 ML/MIN/1.73SQ M
GLUCOSE SERPL-MCNC: 88 MG/DL (ref 65–140)
HCT VFR BLD AUTO: 40.8 % (ref 34.8–46.1)
HGB BLD-MCNC: 13.2 G/DL (ref 11.5–15.4)
IMM GRANULOCYTES # BLD AUTO: 0.02 THOUSAND/UL (ref 0–0.2)
IMM GRANULOCYTES NFR BLD AUTO: 0 % (ref 0–2)
LYMPHOCYTES # BLD AUTO: 1.95 THOUSANDS/ÂΜL (ref 0.6–4.47)
LYMPHOCYTES NFR BLD AUTO: 24 % (ref 14–44)
MCH RBC QN AUTO: 27.7 PG (ref 26.8–34.3)
MCHC RBC AUTO-ENTMCNC: 32.4 G/DL (ref 31.4–37.4)
MCV RBC AUTO: 86 FL (ref 82–98)
MONOCYTES # BLD AUTO: 0.89 THOUSAND/ÂΜL (ref 0.17–1.22)
MONOCYTES NFR BLD AUTO: 11 % (ref 4–12)
NEUTROPHILS # BLD AUTO: 4.98 THOUSANDS/ÂΜL (ref 1.85–7.62)
NEUTS SEG NFR BLD AUTO: 62 % (ref 43–75)
NRBC BLD AUTO-RTO: 0 /100 WBCS
P AXIS: 71 DEGREES
PLATELET # BLD AUTO: 317 THOUSANDS/UL (ref 149–390)
PMV BLD AUTO: 8.9 FL (ref 8.9–12.7)
POTASSIUM SERPL-SCNC: 4 MMOL/L (ref 3.5–5.3)
PR INTERVAL: 158 MS
PROT SERPL-MCNC: 7.3 G/DL (ref 6.4–8.4)
QRS AXIS: 29 DEGREES
QRSD INTERVAL: 86 MS
QT INTERVAL: 378 MS
QTC INTERVAL: 459 MS
RBC # BLD AUTO: 4.76 MILLION/UL (ref 3.81–5.12)
SODIUM SERPL-SCNC: 137 MMOL/L (ref 135–147)
T WAVE AXIS: 40 DEGREES
VENTRICULAR RATE: 89 BPM
WBC # BLD AUTO: 8.06 THOUSAND/UL (ref 4.31–10.16)

## 2023-05-03 RX ORDER — PREDNISONE 20 MG/1
40 TABLET ORAL DAILY
Qty: 6 TABLET | Refills: 0 | Status: SHIPPED | OUTPATIENT
Start: 2023-05-03 | End: 2023-05-06

## 2023-05-03 RX ORDER — ALBUTEROL SULFATE 2.5 MG/3ML
5 SOLUTION RESPIRATORY (INHALATION) ONCE
Status: COMPLETED | OUTPATIENT
Start: 2023-05-03 | End: 2023-05-03

## 2023-05-03 RX ADMIN — IPRATROPIUM BROMIDE 0.5 MG: 0.5 SOLUTION RESPIRATORY (INHALATION) at 10:43

## 2023-05-03 RX ADMIN — ALBUTEROL SULFATE 5 MG: 2.5 SOLUTION RESPIRATORY (INHALATION) at 10:43

## 2023-05-03 NOTE — ED PROVIDER NOTES
History  Chief Complaint   Patient presents with   • Wheezing     Pt reports weeks of cough without improvement, also states she is wheezing and has pain in chest from congestion  60 yo F with HTN, hypothyroid, CAD with stents, h/o COVID with prolonged hospitalization, c/o recurrent cough, wheezing and shortness of breath, which has been a persistent issue for several weeks  She recalls being evaluated here 3/23/23, she reported for the same issue, although the note itself addresses more GI complaints with abdominal pain, diarrhea   She recalls being reevaluated specifically for the cough, wheeing, in [place] 4/12/23, treated with steroids, abx (Jayson Racer), and inhaler, then follow up PCP in the interval since then with more abx (amoxicllin), and more steroids  But she continues to have this chronic cough and wheezing  She has completed course of both abx steroids for over 1 week  She denies fever, chills, chest pain  History provided by:  Patient  Wheezing  Severity:  Moderate  Severity compared to prior episodes:  Similar  Onset quality:  Gradual  Duration:  6 weeks  Timing:  Constant  Progression:  Waxing and waning  Chronicity:  Recurrent  Associated symptoms: cough and shortness of breath        Prior to Admission Medications   Prescriptions Last Dose Informant Patient Reported? Taking? Lidocaine 4 % PTCH   No No   Sig: Apply 1 patch topically daily   acetaminophen (TYLENOL) 500 mg tablet   No No   Sig: Take 1 tablet (500 mg total) by mouth every 6 (six) hours as needed for mild pain or moderate pain   amLODIPine (NORVASC) 10 mg tablet   No No   Sig: Take 1 tablet (10 mg total) by mouth in the morning  calcium carbonate (TUMS) 500 mg chewable tablet   No No   Sig: Chew 2 tablets (1,000 mg total)  as needed in the morning and 2 tablets (1,000 mg total) as needed at noon and 2 tablets (1,000 mg total) as needed in the evening (indigestion,heartburn)     citalopram (CeleXA) 40 mg tablet   No No   Sig: Take 1 tablet (40 mg total) by mouth in the morning  clotrimazole (LOTRIMIN) 1 % cream   No No   Sig: APPLY TO AFFECTED AREA TWICE A DAY   diphenhydrAMINE (BENADRYL) 2 % cream   No No   Sig: Apply topically 3 (three) times a day as needed for itching   gabapentin (NEURONTIN) 100 mg capsule   No No   Sig: Take 1 capsule (100 mg total) by mouth 3 (three) times a day for 10 days   levothyroxine 150 mcg tablet   No No   Sig: Take 1 tablet (150 mcg total) by mouth daily in the early morning   methocarbamol (ROBAXIN) 500 mg tablet   No No   Sig: Take 1 tablet (500 mg total) by mouth every 6 (six) hours   methocarbamol (ROBAXIN) 500 mg tablet   No No   Sig: Take 1 tablet (500 mg total) by mouth 4 (four) times a day for 6 days   ondansetron (ZOFRAN-ODT) 4 mg disintegrating tablet   No No   Sig: Take 1 tablet (4 mg total) by mouth every 6 (six) hours as needed for nausea or vomiting   pantoprazole (PROTONIX) 40 mg tablet   No No   Sig: Take 1 tablet (40 mg total) by mouth daily in the early morning   senna (SENOKOT) 8 6 mg   No No   Sig: Take 1 tablet (8 6 mg total) by mouth in the morning for 5 days  sucralfate (CARAFATE) 1 g tablet   No No   Sig: Take 1 tablet (1 g total) by mouth 4 (four) times a day for 7 days   traZODone (DESYREL) 150 mg tablet   No No   Sig: Take 0 5 tablets (75 mg total) by mouth daily at bedtime      Facility-Administered Medications: None       Past Medical History:   Diagnosis Date   • Disease of thyroid gland    • Hernia    • Hypertension        Past Surgical History:   Procedure Laterality Date   • CHOLECYSTECTOMY     • CORONARY ANGIOPLASTY WITH STENT PLACEMENT     • IR DRAINAGE TUBE CHECK/CHANGE/REPOSITION/REINSERTION/UPSIZE  5/10/2022   • IR DRAINAGE TUBE CHECK/CHANGE/REPOSITION/REINSERTION/UPSIZE  6/14/2022   • IR DRAINAGE TUBE PLACEMENT  5/7/2022   • IR DRAINAGE TUBE PLACEMENT  6/11/2022       History reviewed  No pertinent family history    I have reviewed and agree with the history as documented  E-Cigarette/Vaping   • E-Cigarette Use Never User      E-Cigarette/Vaping Substances     Social History     Tobacco Use   • Smoking status: Former   • Smokeless tobacco: Never   Vaping Use   • Vaping Use: Never used   Substance Use Topics   • Alcohol use: Not Currently   • Drug use: Not Currently       Review of Systems   Respiratory: Positive for cough, shortness of breath and wheezing  Physical Exam  Physical Exam  Vitals and nursing note reviewed  Constitutional:       General: She is not in acute distress  Appearance: She is well-developed  She is obese  She is not diaphoretic  HENT:      Head: Normocephalic and atraumatic  Right Ear: External ear normal       Left Ear: External ear normal       Nose: Nose normal    Eyes:      Conjunctiva/sclera: Conjunctivae normal       Pupils: Pupils are equal, round, and reactive to light  Cardiovascular:      Rate and Rhythm: Normal rate and regular rhythm  Pulmonary:      Effort: Pulmonary effort is normal  No accessory muscle usage  Breath sounds: Decreased air movement and transmitted upper airway sounds present  Rhonchi present  Abdominal:      Palpations: Abdomen is soft  Musculoskeletal:         General: Normal range of motion  Cervical back: Normal range of motion and neck supple  Skin:     General: Skin is warm and dry  Findings: No rash  Neurological:      Mental Status: She is alert and oriented to person, place, and time  Gait: Gait normal    Psychiatric:         Behavior: Behavior normal          Thought Content:  Thought content normal          Judgment: Judgment normal          Vital Signs  ED Triage Vitals [05/03/23 0932]   Temperature Pulse Respirations Blood Pressure SpO2   98 4 °F (36 9 °C) 95 20 162/76 96 %      Temp Source Heart Rate Source Patient Position - Orthostatic VS BP Location FiO2 (%)   Oral Monitor Sitting Right arm --      Pain Score       --           Vitals:    05/03/23 0932 05/03/23 1115 05/03/23 1230   BP: 162/76 125/66    Pulse: 95 81 90   Patient Position - Orthostatic VS: Sitting Lying          Visual Acuity      ED Medications  Medications   albuterol inhalation solution 5 mg (5 mg Nebulization Given 5/3/23 1043)   ipratropium (ATROVENT) 0 02 % inhalation solution 0 5 mg (0 5 mg Nebulization Given 5/3/23 1043)       Diagnostic Studies  Results Reviewed     Procedure Component Value Units Date/Time    B-Type Natriuretic Peptide(BNP) [[id number]]  (Normal) Collected: 05/03/23 1003    Lab Status: Final result Specimen: Blood from Arm, Left Updated: 05/03/23 1031     BNP 22 pg/mL     Comprehensive metabolic panel [[id number]] Collected: 05/03/23 1003    Lab Status: Final result Specimen: Blood from Arm, Left Updated: 05/03/23 1025     Sodium 137 mmol/L      Potassium 4 0 mmol/L      Chloride 104 mmol/L      CO2 28 mmol/L      ANION GAP 5 mmol/L      BUN 12 mg/dL      Creatinine 0 82 mg/dL      Glucose 88 mg/dL      Calcium 9 2 mg/dL      AST 16 U/L      ALT 16 U/L      Alkaline Phosphatase 78 U/L      Total Protein 7 3 g/dL      Albumin 3 9 g/dL      Total Bilirubin 0 39 mg/dL      eGFR 78 ml/min/1 73sq m     Narrative:      [place] guidelines for Chronic Kidney Disease (CKD):   •  Stage 1 with normal or high GFR (GFR > 90 mL/min/1 73 square meters)  •  Stage 2 Mild CKD (GFR = 60-89 mL/min/1 73 square meters)  •  Stage 3A Moderate CKD (GFR = 45-59 mL/min/1 73 square meters)  •  Stage 3B Moderate CKD (GFR = 30-44 mL/min/1 73 square meters)  •  Stage 4 Severe CKD (GFR = 15-29 mL/min/1 73 square meters)  •  Stage 5 End Stage CKD (GFR <15 mL/min/1 73 square meters)  Note: GFR calculation is accurate only with a steady state creatinine    CBC and differential [[id number]]  (Abnormal) Collected: 05/03/23 1003    Lab Status: Final result Specimen: Blood from Arm, Left Updated: 05/03/23 1008     WBC 8 06 Thousand/uL      RBC 4 76 Million/uL      Hemoglobin 13 2 g/dL Hematocrit 40 8 %      MCV 86 fL      MCH 27 7 pg      MCHC 32 4 g/dL      RDW 15 2 %      MPV 8 9 fL      Platelets 280 Thousands/uL      nRBC 0 /100 WBCs      Neutrophils Relative 62 %      Immat GRANS % 0 %      Lymphocytes Relative 24 %      Monocytes Relative 11 %      Eosinophils Relative 2 %      Basophils Relative 1 %      Neutrophils Absolute 4 98 Thousands/µL      Immature Grans Absolute 0 02 Thousand/uL      Lymphocytes Absolute 1 95 Thousands/µL      Monocytes Absolute 0 89 Thousand/µL      Eosinophils Absolute 0 13 Thousand/µL      Basophils Absolute 0 09 Thousands/µL                  XR chest 2 views   Final Result by Claudette Edge, MD (05/03 1121)      No acute cardiopulmonary disease  Findings are stable            Workstation performed: [id number]                    Procedures  ECG 12 Lead Documentation Only    Date/Time: 5/3/2023 10:09 AM  Performed by: Eliott Baumgarten, MD  Authorized by: Eliott Baumgarten, MD     Rate:     ECG rate:  89  Rhythm:     Rhythm: sinus rhythm    QRS:     QRS axis:  Normal    QRS intervals:  Normal  Conduction:     Conduction: normal    ST segments:     ST segments:  Normal  T waves:     T waves: normal               ED Course  ED Course as of 05/03/23 1346   Wed May 03, 2023   1039 XR chest 2 views  My independent interpretation of imaging:   No acute findings         1249 Reviewed results with patient at bedside and updated on the plan  She has improvement after neb, ED workup is reassuring  She is c/w chronic bronchitis  I do not think she has a hard indication for steroids, but she does have some improvement, but also side effects  Therefore, we discussed doing a short burst, so if there is benefit she gets it, and if there are side effects, she can disocntinue  Otherwise, her goal is outpatient followup have recently moved here                                   SBIRT 22yo+    Flowsheet Row Most Recent Value   Initial Alcohol Screen: US AUDIT-C     1  How often do you have a drink containing alcohol? 0 Filed at: 05/03/2023 1058   2  How many drinks containing alcohol do you have on a typical day you are drinking? 0 Filed at: 05/03/2023 1058   3b  FEMALE Any Age, or MALE 65+: How often do you have 4 or more drinks on one occassion? 0 Filed at: 05/03/2023 1058   Audit-C Score 0 Filed at: 05/03/2023 1058   KEYSHAWN: How many times in the past year have you    Used an illegal drug or used a prescription medication for non-medical reasons? Never Filed at: 05/03/2023 1058                    MDM    Disposition  Final diagnoses:   Chronic bronchitis ([address] )     Time reflects when diagnosis was documented in both MDM as applicable and the Disposition within this note     Time User Action Codes Description Comment    5/3/2023 12:53 PM Gudelia Cortes Add [J42] Chronic bronchitis [facility])       ED Disposition     ED Disposition   Discharge    Condition   Good    Date/Time   Wed May 3, 2023 12:53 PM    [address] discharge to home/self care                 Follow-up Information     Follow up With Specialties Details Why Contact Info Additional [facility] Schedule an appointment as soon as possible for a visit  For followup [address], [address] [facility] [id number]  [address] [place] [address], [address], [address], [address]          Discharge Medication List as of 5/3/2023 12:56 PM      START taking these medications    Details   predniSONE 20 mg tablet Take 2 tablets (40 mg total) by mouth daily for 3 days, Starting Wed 5/3/2023, Until Sat 5/6/2023, Normal         CONTINUE these medications which have NOT CHANGED    Details   acetaminophen (TYLENOL) 500 mg tablet Take 1 tablet (500 mg total) by mouth every 6 (six) hours as needed for mild pain or moderate pain, Starting Thu 3/23/2023, Normal      amLODIPine (NORVASC) 10 mg tablet Take 1 tablet (10 mg total) by mouth in the morning , Starting Sat 5/14/2022, No Print      calcium carbonate (TUMS) 500 mg chewable tablet Chew 2 tablets (1,000 mg total)  as needed in the morning and 2 tablets (1,000 mg total) as needed at noon and 2 tablets (1,000 mg total) as needed in the evening (indigestion,heartburn)  , Starting Fri 5/13/2022, No Print      citalopram (CeleXA) 40 mg tablet Take 1 tablet (40 mg total) by mouth in the morning , Starting Fri 5/13/2022, No Print      clotrimazole (LOTRIMIN) 1 % cream APPLY TO AFFECTED AREA TWICE A DAY, Normal      diphenhydrAMINE (BENADRYL) 2 % cream Apply topically 3 (three) times a day as needed for itching, Starting u 3/23/2023, Normal      gabapentin (NEURONTIN) 100 mg capsule Take 1 capsule (100 mg total) by mouth 3 (three) times a day for 10 days, Starting u 5/26/2022, Until Sun 6/5/2022, Normal      levothyroxine 150 mcg tablet Take 1 tablet (150 mcg total) by mouth daily in the early morning, Starting Sat 5/14/2022, No Print      Lidocaine 4 % PTCH Apply 1 patch topically daily, Starting Fri 5/13/2022, No Print      methocarbamol (ROBAXIN) 500 mg tablet Take 1 tablet (500 mg total) by mouth every 6 (six) hours, Starting Thu 5/26/2022, Normal      ondansetron (ZOFRAN-ODT) 4 mg disintegrating tablet Take 1 tablet (4 mg total) by mouth every 6 (six) hours as needed for nausea or vomiting, Starting u 3/23/2023, Normal      pantoprazole (PROTONIX) 40 mg tablet Take 1 tablet (40 mg total) by mouth daily in the early morning, Starting Fri 5/13/2022, Until Sun 6/12/2022, Normal      senna (SENOKOT) 8 6 mg Take 1 tablet (8 6 mg total) by mouth in the morning for 5 days  , Starting Sat 5/14/2022, Until u 5/19/2022, Normal      sucralfate (CARAFATE) 1 g tablet Take 1 tablet (1 g total) by mouth 4 (four) times a day for 7 days, Starting Th 3/23/2023, Until Thu 3/30/2023, Normal      traZODone (DESYREL) 150 mg tablet Take 0 5 tablets (75 mg total) by mouth daily at bedtime, Starting Fri 5/13/2022, No Print                 PDMP Review       Value Time User    PDMP Reviewed  Yes 5/25/2022  2:37 PM Reagan Chen PA-C          ED Provider  Electronically Signed by           William Russell MD  05/03/23 7639

## 2023-05-18 ENCOUNTER — APPOINTMENT (EMERGENCY)
Dept: RADIOLOGY | Facility: HOSPITAL | Age: 58
End: 2023-05-18

## 2023-05-18 ENCOUNTER — HOSPITAL ENCOUNTER (EMERGENCY)
Facility: HOSPITAL | Age: 58
Discharge: HOME | End: 2023-05-18
Attending: EMERGENCY MEDICINE

## 2023-05-18 VITALS
BODY MASS INDEX: 43.05 KG/M2 | RESPIRATION RATE: 16 BRPM | SYSTOLIC BLOOD PRESSURE: 150 MMHG | HEART RATE: 84 BPM | OXYGEN SATURATION: 94 % | DIASTOLIC BLOOD PRESSURE: 75 MMHG | TEMPERATURE: 97.7 F | HEIGHT: 63 IN | WEIGHT: 243 LBS

## 2023-05-18 DIAGNOSIS — M25.562 ACUTE PAIN OF LEFT KNEE: Primary | ICD-10-CM

## 2023-05-18 RX ORDER — NAPROXEN 250 MG/1
250 TABLET ORAL 2 TIMES DAILY WITH MEALS
Qty: 24 TABLET | Refills: 0 | Status: SHIPPED | OUTPATIENT
Start: 2023-05-18

## 2023-05-18 RX ORDER — METHOCARBAMOL 500 MG/1
500 TABLET, FILM COATED ORAL 2 TIMES DAILY
Qty: 12 TABLET | Refills: 0 | Status: SHIPPED | OUTPATIENT
Start: 2023-05-18 | End: 2023-05-20 | Stop reason: CLARIF

## 2023-05-18 NOTE — ED PROVIDER NOTES
History  Chief Complaint   Patient presents with   • Knee Pain     Pt reports left knee pain x 1 week  No injury reported  Pt states knee feels unstable  Pain worse with ambulation and climbing stairs     24-year-old female with history of hypertension presents to ED for left knee pain  She states that the left knee pain has been present for approximately 1 week  She does not recall a single incident that started the knee pain  She does note that around 1 week ago she felt her patella subluxed laterally  Since then she has had pain in the anterior aspect of her knee  She denies history of prior patellar dislocations  The pain is increased when she goes up and down stairs and with prolonged walking/standing  Denies fever, chills, shortness of breath, chest pain, numbness and tingling of left lower extremity, pain with micromotion  She has been wearing a compressive knee sleeve which she feels provides some benefit however the knee sleeve is a size extra-large and shifts in positioning regularly  She has taken Tylenol and ibuprofen for pain management with mild relief  Prior to Admission Medications   Prescriptions Last Dose Informant Patient Reported? Taking? Lidocaine 4 % PTCH   No No   Sig: Apply 1 patch topically daily   acetaminophen (TYLENOL) 500 mg tablet   No No   Sig: Take 1 tablet (500 mg total) by mouth every 6 (six) hours as needed for mild pain or moderate pain   amLODIPine (NORVASC) 10 mg tablet   No No   Sig: Take 1 tablet (10 mg total) by mouth in the morning  calcium carbonate (TUMS) 500 mg chewable tablet   No No   Sig: Chew 2 tablets (1,000 mg total)  as needed in the morning and 2 tablets (1,000 mg total) as needed at noon and 2 tablets (1,000 mg total) as needed in the evening (indigestion,heartburn)  citalopram (CeleXA) 40 mg tablet   No No   Sig: Take 1 tablet (40 mg total) by mouth in the morning     clotrimazole (LOTRIMIN) 1 % cream   No No   Sig: APPLY TO AFFECTED AREA TWICE A DAY   diphenhydrAMINE (BENADRYL) 2 % cream   No No   Sig: Apply topically 3 (three) times a day as needed for itching   gabapentin (NEURONTIN) 100 mg capsule   No No   Sig: Take 1 capsule (100 mg total) by mouth 3 (three) times a day for 10 days   levothyroxine 150 mcg tablet   No No   Sig: Take 1 tablet (150 mcg total) by mouth daily in the early morning   methocarbamol (ROBAXIN) 500 mg tablet   No No   Sig: Take 1 tablet (500 mg total) by mouth every 6 (six) hours   methocarbamol (ROBAXIN) 500 mg tablet   No No   Sig: Take 1 tablet (500 mg total) by mouth 4 (four) times a day for 6 days   ondansetron (ZOFRAN-ODT) 4 mg disintegrating tablet   No No   Sig: Take 1 tablet (4 mg total) by mouth every 6 (six) hours as needed for nausea or vomiting   pantoprazole (PROTONIX) 40 mg tablet   No No   Sig: Take 1 tablet (40 mg total) by mouth daily in the early morning   senna (SENOKOT) 8 6 mg   No No   Sig: Take 1 tablet (8 6 mg total) by mouth in the morning for 5 days  sucralfate (CARAFATE) 1 g tablet   No No   Sig: Take 1 tablet (1 g total) by mouth 4 (four) times a day for 7 days   traZODone (DESYREL) 150 mg tablet   No No   Sig: Take 0 5 tablets (75 mg total) by mouth daily at bedtime      Facility-Administered Medications: None       Past Medical History:   Diagnosis Date   • Disease of thyroid gland    • Hernia    • Hypertension        Past Surgical History:   Procedure Laterality Date   • CHOLECYSTECTOMY     • CORONARY ANGIOPLASTY WITH STENT PLACEMENT     • IR DRAINAGE TUBE CHECK/CHANGE/REPOSITION/REINSERTION/UPSIZE  5/10/2022   • IR DRAINAGE TUBE CHECK/CHANGE/REPOSITION/REINSERTION/UPSIZE  6/14/2022   • IR DRAINAGE TUBE PLACEMENT  5/7/2022   • IR DRAINAGE TUBE PLACEMENT  6/11/2022       History reviewed  No pertinent family history  I have reviewed and agree with the history as documented      E-Cigarette/Vaping   • E-Cigarette Use Never User      E-Cigarette/Vaping Substances     Social History Tobacco Use   • Smoking status: Former     Types: Cigarettes   • Smokeless tobacco: Never   Vaping Use   • Vaping Use: Never used   Substance Use Topics   • Alcohol use: Not Currently   • Drug use: Not Currently       Review of Systems   Constitutional: Negative for chills and fever  HENT: Negative for ear pain and sore throat  Eyes: Negative for pain and visual disturbance  Respiratory: Negative for cough and shortness of breath  Cardiovascular: Negative for chest pain and palpitations  Gastrointestinal: Negative for abdominal pain and vomiting  Genitourinary: Negative for dysuria and hematuria  Musculoskeletal: Positive for arthralgias  Negative for back pain  Skin: Negative for color change and rash  Neurological: Negative for seizures and syncope  All other systems reviewed and are negative  Physical Exam  Physical Exam  Vitals and nursing note reviewed  Constitutional:       General: She is not in acute distress  Appearance: She is well-developed  HENT:      Head: Normocephalic and atraumatic  Eyes:      Conjunctiva/sclera: Conjunctivae normal    Cardiovascular:      Rate and Rhythm: Normal rate and regular rhythm  Heart sounds: No murmur heard  Pulmonary:      Effort: Pulmonary effort is normal  No respiratory distress  Breath sounds: Normal breath sounds  Abdominal:      Palpations: Abdomen is soft  Tenderness: There is no abdominal tenderness  Musculoskeletal:         General: No swelling  Cervical back: Neck supple  Right knee: Normal       Left knee: Effusion present  No deformity, erythema, ecchymosis or lacerations  Tenderness present  Legs:       Comments: Positive tenderness palpation on the anterior aspect of knee  Range of motion testing demonstrates pain with range of motion  The pain is located in the anterior aspect of the knee  No pain with micromotion range of motion assessment  No patellar laxity      Skin: General: Skin is warm and dry  Capillary Refill: Capillary refill takes less than 2 seconds  Neurological:      Mental Status: She is alert  Psychiatric:         Mood and Affect: Mood normal          Vital Signs  ED Triage Vitals [05/18/23 1231]   Temperature Pulse Respirations Blood Pressure SpO2   97 7 °F (36 5 °C) 84 16 150/75 94 %      Temp Source Heart Rate Source Patient Position - Orthostatic VS BP Location FiO2 (%)   Oral Monitor Sitting Right arm --      Pain Score       10 - Worst Possible Pain           Vitals:    05/18/23 1231   BP: 150/75   Pulse: 84   Patient Position - Orthostatic VS: Sitting         Visual Acuity      ED Medications  Medications - No data to display    Diagnostic Studies  Results Reviewed     None                 XR knee 4+ views left injury   Final Result by Demetrio Garland MD (05/18 1447)      Osteoarthritis      No acute osseous abnormality  Workstation performed: [id number]                    Procedures  Procedures         ED Course                               SBIRT 22yo+    Flowsheet Row Most Recent Value   Initial Alcohol Screen: US AUDIT-C     1  How often do you have a drink containing alcohol? 0 Filed at: 05/18/2023 1235   2  How many drinks containing alcohol do you have on a typical day you are drinking? 0 Filed at: 05/18/2023 1235   3a  Male UNDER 65: How often do you have five or more drinks on one occasion? 0 Filed at: 05/18/2023 1235   3b  FEMALE Any Age, or MALE 65+: How often do you have 4 or more drinks on one occassion? 0 Filed at: 05/18/2023 1235   Audit-C Score 0 Filed at: 05/18/2023 1235   KEYSHAWN: How many times in the past year have you    Used an illegal drug or used a prescription medication for non-medical reasons? Never Filed at: 05/18/2023 1235                    Medical Decision Making  78-year-old female presents with left knee pain that has been present for approximately 1 week    The pain is located on the anterior aspect of her knee  X-rays obtained today demonstrates severe patellofemoral osteoarthritis  Findings on x-ray are consistent with patient's symptoms  Physical examination demonstrates that the left knee is not erythematous, no pain with micromotion range of motion  Mildly swollen  Patient is afebrile, does not demonstrate signs of septic joint or crystal arthropathy  Advised patient to follow-up with orthopedics for further management of her left knee pain  Placed ambulatory referral to orthopedic surgery  Provided patient with naproxen to help with acute knee pain  Instructed patient to not take ibuprofen with naproxen as they are both NSAIDs and can cause kidney damage when taken in large volumes  Patient can take Tylenol for pain management as well  Provided patient with short prescription of Robaxin  Advised patient to avoid taking Robaxin prior to driving as Robaxin can cause drowsiness  Provided patient with Ace wrap's for compression of left knee  Advised patient to use ice, heat, elevation to help reduce amount of inflammation in her left knee  Prior to discharge, discharge instructions were discussed with patient at bedside  Patient was provided both verbal and written instructions  Patient is understanding of the discharge instructions and is agreeable to plan of care  Return precautions were discussed with patient bedside, patient verbalized understanding of signs and symptoms that would necessitate return to the ED  All questions were answered  Patient was comfortable with the plan of care and discharged to home  Acute pain of left knee: acute illness or injury  Amount and/or Complexity of Data Reviewed  Radiology: ordered  Details: X-ray of left knee demonstrates severe patellofemoral osteoarthritis  No acute fracture  No dislocation  Risk  Prescription drug management            Disposition  Final diagnoses:   Acute pain of left knee     Time reflects when diagnosis was documented in both MDM as applicable and the Disposition within this note     Time User Action Codes Description Comment    5/18/2023  1:27 PM Gearline Bumpers Add [P28 709] Acute pain of left knee       ED Disposition     ED Disposition   Discharge    Condition   Stable    Date/Time   Thu May 18, 2023  1:28 PM    [address] discharge to home/self care  Follow-up Information     Follow up With Specialties Details Why Contact Info Additional Information    [address] [facility]   [address]  [address] [facility] [id number]  [address] [facility], [address], [address] [facility], [place], [place], [address] [phone]          Discharge Medication List as of 5/18/2023  1:36 PM      START taking these medications    Details   naproxen (NAPROSYN) 250 mg tablet Take 1 tablet (250 mg total) by mouth 2 (two) times a day with meals, Starting u 5/18/2023, Normal         CONTINUE these medications which have CHANGED    Details   !! methocarbamol (ROBAXIN) 500 mg tablet Take 1 tablet (500 mg total) by mouth 2 (two) times a day, Starting u 5/18/2023, Normal       !! - Potential duplicate medications found  Please discuss with provider  CONTINUE these medications which have NOT CHANGED    Details   acetaminophen (TYLENOL) 500 mg tablet Take 1 tablet (500 mg total) by mouth every 6 (six) hours as needed for mild pain or moderate pain, Starting Thu 3/23/2023, Normal      amLODIPine (NORVASC) 10 mg tablet Take 1 tablet (10 mg total) by mouth in the morning , Starting Sat 5/14/2022, No Print      calcium carbonate (TUMS) 500 mg chewable tablet Chew 2 tablets (1,000 mg total)  as needed in the morning and 2 tablets (1,000 mg total) as needed at noon and 2 tablets (1,000 mg total) as needed in the evening (indigestion,heartburn)  , Starting Fri 5/13/2022, No Print      citalopram (CeleXA) 40 mg tablet Take 1 tablet (40 mg total) by mouth in the morning , Starting Fri 5/13/2022, No Print      clotrimazole (LOTRIMIN) 1 % cream APPLY TO AFFECTED AREA TWICE A DAY, Normal      diphenhydrAMINE (BENADRYL) 2 % cream Apply topically 3 (three) times a day as needed for itching, Starting Thu 3/23/2023, Normal      gabapentin (NEURONTIN) 100 mg capsule Take 1 capsule (100 mg total) by mouth 3 (three) times a day for 10 days, Starting Thu 5/26/2022, Until Sun 6/5/2022, Normal      levothyroxine 150 mcg tablet Take 1 tablet (150 mcg total) by mouth daily in the early morning, Starting Sat 5/14/2022, No Print      Lidocaine 4 % PTCH Apply 1 patch topically daily, Starting Fri 5/13/2022, No Print      !! methocarbamol (ROBAXIN) 500 mg tablet Take 1 tablet (500 mg total) by mouth every 6 (six) hours, Starting Thu 5/26/2022, Normal      ondansetron (ZOFRAN-ODT) 4 mg disintegrating tablet Take 1 tablet (4 mg total) by mouth every 6 (six) hours as needed for nausea or vomiting, Starting Thu 3/23/2023, Normal      pantoprazole (PROTONIX) 40 mg tablet Take 1 tablet (40 mg total) by mouth daily in the early morning, Starting Fri 5/13/2022, Until Sun 6/12/2022, Normal      senna (SENOKOT) 8 6 mg Take 1 tablet (8 6 mg total) by mouth in the morning for 5 days  , Starting Sat 5/14/2022, Until Thu 5/19/2022, Normal      sucralfate (CARAFATE) 1 g tablet Take 1 tablet (1 g total) by mouth 4 (four) times a day for 7 days, Starting Thu 3/23/2023, Until Thu 3/30/2023, Normal      traZODone (DESYREL) 150 mg tablet Take 0 5 tablets (75 mg total) by mouth daily at bedtime, Starting Fri 5/13/2022, No Print       !! - Potential duplicate medications found  Please discuss with provider                PDMP Review       Value Time User    PDMP Reviewed  Yes 5/25/2022  2:37 PM Jazmyn Sharma, PA-C          ED Provider  Electronically Signed by           Javad Lanier PA-C  05/18/23 2203

## 2023-05-18 NOTE — DISCHARGE INSTRUCTIONS
Today I provided you with a prescription for naproxen  Please take as directed  Do not take ibuprofen when taken naproxen as this can lead to kidney injury  I provided you with a short prescription of Robaxin  Take this medication as directed  Avoid taking robaxin prior to driving  You can take tylenol with naproxen  Taken tylenol as directed on packaging  You can use ace wrap for compression of your knee  There are many types of braces that you can obtain which can help with comfort  You can use ice, heat, elevation to help reduce swelling in your left knee     Follow up with orthopedics for definitive treatment of your left knee pain

## 2023-05-20 RX ORDER — METHOCARBAMOL 500 MG/1
500 TABLET, FILM COATED ORAL 2 TIMES DAILY
Qty: 12 TABLET | Refills: 0 | Status: SHIPPED | OUTPATIENT
Start: 2023-05-20

## 2023-05-25 ENCOUNTER — OFFICE VISIT (OUTPATIENT)
Dept: OBGYN CLINIC | Facility: CLINIC | Age: 58
End: 2023-05-25

## 2023-05-25 VITALS
SYSTOLIC BLOOD PRESSURE: 142 MMHG | DIASTOLIC BLOOD PRESSURE: 82 MMHG | BODY MASS INDEX: 43.05 KG/M2 | WEIGHT: 243 LBS | HEIGHT: 63 IN

## 2023-05-25 DIAGNOSIS — M17.12 PRIMARY OSTEOARTHRITIS OF LEFT KNEE: Primary | ICD-10-CM

## 2023-05-25 RX ORDER — BUPROPION HYDROCHLORIDE 300 MG/1
TABLET ORAL
COMMUNITY
Start: 2023-03-20

## 2023-05-25 RX ORDER — BUDESONIDE 0.5 MG/2ML
500 INHALANT ORAL
COMMUNITY
Start: 2023-04-07 | End: 2023-07-06

## 2023-05-25 RX ORDER — PSEUDOEPHEDRINE HCL 30 MG
100 TABLET ORAL
COMMUNITY

## 2023-05-25 RX ORDER — CLONAZEPAM 2 MG/1
2 TABLET ORAL
COMMUNITY

## 2023-05-25 RX ORDER — ASPIRIN 81 MG/1
81 TABLET ORAL
COMMUNITY

## 2023-05-25 RX ORDER — CLONAZEPAM 1 MG/1
1 TABLET ORAL
COMMUNITY

## 2023-05-25 RX ORDER — HYDROXYZINE 50 MG/1
50 TABLET, FILM COATED ORAL 3 TIMES DAILY PRN
COMMUNITY

## 2023-05-25 RX ORDER — MULTIVITAMIN
1 CAPSULE ORAL DAILY
COMMUNITY

## 2023-05-25 RX ORDER — LEVOTHYROXINE SODIUM 0.15 MG/1
150 TABLET ORAL
COMMUNITY

## 2023-05-25 RX ORDER — BENZONATATE 100 MG/1
100 CAPSULE ORAL 3 TIMES DAILY PRN
COMMUNITY
Start: 2022-12-07

## 2023-05-25 RX ORDER — LIDOCAINE HYDROCHLORIDE 10 MG/ML
3 INJECTION, SOLUTION INFILTRATION; PERINEURAL
Status: COMPLETED | OUTPATIENT
Start: 2023-05-25 | End: 2023-05-25

## 2023-05-25 RX ORDER — LOSARTAN POTASSIUM AND HYDROCHLOROTHIAZIDE 25; 100 MG/1; MG/1
1 TABLET ORAL DAILY
COMMUNITY

## 2023-05-25 RX ORDER — AZITHROMYCIN 250 MG/1
TABLET, FILM COATED ORAL
COMMUNITY
Start: 2023-04-03

## 2023-05-25 RX ORDER — ACETAMINOPHEN 325 MG/1
650 TABLET ORAL
COMMUNITY

## 2023-05-25 RX ORDER — LEVOTHYROXINE SODIUM 0.2 MG/1
200 TABLET ORAL DAILY
COMMUNITY

## 2023-05-25 RX ORDER — TRAMADOL HYDROCHLORIDE 50 MG/1
50 TABLET ORAL
COMMUNITY

## 2023-05-25 RX ORDER — CARVEDILOL 12.5 MG/1
12.5 TABLET ORAL 2 TIMES DAILY
COMMUNITY

## 2023-05-25 RX ORDER — OMEPRAZOLE 20 MG/1
20 CAPSULE, DELAYED RELEASE ORAL
COMMUNITY

## 2023-05-25 RX ORDER — AMLODIPINE BESYLATE 10 MG/1
10 TABLET ORAL DAILY
COMMUNITY

## 2023-05-25 RX ORDER — HYDROXYZINE 50 MG/1
TABLET, FILM COATED ORAL
COMMUNITY
Start: 2023-03-20

## 2023-05-25 RX ORDER — CLONAZEPAM 1 MG/1
1 TABLET ORAL 2 TIMES DAILY
COMMUNITY
Start: 2023-04-03

## 2023-05-25 RX ORDER — METHYLPREDNISOLONE ACETATE 40 MG/ML
1 INJECTION, SUSPENSION INTRA-ARTICULAR; INTRALESIONAL; INTRAMUSCULAR; SOFT TISSUE
Status: COMPLETED | OUTPATIENT
Start: 2023-05-25 | End: 2023-05-25

## 2023-05-25 RX ORDER — AMOXICILLIN AND CLAVULANATE POTASSIUM 875; 125 MG/1; MG/1
1 TABLET, FILM COATED ORAL EVERY 12 HOURS
COMMUNITY
Start: 2023-04-17

## 2023-05-25 RX ADMIN — METHYLPREDNISOLONE ACETATE 1 ML: 40 INJECTION, SUSPENSION INTRA-ARTICULAR; INTRALESIONAL; INTRAMUSCULAR; SOFT TISSUE at 10:30

## 2023-05-25 RX ADMIN — LIDOCAINE HYDROCHLORIDE 3 ML: 10 INJECTION, SOLUTION INFILTRATION; PERINEURAL at 10:30

## 2023-05-25 NOTE — PROGRESS NOTES
"Patient Name:  Pedrito Stevenson  MRN:  [id number]    Assessment & Plan     Left knee DJD  1  Corticosteroid injection performed today into the left knee  2  Continue naproxen  3  Short hinged knee brace provided today  4  Advised corticosteroid injections may be performed every 3 months as needed for pain  Patient may follow-up in 3 months  Chief Complaint     Left knee pain    History of the Present Illness     Pedrito Stevenson is a 59 y o  female who reports to the office today for evaluation of her left knee  She notes an onset of pain approximately 1 month ago  She denies any injury or trauma but states the pain began after a large dog jumped on her  Since then she notes generalized left knee pain with associated swelling and stiffness  Pain is worse with increased activity as well as prolonged standing, walking, and walking up and down steps  She notes stiffness first thing in the morning  She denies any weakness or instability  She did report to the emergency department initially and was prescribed lidocaine patches, Robaxin, and naproxen  She notes limited improvement with these medications thus far  No numbness or tingling  No fevers or chills  Physical Exam     /82   Ht 5' 3\" (1 6 m)   Wt 110 kg (243 lb)   LMP 08/03/2016   BMI 43 05 kg/m²     Left knee: No gross deformity  Skin intact  No erythema ecchymosis or swelling  No effusion  No significant joint line tenderness  Range of motion 0-1 20 with pain noted in the patellofemoral compartment at terminal flexion  Crepitation noted in the patellofemoral compartment as well  Stable to varus and valgus stress without pain  Stable Lachman test   Negative posterior drawer test   Negative Samson's test   Positive patellar grind test     Eyes: Anicteric sclerae  ENT: Trachea midline  Lungs: Normal respiratory effort  CV: Capillary refill is less than 2 seconds  Skin: Intact without erythema    Lymph: No palpable " lymphadenopathy  Neuro: Sensation is grossly intact to light touch  Psych: Mood and affect are appropriate  Data Review     I have personally reviewed pertinent films in PACS, and my interpretation follows:    X-rays left knee 5/18/2023: Tricompartmental degenerative changes severe in the patellofemoral compartment and moderate in the medial and lateral compartments  No fracture or dislocation  No significant degenerative changes  Past Medical History:   Diagnosis Date   • Disease of thyroid gland    • Hernia    • Hypertension        Past Surgical History:   Procedure Laterality Date   • CHOLECYSTECTOMY     • CORONARY ANGIOPLASTY WITH STENT PLACEMENT     • IR DRAINAGE TUBE CHECK/CHANGE/REPOSITION/REINSERTION/UPSIZE  5/10/2022   • IR DRAINAGE TUBE CHECK/CHANGE/REPOSITION/REINSERTION/UPSIZE  6/14/2022   • IR DRAINAGE TUBE PLACEMENT  5/7/2022   • IR DRAINAGE TUBE PLACEMENT  6/11/2022       No Known Allergies    Current Outpatient Medications on File Prior to Visit   Medication Sig Dispense Refill   • acetaminophen (TYLENOL) 325 mg tablet Take 650 mg by mouth     • acetaminophen (TYLENOL) 500 mg tablet Take 1 tablet (500 mg total) by mouth every 6 (six) hours as needed for mild pain or moderate pain 30 tablet 0   • amLODIPine (NORVASC) 10 mg tablet Take 1 tablet (10 mg total) by mouth in the morning    0   • amLODIPine (NORVASC) 10 mg tablet Take 10 mg by mouth daily     • amoxicillin-clavulanate (AUGMENTIN) 875-125 mg per tablet Take 1 tablet by mouth every 12 (twelve) hours     • aspirin (ECOTRIN LOW STRENGTH) 81 mg EC tablet Take 81 mg by mouth     • azithromycin (ZITHROMAX) 250 mg tablet TAKE 2 TABLETS BY MOUTH TODAY, THEN TAKE 1 TABLET DAILY FOR 4 DAYS     • benzonatate (TESSALON PERLES) 100 mg capsule Take 100 mg by mouth Three times daily as needed     • budesonide (PULMICORT) 0 5 mg/2 mL nebulizer solution Inhale 500 mcg     • buPROPion (WELLBUTRIN XL) 300 mg 24 hr tablet TAKE ORALLY 1 EXTENDED RELEASE ORAL TABLET IN AM FOR 30 DAYS  FOR DEPRESSION  TAKE IT WITH CELEXA  • calcium carbonate (TUMS) 500 mg chewable tablet Chew 2 tablets (1,000 mg total)  as needed in the morning and 2 tablets (1,000 mg total) as needed at noon and 2 tablets (1,000 mg total) as needed in the evening (indigestion,heartburn)  0   • carvedilol (COREG) 12 5 mg tablet Take 12 5 mg by mouth 2 (two) times a day     • citalopram (CeleXA) 40 mg tablet Take 1 tablet (40 mg total) by mouth in the morning  0   • clonazePAM (KlonoPIN) 1 mg tablet Take 1 mg by mouth     • clonazePAM (KlonoPIN) 1 mg tablet Take 1 mg by mouth 2 (two) times a day     • clonazePAM (KlonoPIN) 2 mg tablet Take 2 mg by mouth     • clotrimazole (LOTRIMIN) 1 % cream APPLY TO AFFECTED AREA TWICE A DAY 30 g 0   • diphenhydrAMINE (BENADRYL) 2 % cream Apply topically 3 (three) times a day as needed for itching 30 g 0   • Docusate Sodium (DSS) 100 MG CAPS Take 100 mg by mouth     • hydrOXYzine HCL (ATARAX) 50 mg tablet TAKE ORALLY 2 ORAL TABLET AT NIGHT FOR 30 DAYS   FOR SLEEP     • hydrOXYzine HCL (ATARAX) 50 mg tablet Take 50 mg by mouth Three times daily as needed     • levothyroxine 150 mcg tablet Take 1 tablet (150 mcg total) by mouth daily in the early morning  0   • levothyroxine 150 mcg tablet Take 150 mcg by mouth     • levothyroxine 200 mcg tablet Take 200 mcg by mouth daily     • Lidocaine 4 % PTCH Apply 1 patch topically daily  0   • losartan-hydrochlorothiazide (HYZAAR) 100-25 MG per tablet Take 1 tablet by mouth daily     • methocarbamol (ROBAXIN) 500 mg tablet Take 1 tablet (500 mg total) by mouth every 6 (six) hours 40 tablet 0   • methocarbamol (ROBAXIN) 500 mg tablet Take 1 tablet (500 mg total) by mouth 2 (two) times a day 12 tablet 0   • Multiple Vitamin (multivitamin) capsule Take 1 tablet by mouth daily     • naproxen (NAPROSYN) 250 mg tablet Take 1 tablet (250 mg total) by mouth 2 (two) times a day with meals 24 tablet 0   • omeprazole (PriLOSEC) 20 mg delayed release capsule Take 20 mg by mouth     • ondansetron (ZOFRAN-ODT) 4 mg disintegrating tablet Take 1 tablet (4 mg total) by mouth every 6 (six) hours as needed for nausea or vomiting 20 tablet 0   • traMADol (ULTRAM) 50 mg tablet Take 50 mg by mouth     • traZODone (DESYREL) 150 mg tablet Take 0 5 tablets (75 mg total) by mouth daily at bedtime  0   • gabapentin (NEURONTIN) 100 mg capsule Take 1 capsule (100 mg total) by mouth 3 (three) times a day for 10 days 30 capsule 0   • methocarbamol (ROBAXIN) 500 mg tablet Take 1 tablet (500 mg total) by mouth 4 (four) times a day for 6 days 24 tablet 0   • pantoprazole (PROTONIX) 40 mg tablet Take 1 tablet (40 mg total) by mouth daily in the early morning 30 tablet 0   • senna (SENOKOT) 8 6 mg Take 1 tablet (8 6 mg total) by mouth in the morning for 5 days  5 tablet 0   • sucralfate (CARAFATE) 1 g tablet Take 1 tablet (1 g total) by mouth 4 (four) times a day for 7 days 28 tablet 0   • [DISCONTINUED] sodium chloride, PF, 0 9 % 10 mL by Intracatheter route daily for 120 doses Intracatheter flushing daily 300 mL 3     No current facility-administered medications on file prior to visit  Social History     Tobacco Use   • Smoking status: Former     Types: Cigarettes   • Smokeless tobacco: Never   Vaping Use   • Vaping Use: Never used   Substance Use Topics   • Alcohol use: Not Currently   • Drug use: Not Currently       No family history on file  Review of Systems     As stated in the HPI  All other systems reviewed and are negative        Large joint arthrocentesis: L knee  Procedure Details  Location: knee - L knee  Needle size: 22 G  Ultrasound guidance: no  Approach: anterolateral  Medications administered: 3 mL lidocaine 1 %; 1 mL methylPREDNISolone acetate 40 mg/mL    Patient tolerance: patient tolerated the procedure well with no immediate complications  Dressing:  Sterile dressing applied

## 2023-05-26 ENCOUNTER — CONSULT (OUTPATIENT)
Dept: PULMONOLOGY | Facility: CLINIC | Age: 58
End: 2023-05-26

## 2023-05-26 VITALS
HEIGHT: 63 IN | WEIGHT: 246.8 LBS | SYSTOLIC BLOOD PRESSURE: 138 MMHG | OXYGEN SATURATION: 97 % | BODY MASS INDEX: 43.73 KG/M2 | HEART RATE: 86 BPM | DIASTOLIC BLOOD PRESSURE: 80 MMHG

## 2023-05-26 DIAGNOSIS — R06.09 DOE (DYSPNEA ON EXERTION): Primary | ICD-10-CM

## 2023-05-26 DIAGNOSIS — E66.01 MORBID OBESITY (HCC): ICD-10-CM

## 2023-05-26 DIAGNOSIS — J45.20 MILD INTERMITTENT REACTIVE AIRWAY DISEASE WITHOUT COMPLICATION: ICD-10-CM

## 2023-05-26 DIAGNOSIS — G47.33 OSA (OBSTRUCTIVE SLEEP APNEA): ICD-10-CM

## 2023-05-26 DIAGNOSIS — J42 CHRONIC BRONCHITIS (HCC): ICD-10-CM

## 2023-05-26 DIAGNOSIS — I82.4Y2 DEEP VEIN THROMBOSIS (DVT) OF PROXIMAL VEIN OF LEFT LOWER EXTREMITY, UNSPECIFIED CHRONICITY (HCC): ICD-10-CM

## 2023-05-26 RX ORDER — ALBUTEROL SULFATE 90 UG/1
2 AEROSOL, METERED RESPIRATORY (INHALATION) EVERY 6 HOURS PRN
Qty: 18 G | Refills: 1 | Status: SHIPPED | OUTPATIENT
Start: 2023-05-26

## 2023-05-26 NOTE — PROGRESS NOTES
Pulmonary Consultation   Samuel Urbina 59 y o  female MRN: [id number]    Encounter: [id number]      Reason for consultation:   Recent severe upper respiratory infection with reactive airway disease  Requesting physician:  Jayson Camacho MD      Impressions:   · Reactive airway disease  · Dyspnea on exertion  · Deep venous thrombosis  · Morbid obesity  · Obstructive sleep apnea  Recommendations:  · Hold the Comiso  · Albuterol rescue inhaler 2 inhalations 4 times a day as needed  · Split sleep study  · Referral to the weight management program   · Weight loss  · Follow-up in 6 months  Discussion:  The patient's symptoms are consistent with reactive airway disease triggered by the severe upper respiratory infection  It has resolved and she is in remission  I told her to stop the Comiso inhaler  I have ordered albuterol rescue inhaler 2 inhalations 4 times a day as needed for wheezing and chest tightness  Her dyspnea on exertion is due to her morbid obesity and deconditioning  The patient acknowledges that she needs to lose weight  However she is having difficulty  I have referred her to the weight management program   The patient has a history of sleeve bariatric surgery in the past   Also she has untreated obstructive sleep apnea  I have scheduled her for a split sleep study  I will see her in 6 months and a follow-up visit  History of Present Illness   HPI:  Samuel Urbina is a 59 y o  female who is here for evaluation of severe reactive airway disease  The patient stated that she had upper respiratory infection and that led to severe chest tightness and wheezing on cough  She was seen in the emergency room  Her symptoms lingered for over a month  She was treated with steroids as well as inhalers  About 2 weeks ago the cough has resolved  She is still taken Breztri twice a day  She denies history of bronchial asthma or other chronic lung disease    Over the last "5 years she did not need to be hospitalized for respiratory infections  The patient was recently relocated from [place]  Over there she had morbid obesity and had sleeve bariatric surgery done  Unfortunately she has gained weight recently  Also she was diagnosed with obstructive sleep apnea about a year ago but she did not have a CPAP machine  Review of systems:  12 point review of systems was completed and was otherwise negative except as listed in HPI  Historical Information   Past Medical History:   Diagnosis Date   • Disease of thyroid gland    • Hernia    • Hypertension      Past Surgical History:   Procedure Laterality Date   • CHOLECYSTECTOMY     • CORONARY ANGIOPLASTY WITH STENT PLACEMENT     • IR DRAINAGE TUBE CHECK/CHANGE/REPOSITION/REINSERTION/UPSIZE  5/10/2022   • IR DRAINAGE TUBE CHECK/CHANGE/REPOSITION/REINSERTION/UPSIZE  6/14/2022   • IR DRAINAGE TUBE PLACEMENT  5/7/2022   • IR DRAINAGE TUBE PLACEMENT  6/11/2022     History reviewed  No pertinent family history  Family History:  No family history of chronic lung disease  Social History:  The patient just relocated from [place]  Currently she lives with her daughter  No significant smoking history  She was a manager at [employer]  Her daughter has a dog  Meds/Allergies   No current facility-administered medications for this visit       (Not in a hospital admission)    No Known Allergies    Vitals: Blood pressure 138/80, pulse 86, height 5' 3\" (1 6 m), weight 112 kg (246 lb 12 8 oz), last menstrual period 08/03/2016, SpO2 97 %, not currently breastfeeding ,      Physical exam:        Head/eyes:    Normocephalic, without obvious abnormality, atraumatic,         PERRL, extraocular muscles intact, no scleral icterus    Nose:   Nares normal, septum midline, mucosa normal, no drainage    or sinus tenderness   Throat:   Moist mucous membranes, no thrush   Neck:   Supple, trachea midline, no adenopathy; no carotid    " bruit or JVD   Lungs:    Decreased breath sounds  No wheezing or rhonchi  Heart:    Regular rate and rhythm, S1 and S2 normal, no murmur, rub   or gallop   Abdomen:     Soft, non-tender, bowel sounds active all four quadrants,     no masses, no organomegaly   Extremities:   Extremities normal, atraumatic, no cyanosis or edema   Skin:   Warm, dry, turgor normal, no rashes or lesions   Neurologic:   CNII-XII intact, normal strength, non-focal         Imaging and other studies: I have personally reviewed pertinent films in PACS chest x-ray is reviewed on the PACS system  It showed no acute pulmonary findings      Lab Results   Component Value Date    HCT 40 8 05/03/2023    HGB 13 2 05/03/2023    MCV 86 05/03/2023     05/03/2023    WBC 8 06 05/03/2023     Lab Results   Component Value Date    BUN 12 05/03/2023    CALCIUM 9 2 05/03/2023     05/03/2023    CO2 28 05/03/2023    CREATININE 0 82 05/03/2023    GLUC 88 05/03/2023    K 4 0 05/03/2023    SODIUM 137 05/03/2023             Lizbeth Miles MD

## 2023-05-31 ENCOUNTER — TELEPHONE (OUTPATIENT)
Dept: SLEEP CENTER | Facility: CLINIC | Age: 58
End: 2023-05-31

## 2023-05-31 NOTE — TELEPHONE ENCOUNTER
----- Message from Rukhsana Landon MD sent at 5/31/2023  7:36 AM EDT -----  approved  ----- Message -----  From: Keisha Ryan  Sent: 5/30/2023   3:51 PM EDT  To: Sleep Medicine [facility] Provider    This Split sleep study needs approval      If approved please sign and return to clerical pool  If denied please include reasons why  Also provide alternative testing if warranted  Please sign and return to clerical pool

## 2023-06-21 ENCOUNTER — TELEPHONE (OUTPATIENT)
Dept: BARIATRICS | Facility: CLINIC | Age: 58
End: 2023-06-21

## 2023-06-21 NOTE — TELEPHONE ENCOUNTER
Transfer patient who had the sleeve in the year of 2021 by Dr Oneill at [facility]  patient is interested in seeing zaida at our office  Paper work is placed in  Hamzah banerjee

## 2023-07-14 ENCOUNTER — TELEPHONE (OUTPATIENT)
Dept: BARIATRICS | Facility: CLINIC | Age: 58
End: 2023-07-14

## 2023-08-07 ENCOUNTER — TELEPHONE (OUTPATIENT)
Dept: BARIATRICS | Facility: CLINIC | Age: 58
End: 2023-08-07

## 2023-08-28 ENCOUNTER — OFFICE VISIT (OUTPATIENT)
Dept: OBGYN CLINIC | Facility: CLINIC | Age: 58
End: 2023-08-28

## 2023-08-28 VITALS
BODY MASS INDEX: 44.3 KG/M2 | SYSTOLIC BLOOD PRESSURE: 130 MMHG | WEIGHT: 250 LBS | DIASTOLIC BLOOD PRESSURE: 74 MMHG | HEIGHT: 63 IN

## 2023-08-28 DIAGNOSIS — M70.62 GREATER TROCHANTERIC BURSITIS OF LEFT HIP: ICD-10-CM

## 2023-08-28 DIAGNOSIS — M17.12 PRIMARY OSTEOARTHRITIS OF LEFT KNEE: Primary | ICD-10-CM

## 2023-08-28 RX ORDER — LIDOCAINE HYDROCHLORIDE 10 MG/ML
2 INJECTION, SOLUTION INFILTRATION; PERINEURAL
Status: COMPLETED | OUTPATIENT
Start: 2023-08-28 | End: 2023-08-28

## 2023-08-28 RX ORDER — BUPIVACAINE HYDROCHLORIDE 2.5 MG/ML
2 INJECTION, SOLUTION INFILTRATION; PERINEURAL
Status: COMPLETED | OUTPATIENT
Start: 2023-08-28 | End: 2023-08-28

## 2023-08-28 RX ORDER — TRIAMCINOLONE ACETONIDE 40 MG/ML
40 INJECTION, SUSPENSION INTRA-ARTICULAR; INTRAMUSCULAR
Status: COMPLETED | OUTPATIENT
Start: 2023-08-28 | End: 2023-08-28

## 2023-08-28 RX ADMIN — BUPIVACAINE HYDROCHLORIDE 2 ML: 2.5 INJECTION, SOLUTION INFILTRATION; PERINEURAL at 10:30

## 2023-08-28 RX ADMIN — LIDOCAINE HYDROCHLORIDE 2 ML: 10 INJECTION, SOLUTION INFILTRATION; PERINEURAL at 10:30

## 2023-08-28 RX ADMIN — TRIAMCINOLONE ACETONIDE 40 MG: 40 INJECTION, SUSPENSION INTRA-ARTICULAR; INTRAMUSCULAR at 10:30

## 2023-08-28 NOTE — PROGRESS NOTES
Patient Name:  Fely Ruffin  MRN:  [id number]    Assessment & Plan     Left knee DJD. Left hip pain, likely greater trochanteric bursitis. 1. Corticosteroid injection performed today into the left knee joint. 2. Patient exhibits severe pain in left hip greater trochanteric bursa. Corticosteroid injection was performed today into the left hip greater trochanteric bursa. 3. Referral to physical therapy with regards to the left hip greater trochanteric bursitis. 4. Counter anti-inflammatories as needed. 5. Activities as tolerated with modification avoid pain. 6. Follow-up in 3 months for repeat injections at that time as indicated. Chief Complaint     Left knee pain, left hip pain. History of the Present Illness     Fely Ruffin is a 59 y.o. female who returns to the office today for follow-up regarding her left knee. She was last seen on 5/25/2023. At that time she received a left knee intra-articular corticosteroid injection. She noted significant improvement up until a few days ago. She notes a gradual return of her pain. She notes generalized pain in the left knee with associated swelling and stiffness. She denies any weakness or instability. Pain is worse with prolonged standing and ambulating. No numbness or tingling. No fevers or chills. She is interested in a repeat corticosteroid injection today. She also notes new onset left hip pain that began a few weeks ago. She denies any injury or trauma. Pain is localized to the lateral aspect of the hip. She denies any significant radiation into the groin. She notes radiation distally on the lateral thigh to the knee. Pain is worse with direct pressure and lying on her left side at night. She denies any weakness or instability in the hip. Physical Exam     /74   Ht 5' 3" (1.6 m)   Wt 113 kg (250 lb)   LMP 08/03/2016   BMI 44.29 kg/m²     Left knee: No gross deformity.   Skin intact without erythema ecchymosis or swelling. No effusion. Generalized diffuse tenderness to palpation. Range of motion 0-120 without significant pain. Crepitation noted with passive range of motion. Stable to varus and valgus stress without pain. Stable Lachman test.  Negative posterior drawer test.  Negative London's test.  Positive patellar grind test.    Left hip: No gross deformity. No tenderness anterior hip. There is severe tenderness over the greater trochanter. Hip range of motion is intact and nearly full in all planes with slight discomfort laterally. Negative FADIR test.  Pain noted laterally with TAO test.  Negative logroll test.  Positive Gerald test.    Eyes: Anicteric sclerae. ENT: Trachea midline. Lungs: Normal respiratory effort. CV: Capillary refill is less than 2 seconds. Skin: Intact without erythema. Lymph: No palpable lymphadenopathy. Neuro: Sensation is grossly intact to light touch. Psych: Mood and affect are appropriate. Data Review     I have personally reviewed pertinent films in PACS, and my interpretation follows:    X-rays left hip 8/28/2023: No acute osseous abnormality. No fracture or dislocation. No significant degenerative changes. X-rays left knee 5/18/2023: Tricompartmental degenerative changes severe in the patellofemoral compartment and moderate in the medial and lateral compartments. No fracture or dislocation. No significant degenerative changes.     Past Medical History:   Diagnosis Date   • Disease of thyroid gland    • Hernia    • Hypertension        Past Surgical History:   Procedure Laterality Date   • CHOLECYSTECTOMY     • CORONARY ANGIOPLASTY WITH STENT PLACEMENT     • IR DRAINAGE TUBE CHECK/CHANGE/REPOSITION/REINSERTION/UPSIZE  5/10/2022   • IR DRAINAGE TUBE CHECK/CHANGE/REPOSITION/REINSERTION/UPSIZE  6/14/2022   • IR DRAINAGE TUBE PLACEMENT  5/7/2022   • IR DRAINAGE TUBE PLACEMENT  6/11/2022       No Known Allergies    Current Outpatient Medications on File Prior to Visit Medication Sig Dispense Refill   • acetaminophen (TYLENOL) 325 mg tablet Take 650 mg by mouth (Patient not taking: Reported on 5/26/2023)     • acetaminophen (TYLENOL) 500 mg tablet Take 1 tablet (500 mg total) by mouth every 6 (six) hours as needed for mild pain or moderate pain (Patient not taking: Reported on 5/26/2023) 30 tablet 0   • albuterol (Ventolin HFA) 90 mcg/act inhaler Inhale 2 puffs every 6 (six) hours as needed for wheezing 18 g 1   • amLODIPine (NORVASC) 10 mg tablet Take 1 tablet (10 mg total) by mouth in the morning. 0   • amLODIPine (NORVASC) 10 mg tablet Take 10 mg by mouth daily     • amoxicillin-clavulanate (AUGMENTIN) 875-125 mg per tablet Take 1 tablet by mouth every 12 (twelve) hours     • aspirin (ECOTRIN LOW STRENGTH) 81 mg EC tablet Take 81 mg by mouth     • azithromycin (ZITHROMAX) 250 mg tablet TAKE 2 TABLETS BY MOUTH TODAY, THEN TAKE 1 TABLET DAILY FOR 4 DAYS     • benzonatate (TESSALON PERLES) 100 mg capsule Take 100 mg by mouth Three times daily as needed     • budesonide (PULMICORT) 0.5 mg/2 mL nebulizer solution Inhale 500 mcg     • buPROPion (WELLBUTRIN XL) 300 mg 24 hr tablet TAKE ORALLY 1 EXTENDED RELEASE ORAL TABLET IN AM FOR 30 DAYS. FOR DEPRESSION. TAKE IT WITH CELEXA. • calcium carbonate (TUMS) 500 mg chewable tablet Chew 2 tablets (1,000 mg total)  as needed in the morning and 2 tablets (1,000 mg total) as needed at noon and 2 tablets (1,000 mg total) as needed in the evening (indigestion,heartburn). 0   • carvedilol (COREG) 12.5 mg tablet Take 12.5 mg by mouth 2 (two) times a day     • citalopram (CeleXA) 40 mg tablet Take 1 tablet (40 mg total) by mouth in the morning.   0   • clonazePAM (KlonoPIN) 1 mg tablet Take 1 mg by mouth     • clonazePAM (KlonoPIN) 1 mg tablet Take 1 mg by mouth 2 (two) times a day     • clonazePAM (KlonoPIN) 2 mg tablet Take 2 mg by mouth     • clotrimazole (LOTRIMIN) 1 % cream APPLY TO AFFECTED AREA TWICE A DAY 30 g 0   • diphenhydrAMINE (BENADRYL) 2 % cream Apply topically 3 (three) times a day as needed for itching (Patient not taking: Reported on 5/26/2023) 30 g 0   • Docusate Sodium (DSS) 100 MG CAPS Take 100 mg by mouth     • gabapentin (NEURONTIN) 100 mg capsule Take 1 capsule (100 mg total) by mouth 3 (three) times a day for 10 days 30 capsule 0   • hydrOXYzine HCL (ATARAX) 50 mg tablet TAKE ORALLY 2 ORAL TABLET AT NIGHT FOR 30 DAYS. FOR SLEEP     • hydrOXYzine HCL (ATARAX) 50 mg tablet Take 50 mg by mouth Three times daily as needed     • levothyroxine 150 mcg tablet Take 1 tablet (150 mcg total) by mouth daily in the early morning  0   • levothyroxine 150 mcg tablet Take 150 mcg by mouth     • levothyroxine 200 mcg tablet Take 200 mcg by mouth daily (Patient not taking: Reported on 5/26/2023)     • Lidocaine 4 % PTCH Apply 1 patch topically daily (Patient not taking: Reported on 5/26/2023)  0   • losartan-hydrochlorothiazide (HYZAAR) 100-25 MG per tablet Take 1 tablet by mouth daily     • methocarbamol (ROBAXIN) 500 mg tablet Take 1 tablet (500 mg total) by mouth every 6 (six) hours 40 tablet 0   • methocarbamol (ROBAXIN) 500 mg tablet Take 1 tablet (500 mg total) by mouth 2 (two) times a day 12 tablet 0   • Multiple Vitamin (multivitamin) capsule Take 1 tablet by mouth daily     • naproxen (NAPROSYN) 250 mg tablet Take 1 tablet (250 mg total) by mouth 2 (two) times a day with meals 24 tablet 0   • omeprazole (PriLOSEC) 20 mg delayed release capsule Take 20 mg by mouth     • ondansetron (ZOFRAN-ODT) 4 mg disintegrating tablet Take 1 tablet (4 mg total) by mouth every 6 (six) hours as needed for nausea or vomiting 20 tablet 0   • pantoprazole (PROTONIX) 40 mg tablet Take 1 tablet (40 mg total) by mouth daily in the early morning 30 tablet 0   • senna (SENOKOT) 8.6 mg Take 1 tablet (8.6 mg total) by mouth in the morning for 5 days.  5 tablet 0   • sucralfate (CARAFATE) 1 g tablet Take 1 tablet (1 g total) by mouth 4 (four) times a day for 7 days 28 tablet 0   • traMADol (ULTRAM) 50 mg tablet Take 50 mg by mouth (Patient not taking: Reported on 2023)     • traZODone (DESYREL) 150 mg tablet Take 0.5 tablets (75 mg total) by mouth daily at bedtime  0   • [DISCONTINUED] sodium chloride, PF, 0.9 % 10 mL by Intracatheter route daily for 120 doses Intracatheter flushing daily 300 mL 3     No current facility-administered medications on file prior to visit. Social History     Tobacco Use   • Smoking status: Former     Types: Cigarettes     Quit date:      Years since quittin.6   • Smokeless tobacco: Never   • Tobacco comments:     Social only   Vaping Use   • Vaping Use: Never used   Substance Use Topics   • Alcohol use: Not Currently   • Drug use: Not Currently       History reviewed. No pertinent family history. Review of Systems     As stated in the HPI. All other systems reviewed and are negative.       Large joint arthrocentesis: L knee  Procedure Details  Location: knee - L knee  Needle size: 22 G  Ultrasound guidance: no  Approach: anterolateral  Medications administered: 2 mL bupivacaine 0.25 %; 2 mL lidocaine 1 %; 40 mg triamcinolone acetonide 40 mg/mL    Patient tolerance: patient tolerated the procedure well with no immediate complications  Dressing:  Sterile dressing applied    Large joint arthrocentesis: L greater trochanteric bursa  Procedure Details  Location: hip - L greater trochanteric bursa  Needle size: 22 G  Ultrasound guidance: no  Approach: lateral  Medications administered: 2 mL lidocaine 1 %; 40 mg triamcinolone acetonide 40 mg/mL    Patient tolerance: patient tolerated the procedure well with no immediate complications  Dressing:  Sterile dressing applied

## 2023-09-15 ENCOUNTER — TELEPHONE (OUTPATIENT)
Dept: PULMONOLOGY | Facility: CLINIC | Age: 58
End: 2023-09-15

## 2023-09-15 NOTE — TELEPHONE ENCOUNTER
Left message for patient to call back and set up follow up with Dr Farnaz Morel in the [facility]

## 2023-10-04 ENCOUNTER — HOSPITAL ENCOUNTER (EMERGENCY)
Facility: HOSPITAL | Age: 58
End: 2023-10-04
Attending: EMERGENCY MEDICINE
Payer: COMMERCIAL

## 2023-10-04 ENCOUNTER — APPOINTMENT (EMERGENCY)
Dept: CT IMAGING | Facility: HOSPITAL | Age: 58
End: 2023-10-04
Payer: COMMERCIAL

## 2023-10-04 ENCOUNTER — HOSPITAL ENCOUNTER (INPATIENT)
Facility: HOSPITAL | Age: 58
LOS: 2 days | Discharge: HOME | End: 2023-10-06
Attending: SURGERY | Admitting: SURGERY
Payer: COMMERCIAL

## 2023-10-04 VITALS
TEMPERATURE: 97.8 F | SYSTOLIC BLOOD PRESSURE: 145 MMHG | DIASTOLIC BLOOD PRESSURE: 86 MMHG | BODY MASS INDEX: 44.32 KG/M2 | OXYGEN SATURATION: 97 % | WEIGHT: 250.22 LBS | RESPIRATION RATE: 16 BRPM | HEART RATE: 85 BPM

## 2023-10-04 DIAGNOSIS — S06.5XAA SUBDURAL HEMATOMA (HCC): ICD-10-CM

## 2023-10-04 DIAGNOSIS — R00.2 PALPITATIONS: Primary | ICD-10-CM

## 2023-10-04 DIAGNOSIS — Z86.718 HISTORY OF DVT (DEEP VEIN THROMBOSIS): ICD-10-CM

## 2023-10-04 DIAGNOSIS — R42 LIGHTHEADEDNESS: ICD-10-CM

## 2023-10-04 DIAGNOSIS — Z86.79 HISTORY OF CAD (CORONARY ARTERY DISEASE): ICD-10-CM

## 2023-10-04 DIAGNOSIS — R51.9 HEADACHE: ICD-10-CM

## 2023-10-04 DIAGNOSIS — S06.5XAA SUBDURAL HEMATOMA (HCC): Primary | ICD-10-CM

## 2023-10-04 LAB
ALBUMIN SERPL BCP-MCNC: 3.9 G/DL (ref 3.5–5)
ALP SERPL-CCNC: 65 U/L (ref 34–104)
ALT SERPL W P-5'-P-CCNC: 15 U/L (ref 7–52)
ANION GAP SERPL CALCULATED.3IONS-SCNC: 7 MMOL/L
APTT PPP: 20 SECONDS (ref 23–37)
AST SERPL W P-5'-P-CCNC: 19 U/L (ref 13–39)
ATRIAL RATE: 84 BPM
BASOPHILS # BLD AUTO: 0.04 THOUSANDS/ÂΜL (ref 0–0.1)
BASOPHILS NFR BLD AUTO: 1 % (ref 0–1)
BILIRUB SERPL-MCNC: 0.37 MG/DL (ref 0.2–1)
BUN SERPL-MCNC: 15 MG/DL (ref 5–25)
CALCIUM SERPL-MCNC: 8.7 MG/DL (ref 8.4–10.2)
CHLORIDE SERPL-SCNC: 102 MMOL/L (ref 96–108)
CO2 SERPL-SCNC: 28 MMOL/L (ref 21–32)
CREAT SERPL-MCNC: 1.17 MG/DL (ref 0.6–1.3)
EOSINOPHIL # BLD AUTO: 0.01 THOUSAND/ÂΜL (ref 0–0.61)
EOSINOPHIL NFR BLD AUTO: 0 % (ref 0–6)
ERYTHROCYTE [DISTWIDTH] IN BLOOD BY AUTOMATED COUNT: 15.4 % (ref 11.6–15.1)
GFR SERPL CREATININE-BSD FRML MDRD: 50 ML/MIN/1.73SQ M
GLUCOSE SERPL-MCNC: 98 MG/DL (ref 65–140)
HCT VFR BLD AUTO: 43.6 % (ref 34.8–46.1)
HGB BLD-MCNC: 13.9 G/DL (ref 11.5–15.4)
IMM GRANULOCYTES # BLD AUTO: 0.06 THOUSAND/UL (ref 0–0.2)
IMM GRANULOCYTES NFR BLD AUTO: 1 % (ref 0–2)
INR PPP: 0.87 (ref 0.84–1.19)
LYMPHOCYTES # BLD AUTO: 1.65 THOUSANDS/ÂΜL (ref 0.6–4.47)
LYMPHOCYTES NFR BLD AUTO: 20 % (ref 14–44)
MAGNESIUM SERPL-MCNC: 2 MG/DL (ref 1.9–2.7)
MCH RBC QN AUTO: 28.1 PG (ref 26.8–34.3)
MCHC RBC AUTO-ENTMCNC: 31.9 G/DL (ref 31.4–37.4)
MCV RBC AUTO: 88 FL (ref 82–98)
MONOCYTES # BLD AUTO: 0.6 THOUSAND/ÂΜL (ref 0.17–1.22)
MONOCYTES NFR BLD AUTO: 7 % (ref 4–12)
NEUTROPHILS # BLD AUTO: 6.07 THOUSANDS/ÂΜL (ref 1.85–7.62)
NEUTS SEG NFR BLD AUTO: 71 % (ref 43–75)
NRBC BLD AUTO-RTO: 0 /100 WBCS
P AXIS: 65 DEGREES
PLATELET # BLD AUTO: 325 THOUSANDS/UL (ref 149–390)
PMV BLD AUTO: 8.9 FL (ref 8.9–12.7)
POTASSIUM SERPL-SCNC: 4.4 MMOL/L (ref 3.5–5.3)
PR INTERVAL: 154 MS
PROT SERPL-MCNC: 7.3 G/DL (ref 6.4–8.4)
PROTHROMBIN TIME: 11.9 SECONDS (ref 11.6–14.5)
QRS AXIS: -2 DEGREES
QRSD INTERVAL: 100 MS
QT INTERVAL: 376 MS
QTC INTERVAL: 444 MS
RBC # BLD AUTO: 4.94 MILLION/UL (ref 3.81–5.12)
SODIUM SERPL-SCNC: 137 MMOL/L (ref 135–147)
T WAVE AXIS: 10 DEGREES
TSH SERPL DL<=0.05 MIU/L-ACNC: 6.42 UIU/ML (ref 0.45–4.5)
VENTRICULAR RATE: 84 BPM
WBC # BLD AUTO: 8.43 THOUSAND/UL (ref 4.31–10.16)

## 2023-10-04 PROCEDURE — 96365 THER/PROPH/DIAG IV INF INIT: CPT

## 2023-10-04 PROCEDURE — 96375 TX/PRO/DX INJ NEW DRUG ADDON: CPT

## 2023-10-04 PROCEDURE — 85025 COMPLETE CBC W/AUTO DIFF WBC: CPT | Performed by: EMERGENCY MEDICINE

## 2023-10-04 PROCEDURE — 84439 ASSAY OF FREE THYROXINE: CPT | Performed by: EMERGENCY MEDICINE

## 2023-10-04 PROCEDURE — 99285 EMERGENCY DEPT VISIT HI MDM: CPT

## 2023-10-04 PROCEDURE — 70450 CT HEAD/BRAIN W/O DYE: CPT

## 2023-10-04 PROCEDURE — 83735 ASSAY OF MAGNESIUM: CPT | Performed by: EMERGENCY MEDICINE

## 2023-10-04 PROCEDURE — 80053 COMPREHEN METABOLIC PANEL: CPT | Performed by: EMERGENCY MEDICINE

## 2023-10-04 PROCEDURE — 36415 COLL VENOUS BLD VENIPUNCTURE: CPT | Performed by: EMERGENCY MEDICINE

## 2023-10-04 PROCEDURE — G1004 CDSM NDSC: HCPCS

## 2023-10-04 PROCEDURE — 93005 ELECTROCARDIOGRAM TRACING: CPT

## 2023-10-04 PROCEDURE — 99285 EMERGENCY DEPT VISIT HI MDM: CPT | Performed by: EMERGENCY MEDICINE

## 2023-10-04 PROCEDURE — 84443 ASSAY THYROID STIM HORMONE: CPT | Performed by: EMERGENCY MEDICINE

## 2023-10-04 PROCEDURE — 85610 PROTHROMBIN TIME: CPT | Performed by: EMERGENCY MEDICINE

## 2023-10-04 PROCEDURE — 85730 THROMBOPLASTIN TIME PARTIAL: CPT | Performed by: EMERGENCY MEDICINE

## 2023-10-04 PROCEDURE — 93010 ELECTROCARDIOGRAM REPORT: CPT | Performed by: INTERNAL MEDICINE

## 2023-10-04 PROCEDURE — 99223 1ST HOSP IP/OBS HIGH 75: CPT | Performed by: SURGERY

## 2023-10-04 RX ORDER — ACETAMINOPHEN 325 MG/1
650 TABLET ORAL EVERY 6 HOURS PRN
Status: DISCONTINUED | OUTPATIENT
Start: 2023-10-04 | End: 2023-10-06 | Stop reason: HOSPADM

## 2023-10-04 RX ORDER — METOCLOPRAMIDE HYDROCHLORIDE 5 MG/ML
10 INJECTION INTRAMUSCULAR; INTRAVENOUS ONCE
Status: COMPLETED | OUTPATIENT
Start: 2023-10-04 | End: 2023-10-04

## 2023-10-04 RX ADMIN — SODIUM CHLORIDE, SODIUM LACTATE, POTASSIUM CHLORIDE, AND CALCIUM CHLORIDE 1000 ML: .6; .31; .03; .02 INJECTION, SOLUTION INTRAVENOUS at 15:25

## 2023-10-04 RX ADMIN — Medication 2000 UNITS: at 20:04

## 2023-10-04 RX ADMIN — METOCLOPRAMIDE 10 MG: 5 INJECTION, SOLUTION INTRAMUSCULAR; INTRAVENOUS at 17:35

## 2023-10-04 NOTE — ED PROVIDER NOTES
History  Chief Complaint   Patient presents with   • Dizziness     Began a few days ago. Pt reports decreased PO intake, but feels like she has gained weight. HPI  Patient has been suffering from palpitations and lightheadedness for the last couple of days with slight decreased p.o. intake. She states about a week ago she fell and hit her head sustaining black and blue around her eye. She is on Eliquis never got her head scanned. She does have a slight headache. States she is out of some of her medications including clonazepam which she was taking she says regularly for years from doctors that prescribed it in [facility] but she now just moved out here and has been unable to establish care with a primary care doctor nor a psychiatrist.  No fevers or chills. No chest pain or shortness of breath. 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 There is no new trauma to her abdomen. She has been taking Eliquis for DVT and she states she has a few days left but needs a refill for that 2. Review of the PDMP shows last time she had a clonazepam refill was April 3 for 5 days worth and then there was a 30-day prescription in December 2022 and so she has not been taking it regularly but she says she still had some leftover. I did discuss with crisis to give her a list of resources to obtain outpatient mental health. Prior to Admission Medications   Prescriptions Last Dose Informant Patient Reported? Taking?    Docusate Sodium (DSS) 100 MG CAPS   Yes No   Sig: Take 100 mg by mouth   Patient not taking: Reported on 10/4/2023   Lidocaine 4 % PTCH   No No   Sig: Apply 1 patch topically daily   Patient not taking: Reported on 5/26/2023   Multiple Vitamin (multivitamin) capsule   Yes No   Sig: Take 1 tablet by mouth daily   acetaminophen (TYLENOL) 325 mg tablet   Yes No   Sig: Take 650 mg by mouth acetaminophen (TYLENOL) 500 mg tablet   No No   Sig: Take 1 tablet (500 mg total) by mouth every 6 (six) hours as needed for mild pain or moderate pain   Patient not taking: Reported on 5/26/2023   albuterol (Ventolin HFA) 90 mcg/act inhaler   No No   Sig: Inhale 2 puffs every 6 (six) hours as needed for wheezing   amLODIPine (NORVASC) 10 mg tablet   No No   Sig: Take 1 tablet (10 mg total) by mouth in the morning. amLODIPine (NORVASC) 10 mg tablet   Yes No   Sig: Take 10 mg by mouth daily   Patient not taking: Reported on 10/4/2023   amoxicillin-clavulanate (AUGMENTIN) 875-125 mg per tablet   Yes No   Sig: Take 1 tablet by mouth every 12 (twelve) hours   Patient not taking: Reported on 10/4/2023   aspirin (ECOTRIN LOW STRENGTH) 81 mg EC tablet   Yes No   Sig: Take 81 mg by mouth   azithromycin (ZITHROMAX) 250 mg tablet   Yes No   Sig: TAKE 2 TABLETS BY MOUTH TODAY, THEN TAKE 1 TABLET DAILY FOR 4 DAYS   Patient not taking: Reported on 10/4/2023   benzonatate (TESSALON PERLES) 100 mg capsule   Yes No   Sig: Take 100 mg by mouth Three times daily as needed   Patient not taking: Reported on 10/4/2023   buPROPion (WELLBUTRIN XL) 300 mg 24 hr tablet   Yes No   Sig: TAKE ORALLY 1 EXTENDED RELEASE ORAL TABLET IN AM FOR 30 DAYS. FOR DEPRESSION. TAKE IT WITH CELEXA. Patient not taking: Reported on 10/4/2023   budesonide (PULMICORT) 0.5 mg/2 mL nebulizer solution   Yes No   Sig: Inhale 500 mcg   calcium carbonate (TUMS) 500 mg chewable tablet   No No   Sig: Chew 2 tablets (1,000 mg total)  as needed in the morning and 2 tablets (1,000 mg total) as needed at noon and 2 tablets (1,000 mg total) as needed in the evening (indigestion,heartburn). carvedilol (COREG) 12.5 mg tablet   Yes No   Sig: Take 12.5 mg by mouth 2 (two) times a day   Patient not taking: Reported on 10/4/2023   citalopram (CeleXA) 40 mg tablet   No No   Sig: Take 1 tablet (40 mg total) by mouth in the morning.    clonazePAM (KlonoPIN) 1 mg tablet   Yes No   Sig: Take 1 mg by mouth   Patient not taking: Reported on 10/4/2023   clonazePAM (KlonoPIN) 1 mg tablet   Yes No   Sig: Take 1 mg by mouth 2 (two) times a day   Patient not taking: Reported on 10/4/2023   clonazePAM (KlonoPIN) 2 mg tablet   Yes No   Sig: Take 2 mg by mouth   Patient not taking: Reported on 10/4/2023   clotrimazole (LOTRIMIN) 1 % cream   No No   Sig: APPLY TO AFFECTED AREA TWICE A DAY   diphenhydrAMINE (BENADRYL) 2 % cream   No No   Sig: Apply topically 3 (three) times a day as needed for itching   Patient not taking: Reported on 5/26/2023   gabapentin (NEURONTIN) 100 mg capsule   No No   Sig: Take 1 capsule (100 mg total) by mouth 3 (three) times a day for 10 days   hydrOXYzine HCL (ATARAX) 50 mg tablet   Yes No   Sig: TAKE ORALLY 2 ORAL TABLET AT NIGHT FOR 30 DAYS.  FOR SLEEP   Patient not taking: Reported on 10/4/2023   hydrOXYzine HCL (ATARAX) 50 mg tablet   Yes No   Sig: Take 50 mg by mouth Three times daily as needed   levothyroxine 150 mcg tablet   No No   Sig: Take 1 tablet (150 mcg total) by mouth daily in the early morning   levothyroxine 150 mcg tablet   Yes No   Sig: Take 150 mcg by mouth   levothyroxine 200 mcg tablet   Yes No   Sig: Take 200 mcg by mouth daily   Patient not taking: Reported on 5/26/2023   losartan-hydrochlorothiazide (HYZAAR) 100-25 MG per tablet   Yes No   Sig: Take 1 tablet by mouth daily   Patient not taking: Reported on 10/4/2023   methocarbamol (ROBAXIN) 500 mg tablet   No No   Sig: Take 1 tablet (500 mg total) by mouth every 6 (six) hours   methocarbamol (ROBAXIN) 500 mg tablet   No No   Sig: Take 1 tablet (500 mg total) by mouth 2 (two) times a day   Patient not taking: Reported on 10/4/2023   naproxen (NAPROSYN) 250 mg tablet   No No   Sig: Take 1 tablet (250 mg total) by mouth 2 (two) times a day with meals   omeprazole (PriLOSEC) 20 mg delayed release capsule   Yes No   Sig: Take 20 mg by mouth   ondansetron (ZOFRAN-ODT) 4 mg disintegrating tablet   No No Sig: Take 1 tablet (4 mg total) by mouth every 6 (six) hours as needed for nausea or vomiting   pantoprazole (PROTONIX) 40 mg tablet   No No   Sig: Take 1 tablet (40 mg total) by mouth daily in the early morning   senna (SENOKOT) 8.6 mg   No No   Sig: Take 1 tablet (8.6 mg total) by mouth in the morning for 5 days. sucralfate (CARAFATE) 1 g tablet   No No   Sig: Take 1 tablet (1 g total) by mouth 4 (four) times a day for 7 days   traMADol (ULTRAM) 50 mg tablet   Yes No   Sig: Take 50 mg by mouth   Patient not taking: Reported on 2023   traZODone (DESYREL) 150 mg tablet   No No   Sig: Take 0.5 tablets (75 mg total) by mouth daily at bedtime      Facility-Administered Medications: None       Past Medical History:   Diagnosis Date   • Disease of thyroid gland    • Hernia    • Hypertension        Past Surgical History:   Procedure Laterality Date   • CHOLECYSTECTOMY     • CORONARY ANGIOPLASTY WITH STENT PLACEMENT     • IR DRAINAGE TUBE CHECK/CHANGE/REPOSITION/REINSERTION/UPSIZE  5/10/2022   • IR DRAINAGE TUBE CHECK/CHANGE/REPOSITION/REINSERTION/UPSIZE  2022   • IR DRAINAGE TUBE PLACEMENT  2022   • IR DRAINAGE TUBE PLACEMENT  2022       History reviewed. No pertinent family history. I have reviewed and agree with the history as documented. E-Cigarette/Vaping   • E-Cigarette Use Never User      E-Cigarette/Vaping Substances     Social History     Tobacco Use   • Smoking status: Former     Types: Cigarettes     Quit date:      Years since quittin.7   • Smokeless tobacco: Never   • Tobacco comments:     Social only   Vaping Use   • Vaping Use: Never used   Substance Use Topics   • Alcohol use: Not Currently   • Drug use: Not Currently       Review of Systems    Physical Exam  Physical Exam  Vitals and nursing note reviewed. Constitutional:       General: She is not in acute distress. Appearance: Normal appearance. She is well-developed. She is obese.  She is not ill-appearing, toxic-appearing or diaphoretic. HENT:      Head: Normocephalic. Comments: Patient has ecchymosis around her right eye. Right Ear: Hearing normal. No drainage or swelling. Left Ear: Hearing normal. No drainage or swelling. Eyes:      General: Lids are normal.         Right eye: No discharge. Left eye: No discharge. Extraocular Movements: Extraocular movements intact. Conjunctiva/sclera: Conjunctivae normal.      Pupils: Pupils are equal, round, and reactive to light. Neck:      Vascular: No JVD. Trachea: Trachea normal.   Cardiovascular:      Rate and Rhythm: Normal rate and regular rhythm. Pulses: Normal pulses. Heart sounds: Normal heart sounds. No murmur heard. No friction rub. No gallop. Pulmonary:      Effort: Pulmonary effort is normal. No respiratory distress. Breath sounds: Normal breath sounds. No stridor. No wheezing or rales. Abdominal:      General: Abdomen is protuberant. Palpations: Abdomen is soft. Tenderness: There is no abdominal tenderness. There is no guarding or rebound. Musculoskeletal:         General: No tenderness or deformity. Normal range of motion. Cervical back: Normal range of motion. Skin:     General: Skin is warm and dry. Coloration: Skin is not pale. Neurological:      General: No focal deficit present. Mental Status: She is alert. GCS: GCS eye subscore is 4. GCS verbal subscore is 5. GCS motor subscore is 6. Cranial Nerves: No cranial nerve deficit. Sensory: No sensory deficit. Motor: No weakness or abnormal muscle tone. Gait: Gait normal.   Psychiatric:         Mood and Affect: Mood normal.         Speech: Speech normal.         Behavior: Behavior is cooperative.          Vital Signs  ED Triage Vitals   Temperature Pulse Respirations Blood Pressure SpO2   10/04/23 1416 10/04/23 1416 10/04/23 1416 10/04/23 1416 10/04/23 1416   97.8 °F (36.6 °C) 101 18 151/80 95 % Temp Source Heart Rate Source Patient Position - Orthostatic VS BP Location FiO2 (%)   10/04/23 1416 10/04/23 1649 10/04/23 1416 10/04/23 1416 --   Oral Monitor Sitting Right arm       Pain Score       10/04/23 1416       8           Vitals:    10/04/23 1416 10/04/23 1649 10/04/23 1900   BP: 151/80 149/81 145/86   Pulse: 101 89 85   Patient Position - Orthostatic VS: Sitting Lying Lying         Visual Acuity  Visual Acuity    Flowsheet Row Most Recent Value   L Pupil Size (mm) 2   R Pupil Size (mm) 2          ED Medications  Medications   lactated ringers bolus 1,000 mL (0 mL Intravenous Stopped 10/4/23 1625)   metoclopramide (REGLAN) injection 10 mg (10 mg Intravenous Given 10/4/23 1735)   prothrombin complex concentrate (human) (Kcentra) 2,000 Units (2,000 Units Intravenous Given 10/4/23 2004)       Diagnostic Studies  Results Reviewed     Procedure Component Value Units Date/Time    T4, free [[id number]]  (Normal) Collected: 10/04/23 1519    Lab Status: Final result Specimen: Blood from Arm, Right Updated: 10/05/23 0005     Free T4 0.85 ng/dL     Narrative:        "Therapeutic range for patients medicated with thyroid disorders: 0.61-1.24 ng/dL. "    Allen Kazsharron [[id number]]  (Normal) Collected: 10/04/23 1519    Lab Status: Final result Specimen: Blood from Arm, Right Updated: 10/04/23 1558     Protime 11.9 seconds      INR 0.87    APTT [[id number]]  (Abnormal) Collected: 10/04/23 1519    Lab Status: Final result Specimen: Blood from Arm, Right Updated: 10/04/23 1558     PTT 20 seconds     TSH, 3rd generation with Free T4 reflex [[id number]]  (Abnormal) Collected: 10/04/23 1519    Lab Status: Final result Specimen: Blood from Arm, Right Updated: 10/04/23 1555     TSH 3RD GENERATON 6.424 uIU/mL     Comprehensive metabolic panel [[id number]] Collected: 10/04/23 1519    Lab Status: Final result Specimen: Blood from Arm, Right Updated: 10/04/23 1539     Sodium 137 mmol/L      Potassium 4.4 mmol/L      Chloride 102 mmol/L      CO2 28 mmol/L      ANION GAP 7 mmol/L      BUN 15 mg/dL      Creatinine 1.17 mg/dL      Glucose 98 mg/dL      Calcium 8.7 mg/dL      AST 19 U/L      ALT 15 U/L      Alkaline Phosphatase 65 U/L      Total Protein 7.3 g/dL      Albumin 3.9 g/dL      Total Bilirubin 0.37 mg/dL      eGFR 50 ml/min/1.73sq m     Narrative:      National Kidney Disease Foundation guidelines for Chronic Kidney Disease (CKD):   •  Stage 1 with normal or high GFR (GFR > 90 mL/min/1.73 square meters)  •  Stage 2 Mild CKD (GFR = 60-89 mL/min/1.73 square meters)  •  Stage 3A Moderate CKD (GFR = 45-59 mL/min/1.73 square meters)  •  Stage 3B Moderate CKD (GFR = 30-44 mL/min/1.73 square meters)  •  Stage 4 Severe CKD (GFR = 15-29 mL/min/1.73 square meters)  •  Stage 5 End Stage CKD (GFR <15 mL/min/1.73 square meters)  Note: GFR calculation is accurate only with a steady state creatinine    Magnesium [[id number]]  (Normal) Collected: 10/04/23 1519    Lab Status: Final result Specimen: Blood from Arm, Right Updated: 10/04/23 1539     Magnesium 2.0 mg/dL     CBC and differential [[id number]]  (Abnormal) Collected: 10/04/23 1519    Lab Status: Final result Specimen: Blood from Arm, Right Updated: 10/04/23 1526     WBC 8.43 Thousand/uL      RBC 4.94 Million/uL      Hemoglobin 13.9 g/dL      Hematocrit 43.6 %      MCV 88 fL      MCH 28.1 pg      MCHC 31.9 g/dL      RDW 15.4 %      MPV 8.9 fL      Platelets 132 Thousands/uL      nRBC 0 /100 WBCs      Neutrophils Relative 71 %      Immat GRANS % 1 %      Lymphocytes Relative 20 %      Monocytes Relative 7 %      Eosinophils Relative 0 %      Basophils Relative 1 %      Neutrophils Absolute 6.07 Thousands/µL      Immature Grans Absolute 0.06 Thousand/uL      Lymphocytes Absolute 1.65 Thousands/µL      Monocytes Absolute 0.60 Thousand/µL      Eosinophils Absolute 0.01 Thousand/µL      Basophils Absolute 0.04 Thousands/µL                  CT head without contrast   Final Result by Mai Meckel, MD (10/04 1911)      Small posterior parafalcine subdural hematoma. No mass effect. I personally discussed this study with Isma Zuhair on 10/4/2023 7:11 PM.                        Workstation performed: [id number]                    Procedures  ECG 12 Lead Documentation Only    Date/Time: 10/4/2023 3:17 PM    Performed by: Farshad Manning MD  Authorized by: Farshad Manning MD    Indications / Diagnosis:  Palpitations  ECG reviewed by me, the ED Provider: yes    Patient location:  ED  Previous ECG:     Comparison to cardiac monitor: Yes    Interpretation:     Interpretation: normal    Rate:     ECG rate:  84    ECG rate assessment: normal    Rhythm:     Rhythm: sinus rhythm    Ectopy:     Ectopy: none    QRS:     QRS axis:  Normal  Conduction:     Conduction: normal    ST segments:     ST segments:  Normal  T waves:     T waves: normal               ED Course  ED Course as of 10/06/23 0809   Wed Oct 04, 2023   1553 CBC and differential(!)  Normal white blood cell count hemoglobin and platelet count   5879 Comprehensive metabolic panel  Normal electrolytes, renal function and liver function tests. Magnesium is normal.                               SBIRT 22yo+    Flowsheet Row Most Recent Value   Initial Alcohol Screen: US AUDIT-C     1. How often do you have a drink containing alcohol? 0 Filed at: 10/04/2023 1647   2. How many drinks containing alcohol do you have on a typical day you are drinking? 0 Filed at: 10/04/2023 1647   3a. Male UNDER 65: How often do you have five or more drinks on one occasion? 0 Filed at: 10/04/2023 1647   3b. FEMALE Any Age, or MALE 65+: How often do you have 4 or more drinks on one occassion? 0 Filed at: 10/04/2023 1647   Audit-C Score 0 Filed at: 10/04/2023 1647   KEYSHAWN: How many times in the past year have you. .. Used an illegal drug or used a prescription medication for non-medical reasons?  Never Filed at: 10/04/2023 1647                    Medical Decision Making  Patient complaining of lightheadedness and palpitations which she describes as only beating slightly fast.  There is no evidence of any arrhythmia here she is in normal sinus rhythm without any evidence of ectopy. Laboratory studies initiated to rule out anemia, electrolyte abnormalities which did not show any major problems. She is not anemic. No major electrolyte abnormalities. She has been in normal sinus rhythm on the monitor the entire time she has been in the emergency department. CT head was initiated because of her fall and history of being on anticoagulation. She has a normal mental status. Head CT was pending at the time of signout. The injury was a week out. She has no evidence of a skull fracture. CT initiated to rule out any type of intracranial bleeding with disposition pending upon results of that study. Amount and/or Complexity of Data Reviewed  Labs: ordered. Decision-making details documented in ED Course. Radiology: ordered. Risk  Prescription drug management. Disposition  Final diagnoses:   Palpitations   Lightheadedness   History of DVT (deep vein thrombosis)   Subdural hematoma ([address])     Time reflects when diagnosis was documented in both MDM as applicable and the Disposition within this note     Time User Action Codes Description Comment    10/4/2023  3:16 PM Shazia Handler Add [R00.2] Palpitations     10/4/2023  3:16 PM Shazia Handler Add [R42] [address] [place]     10/4/2023  3:29 PM Shazia Handler Add [I82.409] Deep vein thrombosis (DVT) during current hospitalization ([address])     10/4/2023  3:29 PM Shazia Handler Add [D90.087] History of DVT (deep vein thrombosis)     10/4/2023  4:10 PM Shazia Handler Remove [I82.409] Deep vein thrombosis (DVT) during current hospitalization ([address])     10/4/2023  7:19 PM Hosak, Earleen Rubinstein Add [S06. 5XAA] Subdural hematoma [facility])       ED Disposition     ED Disposition   Transfer to Another Facility-In Network    Condition   --    Date/Time   Wed Oct 4, 2023  7:13 PM    Comment   Donnell Salamanca should be transferred out to [facility].            MD Documentation    [facility] Most Recent Value   Patient Condition The patient has been stabilized such that within reasonable medical probability, no material deterioration of the patient condition or the condition of the unborn child(romain) is likely to result from the transfer   Reason for Transfer Level of Care needed not available at this facility  [trauma]   Benefits of Transfer Specialized equipment and/or services available at the receiving facility (Include comment)________________________  [trauam]   Risks of Transfer Potential for delay in receiving treatment, Potential deterioration of medical condition, Loss of IV, Increased discomfort during transfer, Possible worsening of condition or death during transfer   Accepting Physician Judy Covington   Sending MD [facility]   Provider Certification General risk, such as traffic hazards, adverse weather conditions, rough terrain or turbulence, possible failure of equipment (including vehicle or aircraft), or consequences of actions of persons outside the control of the transport personnel, Risk of worsening condition, The possibility of a transport vehicle being unavailable, Unanticipated needs of medical equipment and personnel during transport      RN Documentation    Flowsheet Row Most [address] [facility] Judy Ybarra      Follow-up Information     Follow up With Specialties Details Why Contact Info Jered Zhang Schedule an appointment as soon as possible for a visit in 1 week reevaluation [address] [facility], [address] [place] [address] [address] [facility],[address] [facility], [address] [facility], [facility]          Discharge Medication List as of 10/4/2023  8:32 PM      START taking these medications    Details   apixaban (Eliquis) 2.5 mg Take 1 tablet (2.5 mg total) by mouth 2 (two) times a day, Starting Wed 10/4/2023, Until Fri 11/3/2023, Normal         CONTINUE these medications which have NOT CHANGED    Details   !! acetaminophen (TYLENOL) 325 mg tablet Take 650 mg by mouth, Historical Med      !! acetaminophen (TYLENOL) 500 mg tablet Take 1 tablet (500 mg total) by mouth every 6 (six) hours as needed for mild pain or moderate pain, Starting Thu 3/23/2023, Normal      albuterol (Ventolin HFA) 90 mcg/act inhaler Inhale 2 puffs every 6 (six) hours as needed for wheezing, Starting Fri 5/26/2023, Normal      !! amLODIPine (NORVASC) 10 mg tablet Take 1 tablet (10 mg total) by mouth in the morning., Starting Sat 5/14/2022, No Print      !! amLODIPine (NORVASC) 10 mg tablet Take 10 mg by mouth daily, Historical Med      amoxicillin-clavulanate (AUGMENTIN) 875-125 mg per tablet Take 1 tablet by mouth every 12 (twelve) hours, Starting Mon 4/17/2023, Historical Med      aspirin (ECOTRIN LOW STRENGTH) 81 mg EC tablet Take 81 mg by mouth, Historical Med      azithromycin (ZITHROMAX) 250 mg tablet TAKE 2 TABLETS BY MOUTH TODAY, THEN TAKE 1 TABLET DAILY FOR 4 DAYS, Historical Med      benzonatate (TESSALON PERLES) 100 mg capsule Take 100 mg by mouth Three times daily as needed, Starting Wed 12/7/2022, Historical Med      budesonide (PULMICORT) 0.5 mg/2 mL nebulizer solution Inhale 500 mcg, Starting Fri 4/7/2023, Until Thu 7/6/2023 at 2359, Historical Med      buPROPion (WELLBUTRIN XL) 300 mg 24 hr tablet TAKE ORALLY 1 EXTENDED RELEASE ORAL TABLET IN AM FOR 30 DAYS. FOR DEPRESSION.  TAKE IT WITH CELEXA., Historical Med      calcium carbonate (TUMS) 500 mg chewable tablet Chew 2 tablets (1,000 mg total)  as needed in the morning and 2 tablets (1,000 mg total) as needed at noon and 2 tablets (1,000 mg total) as needed in the evening (indigestion,heartburn). , Starting Fri 5/13/2022, No Print      carvedilol (COREG) 12.5 mg tablet Take 12.5 mg by mouth 2 (two) times a day, Historical Med      citalopram (CeleXA) 40 mg tablet Take 1 tablet (40 mg total) by mouth in the morning., Starting Fri 5/13/2022, No Print      !! clonazePAM (KlonoPIN) 1 mg tablet Take 1 mg by mouth, Historical Med      !! clonazePAM (KlonoPIN) 1 mg tablet Take 1 mg by mouth 2 (two) times a day, Starting Mon 4/3/2023, Historical Med      !! clonazePAM (KlonoPIN) 2 mg tablet Take 2 mg by mouth, Historical Med      clotrimazole (LOTRIMIN) 1 % cream APPLY TO AFFECTED AREA TWICE A DAY, Normal      diphenhydrAMINE (BENADRYL) 2 % cream Apply topically 3 (three) times a day as needed for itching, Starting Thu 3/23/2023, Normal      Docusate Sodium (DSS) 100 MG CAPS Take 100 mg by mouth, Historical Med      gabapentin (NEURONTIN) 100 mg capsule Take 1 capsule (100 mg total) by mouth 3 (three) times a day for 10 days, Starting Thu 5/26/2022, Until Fri 5/26/2023, Normal      !! hydrOXYzine HCL (ATARAX) 50 mg tablet TAKE ORALLY 2 ORAL TABLET AT NIGHT FOR 30 DAYS.  FOR SLEEP, Historical Med      !! hydrOXYzine HCL (ATARAX) 50 mg tablet Take 50 mg by mouth Three times daily as needed, Historical Med      !! levothyroxine 150 mcg tablet Take 1 tablet (150 mcg total) by mouth daily in the early morning, Starting Sat 5/14/2022, No Print      !! levothyroxine 150 mcg tablet Take 150 mcg by mouth, Historical Med      !! levothyroxine 200 mcg tablet Take 200 mcg by mouth daily, Historical Med      Lidocaine 4 % PTCH Apply 1 patch topically daily, Starting Fri 5/13/2022, No Print      losartan-hydrochlorothiazide (HYZAAR) 100-25 MG per tablet Take 1 tablet by mouth daily, Historical Med      !! methocarbamol (ROBAXIN) 500 mg tablet Take 1 tablet (500 mg total) by mouth every 6 (six) hours, Starting Thu 5/26/2022, Normal !! methocarbamol (ROBAXIN) 500 mg tablet Take 1 tablet (500 mg total) by mouth 2 (two) times a day, Starting Sat 5/20/2023, Normal      Multiple Vitamin (multivitamin) capsule Take 1 tablet by mouth daily, Historical Med      naproxen (NAPROSYN) 250 mg tablet Take 1 tablet (250 mg total) by mouth 2 (two) times a day with meals, Starting Thu 5/18/2023, Normal      omeprazole (PriLOSEC) 20 mg delayed release capsule Take 20 mg by mouth, Historical Med      ondansetron (ZOFRAN-ODT) 4 mg disintegrating tablet Take 1 tablet (4 mg total) by mouth every 6 (six) hours as needed for nausea or vomiting, Starting Thu 3/23/2023, Normal      pantoprazole (PROTONIX) 40 mg tablet Take 1 tablet (40 mg total) by mouth daily in the early morning, Starting Fri 5/13/2022, Until Fri 5/26/2023, Normal      senna (SENOKOT) 8.6 mg Take 1 tablet (8.6 mg total) by mouth in the morning for 5 days. , Starting Sat 5/14/2022, Until Fri 5/26/2023, Normal      sucralfate (CARAFATE) 1 g tablet Take 1 tablet (1 g total) by mouth 4 (four) times a day for 7 days, Starting Thu 3/23/2023, Until Thu 3/30/2023, Normal      traMADol (ULTRAM) 50 mg tablet Take 50 mg by mouth, Historical Med      traZODone (DESYREL) 150 mg tablet Take 0.5 tablets (75 mg total) by mouth daily at bedtime, Starting Fri 5/13/2022, No Print       !! - Potential duplicate medications found. Please discuss with provider.               PDMP Review       Value Time User    PDMP Reviewed  Yes 5/25/2022  2:37 PM Luzmaria Hinojosa PA-C          ED Provider  Electronically Signed by           Anny Verdin MD  10/06/23 3548

## 2023-10-05 ENCOUNTER — APPOINTMENT (INPATIENT)
Dept: RADIOLOGY | Facility: HOSPITAL | Age: 58
End: 2023-10-05
Payer: COMMERCIAL
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
## 2023-10-05 PROBLEM — E03.9 HYPOTHYROIDISM: Status: ACTIVE | Noted: 2023-10-05

## 2023-10-05 PROBLEM — S06.5XAA SUBDURAL HEMATOMA (HCC): Status: ACTIVE | Noted: 2023-10-05

## 2023-10-05 PROBLEM — J43.9 EMPHYSEMA LUNG (HCC): Status: ACTIVE | Noted: 2023-10-05

## 2023-10-05 PROBLEM — I10 HYPERTENSION: Status: ACTIVE | Noted: 2023-10-05

## 2023-10-05 LAB — T4 FREE SERPL-MCNC: 0.85 NG/DL (ref 0.61–1.12)

## 2023-10-05 PROCEDURE — 99233 SBSQ HOSP IP/OBS HIGH 50: CPT | Performed by: SURGERY

## 2023-10-05 PROCEDURE — 70450 CT HEAD/BRAIN W/O DYE: CPT

## 2023-10-05 PROCEDURE — G1004 CDSM NDSC: HCPCS

## 2023-10-05 RX ORDER — LEVETIRACETAM 500 MG/1
500 TABLET ORAL ONCE
Status: COMPLETED | OUTPATIENT
Start: 2023-10-05 | End: 2023-10-05

## 2023-10-05 RX ORDER — SENNOSIDES 8.6 MG
1 TABLET ORAL
Status: DISCONTINUED | OUTPATIENT
Start: 2023-10-05 | End: 2023-10-06 | Stop reason: HOSPADM

## 2023-10-05 RX ORDER — ASPIRIN 81 MG/1
81 TABLET, CHEWABLE ORAL DAILY
Status: DISCONTINUED | OUTPATIENT
Start: 2023-10-05 | End: 2023-10-06 | Stop reason: HOSPADM

## 2023-10-05 RX ORDER — PANTOPRAZOLE SODIUM 40 MG/1
40 TABLET, DELAYED RELEASE ORAL
Status: DISCONTINUED | OUTPATIENT
Start: 2023-10-05 | End: 2023-10-06 | Stop reason: HOSPADM

## 2023-10-05 RX ORDER — ENOXAPARIN SODIUM 100 MG/ML
40 INJECTION SUBCUTANEOUS EVERY 12 HOURS SCHEDULED
Status: DISCONTINUED | OUTPATIENT
Start: 2023-10-05 | End: 2023-10-06 | Stop reason: HOSPADM

## 2023-10-05 RX ORDER — ALBUTEROL SULFATE 90 UG/1
2 AEROSOL, METERED RESPIRATORY (INHALATION) EVERY 6 HOURS PRN
Status: DISCONTINUED | OUTPATIENT
Start: 2023-10-05 | End: 2023-10-06 | Stop reason: HOSPADM

## 2023-10-05 RX ORDER — AMLODIPINE BESYLATE 10 MG/1
10 TABLET ORAL DAILY
Status: DISCONTINUED | OUTPATIENT
Start: 2023-10-05 | End: 2023-10-06 | Stop reason: HOSPADM

## 2023-10-05 RX ORDER — CITALOPRAM 20 MG/1
40 TABLET ORAL DAILY
Status: DISCONTINUED | OUTPATIENT
Start: 2023-10-05 | End: 2023-10-06 | Stop reason: HOSPADM

## 2023-10-05 RX ORDER — TRAZODONE HYDROCHLORIDE 50 MG/1
75 TABLET ORAL
Status: DISCONTINUED | OUTPATIENT
Start: 2023-10-05 | End: 2023-10-06 | Stop reason: HOSPADM

## 2023-10-05 RX ORDER — LEVOTHYROXINE SODIUM 0.07 MG/1
150 TABLET ORAL
Status: DISCONTINUED | OUTPATIENT
Start: 2023-10-05 | End: 2023-10-06 | Stop reason: HOSPADM

## 2023-10-05 RX ORDER — ASPIRIN 81 MG/1
81 TABLET, CHEWABLE ORAL DAILY
Qty: 30 TABLET | Refills: 0 | Status: SHIPPED | OUTPATIENT
Start: 2023-10-05

## 2023-10-05 RX ORDER — METOCLOPRAMIDE HYDROCHLORIDE 5 MG/ML
10 INJECTION INTRAMUSCULAR; INTRAVENOUS ONCE
Status: COMPLETED | OUTPATIENT
Start: 2023-10-05 | End: 2023-10-05

## 2023-10-05 RX ORDER — BUTALBITAL, ACETAMINOPHEN AND CAFFEINE 50; 325; 40 MG/1; MG/1; MG/1
1 TABLET ORAL EVERY 4 HOURS PRN
Status: DISCONTINUED | OUTPATIENT
Start: 2023-10-05 | End: 2023-10-06 | Stop reason: HOSPADM

## 2023-10-05 RX ORDER — HYDROXYZINE 50 MG/1
50 TABLET, FILM COATED ORAL 2 TIMES DAILY PRN
Status: DISCONTINUED | OUTPATIENT
Start: 2023-10-05 | End: 2023-10-06 | Stop reason: HOSPADM

## 2023-10-05 RX ORDER — LEVETIRACETAM 500 MG/1
500 TABLET ORAL EVERY 12 HOURS SCHEDULED
Qty: 14 TABLET | Refills: 0 | Status: SHIPPED | OUTPATIENT
Start: 2023-10-05 | End: 2023-10-06 | Stop reason: CLARIF

## 2023-10-05 RX ORDER — METHOCARBAMOL 500 MG/1
500 TABLET, FILM COATED ORAL EVERY 6 HOURS SCHEDULED
Status: DISCONTINUED | OUTPATIENT
Start: 2023-10-05 | End: 2023-10-06 | Stop reason: HOSPADM

## 2023-10-05 RX ORDER — CALCIUM CARBONATE 500 MG/1
1000 TABLET, CHEWABLE ORAL 3 TIMES DAILY PRN
Status: DISCONTINUED | OUTPATIENT
Start: 2023-10-05 | End: 2023-10-06 | Stop reason: HOSPADM

## 2023-10-05 RX ORDER — CLOTRIMAZOLE 1 %
1 CREAM (GRAM) TOPICAL 2 TIMES DAILY
Status: DISCONTINUED | OUTPATIENT
Start: 2023-10-05 | End: 2023-10-06 | Stop reason: HOSPADM

## 2023-10-05 RX ADMIN — CLOTRIMAZOLE 1 APPLICATION: 1 CREAM TOPICAL at 08:43

## 2023-10-05 RX ADMIN — AMLODIPINE BESYLATE 10 MG: 10 TABLET ORAL at 08:43

## 2023-10-05 RX ADMIN — BUTALBITAL, ACETAMINOPHEN AND CAFFEINE 1 TABLET: 50; 325; 40 TABLET ORAL at 22:09

## 2023-10-05 RX ADMIN — METHOCARBAMOL 500 MG: 500 TABLET ORAL at 08:43

## 2023-10-05 RX ADMIN — LEVOTHYROXINE SODIUM 150 MCG: 75 TABLET ORAL at 06:24

## 2023-10-05 RX ADMIN — TRAZODONE HYDROCHLORIDE 75 MG: 50 TABLET ORAL at 20:43

## 2023-10-05 RX ADMIN — METHOCARBAMOL 500 MG: 500 TABLET ORAL at 16:14

## 2023-10-05 RX ADMIN — ENOXAPARIN SODIUM 40 MG: 40 INJECTION SUBCUTANEOUS at 20:44

## 2023-10-05 RX ADMIN — LEVETIRACETAM 500 MG: 500 TABLET, FILM COATED ORAL at 16:14

## 2023-10-05 RX ADMIN — ASPIRIN 81 MG CHEWABLE TABLET 81 MG: 81 TABLET CHEWABLE at 16:15

## 2023-10-05 RX ADMIN — PANTOPRAZOLE SODIUM 40 MG: 40 TABLET, DELAYED RELEASE ORAL at 06:24

## 2023-10-05 RX ADMIN — METOCLOPRAMIDE HYDROCHLORIDE 10 MG: 5 INJECTION INTRAMUSCULAR; INTRAVENOUS at 16:15

## 2023-10-05 RX ADMIN — CITALOPRAM HYDROBROMIDE 40 MG: 20 TABLET ORAL at 08:43

## 2023-10-05 RX ADMIN — B-COMPLEX W/ C & FOLIC ACID TAB 1 TABLET: TAB at 08:43

## 2023-10-05 RX ADMIN — METHOCARBAMOL 500 MG: 500 TABLET ORAL at 03:15

## 2023-10-05 RX ADMIN — METHOCARBAMOL 500 MG: 500 TABLET ORAL at 20:43

## 2023-10-05 RX ADMIN — ACETAMINOPHEN 650 MG: 325 TABLET, FILM COATED ORAL at 10:35

## 2023-10-05 NOTE — UTILIZATION REVIEW
NOTIFICATION OF INPATIENT ADMISSION   AUTHORIZATION REQUEST   SERVICING FACILITY:   [facility]  Address: [address] [facility], [address] [place] [address] Place [address]  Tax ID: [id number]  NPI: [id number] ATTENDING PROVIDER:  Attending Name and NPI#: Harinder Amos Md [[id number]]  Address: [address]  Phone: [phone]   ADMISSION INFORMATION:  Place of Service: Inpatient [address] [facility] [address]  Place of Service Code: 21  Inpatient Admission Date/Time: 10/4/23 10:32 PM  Discharge Date/Time: No discharge date for patient encounter. Admitting Diagnosis Code/Description:  Subdural hematoma ([address]) [S06. 5XAA]  Unspecified multiple injuries, initial encounter [T07. XXXA]     UTILIZATION REVIEW CONTACT:  Jazmyn Baker Utilization   Network Utilization Review Department  Phone: [phone]  Fax: [phone]  Email: [email or web address]  Contact for approvals/pending authorizations, clinical reviews, and discharge. PHYSICIAN ADVISORY SERVICES:  Medical Necessity Denial & Fdkg-jt-Vbam Review  Phone: [phone]  Fax: [phone]  Email: [email or web address]     DISCHARGE SUPPORT TEAM:  For Patients Discharge Needs & Updates  Phone: [phone] opt. 2 Fax: [phone]  Email: [email or web address]

## 2023-10-05 NOTE — UTILIZATION REVIEW
Updated Review - Pt did not discharge yesterday 10/5 as planned and has now passed 2 MN for care/treatment - Headache/ Pain control. Safe d/c plan    Initial Clinical Review    Admission: Date/Time/Statement:   Admission Orders (From admission, onward)     Ordered        10/04/23 2232  Inpatient Admission  Once                      Orders Placed This Encounter   Procedures   • Inpatient Admission     Standing Status:   Standing     Number of Occurrences:   1     Order Specific Question:   Level of Care     Answer:   Level 2 Stepdown / HOT [14]     Order Specific Question:   Estimated length of stay     Answer:   More than 2 Midnights     Order Specific Question:   Certification     Answer:   I certify that inpatient services are medically necessary for this patient for a duration of greater than two midnights. See H&P and MD Progress Notes for additional information about the patient's course of treatment. ED Arrival Information     Expected   10/4/2023     Arrival   10/4/2023 20:50    Acuity   Urgent            Means of arrival   Ambulance    Escorted by   Casi Landrum    Service   Trauma    Admission type   Emergency            Arrival complaint   Trauma           Chief Complaint   Patient presents with   • Fall     Pt. Reports falling about 2 weeks ago. Since then she has been dizzy and having headaches. Initial Presentation: 59 y.o. female, Transfer from [facility] ED presented for Mechanical Fall with with small posterior parafalcine subdural hematoma. Pt  states that she tripped and fell striking her head on a wall approx. 2 weeks ago. Pt on Eliquis for prior DVT. She states that she ran out of Eliquis approx. 1 week ago. States that for the past week she has felt fatigued and generally weak. Also c/o occasional headaches. In ED, given Kcentra. PMH for Hypothyroidism, Emphysema lung and HTN. Admit Inpatient level of care for Fall with Subdural hematoma. Continue neuro checks q2h. Neurosurgery consult. Hold all anticoagulants/ antiplatelet meds. 10/4 CT Head; Small posterior parafalcine SDH    Date: 10/5   Day 2:    Neurosurgery cons; Subdural hematoma. Continue frequent neuro checks. Recommend SBP <160 mmHg. Pt on Aspirin and Elqiuis. Given that pt had a fall about 3-4 weeks ago, if repeat CTH is stable, pt will be okay to resume ASA at this time, however, continue to hold Eliquis given the hyperdensity concerning for acute SDH. Ongoing medical management. No neurosurgical intervention anticipated at this time. Repeat CT Head this pm.   10/4 CT Head; Small posterior parafalcine SDH. Progress notes; Repeat CT Head at 1 PM, d/c home if negative. Continue aspirin. Hold Eliquis. F/u CT showed stable subdural hemorrhage. Can d/c home with outpt Neurosurgery f/u.       10;/6  Pt did not d/c as planned yesterday. Stayed for Headache 7/10. Tylenol did not help per pt, she took earlier. Creat little elevated this admit. Will try Fioricet. Date: 10/6   Day 3: Has surpassed a 2nd midnight with active treatments and services, which include SDH, Headache, pain control and treat/ med adjustments.  Safe d/c plan        ED Triage Vitals   Temperature Pulse Respirations Blood Pressure SpO2   10/04/23 2120 10/04/23 2120 10/04/23 2120 10/04/23 2120 10/04/23 2103   97.8 °F (36.6 °C) 85 18 (!) 197/85 97 %      Temp Source Heart Rate Source Patient Position - Orthostatic VS BP Location FiO2 (%)   10/04/23 2120 10/04/23 2120 10/04/23 2120 10/04/23 2120 --   Oral Monitor Sitting Right arm       Pain Score       10/04/23 2100       7          Wt Readings from Last 1 Encounters:   10/04/23 113 kg (250 lb 3.6 oz)     Additional Vital Signs:   10/05/23 0845 -- 85 18 119/60 -- 96 % None (Room air) Lying   10/05/23 0843 -- -- -- 119/60 -- -- -- --   10/05/23 0700 -- 74 18 118/66 87 91 % None (Room air) Lying   10/05/23 0600 -- 68 18 122/65 88 93 % None (Room air) Lying   10/05/23 0500 -- 74 16 163/74 106 90 % None (Room air) Lying     10/05/23 0000 -- 79 18 141/100 117 95 % None (Room air) Lying   10/04/23 2300 -- 80 18 146/80 -- 98 % None (Room air) --   10/04/23 2200 -- 89 16 188/85   Abnormal  -- 99 % None (Room air) Sitting       Pertinent Labs/Diagnostic Test Results:   CT head wo contrast (10/5) -   Stable small left posterior parafalcine SDH. There is no new intra or extra-axial hemorrhage. 10/4  CT Head - Small posterior parafalcine subdural hematoma.  No mass effect.         Results from last 7 days   Lab Units 10/04/23  1519   WBC Thousand/uL 8.43   HEMOGLOBIN g/dL 13.9   HEMATOCRIT % 43.6   PLATELETS Thousands/uL 325   NEUTROS ABS Thousands/µL 6.07         Results from last 7 days   Lab Units 10/04/23  1519   SODIUM mmol/L 137   POTASSIUM mmol/L 4.4   CHLORIDE mmol/L 102   CO2 mmol/L 28   ANION GAP mmol/L 7   BUN mg/dL 15   CREATININE mg/dL 1.17   EGFR ml/min/1.73sq m 50   CALCIUM mg/dL 8.7   MAGNESIUM mg/dL 2.0     Results from last 7 days   Lab Units 10/04/23  1519   AST U/L 19   ALT U/L 15   ALK PHOS U/L 65   TOTAL PROTEIN g/dL 7.3   ALBUMIN g/dL 3.9   TOTAL BILIRUBIN mg/dL 0.37         Results from last 7 days   Lab Units 10/04/23  1519   GLUCOSE RANDOM mg/dL 98         Results from last 7 days   Lab Units 10/04/23  1519   PROTIME seconds 11.9   INR  0.87   PTT seconds 20*     Results from last 7 days   Lab Units 10/04/23  1519   TSH 3RD GENERATON uIU/mL 6.424*       ED Treatment:   Medication Administration from 10/04/2023 2050 to 10/05/2023 1132       Date/Time Order Dose Route Action     10/05/2023 1035 EDT acetaminophen (TYLENOL) tablet 650 mg 650 mg Oral Given     10/05/2023 0843 EDT amLODIPine (NORVASC) tablet 10 mg 10 mg Oral Given     10/05/2023 0843 EDT citalopram (CeleXA) tablet 40 mg 40 mg Oral Given     10/05/2023 0843 EDT clotrimazole (LOTRIMIN) 1 % cream 1 Application 1 Application Topical Given     10/05/2023 0624 EDT levothyroxine tablet 150 mcg 150 mcg Oral Given     10/05/2023 0843 EDT methocarbamol (ROBAXIN) tablet 500 mg 500 mg Oral Given     10/05/2023 0315 EDT methocarbamol (ROBAXIN) tablet 500 mg 500 mg Oral Given     10/05/2023 1161 EDT multivitamin stress formula tablet 1 tablet 1 tablet Oral Given     10/05/2023 0624 EDT pantoprazole (PROTONIX) EC tablet 40 mg 40 mg Oral Given        Past Medical History:   Diagnosis Date   • Disease of thyroid gland    • Hernia    • Hypertension      Present on Admission:  **None**      Admitting Diagnosis: Unspecified multiple injuries, initial encounter [T07. XXXA]  Age/Sex: 59 y.o. female     Admission Orders:  Scheduled Medications:  amLODIPine, 10 mg, Oral, Daily  citalopram, 40 mg, Oral, Daily  clotrimazole, 1 Application, Topical, BID  levothyroxine, 150 mcg, Oral, Early Morning  methocarbamol, 500 mg, Oral, Q6H JANETT  multivitamin stress formula, 1 tablet, Oral, Daily  pantoprazole, 40 mg, Oral, Early Morning  senna, 1 tablet, Oral, HS  traZODone, 75 mg, Oral, HS      Continuous IV Infusions: None     PRN Meds:  acetaminophen, 650 mg, Oral, Q6H PRN 10/5 x1  albuterol, 2 puff, Inhalation, Q6H PRN  calcium carbonate, 1,000 mg, Oral, TID PRN  hydrOXYzine HCL, 50 mg, Oral, BID PRN    butalbital-acetaminophen-caffeine (FIORICET,ESGIC) -40 mg per tablet 1 tablet  Dose: 1 tablet  Freq: Every 4 hours PRN Route: PO 10/5 x1  PRN Reason: headaches  Start: 10/05/23 2105      IP CONSULT TO NEUROSURGERY  IP CONSULT TO CASE MANAGEMENT    Network Utilization Review Department  ATTENTION: Please call with any questions or concerns to [phone] and carefully listen to the prompts so that you are directed to the right person. All voicemails are confidential.   For Discharge needs, contact Care Management DC Support Team at [phone] opt. 2  Send all requests for admission clinical reviews, approved or denied determinations and any other requests to dedicated fax number below belonging to the campus where the patient is receiving treatment.  List of dedicated fax numbers for the Facilities:  [place] DENIALS (Administrative/Medical Necessity) [phone]   DISCHARGE SUPPORT TEAM (NETWORK) [address] [address] [facility] (Maternity/NICU/Pediatrics) [phone]   [address] [place] [address] Drive [address] 1000 [facility] [phone] [address] 5220 [facility] [address] [address] [facility] [address] [phone]

## 2023-10-05 NOTE — ASSESSMENT & PLAN NOTE
· Patient found to have small posterior parafalcine hyperdensity concerning for subdural hematoma. Pt with hx of fall about 3-4 weeks prior to admission while on ASA and Eliquis for hx of DVT and TIA. · Imaging reviewed personally and by attending. Final results as below  · CT head wo 10/4/2023: Small posterior hyperdensity concerning for "parafalcine subdural hematoma. No mass effect. Plan  · Continue frequent neurological checks. · STAT CT head with any neurological decline or a decrease in GCS of 2pts within 1 hr.  · Recommend SBP <160 mmHg. · Pt was on ASA and Eliquis. Given that pt had a fall about 3-4 weeks ago, if repeat CTH is stable, pt will be okay to resume ASA at this time, however, continue to hold Eliquis given the hyperdensity concerning for acute SDH. · Pain control per primary team  · Mobilize and eval by PT/OT when able to. · DVT PPX: SCDs only at this time. Would recommend  obtaining a stable CT head wo prior to initiation of pharmacological DVT PPX. · Ongoing medical management per primary team.  · No neurosurgical intervention is anticipated at this time. Repeat CTH ordered for this PM.   · Neurosurgery will review repeat [address] when completed. Please call with any questions/concerns.

## 2023-10-05 NOTE — CASE MANAGEMENT
Case Management Assessment & Discharge Planning Note    Patient name Eulalia Record  Location [address] [place], [place] [address]/[facility] 243-07 MRN [id number]  : 1965 Date 10/5/2023       Current Admission Date: 10/4/2023  Current Admission Diagnosis:Subdural hematoma [facility])   Patient Active Problem List    Diagnosis Date Noted   • Fall 10/05/2023   • Subdural hematoma ([address]) 10/05/2023   • Hypertension 10/05/2023   • Emphysema lung ([address]) 10/05/2023   • Hypothyroidism 10/05/2023   • Primary osteoarthritis of left knee 2023   • Intertrigo 2022   • Pulmonary nodule 2022   • Morbid obesity due to excess calories ([address]) 2022   • Splenic abscess 2022      LOS (days): 1  Geometric Mean LOS (GMLOS) (days): 2.00  Days to GMLOS:1.3     OBJECTIVE:    Risk of Unplanned Readmission Score: 10.91         Current admission status: Inpatient       Preferred Pharmacy:   [address] [place] [address]  Phone: [phone] Fax: [phone]    [id number] [place], [address] [address]  Phone: [phone] Fax: [phone]    Primary Care Provider: Edy Benitez MD    Primary Insurance: [facility] [employer]  Secondary Insurance: Chelsiemao Reynolds    ASSESSMENT:  [address] [facility] Ky[address] 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   Primary Phone: [phone] (Mobile)                         Readmission Root Cause  30 Day Readmission: No    Patient Information  Admitted from[de-identified] Home  Mental Status: Alert  During Assessment patient was accompanied by: Not accompanied during assessment  Assessment information provided by[de-identified] Patient  Primary Caregiver: Self  Support Systems: SelfSeth of Residence: [address] [place] [address] do you live in?: [address] of Daily Living Prior to Admission  Functional Status: Independent  Completes ADLs independently?: Yes    DISCHARGE DETAILS:    Discharge planning discussed with[de-identified] patient  Freedom of Choice: Yes     CM contacted family/caregiver?: Yes        Were patient/caregiver advised of the risks associated with not following Treatment Team discharge recommendations?: Yes    Requested [address] [facility]         Is the patient interested in [address] at discharge?: No    DME Referral Provided  Referral made for DME?: No    Treatment Team Recommendation: Home  Discharge Destination Plan[de-identified] Home      CM met with pt. Pt was cleared for a home d/c. Pt has no needs at this time. Pt's family will transport home. CM reviewed d/c planning process including the following: identifying help at home, patient preference for d/c planning needs, Discharge Lounge, [facility] Meds to Bed program, availability of treatment team to discuss questions or concerns patient and/or family may have regarding understanding medications and recognizing signs and symptoms once discharged. CM also encouraged patient to follow up with all recommended appointments after discharge. Patient advised of importance for patient and family to participate in managing patient’s medical well being.

## 2023-10-05 NOTE — DISCHARGE SUMMARY
Discharge Summary - Doug Sosa 59 y.o. female MRN: [id number]    Unit/Bed#: [facility] 833-01 Encounter: [id number]    Admission Date:   Admission Orders (From admission, onward)     Ordered        10/04/23 2232  Inpatient Admission  Once                        Admitting Diagnosis: Subdural hematoma ([address] [facility]) [S06. 5XAA]  Unspecified multiple injuries, initial encounter [T07. XXXA]    HPI: 78-year-old female presents as transfer from [address] [place] [address]. Patient reports mechanical fall 2 weeks ago. Patient reports tripping on something on the floor and striking her head on the wall. Patient has been on Eliquis for DVT recently but is not taken any in the past week. Patient currently reporting headache. Denies loss of consciousness or any other injuries. Procedures Performed: No orders of the defined types were placed in this encounter. Summary of Hospital Course: Patient arrives as a transfer [address] [place] [address]. Repeat CT head shows stable subdural hematoma. Evaluated by neurosurgery who recommended outpatient follow-up. Patient was hemodynamically stable for the duration of her hospitalization. Significant Findings, Care, Treatment and Services Provided: Subdural hematoma    Complications: None    Discharge Diagnosis: Subdural hematoma    Medical Problems     Resolved Problems  Date Reviewed: 5/25/2022   None         Condition at Discharge: stable         Discharge instructions/Information to patient and family:   See after visit summary for information provided to patient and family. Provisions for Follow-Up Care:  See after visit summary for information related to follow-up care and any pertinent home health orders. PCP: Mervat Ayala MD    Disposition: Home    Planned Readmission: No      Discharge Statement   I spent 20 minutes discharging the patient. This time was spent on the day of discharge. I had direct contact with the patient on the day of discharge.  Additional documentation is required if more than 30 minutes were spent on discharge. Discharge Medications:  See after visit summary for reconciled discharge medications provided to patient and family.

## 2023-10-05 NOTE — ASSESSMENT & PLAN NOTE
- Neuro exam: GCS 15, non-focal  - Continue neurologic checks: Every 2 hours. - Reversal agent administered: K-Centra  - CT scan of the head on 10/4/23 reviewed: Small posterior parafalcine SDH  - Appreciate Neurosurgery evaluation and recommendations. - Chemical DVT prophylaxis: Not cleared for chemical prophylaxis by neurosurgery at this time. Continue SCDs bilaterally. - Hold all anticoagulants and anti platelet medications for 2 weeks and/or until cleared by Neurosurgery to resume.  - PT and OT (including cognitive evaluation) evaluation and treatment as indicated.

## 2023-10-05 NOTE — ASSESSMENT & PLAN NOTE
- Neuro exam: GCS 15, non-focal  - Continue neurologic checks: Every 2 hours. - Reversal agent administered: K-Centra  - CT scan of the head on 10/4/23 reviewed: Small posterior parafalcine SDH  - NSGY: repeat CTH at 1pm, d/c to home if negative, continue ASA, hold eliquis  - Chemical DVT prophylaxis: Not cleared for chemical prophylaxis by neurosurgery at this time. Continue SCDs bilaterally. - Hold all anticoagulants and anti platelet medications for 2 weeks and/or until cleared by Neurosurgery to resume.  - PT and OT (including cognitive evaluation) evaluation and treatment as indicated.

## 2023-10-05 NOTE — PROGRESS NOTES
[address] [facility]  Progress Note  Name: Cassandra Marie  MRN: [id number]  Unit/Bed#: [facility]PHP 833-01 I Date of Admission: 10/4/2023   Date of Service: 10/5/2023 I Hospital Day: 1    Assessment/Plan   Hypothyroidism  Assessment & Plan  - Continue home levothyroxine. Emphysema lung ([address])  Assessment & Plan  - Continue home albuterol inhaler. Hypertension  Assessment & Plan  - Continue home amlodipine    Subdural hematoma (HCC)  Assessment & Plan  - Neuro exam: GCS 15, non-focal  - Continue neurologic checks: Every 2 hours. - Reversal agent administered: K-Centra  - CT scan of the head on 10/4/23 reviewed: Small posterior parafalcine SDH  - NSGY: repeat CTH at 1pm, d/c to home if negative, continue ASA, hold eliquis  - Chemical DVT prophylaxis: Not cleared for chemical prophylaxis by neurosurgery at this time. Continue SCDs bilaterally. - Hold all anticoagulants and anti platelet medications for 2 weeks and/or until cleared by Neurosurgery to resume.  - PT and OT (including cognitive evaluation) evaluation and treatment as indicated. Fall  Assessment & Plan  - Status post fall with the below noted injuries. - Fall precautions.  - PT and OT evaluation and treatment as indicated. - Case Management consultation for disposition planning. Follow-up CT showed stable subdural hemorrhage. Patient okay for discharge with outpatient neurosurgery follow-up         Bowel Regimen: senna  VTE Prophylaxis:Sequential compression device (Venodyne)      Disposition: SD2    Subjective   Chief Complaint: headache    Subjective: pt reports diffuse headache his AM. Described as dull, mild to moderate, and constant. Denies any other sxs.      Objective   Vitals:   Temp:  [97.8 °F (36.6 °C)-98 °F (36.7 °C)] 98 °F (36.7 °C)  HR:  [68-92] 87  Resp:  [16-18] 16  BP: (114-197)/() 119/70    I/O     None           Physical Exam:   Physical Exam  General: well-appearing, no acute distress  HEENT: PERRL  Neuro: A&O x3, CN II-XII intact, no cerebellar dysfunction, motor and sensation intact throughout  CV: RRR, pulses 2+ and symmetric throughout  Pulm: CTA bilaterally, normal work of breathing  GI: abdomen soft, non-distended, non-tender, no signs of peritonitis  : deferred  MSK: ROM normal all joints, no tenderness  Skin: warm and dry  Psych: calm and cooperative    Invasive Devices     Peripheral Intravenous Line  Duration           Peripheral IV 10/04/23 Right;Ventral (anterior) Forearm <1 day                      Lab Results: Results: I have personally reviewed all pertinent laboratory/tests results  Imaging: I have personally reviewed pertinent reports.      Other Studies:

## 2023-10-05 NOTE — CONSULTS
[address] [facility]  Consult  Name: Jayro Harman 59 y.o. female I MRN: [id number]  Unit/Bed#: ED 25 I Date of Admission: 10/4/2023   Date of Service: 10/5/2023 I Hospital Day: 1    Inpatient consult to Neurosurgery  Consult performed by: Taras Marcano PA-C  Consult ordered by: Regi Faye,           Assessment/Plan   Subdural hematoma [facility])  Assessment & Plan  · Patient found to have small posterior parafalcine hyperdensity concerning for subdural hematoma. Pt with hx of fall about 3-4 weeks prior to admission while on ASA and Eliquis for hx of DVT and TIA. · Imaging reviewed personally and by attending. Final results as below  · CT head wo 10/4/2023: Small posterior hyperdensity concerning for "parafalcine subdural hematoma. No mass effect. Plan  · Continue frequent neurological checks. · STAT CT head with any neurological decline or a decrease in GCS of 2pts within 1 hr.  · Recommend SBP <160 mmHg. · Pt was on ASA and Eliquis. Given that pt had a fall about 3-4 weeks ago, if repeat CTH is stable, pt will be okay to resume ASA at this time, however, continue to hold Eliquis given the hyperdensity concerning for acute SDH. · Pain control per primary team  · Mobilize and eval by PT/OT when able to. · DVT PPX: SCDs only at this time. Would recommend  obtaining a stable CT head wo prior to initiation of pharmacological DVT PPX. · Ongoing medical management per primary team.  · No neurosurgical intervention is anticipated at this time. Repeat CTH ordered for this PM.   · Neurosurgery will review repeat [address] when completed. Please call with any questions/concerns.           Attending discussed patient and reviewed images during morning rounds on 10/5/2023 at 7 AM  Patient personally accessed and examined on 10/5/2023 at 9:30 AM    History of Present Illness   History, ROS and PFSH obtained from patient and chart review  HPI: Jayro Harman is a 59 y.o. female with PMH including hypertension, thyroid disease, history of hernia, CAD s/p coronary angioplasty with stent placement, history of TIA, history of DVT on Eliquis and ASA who presented to the ED with a complaint of heart palpitations and and lightheadedness over the last few days. Patient reports that about 3 to 4 weeks ago her foot got stuck on the side of a table, she slipped and fell hitting her head against a wall. Patient reports that after the fall she had right eye periorbital ecchymosis that continues to improve. Patient reports that she continues to take Eliquis up to about a week ago when she ran out. Patient reports she got a new refill order for Eliquis that she is yet to . Today patient reports that she has a headache and also when she gets up too fast she gets lightheaded. Patient reports mild right eye pain. Patient denies any blurry or double vision. Pt reports chronic left sided numbness since she had gastric sleeve surgery with post op complications including infection and reports hx of chronic left knee pain. Patient denies any new numbness, tingling, or weakness in bilateral upper and lower extremities. Review of Systems   Constitutional: Negative for chills and fever. HENT: Negative for hearing loss. Eyes: Positive for pain. Negative for visual disturbance. Healing/resolving right eye ecchymosis. Respiratory: Negative for chest tightness and shortness of breath. Cardiovascular: Positive for palpitations. Gastrointestinal: Negative for abdominal pain, nausea and vomiting. Genitourinary: Negative for dysuria. Musculoskeletal: Negative for back pain, neck pain and neck stiffness. Skin: Negative for pallor and rash. Neurological: Positive for dizziness, light-headedness and headaches. Negative for numbness. Psychiatric/Behavioral: Negative for agitation, behavioral problems, confusion and decreased concentration.        Historical Information   Past Medical History:   Diagnosis Date   • Disease of thyroid gland    • Hernia    • Hypertension      Past Surgical History:   Procedure Laterality Date   • CHOLECYSTECTOMY     • CORONARY ANGIOPLASTY WITH STENT PLACEMENT     • IR DRAINAGE TUBE CHECK/CHANGE/REPOSITION/REINSERTION/UPSIZE  5/10/2022   • IR DRAINAGE TUBE CHECK/CHANGE/REPOSITION/REINSERTION/UPSIZE  2022   • IR DRAINAGE TUBE PLACEMENT  2022   • IR DRAINAGE TUBE PLACEMENT  2022     Social History     Substance and Sexual Activity   Alcohol Use Not Currently     Social History     Substance and Sexual Activity   Drug Use Not Currently     Social History     Tobacco Use   Smoking Status Former   • Types: Cigarettes   • Quit date:    • Years since quittin.7   Smokeless Tobacco Never   Tobacco Comments    Social only     History reviewed. No pertinent family history.     Meds/Allergies   all current active meds have been reviewed, current meds:   Current Facility-Administered Medications   Medication Dose Route Frequency   • acetaminophen (TYLENOL) tablet 650 mg  650 mg Oral Q6H PRN   • albuterol (PROVENTIL HFA,VENTOLIN HFA) inhaler 2 puff  2 puff Inhalation Q6H PRN   • amLODIPine (NORVASC) tablet 10 mg  10 mg Oral Daily   • calcium carbonate (TUMS) chewable tablet 1,000 mg  1,000 mg Oral TID PRN   • citalopram (CeleXA) tablet 40 mg  40 mg Oral Daily   • clotrimazole (LOTRIMIN) 1 % cream 1 Application  1 Application Topical BID   • hydrOXYzine HCL (ATARAX) tablet 50 mg  50 mg Oral BID PRN   • levothyroxine tablet 150 mcg  150 mcg Oral Early Morning   • methocarbamol (ROBAXIN) tablet 500 mg  500 mg Oral Q6H [address]   • multivitamin stress formula tablet 1 tablet  1 tablet Oral Daily   • pantoprazole (PROTONIX) EC tablet 40 mg  40 mg Oral Early Morning   • senna (SENOKOT) tablet 8.6 mg  1 tablet Oral HS   • traZODone (DESYREL) tablet 75 mg  75 mg Oral HS    and PTA meds:   Prior to Admission Medications   Prescriptions Last Dose Informant Patient Reported? Taking? Docusate Sodium (DSS) 100 MG CAPS Not Taking  Yes No   Sig: Take 100 mg by mouth   Patient not taking: Reported on 10/4/2023   Lidocaine 4 % PTCH Not Taking  No No   Sig: Apply 1 patch topically daily   Patient not taking: Reported on 5/26/2023   Multiple Vitamin (multivitamin) capsule 10/4/2023  Yes Yes   Sig: Take 1 tablet by mouth daily   acetaminophen (TYLENOL) 325 mg tablet 10/3/2023  Yes Yes   Sig: Take 650 mg by mouth   acetaminophen (TYLENOL) 500 mg tablet Not Taking  No No   Sig: Take 1 tablet (500 mg total) by mouth every 6 (six) hours as needed for mild pain or moderate pain   Patient not taking: Reported on 5/26/2023   albuterol (Ventolin HFA) 90 mcg/act inhaler Past Week  No Yes   Sig: Inhale 2 puffs every 6 (six) hours as needed for wheezing   amLODIPine (NORVASC) 10 mg tablet 10/4/2023  No Yes   Sig: Take 1 tablet (10 mg total) by mouth in the morning. amLODIPine (NORVASC) 10 mg tablet Not Taking  Yes No   Sig: Take 10 mg by mouth daily   Patient not taking: Reported on 10/4/2023   amoxicillin-clavulanate (AUGMENTIN) 875-125 mg per tablet Not Taking  Yes No   Sig: Take 1 tablet by mouth every 12 (twelve) hours   Patient not taking: Reported on 10/4/2023   apixaban (Eliquis) 2.5 mg Past Week  No Yes   Sig: Take 1 tablet (2.5 mg total) by mouth 2 (two) times a day   aspirin (ECOTRIN LOW STRENGTH) 81 mg EC tablet 10/4/2023  Yes Yes   Sig: Take 81 mg by mouth   azithromycin (ZITHROMAX) 250 mg tablet Not Taking  Yes No   Sig: TAKE 2 TABLETS BY MOUTH TODAY, THEN TAKE 1 TABLET DAILY FOR 4 DAYS   Patient not taking: Reported on 10/4/2023   benzonatate (TESSALON PERLES) 100 mg capsule Not Taking  Yes No   Sig: Take 100 mg by mouth Three times daily as needed   Patient not taking: Reported on 10/4/2023   buPROPion (WELLBUTRIN XL) 300 mg 24 hr tablet Not Taking  Yes No   Sig: TAKE ORALLY 1 EXTENDED RELEASE ORAL TABLET IN AM FOR 30 DAYS. FOR DEPRESSION. TAKE IT WITH CELEXA.    Patient not taking: Reported on 10/4/2023   budesonide (PULMICORT) 0.5 mg/2 mL nebulizer solution   Yes No   Sig: Inhale 500 mcg   calcium carbonate (TUMS) 500 mg chewable tablet Past Week  No Yes   Sig: Chew 2 tablets (1,000 mg total)  as needed in the morning and 2 tablets (1,000 mg total) as needed at noon and 2 tablets (1,000 mg total) as needed in the evening (indigestion,heartburn). carvedilol (COREG) 12.5 mg tablet Not Taking  Yes No   Sig: Take 12.5 mg by mouth 2 (two) times a day   Patient not taking: Reported on 10/4/2023   citalopram (CeleXA) 40 mg tablet 10/4/2023  No Yes   Sig: Take 1 tablet (40 mg total) by mouth in the morning. clonazePAM (KlonoPIN) 1 mg tablet Not Taking  Yes No   Sig: Take 1 mg by mouth   Patient not taking: Reported on 10/4/2023   clonazePAM (KlonoPIN) 1 mg tablet Not Taking  Yes No   Sig: Take 1 mg by mouth 2 (two) times a day   Patient not taking: Reported on 10/4/2023   clonazePAM (KlonoPIN) 2 mg tablet Not Taking  Yes No   Sig: Take 2 mg by mouth   Patient not taking: Reported on 10/4/2023   clotrimazole (LOTRIMIN) 1 % cream 10/3/2023  No Yes   Sig: APPLY TO AFFECTED AREA TWICE A DAY   diphenhydrAMINE (BENADRYL) 2 % cream Not Taking  No No   Sig: Apply topically 3 (three) times a day as needed for itching   Patient not taking: Reported on 5/26/2023   gabapentin (NEURONTIN) 100 mg capsule   No No   Sig: Take 1 capsule (100 mg total) by mouth 3 (three) times a day for 10 days   hydrOXYzine HCL (ATARAX) 50 mg tablet Not Taking  Yes No   Sig: TAKE ORALLY 2 ORAL TABLET AT NIGHT FOR 30 DAYS.  FOR SLEEP   Patient not taking: Reported on 10/4/2023   hydrOXYzine HCL (ATARAX) 50 mg tablet 10/4/2023  Yes Yes   Sig: Take 50 mg by mouth Three times daily as needed   levothyroxine 150 mcg tablet 10/4/2023  No Yes   Sig: Take 1 tablet (150 mcg total) by mouth daily in the early morning   levothyroxine 150 mcg tablet 10/4/2023  Yes Yes   Sig: Take 150 mcg by mouth   levothyroxine 200 mcg tablet Not Taking  Yes No   Sig: Take 200 mcg by mouth daily   Patient not taking: Reported on 5/26/2023   losartan-hydrochlorothiazide (HYZAAR) 100-25 MG per tablet Not Taking  Yes No   Sig: Take 1 tablet by mouth daily   Patient not taking: Reported on 10/4/2023   methocarbamol (ROBAXIN) 500 mg tablet 10/3/2023  No Yes   Sig: Take 1 tablet (500 mg total) by mouth every 6 (six) hours   methocarbamol (ROBAXIN) 500 mg tablet Not Taking  No No   Sig: Take 1 tablet (500 mg total) by mouth 2 (two) times a day   Patient not taking: Reported on 10/4/2023   naproxen (NAPROSYN) 250 mg tablet 10/3/2023  No Yes   Sig: Take 1 tablet (250 mg total) by mouth 2 (two) times a day with meals   omeprazole (PriLOSEC) 20 mg delayed release capsule Past Week  Yes Yes   Sig: Take 20 mg by mouth   ondansetron (ZOFRAN-ODT) 4 mg disintegrating tablet Past Month  No Yes   Sig: Take 1 tablet (4 mg total) by mouth every 6 (six) hours as needed for nausea or vomiting   pantoprazole (PROTONIX) 40 mg tablet   No No   Sig: Take 1 tablet (40 mg total) by mouth daily in the early morning   senna (SENOKOT) 8.6 mg   No No   Sig: Take 1 tablet (8.6 mg total) by mouth in the morning for 5 days. sucralfate (CARAFATE) 1 g tablet   No No   Sig: Take 1 tablet (1 g total) by mouth 4 (four) times a day for 7 days   traMADol (ULTRAM) 50 mg tablet Not Taking  Yes No   Sig: Take 50 mg by mouth   Patient not taking: Reported on 5/26/2023   traZODone (DESYREL) 150 mg tablet 10/3/2023  No Yes   Sig: Take 0.5 tablets (75 mg total) by mouth daily at bedtime      Facility-Administered Medications: None     No Known Allergies    Objective   I/O     None          Physical Exam  Constitutional:       General: She is not in acute distress. Appearance: She is well-developed. She is obese. HENT:      Head: Normocephalic and atraumatic. Eyes:      Extraocular Movements: Extraocular movements intact. Pupils: Pupils are equal, round, and reactive to light.       Comments: Healing/ resolving right eye ecchymosis. Neck:      Trachea: No tracheal deviation. Cardiovascular:      Rate and Rhythm: Normal rate. Pulmonary:      Effort: Pulmonary effort is normal.   Abdominal:      Palpations: Abdomen is soft. Tenderness: There is no abdominal tenderness. There is no guarding. Musculoskeletal:         General: Normal range of motion. Cervical back: Neck supple. Skin:     General: Skin is warm and dry. Coloration: Skin is not pale. Findings: No rash. Neurological:      Mental Status: She is alert and oriented to person, place, and time. Comments: GCS 15, Awake, Alert, Oriented x 3    Motor: DELEON, strength 5/5 BUE 5/5, BLE 4+/5. Sensation:  Decreased to LT LUE/LLE, intact RUE/RLE. Reflexes: 2+ and symmetric, no newell's or clonus     Coordination: no drift bilateral upper extremities     Psychiatric:         Behavior: Behavior normal.       Neurologic Exam     Mental Status   Oriented to person, place, and time. Cranial Nerves     CN III, IV, VI   Pupils are equal, round, and reactive to light. Vitals:Blood pressure 119/60, pulse 85, temperature 97.8 °F (36.6 °C), temperature source Oral, resp. rate 18, last menstrual period 08/03/2016, SpO2 96 %, not currently breastfeeding. ,There is no height or weight on file to calculate BMI.      Lab Results:   Results from last 7 days   Lab Units 10/04/23  1519   WBC Thousand/uL 8.43   HEMOGLOBIN g/dL 13.9   HEMATOCRIT % 43.6   PLATELETS Thousands/uL 325   NEUTROS PCT % 71   MONOS PCT % 7   EOS PCT % 0     Results from last 7 days   Lab Units 10/04/23  1519   POTASSIUM mmol/L 4.4   CHLORIDE mmol/L 102   CO2 mmol/L 28   BUN mg/dL 15   CREATININE mg/dL 1.17   CALCIUM mg/dL 8.7   ALK PHOS U/L 65   ALT U/L 15   AST U/L 19     Results from last 7 days   Lab Units 10/04/23  1519   MAGNESIUM mg/dL 2.0         Results from last 7 days   Lab Units 10/04/23  1519   INR  0.87   PTT seconds 20*     Imaging Studies: I have personally reviewed pertinent reports. and I have personally reviewed pertinent films in PACS    EKG, Pathology, and Other Studies: I have personally reviewed pertinent reports. VTE Prophylaxis: Sequential compression device P[place] Marie)     Code Status: Level 1 - Full Code  Advance Directive and Living Will:      Power of :    POLST:      Counseling / Coordination of Care  I spent 45 minutes with the patient. PLEASE NOTE:  This encounter may have been completed utilizing the [employer] Direct Speech Voice Recognition Software. Grammatical errors, random word insertions, pronoun errors and incomplete sentences are occasional consequences of the system due to software limitations, ambient noise and hardware issues. These may be missed even after proof reading prior to affixing electronic signature. Please do not hesitate to contact me directly if you have any questions or concerns about the content, text or information contained within the body of this dictation.

## 2023-10-05 NOTE — H&P
[address] [facility]  H&P  Name: Marizol Zaldivar 59 y.o. female I MRN: [id number]  Unit/Bed#: ED 25 I Date of Admission: 10/4/2023   Date of Service: 10/5/2023 I Hospital Day: 1      Assessment/Plan   Fall  Assessment & Plan  - Status post fall with the below noted injuries. - Fall precautions.  - PT and OT evaluation and treatment as indicated. - Case Management consultation for disposition planning. Subdural hematoma (HCC)  Assessment & Plan  - Neuro exam: GCS 15, non-focal  - Continue neurologic checks: Every 2 hours. - Reversal agent administered: K-Centra  - CT scan of the head on 10/4/23 reviewed: Small posterior parafalcine SDH  - Appreciate Neurosurgery evaluation and recommendations. - Chemical DVT prophylaxis: Not cleared for chemical prophylaxis by neurosurgery at this time. Continue SCDs bilaterally. - Hold all anticoagulants and anti platelet medications for 2 weeks and/or until cleared by Neurosurgery to resume.  - PT and OT (including cognitive evaluation) evaluation and treatment as indicated. Hypothyroidism  Assessment & Plan  - Continue home levothyroxine. Emphysema lung ([address])  Assessment & Plan  - Continue home albuterol inhaler. Hypertension  Assessment & Plan  - Continue home amlodipine         Chief Complaint: Fatigue    History of Present Illness   HPI:  Marizol Zaldivar is a 59 y.o. female who presents as a transfer from [address] [place] [address] ED with small posterior parafalcine subdural hematoma after a mechanical fall approximately 2 weeks ago. The patient states that she tripped and fell striking her head on a wall approximately 2 weeks ago. The patient is on Eliquis for a prior DVT. The patient states that she ran out of Eliquis approximately 1 week ago. The patient states that for the past week she has felt fatigued and generally weak. The patient has had occasional headaches. In the ED the patient was given Kcentra.   The patient denies any other injuries or complaints at this time. Mechanism:    Review of Systems   Constitutional: Positive for fatigue. Negative for chills and fever. HENT: Negative for congestion and tinnitus. Eyes: Negative for visual disturbance. Respiratory: Negative for cough and shortness of breath. Cardiovascular: Negative for chest pain, palpitations and leg swelling. Gastrointestinal: Negative for abdominal pain, nausea and vomiting. Genitourinary: Negative for dysuria and hematuria. Musculoskeletal: Negative for arthralgias and myalgias. Skin: Negative for color change, pallor, rash and wound. Neurological: Positive for weakness. Negative for dizziness, seizures, syncope, light-headedness, numbness and headaches. Generalized weakness       Historical Information       Past Medical History:   Past Medical History:   Diagnosis Date   • Disease of thyroid gland    • Hernia    • Hypertension        Past Surgical History:   Past Surgical History:   Procedure Laterality Date   • CHOLECYSTECTOMY     • CORONARY ANGIOPLASTY WITH STENT PLACEMENT     • IR DRAINAGE TUBE CHECK/CHANGE/REPOSITION/REINSERTION/UPSIZE  5/10/2022   • IR DRAINAGE TUBE CHECK/CHANGE/REPOSITION/REINSERTION/UPSIZE  2022   • IR DRAINAGE TUBE PLACEMENT  2022   • IR DRAINAGE TUBE PLACEMENT  2022       Social History:  Alcohol Use:   Social History     Substance and Sexual Activity   Alcohol Use Not Currently     Drug Use:   Social History     Substance and Sexual Activity   Drug Use Not Currently     Tobacco Use:   Social History     Tobacco Use   Smoking Status Former   • Types: Cigarettes   • Quit date:    • Years since quittin.7   Smokeless Tobacco Never   Tobacco Comments    Social only       Immunizations: There is no immunization history on file for this patient.     Family History: non-contributory     Meds/Allergies   all current active meds have been reviewed     No Known Allergies    PHYSICAL EXAM      Objective   Vitals:   First set: Temperature: 97.8 °F (36.6 °C) (10/04/23 2120)  Pulse: 85 (10/04/23 2120)  Respirations: 18 (10/04/23 2120)  Blood Pressure: (!) 197/85 (10/04/23 2120)      Physical Exam  Constitutional:       General: She is not in acute distress. Appearance: Normal appearance. She is not ill-appearing, toxic-appearing or diaphoretic. HENT:      Head: Normocephalic. Comments: Right-sided periorbital ecchymosis     Nose: Nose normal.      Mouth/Throat:      Mouth: Mucous membranes are moist.      Pharynx: Oropharynx is clear. Eyes:      General: No scleral icterus. Extraocular Movements: Extraocular movements intact. Conjunctiva/sclera: Conjunctivae normal.      Pupils: Pupils are equal, round, and reactive to light. Cardiovascular:      Rate and Rhythm: Normal rate and regular rhythm. Pulses: Normal pulses. Heart sounds: Normal heart sounds. No murmur heard. No friction rub. No gallop. Pulmonary:      Effort: Pulmonary effort is normal.      Breath sounds: Normal breath sounds. No wheezing, rhonchi or rales. Abdominal:      General: Abdomen is flat. Palpations: Abdomen is soft. Tenderness: There is no abdominal tenderness. There is no guarding or rebound. Musculoskeletal:         General: No swelling, tenderness, deformity or signs of injury. Normal range of motion. Cervical back: Normal range of motion and neck supple. No rigidity or tenderness. Right lower leg: No edema. Left lower leg: No edema. Skin:     General: Skin is warm and dry. Capillary Refill: Capillary refill takes less than 2 seconds. Coloration: Skin is not jaundiced or pale. Findings: No bruising, erythema, lesion or rash. Neurological:      General: No focal deficit present. Mental Status: She is alert and oriented to person, place, and time. Cranial Nerves: No cranial nerve deficit. Sensory: No sensory deficit. Motor: No weakness. Coordination: Coordination normal.         Invasive Devices     Peripheral Intravenous Line  Duration           Peripheral IV 10/04/23 Right;Ventral (anterior) Forearm <1 day                Lab Results: Results: I have personally reviewed all pertinent laboratory/tests results  Imaging/EKG Studies: positive for acute findings: Small posterior parafalcine subdural hematoma  Other Studies: None    Code Status: Level 1 - Full Code  Advance Directive and Living Will:      Power of :    POLST:      Counseling / Coordination of Care  Total floor / unit time spent today 30 minutes. This involved direct patient contact where I performed a full history and physical, reviewed previous records, and reviewed laboratory and other diagnostic studies.

## 2023-10-06 VITALS
TEMPERATURE: 97.3 F | HEIGHT: 63 IN | RESPIRATION RATE: 16 BRPM | WEIGHT: 249.12 LBS | OXYGEN SATURATION: 97 % | DIASTOLIC BLOOD PRESSURE: 89 MMHG | HEART RATE: 70 BPM | BODY MASS INDEX: 44.14 KG/M2 | SYSTOLIC BLOOD PRESSURE: 142 MMHG

## 2023-10-06 PROCEDURE — 90686 IIV4 VACC NO PRSV 0.5 ML IM: CPT | Performed by: SURGERY

## 2023-10-06 PROCEDURE — 97167 OT EVAL HIGH COMPLEX 60 MIN: CPT

## 2023-10-06 PROCEDURE — 99238 HOSP IP/OBS DSCHRG MGMT 30/<: CPT | Performed by: STUDENT IN AN ORGANIZED HEALTH CARE EDUCATION/TRAINING PROGRAM

## 2023-10-06 PROCEDURE — 97163 PT EVAL HIGH COMPLEX 45 MIN: CPT

## 2023-10-06 PROCEDURE — NC001 PR NO CHARGE: Performed by: STUDENT IN AN ORGANIZED HEALTH CARE EDUCATION/TRAINING PROGRAM

## 2023-10-06 PROCEDURE — 97129 THER IVNTJ 1ST 15 MIN: CPT

## 2023-10-06 PROCEDURE — G0008 ADMIN INFLUENZA VIRUS VAC: HCPCS | Performed by: SURGERY

## 2023-10-06 RX ORDER — SENNOSIDES 8.6 MG
650 CAPSULE ORAL EVERY 8 HOURS PRN
Qty: 30 TABLET | Refills: 0 | Status: SHIPPED | OUTPATIENT
Start: 2023-10-06

## 2023-10-06 RX ORDER — IBUPROFEN 400 MG/1
400 TABLET ORAL EVERY 6 HOURS PRN
Qty: 20 TABLET | Refills: 0 | Status: SHIPPED | OUTPATIENT
Start: 2023-10-06

## 2023-10-06 RX ADMIN — METHOCARBAMOL 500 MG: 500 TABLET ORAL at 06:35

## 2023-10-06 RX ADMIN — CITALOPRAM HYDROBROMIDE 40 MG: 20 TABLET ORAL at 09:40

## 2023-10-06 RX ADMIN — B-COMPLEX W/ C & FOLIC ACID TAB 1 TABLET: TAB at 09:39

## 2023-10-06 RX ADMIN — METHOCARBAMOL 500 MG: 500 TABLET ORAL at 12:16

## 2023-10-06 RX ADMIN — INFLUENZA VIRUS VACCINE 0.5 ML: 15; 15; 15; 15 SUSPENSION INTRAMUSCULAR at 12:38

## 2023-10-06 RX ADMIN — PANTOPRAZOLE SODIUM 40 MG: 40 TABLET, DELAYED RELEASE ORAL at 06:35

## 2023-10-06 RX ADMIN — LEVOTHYROXINE SODIUM 150 MCG: 75 TABLET ORAL at 06:35

## 2023-10-06 RX ADMIN — ENOXAPARIN SODIUM 40 MG: 40 INJECTION SUBCUTANEOUS at 09:40

## 2023-10-06 RX ADMIN — AMLODIPINE BESYLATE 10 MG: 10 TABLET ORAL at 09:40

## 2023-10-06 RX ADMIN — ASPIRIN 81 MG CHEWABLE TABLET 81 MG: 81 TABLET CHEWABLE at 09:39

## 2023-10-06 NOTE — QUICK NOTE
Called by RN to see patient for headache. Patient with 7 out of 10 headache pain. Patient states acetaminophen did not help her headache earlier today by itself. Her creatinine was a little higher this admission so we will try Fioricet. Also spoke with patient regarding discharge to home tomorrow. She states her daughter has court tomorrow but should be able to pick her up in the afternoon. Offered additional help to get patient home including having case management arranged for transportation home, to which patient was receptive and said she would let the team know if she needs help. I did inform the patient that she is medically stable for discharge and beyond tomorrow we may not have a medical reason to keep her in the hospital, to which she was understanding.

## 2023-10-06 NOTE — PROGRESS NOTES
[address] [facility]  Progress Note  Name: Inocencia Miranda  MRN: [id number]  Unit/Bed#: PPHP 833-01 I Date of Admission: 10/4/2023   Date of Service: 10/6/2023 I Hospital Day: 2    Assessment/Plan   Hypothyroidism  Assessment & Plan  - Continue home levothyroxine. Emphysema lung ([address])  Assessment & Plan  - Continue home albuterol inhaler. Hypertension  Assessment & Plan  - Continue home amlodipine    Fall  Assessment & Plan  - Status post fall with the below noted injuries. - Fall precautions.  - PT and OT evaluation and treatment as indicated. - Case Management consultation for disposition planning. * Subdural hematoma (HCC)  Assessment & Plan  - Neuro exam: GCS 15, non-focal  - Reversal agent administered: K-Centra  - CT scan of the head on 10/4/23 reviewed: Small posterior parafalcine SDH  - NSGY consulted  - repeat CTH stable - OK to restart ASA, hold eliquis per NSGY  - Chemical DVT prophylaxis: lovenox  - Hold all anticoagulants medications for 2 weeks and/or until cleared by Neurosurgery to resume.  - PT and OT (including cognitive evaluation) evaluation and treatment as indicated. Bowel Regimen: senna  VTE Prophylaxis:Enoxaparin (Lovenox)     Disposition: discharge to home    Subjective   Chief Complaint: headache    Subjective: Patient reports mild headache this morning, improved from yesterday. Headache is diffuse, dull, and constant. denies any other complaints.      Objective   Vitals:   Temp:  [97.3 °F (36.3 °C)-98 °F (36.7 °C)] 97.3 °F (36.3 °C)  HR:  [70-87] 70  Resp:  [16-18] 16  BP: (115-142)/(64-89) 142/89    I/O     None           Physical Exam:   Physical Exam  General: well-appearing, no acute distress  HEENT: PERRL  Neuro: A&O x3, CN II-XII intact, no cerebellar dysfunction, motor and sensation intact throughout  CV: RRR, pulses 2+ and symmetric throughout  Pulm: CTA bilaterally, normal work of breathing  GI: abdomen soft, non-distended, non-tender, no signs of peritonitis  : deferred  MSK: ROM normal all joints, no tenderness  Skin: warm and dry  Psych: calm and cooperative    Invasive Devices     Peripheral Intravenous Line  Duration           Peripheral IV 10/04/23 Right;Ventral (anterior) Forearm 1 day                      Lab Results: Results: I have personally reviewed all pertinent laboratory/tests results  Imaging: I have personally reviewed pertinent reports.      Other Studies:

## 2023-10-06 NOTE — CASE MANAGEMENT
Case Management Discharge Planning Note    Patient name Delores John  Location [address] [place] [address]/[facility] 187-30 MRN [id number]  : 1965 Date 10/6/2023       Current Admission Date: 10/4/2023  Current Admission Diagnosis:Subdural hematoma [facility])   Patient Active Problem List    Diagnosis Date Noted   • Fall 10/05/2023   • Subdural hematoma ([address]) 10/05/2023   • Hypertension 10/05/2023   • Emphysema lung ([address]) 10/05/2023   • Hypothyroidism 10/05/2023   • Primary osteoarthritis of left knee 2023   • Intertrigo 2022   • Pulmonary nodule 2022   • Morbid obesity due to excess calories ([address]) 2022   • Splenic abscess 2022      LOS (days): 2  Geometric Mean LOS (GMLOS) (days): 2.00  Days to GMLOS:0.4     OBJECTIVE:  Risk of Unplanned Readmission Score: 11.59         Current admission status: Inpatient   Preferred Pharmacy:   [address] [place] [address]  Phone: [phone] Fax: [phone]    [id number] [place], [address] [address]  Phone: [phone] Fax: [phone]    Primary Care Provider: Reji Romano MD    Primary Insurance: [facility] [employer] [employer]  Secondary Insurance: [address] [place] [address]    DISCHARGE DETAILS:                            Additional Comments: informed by OT, patient would benefit from cognitive OT OP referral. Provided contact information to pt for [facility] OPPT/OT.

## 2023-10-06 NOTE — PLAN OF CARE
Problem: PAIN - ADULT  Goal: Verbalizes/displays adequate comfort level or baseline comfort level  Description: Interventions:  - Encourage patient to monitor pain and request assistance  - Assess pain using appropriate pain scale  - Administer analgesics based on type and severity of pain and evaluate response  - Implement non-pharmacological measures as appropriate and evaluate response  - Consider cultural and social influences on pain and pain management  - Notify physician/advanced practitioner if interventions unsuccessful or patient reports new pain  Outcome: Progressing     Problem: INFECTION - ADULT  Goal: Absence or prevention of progression during hospitalization  Description: INTERVENTIONS:  - Assess and monitor for signs and symptoms of infection  - Monitor lab/diagnostic results  - Monitor all insertion sites, i.e. indwelling lines, tubes, and drains  - Monitor endotracheal if appropriate and nasal secretions for changes in amount and color  - Philipp appropriate cooling/warming therapies per order  - Administer medications as ordered  - Instruct and encourage patient and family to use good hand hygiene technique  - Identify and instruct in appropriate isolation precautions for identified infection/condition  Outcome: Progressing  Goal: Absence of fever/infection during neutropenic period  Description: INTERVENTIONS:  - Monitor WBC    Outcome: Progressing     Problem: DISCHARGE PLANNING  Goal: Discharge to home or other facility with appropriate resources  Description: INTERVENTIONS:  - Identify barriers to discharge w/patient and caregiver  - Arrange for needed discharge resources and transportation as appropriate  - Identify discharge learning needs (meds, wound care, etc.)  - Arrange for interpretive services to assist at discharge as needed  - Refer to Case Management Department for coordinating discharge planning if the patient needs post-hospital services based on physician/advanced practitioner order or complex needs related to functional status, cognitive ability, or social support system  Outcome: Progressing     Problem: Knowledge Deficit  Goal: Patient/family/caregiver demonstrates understanding of disease process, treatment plan, medications, and discharge instructions  Description: Complete learning assessment and assess knowledge base.   Interventions:  - Provide teaching at level of understanding  - Provide teaching via preferred learning methods  Outcome: Progressing

## 2023-10-06 NOTE — ASSESSMENT & PLAN NOTE
- Neuro exam: GCS 15, non-focal  - Reversal agent administered: K-Centra  - CT scan of the head on 10/4/23 reviewed: Small posterior parafalcine SDH  - NSGY consulted  - repeat CTH stable - OK to restart ASA, hold eliquis per NSGY  - Chemical DVT prophylaxis: lovenox  - Hold all anticoagulants medications for 2 weeks and/or until cleared by Neurosurgery to resume.  - PT and OT (including cognitive evaluation) evaluation and treatment as indicated.

## 2023-10-06 NOTE — TREATMENT PLAN
Neurosurgery treatment plan    Subdural hematoma [facility])  Assessment & Plan  • Patient found to have small posterior parafalcine hyperdensity concerning for subdural hematoma. Pt with hx of fall about 3-4 weeks prior to admission while on ASA and Eliquis for hx of DVT and TIA.      • Imaging reviewed personally and by attending. Final results as below  ? CT head wo 10/4/2023: Small posterior hyperdensity concerning for "parafalcine subdural hematoma. No mass effect.     Plan  • Continue frequent neurological checks. • STAT CT head with any neurological decline or a decrease in GCS of 2pts within 1 hr.  • Recommend SBP <160 mmHg. • Pt was on ASA and Eliquis. Given that pt had a fall about 3-4 weeks ago, given repeat CTH stable, pt will be okay to resume ASA at this time, however, continue to hold Eliquis given the hyperdensity concerning for acute SDH. • Pain control per primary team  • Mobilize and eval by PT/OT when able to. • DVT PPX: SCDs. Okay for pharmacological DVT prophylaxis as needed given stable repeat CT head. • Ongoing medical management per primary team.  • No neurosurgical intervention is anticipated at this time. • Neurosurgery will  see patient as needed during the remainder of this hospitalization. Ideally pt should have obtained MRI brain w/wo at 2-4 week follow up, however pt reported discomfort with obtaining MRI and requested CTH instead. At this time, will  plan for patient to have close follow-up with neurosurgery in 2 weeks with a CT head. If hyperdensity decreases, then this is likely SDH and would be managed accordingly. If hyperdensity remains grossly stable, this could be indicative of underlying mass such as meningioma. In that case, could consider delayed MRI brain w/wo vs continued monitoring with CTH. Please call with any questions/concerns.

## 2023-10-06 NOTE — DISCHARGE INSTR - AVS FIRST PAGE
Neurosurgery discharge instructions for concern for head bleed:     Do not take any blood thinning medications except as directed by your provider. (ie. No Advil. No motrin. No ibuprofen. No Aleve. No fishoil. No heparin). Okay to resume your Aspirin, continue to hold Eliquis until cleared by neurosurgery at your follow up appointment. Refrain from activity that increases chance of trauma to head or falls. Recommend you take fall precaution. No strenuous activity or sports. Return to hospital Emergency Room if you experience worsening / new headache, nausea/vomiting, speech/vision change, seizure, confusion / mental status change, weakness, or other neurological changes. Follow-up as scheduled with a repeat CT head without contrast to be completed 2-3 days prior to visit. Prescription has been entered electronically. Please call central scheduling at [phone] to schedule your CT head.

## 2023-10-06 NOTE — PHYSICAL THERAPY NOTE
Physical Therapy Evaluation     Patient's Name: Yadira Morrison    Admitting Diagnosis  Subdural hematoma ([address]) [S06. 5XAA]  Unspecified multiple injuries, initial encounter [T07. XXXA]    Problem List  Patient Active Problem List   Diagnosis   • Splenic abscess   • Intertrigo   • Pulmonary nodule   • Morbid obesity due to excess calories (HCC)   • Primary osteoarthritis of left knee   • Fall   • Subdural hematoma (HCC)   • Hypertension   • Emphysema lung ([address])   • Hypothyroidism       Past Medical History  Past Medical History:   Diagnosis Date   • Disease of thyroid gland    • Hernia    • Hypertension        Past Surgical History  Past Surgical History:   Procedure Laterality Date   • CHOLECYSTECTOMY     • CORONARY ANGIOPLASTY WITH STENT PLACEMENT     • IR DRAINAGE TUBE CHECK/CHANGE/REPOSITION/REINSERTION/UPSIZE  5/10/2022   • IR DRAINAGE TUBE CHECK/CHANGE/REPOSITION/REINSERTION/UPSIZE  6/14/2022   • IR DRAINAGE TUBE PLACEMENT  5/7/2022   • IR DRAINAGE TUBE PLACEMENT  6/11/2022        10/06/23 0900   PT Last Visit   PT Visit Date 10/06/23   Note Type   Note type Evaluation   Pain Assessment   Pain Assessment Tool 0-10   Pain Score 6   Pain Location/Orientation Orientation: Left; Location: Leg;Location: Head   Pain Onset/Description Descriptor: Aching;Frequency: Intermittent   Patient's Stated Pain Goal No pain   Hospital Pain Intervention(s) Repositioned   Restrictions/Precautions   Weight Bearing Precautions Per Order No   Other Precautions Pain   Home Living   Type of [address] [place] [address] Two level;Stairs to enter with rails;Bed/bath upstairs;1/2 bath on main level   Bathroom Shower/Tub Tub/shower unit   [address] [place] [address];Walker  Shannon Gaston is at [facility])   Additional Comments Pt reports lives with daughter in [place] , with [address] to bedroom. Pt states does stay with her son intermittently, has walker at his place.  Pt does state that will be staying with sister post dc, no BURT, 1 level house   Prior Function   Level of [place] Independent with ADLs; Independent with functional mobility; Needs assistance with IADLS   Lives With Daughter  (Does also stay at sons house intermittently)   Receives Help From Family   IADLs Independent with meal prep; Independent with medication management; Family/Friend/Other provides transportation  (Use [employer] for transportation)   Falls in the last 6 months 1 to 4  (1)   Vocational On disability   Comments Pt reports being Ind for mobility and ADLs. Does not use AD   General   Family/Caregiver Present No   Cognition   Overall Cognitive Status WFL   Arousal/Participation Alert   Orientation Level Oriented X4   Following Commands Follows one step commands without difficulty   Comments Pt cooperative during session. Subjective   Subjective Agreeable to mobilize   RLE Assessment   RLE Assessment WFL   LLE Assessment   LLE Assessment WFL  (Pt reports chronic knee and hip pain)   Bed Mobility   Supine to Sit Unable to assess   Sit to Supine Unable to assess   Additional Comments Pt OOB in chair upon arrival.   Transfers   Sit to Stand 5  Supervision   Stand to Sit 5  Supervision   Additional Comments no AD   Ambulation/Elevation   Gait pattern Forward Flexion; Antalgic   Gait Assistance 5  Supervision   Assistive Device None   Distance 175 ft   Stair Management Assistance 5  Supervision   Stair Management Technique Nonreciprocal;One rail R   Number of Stairs 7   Ambulation/Elevation Additional Comments Pt ambulates without AD . Pt noted to have antalgic gait with c/o L knee pain, pt reports being chronic. Also noted to have FF posture, reports chronic posture. No LOB noted during session. Pt does c/o mild wooziness, BP checked 142/89. Pt states has been ongoing. Balance   Static Sitting Good   Dynamic Sitting Fair +   Static Standing Fair   Dynamic Standing Fair   Ambulatory Fair   Activity Tolerance   Activity Tolerance Patient limited by pain; Patient limited by fatigue   Medical Staff Made Aware OT Lidia Rashid   Nurse Made Aware RN cleared pt for therapy   Assessment   Prognosis Fair   Problem List Pain;Decreased endurance   Assessment Pt is a 59 y.o. female seen for PT evaluation s/p admit to [facility] on 10/4/2023. Pt was admitted with a primary dx of: Subdural hematoma, Fall,  HTN with PMH of Lung emphysema, Pulmonary nodule, OA L knee, Splenic abscess. PT now consulted for assessment of mobility and d/c needs. Pt with Activity as tolerated orders. Pts current clinical presentation is Unstable/ Unpredictable (high complexity) due to Ongoing medical management for primary dx, Decreased activity tolerance compared to baseline. Prior to admission, pt reports being Ind for mobility and ADLs. Pt lives with her daughter , but states intermittently stays at her sons house. Post dc is planning to stay with her sister in 1 level home. Upon evaluation, pt currently is able to transfer and ambulate with supervision , no AD used. Pt noted to have gait deviations, pt reports are chronic but is steady with ambulation, no LOB noted. At conclusion of PT session all needs in reach, RN notified of session findings/recommendations and pt returned back in recliner chair with phone and call bell within reach. The patient's AM-PAC Basic Mobility Inpatient Short Form Raw Score is 18. A Raw score of greater than 16 suggests the patient may benefit from discharge to home. Please also refer to the recommendation of the Physical Therapist for safe discharge planning. Recommend Home, no needs, may possibly require increased caregiver support pending OT Cog eval. Encourage continued mobility on unit with staff, no further skilled PT services indicated. Barriers to Discharge Decreased caregiver support   Goals   Patient Goals to go home   Plan   Treatment/Interventions OT; Spoke to nursing   PT Frequency Other (Comment)  (PT Eval Only)   Recommendation   PT Discharge Recommendation No rehabilitation needs   Equipment Recommended   (Pt does have cane and walker if needed)   AM-PAC Basic Mobility Inpatient   Turning in Flat Bed Without Bedrails 3   Lying on Back to Sitting on Edge of Flat Bed Without Bedrails 3   Moving Bed to Chair 3   Standing Up From Chair Using Arms 3   Walk in Room 3   Climb 3-5 Stairs With Railing 3   Basic Mobility Inpatient Raw Score 18   Basic Mobility Standardized Score 41.05   Highest Level Of Mobility   JH-HLM Goal 6: Walk 10 steps or more   JH-HLM Achieved 7: Walk 25 feet or more   Modified Douglas Scale   Modified Stalin Scale 2   End of Consult   Patient Position at End of Consult All needs within reach; Bedside chair           Mayito Henson, PT DPT

## 2023-10-06 NOTE — PLAN OF CARE
Problem: OCCUPATIONAL THERAPY ADULT  Goal: Performs self-care activities at highest level of function for planned discharge setting. See evaluation for individualized goals. Description: Treatment Interventions: ADL retraining, Functional transfer training, Cognitive reorientation, Patient/family training, Equipment evaluation/education, Compensatory technique education, Continued evaluation          See flowsheet documentation for full assessment, interventions and recommendations. 10/6/2023 1449 by Madan Rachel OT  Note: Limitation: Decreased cognition, Decreased endurance, Decreased self-care trans, Decreased high-level ADLs     Assessment: Pt is a 59 y.o. female seen for OT evaluation s/p admission to [address] [place] [address] on 10/4/2023 following a fall two weeks ago. Pt diagnosed with Subdural hematoma ([address]). Pt has a significant PMH impacting occupational performance including: Disease of thyroid gland, Hernia, and Hypertension. Pt with active OT evaluation and treatment orders and activity orders. PTA, pt living with her daughter in a 2 . Pt reports I w/ ADLs and fxnl mobility w/o AD at baseline; - driving. Pt agreeable and willing to participate in OT evaluation. During evaluation, pt was I for eating and grooming, mod I for UB ADLs, and S for LB ADLs and toileting. Pt was also S for transfer and fxnl mobility w/ AD. Performance deficits that affect the pt’s occupational performance during the initial evaluation include decreased activity tolerance, decreased insight into deficits, pain and decreased cognition. Based on pt’s functional performance and deficits the following occupations will be addressed in OT treatments in order to maximize pt’s independence and overall occupational performance: activity tolerance and cognition. Goals are listed below.   From OT perspective, recommend d/c to home w/ OPOT (cog and fitness to drive) and increased family support when pt medically stable to d/c from acute care. Will continue to follow. OT Discharge Recommendation: Home with outpatient rehabilitation (cogn and fitness to drive)       [id number]/5/[id number] 1449 by Esthela Ponce OT  Note: Limitation: Decreased cognition, Decreased endurance, Decreased self-care trans, Decreased high-level ADLs     Assessment: Pt is a 59 y.o. female seen for OT evaluation s/p admission to [facility] on 10/4/2023 following a fall two weeks ago. Pt diagnosed with Subdural hematoma ([address]). Pt has a significant PMH impacting occupational performance including: Disease of thyroid gland, Hernia, and Hypertension. Pt with active OT evaluation and treatment orders and activity orders. PTA, pt living with her daughter in a [place]. Pt reports I w/ ADLs and fxnl mobility w/o AD at baseline; - driving. Pt agreeable and willing to participate in OT evaluation. During evaluation, pt was I for eating and grooming, mod I for UB ADLs, and S for LB ADLs and toileting. Pt was also S for transfer and fxnl mobility w/ AD. Performance deficits that affect the pt’s occupational performance during the initial evaluation include decreased activity tolerance, decreased insight into deficits, pain and decreased cognition. Based on pt’s functional performance and deficits the following occupations will be addressed in OT treatments in order to maximize pt’s independence and overall occupational performance: activity tolerance and cognition. Goals are listed below. From OT perspective, recommend d/c to home w/ OPOT (cog and fitness to drive) and increased family support when pt medically stable to d/c from acute care. Will continue to follow. See additional tx note.      OT Discharge Recommendation: Home with outpatient rehabilitation (cog and fitness to drive)

## 2023-10-06 NOTE — CASE MANAGEMENT
Case Management Discharge Planning Note    Patient name Eneida Samaniego  Location [address] [place], [place] [address]/[facility] 673-29 MRN [id number]  : 1965 Date 10/6/2023       Current Admission Date: 10/4/2023  Current Admission Diagnosis:Subdural hematoma [facility])   Patient Active Problem List    Diagnosis Date Noted   • Fall 10/05/2023   • Subdural hematoma ([address]) 10/05/2023   • Hypertension 10/05/2023   • Emphysema lung ([address]) 10/05/2023   • Hypothyroidism 10/05/2023   • Primary osteoarthritis of left knee 2023   • Intertrigo 2022   • Pulmonary nodule 2022   • Morbid obesity due to excess calories ([address]) 2022   • Splenic abscess 2022      LOS (days): 2  Geometric Mean LOS (GMLOS) (days): 2.00  Days to GMLOS:0.3     OBJECTIVE:  Risk of Unplanned Readmission Score: 11.75         Current admission status: Inpatient   Preferred Pharmacy:   [address] [place] [address]  Phone: [phone] Fax: [phone] - [facility], [address]  Phone: [phone] Fax: [phone]    Primary Care Provider: Maxine Mirza MD    Primary Insurance: [facility] [employer]  Secondary Insurance: Lucinda Sanders    DISCHARGE DETAILS:                                                                                                 Additional Comments: pt requires lyft home, waiver signed. Roundtrip request placed for 4:45pm.      Patient/caregiver received discharge checklist.  Content reviewed. Patient/caregiver encouraged to participate in discharge plan of care prior to discharge home. Gideon Oden

## 2023-10-06 NOTE — OCCUPATIONAL THERAPY NOTE
Occupational Therapy Evaluation and Treatment Note     Patient Name: Marcelle Solis  Today's Date: 10/6/2023  Problem List  Principal Problem:    Subdural hematoma ([address])  Active Problems:    Fall    Hypertension    Emphysema lung ([address])    Hypothyroidism    Past Medical History  Past Medical History:   Diagnosis Date   • Disease of thyroid gland    • Hernia    • Hypertension      Past Surgical History  Past Surgical History:   Procedure Laterality Date   • CHOLECYSTECTOMY     • CORONARY ANGIOPLASTY WITH STENT PLACEMENT     • IR DRAINAGE TUBE CHECK/CHANGE/REPOSITION/REINSERTION/UPSIZE  5/10/2022   • IR DRAINAGE TUBE CHECK/CHANGE/REPOSITION/REINSERTION/UPSIZE  6/14/2022   • IR DRAINAGE TUBE PLACEMENT  5/7/2022   • IR DRAINAGE TUBE PLACEMENT  6/11/2022        10/06/23 0850   OT Last Visit   OT Visit Date 10/06/23   Note Type   Note type Evaluation  (and treatment session)   Pain Assessment   Pain Assessment Tool 0-10   Pain Score 6   Pain Location/Orientation Location: Head   Effect of Pain on Daily Activities limits activity tolerance   Patient's Stated Pain Goal No pain   Hospital Pain Intervention(s) Repositioned; Ambulation/increased activity; Emotional support   Restrictions/Precautions   Weight Bearing Precautions Per Order No   Other Precautions Pain   Home Living   Type of [address] [place] [address]  Multi-level;1/2 bath on main level;Bed/bath upstairs   Bathroom Shower/Tub Tub/shower unit   Bathroom Toilet Standard   Bathroom Equipment   (pt denies)   Home Equipment Walker;Cane  (walker at [place]; does not use at baseline)   Prior Function   Level of [place] Independent with ADLs; Independent with functional mobility; Needs assistance with IADLS   Lives With Daughter  (also stays w/ her son occasionally)   Receives Help From Family   IADLs Independent with meal prep; Independent with medication management; Family/Friend/Other provides transportation   Falls in the last 6 months 1 to 4  (Pt reports 1 fall.) Vocational On disability   Lifestyle   Autonomy Pt reports I w/ ADLs and fxnl mobility w/o AD at baseline; - driving. Reciprocal Relationships Pt lives w/ her daughter, but her daughter is not hold throughout the day. Pt also stays w/ her son occassionally. Service to Others Pt is on disability. Intrinsic Gratification Pt enjoys spending time w/ her family. General   Family/Caregiver Present No   ADL   Where Assessed Chair   Eating Assistance 7  Independent   Grooming Assistance 7  Independent   UB Bathing Assistance 6  Modified Independent   LB Bathing Assistance 5  Supervision/Setup   UB Dressing Assistance 6  Modified independent   LB Dressing Assistance 5  Supervision/Setup   LB Dressing Deficit Setup;Don/doff R sock; Don/doff L sock   Toileting Assistance  5  Supervision/Setup   Bed Mobility   Supine to Sit Unable to assess   Sit to Supine Unable to assess   Additional Comments Pt OOB in chair upon therapist's arrival and at end of OT evaluation w/ all needs met. Transfers   Sit to Stand 5  Supervision   Additional items Armrests; Increased time required   Stand to Sit 5  Supervision   Additional items Armrests; Increased time required   Additional Comments completed w/o AD   Functional Mobility   Functional Mobility 5  Supervision   Additional Comments Pt ambulated within her room w/ S and w/o AD. Additional items   (no AD)   Balance   Static Sitting Good   Dynamic Sitting Fair +   Static Standing Fair   Dynamic Standing Fair -   Ambulatory Fair -   Activity Tolerance   Activity Tolerance Patient limited by fatigue;Patient limited by pain   Nurse Made Aware RN clearance prior to session   RUE Assessment   RUE Assessment WFL   LUE Assessment   LUE Assessment WFL   Hand Function   Gross Motor Coordination Functional   Fine Motor Coordination Functional   Cognition   Overall Cognitive Status Impaired   Arousal/Participation Alert; Responsive; Cooperative   Attention Attends with cues to redirect Orientation Level Oriented X4   Memory Decreased short term memory;Decreased recall of recent events   Following Commands Follows one step commands without difficulty   Comments Pt pleasant and cooperative throughout evaluation. Pt demonstrated a flat affect. See additional treatment note and cognitive comment below. Cognition Assessment Tools MOCA   Score 16  (Pt scored a 16/30 on the MoCA which indicated a moderate cognitive impairment.)   Assessment   Limitation Decreased cognition;Decreased endurance;Decreased self-care trans;Decreased high-level ADLs   Assessment Pt is a 59 y.o. female seen for OT evaluation s/p admission to [address] [place] [address] on 10/4/2023 following a fall two weeks ago. Pt diagnosed with Subdural hematoma ([address] Pt has a significant PMH impacting occupational performance including: Disease of thyroid gland, Hernia, and Hypertension. Pt with active OT evaluation and treatment orders and activity orders. PTA, pt living with her daughter in a 2 . Pt reports I w/ ADLs and fxnl mobility w/o AD at baseline; - driving. Pt agreeable and willing to participate in OT evaluation. During evaluation, pt was I for eating and grooming, mod I for UB ADLs, and S for LB ADLs and toileting. Pt was also S for transfer and fxnl mobility w/ AD. Performance deficits that affect the pt’s occupational performance during the initial evaluation include decreased activity tolerance, decreased insight into deficits, pain and decreased cognition. Based on pt’s functional performance and deficits the following occupations will be addressed in OT treatments in order to maximize pt’s independence and overall occupational performance: activity tolerance and cognition. Goals are listed below.   From OT perspective, recommend d/c to home w/ OPOT (cog and fitness to drive) and increased family support, especially w/ higher level ADLs and IADLs like cooking w/ the stove and managing medications, when pt medically stable to d/c from acute care. Will continue to follow. Goals   Patient Goals to go home   LTG Time Frame 10-14   Plan   Treatment Interventions ADL retraining;Functional transfer training;Cognitive reorientation;Patient/family training;Equipment evaluation/education; Compensatory technique education;Continued evaluation   Goal Expiration Date 10/20/23   OT Treatment Day 0   OT Frequency 1-2x/wk   Recommendation   OT Discharge Recommendation Home with outpatient rehabilitation  (cog and fitness to drive)   AM-PAC Daily Activity Inpatient   Lower Body Dressing 3   Bathing 3   Toileting 3   Upper Body Dressing 4   Grooming 4   Eating 4   Daily Activity Raw Score 21   Daily Activity Standardized Score (Calc for Raw Score >=11) 44.27   AM-PAC Applied Cognition Inpatient   Following a Speech/Presentation 2   Understanding Ordinary Conversation 3   Taking Medications 3   Remembering Where Things Are Placed or Put Away 3   Remembering List of 4-5 Errands 2   Taking Care of Complicated Tasks 2   Applied Cognition Raw Score 15   Applied Cognition Standardized Score 33.54   MOCA   Version 8.1   Visuopatial/Executive 2   Naming 2   Memory 0   Attention: Digits 1   Attention: Letters 1   Attention: Serial 0   Language: Repeat 2   Language: Fluency 0   Abstraction 1   Delayed Recall 2  (MIS = 9/15)   Orientation 5   Does patient have less than or equal to 12 years of education? 0   MOCA Total Score 16   MOCA Comments Pt's score indicated a moderate cognitive impairment. Pt demonstrated poor visuospatiral/executive functional skills, limited recall and delayed recall skills, and decreased attention during assessment. Additional Treatment Session   Start Time 1267   End Time 1055   Treatment Assessment Pt seen for skilled OT treatment session from 3269 to 1055 w/ interventions focusing om assessing pt's cognition. Pt was agreeable and willing to participate in session. Pt engaged in the MoCA and scored a 16/30.  Pt's score indicated a moderate cognitive impairment. Pt demonstrated poor visuospatiral/executive functional skills, limited recall and delayed recall skills, and decreased attention during assessment. Pt continues to be functioning below baseline level as occupational performance remains limited by pain, decreased activity tolerance, and decreased cognition. From OT standpoint, recommendation at time of d/c would be home w/ OPOT (cog and fitness to drive) and increased family support. No further acute OT needs identified at this time as pt is engaging in ADLs at/near baseline level. Recommend continued active ADL participation and mobilization with hospital staff while in the hospital to increase pt’s endurance and strength upon D/C. D/C pt from OT caseload at this time. Additional Treatment Day 1       The patient's raw score on the AM-PAC Daily Activity Inpatient Short Form is 21. A raw score of greater than or equal to 19 suggests the patient may benefit from discharge to home. Please refer to the recommendation of the Occupational Therapist for safe discharge planning. Goals:    - Pt will tolerate therapeutic activities for greater than 15 minutes in order to increase tolerance for functional activities. - Pt will attend to functional tasks for 10 minutes with min to no vc for attention/redirection.    - Pt will participate in ongoing OT evaluation of cognitive skills to assist with safe d/c planning/recommendations.      Rebecca Tolentino MS, OTR/L

## 2023-10-10 ENCOUNTER — TELEPHONE (OUTPATIENT)
Dept: NEUROSURGERY | Facility: CLINIC | Age: 58
End: 2023-10-10

## 2023-10-10 NOTE — TELEPHONE ENCOUNTER
10/12/2023-CALLED PT, LEFT MESSAGE ON MACHINE CONFIRMING 10/23/2023 APT AND TO SCHEDULE CT HEAD PRIOR TO APT.    **WAITING FOR CT HEAD TO BE SCHEDULED**        10/10/2023-PT DISCHARGED TO HOME  10/23/2023 APT W/CT HEAD      10/06/2023-Tanna Cho PA-C   Please arrange 2-week hospital follow-up with AP with repeat CT head.

## 2023-10-20 NOTE — TELEPHONE ENCOUNTER
Tried to reach patient    Needs CTH prior to follow up, LMOM with instructions, phone numbers for CS and Office and new appointment info    Rescheduled

## 2023-10-23 ENCOUNTER — TELEPHONE (OUTPATIENT)
Dept: NEUROSURGERY | Facility: CLINIC | Age: 58
End: 2023-10-23

## 2023-10-23 NOTE — TELEPHONE ENCOUNTER
Called patient to reschedule appointment for 10/27/23 due to incompletion of cth vm was full sent a  un able to reach letter to address on file.  Cancelled appointment for 10/27/23

## 2023-12-07 ENCOUNTER — TELEPHONE (OUTPATIENT)
Age: 58
End: 2023-12-07

## 2023-12-07 NOTE — TELEPHONE ENCOUNTER
Caller: [id number] [employer]     Doctor: Hank Mcnulty    Reason for call: Medicaid called to verify if 12/8 appt is a NP or follow-up appt, and also verified appt address.     Call back#: n/a

## 2023-12-29 ENCOUNTER — TELEPHONE (OUTPATIENT)
Dept: PULMONOLOGY | Facility: CLINIC | Age: 58
End: 2023-12-29

## 2023-12-29 NOTE — TELEPHONE ENCOUNTER
Left message to patient per Insurance she needs to reschedule her split study that's scheduled for 1/7 to [facility].

## 2024-01-05 ENCOUNTER — OFFICE VISIT (OUTPATIENT)
Dept: OBGYN CLINIC | Facility: CLINIC | Age: 59
End: 2024-01-05

## 2024-01-05 VITALS
WEIGHT: 255 LBS | BODY MASS INDEX: 45.18 KG/M2 | HEIGHT: 63 IN | SYSTOLIC BLOOD PRESSURE: 136 MMHG | DIASTOLIC BLOOD PRESSURE: 80 MMHG

## 2024-01-05 DIAGNOSIS — M17.12 PRIMARY OSTEOARTHRITIS OF LEFT KNEE: Primary | ICD-10-CM

## 2024-01-05 DIAGNOSIS — M70.62 GREATER TROCHANTERIC BURSITIS OF LEFT HIP: ICD-10-CM

## 2024-01-05 RX ORDER — BUPIVACAINE HYDROCHLORIDE 2.5 MG/ML
2 INJECTION, SOLUTION INFILTRATION; PERINEURAL
Status: COMPLETED | OUTPATIENT
Start: 2024-01-05 | End: 2024-01-05

## 2024-01-05 RX ORDER — LIDOCAINE HYDROCHLORIDE 10 MG/ML
2 INJECTION, SOLUTION INFILTRATION; PERINEURAL
Status: COMPLETED | OUTPATIENT
Start: 2024-01-05 | End: 2024-01-05

## 2024-01-05 RX ORDER — BETAMETHASONE SODIUM PHOSPHATE AND BETAMETHASONE ACETATE 3; 3 MG/ML; MG/ML
6 INJECTION, SUSPENSION INTRA-ARTICULAR; INTRALESIONAL; INTRAMUSCULAR; SOFT TISSUE
Status: COMPLETED | OUTPATIENT
Start: 2024-01-05 | End: 2024-01-05

## 2024-01-05 RX ORDER — MELOXICAM 15 MG/1
15 TABLET ORAL DAILY
Qty: 30 TABLET | Refills: 0 | Status: SHIPPED | OUTPATIENT
Start: 2024-01-05

## 2024-01-05 RX ADMIN — BETAMETHASONE SODIUM PHOSPHATE AND BETAMETHASONE ACETATE 6 MG: 3; 3 INJECTION, SUSPENSION INTRA-ARTICULAR; INTRALESIONAL; INTRAMUSCULAR; SOFT TISSUE at 10:30

## 2024-01-05 RX ADMIN — BUPIVACAINE HYDROCHLORIDE 2 ML: 2.5 INJECTION, SOLUTION INFILTRATION; PERINEURAL at 10:30

## 2024-01-05 RX ADMIN — LIDOCAINE HYDROCHLORIDE 2 ML: 10 INJECTION, SOLUTION INFILTRATION; PERINEURAL at 10:30

## 2024-01-05 NOTE — PROGRESS NOTES
"Patient Name:  Deisy Soler  MRN:  [id number]    Assessment & Plan     Left knee DJD exacerbation.  Left hip greater trochanteric bursitis.  Corticosteroid injection performed today into the left knee joint.  Corticosteroid injection also performed into the left hip greater trochanteric bursa.  Referral to physical therapy for the left lower extremity.  Prescription for meloxicam  Authorization process initiated for left knee Durolane injection.  Follow-up in 3 months for left knee Durolane injection and repeat left hip greater trochanteric bursa injection at that time as indicated.    Chief Complaint     Follow-up left knee and left hip pain.    History of the Present Illness     Deisy Soler is a 58 y.o. female who returns to the office today with worsening left hip and left knee pain.  She was last seen by me on 8/28/2023.  At that time she received a corticosteroid injection into the left knee joint as well as the left hip greater trochanteric bursa.  She noted significant improvement up until approximately 2 weeks ago.  She notes a return of her pain.  She denies any injury or trauma.  She notes pain generalized about the left knee with associated swelling and stiffness.  She also notes significant pain in the lateral aspect of the left hip.  Pain is worse with increased activity as well as prolonged standing or walking.  She also notes increased pain in the left hip with lying on her left side.  She states due to the pain she has been walking differently which has led to some lumbar spine pain.  She denies any numbness and tingling in the left lower extremity.  She denies any weakness or instability.  No fevers or chills.  She currently takes over-the-counter anti-inflammatories with minimal improvement.  She has not performed any recent physical therapy for the left lower extremity.    Physical Exam     /80   Ht 5' 3\" (1.6 m)   Wt 116 kg (255 lb)   LMP 08/03/2016   BMI 45.17 kg/m²     Left " knee: No gross deformity.  Skin intact without erythema ecchymosis or swelling.  No effusion.  Generalized diffuse tenderness to palpation.  Range of motion 0-120 without significant pain.  Crepitation noted with passive range of motion.  Stable to varus and valgus stress without pain.  Stable Lachman test.  Negative posterior drawer test.  Negative London's test.  Positive patellar grind test.     Left hip: No gross deformity.  No tenderness anterior hip.  There is severe tenderness over the greater trochanter.  Hip range of motion is intact and nearly full in all planes with slight discomfort laterally.  Negative FADIR test.  Pain noted laterally with TAO test.  Negative logroll test.  Positive Gerald test.    Eyes: Anicteric sclerae.  ENT: Trachea midline.  Lungs: Normal respiratory effort.  CV: Capillary refill is less than 2 seconds.  Skin: Intact without erythema.  Lymph: No palpable lymphadenopathy.  Neuro: Sensation is grossly intact to light touch.  Psych: Mood and affect are appropriate.    Past Medical History:   Diagnosis Date    Disease of thyroid gland     Hernia     Hypertension        Past Surgical History:   Procedure Laterality Date    CHOLECYSTECTOMY      CORONARY ANGIOPLASTY WITH STENT PLACEMENT      IR DRAINAGE TUBE CHECK/CHANGE/REPOSITION/REINSERTION/UPSIZE  5/10/2022    IR DRAINAGE TUBE CHECK/CHANGE/REPOSITION/REINSERTION/UPSIZE  6/14/2022    IR DRAINAGE TUBE PLACEMENT  5/7/2022    IR DRAINAGE TUBE PLACEMENT  6/11/2022       No Known Allergies    Current Outpatient Medications on File Prior to Visit   Medication Sig Dispense Refill    acetaminophen (TYLENOL) 650 mg CR tablet Take 1 tablet (650 mg total) by mouth every 8 (eight) hours as needed for mild pain 30 tablet 0    albuterol (Ventolin HFA) 90 mcg/act inhaler Inhale 2 puffs every 6 (six) hours as needed for wheezing 18 g 1    amLODIPine (NORVASC) 10 mg tablet Take 1 tablet (10 mg total) by mouth in the morning.  0    aspirin (ECOTRIN  LOW STRENGTH) 81 mg EC tablet Take 81 mg by mouth      aspirin 81 mg chewable tablet Chew 1 tablet (81 mg total) daily 30 tablet 0    calcium carbonate (TUMS) 500 mg chewable tablet Chew 2 tablets (1,000 mg total)  as needed in the morning and 2 tablets (1,000 mg total) as needed at noon and 2 tablets (1,000 mg total) as needed in the evening (indigestion,heartburn).  0    citalopram (CeleXA) 40 mg tablet Take 1 tablet (40 mg total) by mouth in the morning.  0    clotrimazole (LOTRIMIN) 1 % cream APPLY TO AFFECTED AREA TWICE A DAY 30 g 0    hydrOXYzine HCL (ATARAX) 50 mg tablet Take 50 mg by mouth Three times daily as needed      ibuprofen (MOTRIN) 400 mg tablet Take 1 tablet (400 mg total) by mouth every 6 (six) hours as needed for mild pain 20 tablet 0    levothyroxine 150 mcg tablet Take 1 tablet (150 mcg total) by mouth daily in the early morning  0    Multiple Vitamin (multivitamin) capsule Take 1 tablet by mouth daily      traZODone (DESYREL) 150 mg tablet Take 0.5 tablets (75 mg total) by mouth daily at bedtime  0    budesonide (PULMICORT) 0.5 mg/2 mL nebulizer solution Inhale 500 mcg      pantoprazole (PROTONIX) 40 mg tablet Take 1 tablet (40 mg total) by mouth daily in the early morning 30 tablet 0    [DISCONTINUED] sodium chloride, PF, 0.9 % 10 mL by Intracatheter route daily for 120 doses Intracatheter flushing daily 300 mL 3     No current facility-administered medications on file prior to visit.       Social History     Tobacco Use    Smoking status: Former     Current packs/day: 0.00     Types: Cigarettes     Quit date:      Years since quittin.0    Smokeless tobacco: Never    Tobacco comments:     Social only   Vaping Use    Vaping status: Never Used   Substance Use Topics    Alcohol use: Not Currently    Drug use: Not Currently       History reviewed. No pertinent family history.    Review of Systems     As stated in the HPI. All other systems reviewed and are negative.      Large joint  arthrocentesis: L greater trochanteric bursa  Procedure Details  Location: hip - L greater trochanteric bursa  Needle size: 22 G  Ultrasound guidance: no  Approach: lateral  Medications administered: 2 mL bupivacaine 0.25 %; 2 mL lidocaine 1 %; 6 mg betamethasone acetate-betamethasone sodium phosphate 6 (3-3) mg/mL    Patient tolerance: patient tolerated the procedure well with no immediate complications  Dressing:  Sterile dressing applied      Large joint arthrocentesis: L knee  Procedure Details  Location: knee - L knee  Needle size: 22 G  Ultrasound guidance: no  Approach: anterolateral  Medications administered: 2 mL bupivacaine 0.25 %; 2 mL lidocaine 1 %; 6 mg betamethasone acetate-betamethasone sodium phosphate 6 (3-3) mg/mL    Patient tolerance: patient tolerated the procedure well with no immediate complications  Dressing:  Sterile dressing applied

## 2024-01-23 ENCOUNTER — PROCEDURE VISIT (OUTPATIENT)
Dept: OBGYN CLINIC | Facility: CLINIC | Age: 59
End: 2024-01-23

## 2024-01-23 VITALS
DIASTOLIC BLOOD PRESSURE: 74 MMHG | BODY MASS INDEX: 45.18 KG/M2 | HEART RATE: 90 BPM | RESPIRATION RATE: 95 BRPM | HEIGHT: 63 IN | SYSTOLIC BLOOD PRESSURE: 117 MMHG | WEIGHT: 255 LBS

## 2024-01-23 DIAGNOSIS — M17.12 PRIMARY OSTEOARTHRITIS OF LEFT KNEE: Primary | ICD-10-CM

## 2024-01-23 NOTE — PROGRESS NOTES
Patient Name:  Deisy Soler  MR#:  [id number]    The patient presents for left knee Durolane injection.  Currently pain is 7 out of 10 in intensity.    Large joint arthrocentesis: L knee  Procedure Details  Location: knee - L knee  Needle size: 22 G  Ultrasound guidance: no  Approach: anterolateral  Medications administered: 3 mL sodium hyaluronate 60 MG/3ML    Patient tolerance: patient tolerated the procedure well with no immediate complications  Dressing:  Sterile dressing applied        Reviewed post-injection care with patient.   Follow-up in 3 months for repeat evaluation.  Consider left knee intra-articular corticosteroid injection at that time as indicated.

## 2024-02-16 ENCOUNTER — TELEPHONE (OUTPATIENT)
Dept: OBGYN CLINIC | Facility: CLINIC | Age: 59
End: 2024-02-16

## 2024-10-30 NOTE — UTILIZATION REVIEW
Inpatient Admission Authorization Request   NOTIFICATION OF INPATIENT ADMISSION/INPATIENT AUTHORIZATION REQUEST   SERVICING FACILITY:   [facility]  Address: [address] [place] [address], [address] [place] [address]  Tax ID: [id number]  NPI: [id number]  Place of Service: Inpatient [address] [facility] Code: 24     ATTENDING PROVIDER:  Attending Name and NPI#: Albaro Jackson Md [[id number]]  Address: [address] [place] [address], [address] [place] [address]  Phone: [phone]     UTILIZATION REVIEW CONTACT:  [facility] Utilization   Network Utilization Review Department  Phone: [phone]  Fax: [phone]  Email: [email or web address]     PHYSICIAN ADVISORY SERVICES:  FOR HKNC-IF-HRTN REVIEW - MEDICAL NECESSITY DENIAL  Phone: [phone]  Fax: [phone]  Email: [email or web address]     TYPE OF REQUEST:  Inpatient Status     ADMISSION INFORMATION:  ADMISSION DATE/TIME: 6/9/22  2:08 PM  PATIENT DIAGNOSIS CODE/DESCRIPTION:  Splenic abscess [D73 3]  Back pain [M54 9]  DISCHARGE DATE/TIME: No discharge date for patient encounter  IMPORTANT INFORMATION:  Please contact the Keke Almaguer directly with any questions or concerns regarding this request  Department voicemails are confidential     Send requests for admission clinical reviews, concurrent reviews, approvals, and administrative denials due to lack of clinical to fax [phone]  Detail Level: Detailed Quality 226: Preventive Care And Screening: Tobacco Use: Screening And Cessation Intervention: Patient screened for tobacco use and is an ex/non-smoker